# Patient Record
Sex: MALE | Race: WHITE | NOT HISPANIC OR LATINO | Employment: OTHER | URBAN - METROPOLITAN AREA
[De-identification: names, ages, dates, MRNs, and addresses within clinical notes are randomized per-mention and may not be internally consistent; named-entity substitution may affect disease eponyms.]

---

## 2017-01-01 ENCOUNTER — APPOINTMENT (OUTPATIENT)
Dept: GENERAL RADIOLOGY | Facility: HOSPITAL | Age: 82
End: 2017-01-01
Attending: INTERNAL MEDICINE

## 2017-01-01 ENCOUNTER — APPOINTMENT (OUTPATIENT)
Dept: NEUROLOGY | Facility: HOSPITAL | Age: 82
End: 2017-01-01
Attending: EMERGENCY MEDICINE

## 2017-01-01 ENCOUNTER — TRANSCRIBE ORDERS (OUTPATIENT)
Dept: ADMINISTRATIVE | Facility: HOSPITAL | Age: 82
End: 2017-01-01

## 2017-01-01 ENCOUNTER — APPOINTMENT (OUTPATIENT)
Dept: GENERAL RADIOLOGY | Facility: HOSPITAL | Age: 82
End: 2017-01-01

## 2017-01-01 ENCOUNTER — LAB REQUISITION (OUTPATIENT)
Dept: LAB | Facility: HOSPITAL | Age: 82
End: 2017-01-01

## 2017-01-01 ENCOUNTER — RESULTS ENCOUNTER (OUTPATIENT)
Dept: TELEMETRY | Facility: HOSPITAL | Age: 82
End: 2017-01-01

## 2017-01-01 ENCOUNTER — APPOINTMENT (OUTPATIENT)
Dept: CT IMAGING | Facility: HOSPITAL | Age: 82
End: 2017-01-01

## 2017-01-01 ENCOUNTER — OFFICE VISIT (OUTPATIENT)
Dept: CARDIOLOGY | Facility: CLINIC | Age: 82
End: 2017-01-01

## 2017-01-01 ENCOUNTER — HOSPITAL ENCOUNTER (INPATIENT)
Facility: HOSPITAL | Age: 82
LOS: 9 days | End: 2018-01-07
Attending: EMERGENCY MEDICINE | Admitting: INTERNAL MEDICINE

## 2017-01-01 ENCOUNTER — HOSPITAL ENCOUNTER (INPATIENT)
Facility: HOSPITAL | Age: 82
LOS: 21 days | Discharge: HOME HEALTH | End: 2017-08-07
Attending: EMERGENCY MEDICINE | Admitting: FAMILY MEDICINE

## 2017-01-01 ENCOUNTER — APPOINTMENT (OUTPATIENT)
Dept: CT IMAGING | Facility: HOSPITAL | Age: 82
End: 2017-01-01
Attending: UROLOGY

## 2017-01-01 ENCOUNTER — APPOINTMENT (OUTPATIENT)
Dept: GENERAL RADIOLOGY | Facility: HOSPITAL | Age: 82
End: 2017-01-01
Attending: HOSPITALIST

## 2017-01-01 ENCOUNTER — APPOINTMENT (OUTPATIENT)
Dept: CARDIOLOGY | Facility: HOSPITAL | Age: 82
End: 2017-01-01

## 2017-01-01 ENCOUNTER — HOSPITAL ENCOUNTER (OUTPATIENT)
Facility: HOSPITAL | Age: 82
Setting detail: OBSERVATION
Discharge: HOME | End: 2017-11-23
Attending: EMERGENCY MEDICINE | Admitting: INTERNAL MEDICINE

## 2017-01-01 ENCOUNTER — LAB (OUTPATIENT)
Dept: LAB | Facility: HOSPITAL | Age: 82
End: 2017-01-01

## 2017-01-01 ENCOUNTER — HOSPITAL ENCOUNTER (OUTPATIENT)
Dept: CT IMAGING | Facility: HOSPITAL | Age: 82
Discharge: HOME | End: 2017-07-06
Attending: UROLOGY | Admitting: UROLOGY

## 2017-01-01 ENCOUNTER — HOSPITAL ENCOUNTER (INPATIENT)
Facility: HOSPITAL | Age: 82
LOS: 6 days | Discharge: HOME HEALTH | End: 2017-02-28
Attending: EMERGENCY MEDICINE | Admitting: FAMILY MEDICINE

## 2017-01-01 ENCOUNTER — HOSPITAL ENCOUNTER (OUTPATIENT)
Dept: INTERVENTIONAL RADIOLOGY/VASCULAR | Facility: HOSPITAL | Age: 82
Discharge: HOME | End: 2017-06-19
Attending: INTERNAL MEDICINE | Admitting: INTERNAL MEDICINE

## 2017-01-01 ENCOUNTER — HOSPITAL ENCOUNTER (INPATIENT)
Facility: HOSPITAL | Age: 82
LOS: 14 days | Discharge: NURSING, REHAB OR LONG-TERM CARE | End: 2017-10-03
Attending: EMERGENCY MEDICINE | Admitting: INTERNAL MEDICINE

## 2017-01-01 ENCOUNTER — HOSPITAL ENCOUNTER (OUTPATIENT)
Dept: GENERAL RADIOLOGY | Facility: HOSPITAL | Age: 82
Discharge: HOME | End: 2017-03-03
Attending: INTERNAL MEDICINE | Admitting: INTERNAL MEDICINE

## 2017-01-01 VITALS
HEIGHT: 72 IN | TEMPERATURE: 98 F | RESPIRATION RATE: 18 BRPM | OXYGEN SATURATION: 96 % | WEIGHT: 257.4 LBS | BODY MASS INDEX: 34.86 KG/M2 | SYSTOLIC BLOOD PRESSURE: 133 MMHG | HEART RATE: 87 BPM | DIASTOLIC BLOOD PRESSURE: 66 MMHG

## 2017-01-01 VITALS
BODY MASS INDEX: 35.84 KG/M2 | HEART RATE: 97 BPM | SYSTOLIC BLOOD PRESSURE: 161 MMHG | HEIGHT: 72 IN | DIASTOLIC BLOOD PRESSURE: 125 MMHG | WEIGHT: 264.6 LBS

## 2017-01-01 VITALS
WEIGHT: 237.7 LBS | HEIGHT: 74 IN | DIASTOLIC BLOOD PRESSURE: 79 MMHG | BODY MASS INDEX: 30.51 KG/M2 | RESPIRATION RATE: 16 BRPM | HEART RATE: 80 BPM | SYSTOLIC BLOOD PRESSURE: 122 MMHG | TEMPERATURE: 98.5 F | OXYGEN SATURATION: 100 %

## 2017-01-01 VITALS
OXYGEN SATURATION: 94 % | BODY MASS INDEX: 34.96 KG/M2 | RESPIRATION RATE: 18 BRPM | WEIGHT: 236 LBS | TEMPERATURE: 98.2 F | SYSTOLIC BLOOD PRESSURE: 122 MMHG | HEIGHT: 69 IN | HEART RATE: 89 BPM | DIASTOLIC BLOOD PRESSURE: 76 MMHG

## 2017-01-01 VITALS
HEART RATE: 82 BPM | TEMPERATURE: 98.4 F | SYSTOLIC BLOOD PRESSURE: 106 MMHG | WEIGHT: 228.4 LBS | BODY MASS INDEX: 30.94 KG/M2 | OXYGEN SATURATION: 97 % | DIASTOLIC BLOOD PRESSURE: 57 MMHG | RESPIRATION RATE: 18 BRPM | HEIGHT: 72 IN

## 2017-01-01 VITALS
DIASTOLIC BLOOD PRESSURE: 70 MMHG | TEMPERATURE: 97.8 F | RESPIRATION RATE: 17 BRPM | OXYGEN SATURATION: 92 % | HEART RATE: 91 BPM | WEIGHT: 236.5 LBS | SYSTOLIC BLOOD PRESSURE: 109 MMHG | HEIGHT: 72 IN | BODY MASS INDEX: 32.03 KG/M2

## 2017-01-01 DIAGNOSIS — B96.89 ACUTE BACTERIAL EPIGLOTTITIS: Primary | ICD-10-CM

## 2017-01-01 DIAGNOSIS — R09.02 HYPOXIA: ICD-10-CM

## 2017-01-01 DIAGNOSIS — N39.0 URINARY TRACT INFECTION, SITE NOT SPECIFIED: Primary | ICD-10-CM

## 2017-01-01 DIAGNOSIS — Z74.09 IMPAIRED FUNCTIONAL MOBILITY, BALANCE, GAIT, AND ENDURANCE: ICD-10-CM

## 2017-01-01 DIAGNOSIS — B96.89 OTHER SPECIFIED BACTERIAL AGENTS AS THE CAUSE OF DISEASES CLASSIFIED ELSEWHERE: ICD-10-CM

## 2017-01-01 DIAGNOSIS — E78.5 DYSLIPIDEMIA: Chronic | ICD-10-CM

## 2017-01-01 DIAGNOSIS — R25.1 EPISODE OF SHAKING: ICD-10-CM

## 2017-01-01 DIAGNOSIS — E11.9 TYPE 2 DIABETES MELLITUS WITHOUT COMPLICATION, WITH LONG-TERM CURRENT USE OF INSULIN (HCC): Chronic | ICD-10-CM

## 2017-01-01 DIAGNOSIS — I11.0 DIASTOLIC HEART FAILURE SECONDARY TO HYPERTENSION (HCC): Chronic | ICD-10-CM

## 2017-01-01 DIAGNOSIS — J45.51: Primary | ICD-10-CM

## 2017-01-01 DIAGNOSIS — N18.2 CKD (CHRONIC KIDNEY DISEASE), STAGE 2 (MILD): Chronic | ICD-10-CM

## 2017-01-01 DIAGNOSIS — Z78.9 IMPAIRED MOBILITY AND ADLS: ICD-10-CM

## 2017-01-01 DIAGNOSIS — N40.1 ENLARGED PROSTATE WITH LOWER URINARY TRACT SYMPTOMS (LUTS): ICD-10-CM

## 2017-01-01 DIAGNOSIS — I10 ESSENTIAL HYPERTENSION: Chronic | ICD-10-CM

## 2017-01-01 DIAGNOSIS — R60.0 BILATERAL EDEMA OF LOWER EXTREMITY: Chronic | ICD-10-CM

## 2017-01-01 DIAGNOSIS — R79.1 SUPRATHERAPEUTIC INR: ICD-10-CM

## 2017-01-01 DIAGNOSIS — I50.30 DIASTOLIC HEART FAILURE SECONDARY TO HYPERTENSION (HCC): Chronic | ICD-10-CM
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
## 2017-01-01 DIAGNOSIS — R60.9 PERIPHERAL EDEMA: ICD-10-CM

## 2017-01-01 DIAGNOSIS — Z74.09 IMPAIRED MOBILITY AND ADLS: ICD-10-CM

## 2017-01-01 DIAGNOSIS — R31.0 FRANK HEMATURIA: ICD-10-CM

## 2017-01-01 DIAGNOSIS — R06.02 SHORTNESS OF BREATH: ICD-10-CM

## 2017-01-01 DIAGNOSIS — N39.0 SEPSIS DUE TO URINARY TRACT INFECTION (HCC): Primary | ICD-10-CM

## 2017-01-01 DIAGNOSIS — Z86.79 HISTORY OF CORONARY ARTERY DISEASE: ICD-10-CM

## 2017-01-01 DIAGNOSIS — A41.9 SEPSIS DUE TO URINARY TRACT INFECTION (HCC): Primary | ICD-10-CM

## 2017-01-01 DIAGNOSIS — E11.9 TYPE 2 DIABETES MELLITUS WITHOUT COMPLICATION, UNSPECIFIED LONG TERM INSULIN USE STATUS: Chronic | ICD-10-CM

## 2017-01-01 DIAGNOSIS — I48.20 CHRONIC ATRIAL FIBRILLATION (HCC): Chronic | ICD-10-CM

## 2017-01-01 DIAGNOSIS — R53.83 FATIGUE, UNSPECIFIED TYPE: ICD-10-CM

## 2017-01-01 DIAGNOSIS — J05.10 ACUTE BACTERIAL EPIGLOTTITIS: Primary | ICD-10-CM

## 2017-01-01 DIAGNOSIS — R41.0 DISORIENTATION: ICD-10-CM

## 2017-01-01 DIAGNOSIS — M70.21 OLECRANON BURSITIS OF RIGHT ELBOW: ICD-10-CM

## 2017-01-01 DIAGNOSIS — J40 BRONCHITIS: ICD-10-CM

## 2017-01-01 DIAGNOSIS — E87.70 HYPERVOLEMIA, UNSPECIFIED HYPERVOLEMIA TYPE: Primary | ICD-10-CM

## 2017-01-01 DIAGNOSIS — R41.89 SPELL OF ALTERED COGNITION: Primary | ICD-10-CM

## 2017-01-01 DIAGNOSIS — R31.0 GROSS HEMATURIA: ICD-10-CM

## 2017-01-01 DIAGNOSIS — Z79.4 TYPE 2 DIABETES MELLITUS WITHOUT COMPLICATION, WITH LONG-TERM CURRENT USE OF INSULIN (HCC): Chronic | ICD-10-CM

## 2017-01-01 DIAGNOSIS — N17.9 ACUTE RENAL FAILURE, UNSPECIFIED ACUTE RENAL FAILURE TYPE (HCC): ICD-10-CM

## 2017-01-01 DIAGNOSIS — R31.0 FRANK HEMATURIA: Primary | ICD-10-CM

## 2017-01-01 DIAGNOSIS — R13.10 DYSPHAGIA, UNSPECIFIED TYPE: ICD-10-CM

## 2017-01-01 DIAGNOSIS — N39.0 URINARY TRACT INFECTION: ICD-10-CM

## 2017-01-01 DIAGNOSIS — A41.9 SEPSIS, DUE TO UNSPECIFIED ORGANISM: ICD-10-CM

## 2017-01-01 DIAGNOSIS — N39.0 URINARY TRACT INFECTION, SITE NOT SPECIFIED: ICD-10-CM

## 2017-01-01 DIAGNOSIS — Z16.24 RESISTANCE TO MULTIPLE ANTIBIOTICS: ICD-10-CM

## 2017-01-01 DIAGNOSIS — M25.552 LEFT HIP PAIN: Primary | ICD-10-CM

## 2017-01-01 DIAGNOSIS — I50.23 ACUTE ON CHRONIC SYSTOLIC CONGESTIVE HEART FAILURE (HCC): ICD-10-CM

## 2017-01-01 DIAGNOSIS — R33.8 OTHER RETENTION OF URINE: ICD-10-CM

## 2017-01-01 DIAGNOSIS — I25.810 CORONARY ARTERY DISEASE INVOLVING CORONARY BYPASS GRAFT OF NATIVE HEART WITHOUT ANGINA PECTORIS: Primary | ICD-10-CM

## 2017-01-01 DIAGNOSIS — I50.33 ACUTE ON CHRONIC DIASTOLIC CONGESTIVE HEART FAILURE (HCC): Primary | ICD-10-CM

## 2017-01-01 LAB
ALBUMIN SERPL-MCNC: 3.4 G/DL (ref 3.2–4.8)
ALBUMIN SERPL-MCNC: 3.5 G/DL (ref 3.2–4.8)
ALBUMIN SERPL-MCNC: 3.6 G/DL (ref 3.2–4.8)
ALBUMIN SERPL-MCNC: 3.7 G/DL (ref 3.2–4.8)
ALBUMIN SERPL-MCNC: 3.7 G/DL (ref 3.2–4.8)
ALBUMIN SERPL-MCNC: 3.8 G/DL (ref 3.2–4.8)
ALBUMIN SERPL-MCNC: 3.9 G/DL (ref 3.2–4.8)
ALBUMIN SERPL-MCNC: 4 G/DL (ref 3.2–4.8)
ALBUMIN SERPL-MCNC: 4.1 G/DL (ref 3.2–4.8)
ALBUMIN SERPL-MCNC: 4.3 G/DL (ref 3.2–4.8)
ALBUMIN/GLOB SERPL: 1.1 G/DL (ref 1.5–2.5)
ALBUMIN/GLOB SERPL: 1.2 G/DL (ref 1.5–2.5)
ALBUMIN/GLOB SERPL: 1.3 G/DL (ref 1.5–2.5)
ALBUMIN/GLOB SERPL: 1.4 G/DL (ref 1.5–2.5)
ALBUMIN/GLOB SERPL: 1.4 G/DL (ref 1.5–2.5)
ALBUMIN/GLOB SERPL: 1.5 G/DL (ref 1.5–2.5)
ALP SERPL-CCNC: 67 U/L (ref 25–100)
ALP SERPL-CCNC: 68 U/L (ref 25–100)
ALP SERPL-CCNC: 71 U/L (ref 25–100)
ALP SERPL-CCNC: 72 U/L (ref 25–100)
ALP SERPL-CCNC: 73 U/L (ref 25–100)
ALP SERPL-CCNC: 73 U/L (ref 25–100)
ALP SERPL-CCNC: 75 U/L (ref 25–100)
ALP SERPL-CCNC: 80 U/L (ref 25–100)
ALP SERPL-CCNC: 83 U/L (ref 25–100)
ALP SERPL-CCNC: 86 U/L (ref 25–100)
ALP SERPL-CCNC: 90 U/L (ref 25–100)
ALT SERPL W P-5'-P-CCNC: 11 U/L (ref 7–40)
ALT SERPL W P-5'-P-CCNC: 12 U/L (ref 7–40)
ALT SERPL W P-5'-P-CCNC: 12 U/L (ref 7–40)
ALT SERPL W P-5'-P-CCNC: 13 U/L (ref 7–40)
ALT SERPL W P-5'-P-CCNC: 15 U/L (ref 7–40)
ALT SERPL W P-5'-P-CCNC: 16 U/L (ref 7–40)
ALT SERPL W P-5'-P-CCNC: 20 U/L (ref 7–40)
ALT SERPL W P-5'-P-CCNC: 32 U/L (ref 7–40)
ALT SERPL W P-5'-P-CCNC: 32 U/L (ref 7–40)
AMPHET+METHAMPHET UR QL: NEGATIVE
AMPHETAMINES UR QL: NEGATIVE
ANION GAP SERPL CALCULATED.3IONS-SCNC: 1 MMOL/L (ref 3–11)
ANION GAP SERPL CALCULATED.3IONS-SCNC: 11 MMOL/L (ref 3–11)
ANION GAP SERPL CALCULATED.3IONS-SCNC: 11 MMOL/L (ref 3–11)
ANION GAP SERPL CALCULATED.3IONS-SCNC: 13 MMOL/L (ref 3–11)
ANION GAP SERPL CALCULATED.3IONS-SCNC: 2 MMOL/L (ref 3–11)
ANION GAP SERPL CALCULATED.3IONS-SCNC: 2 MMOL/L (ref 3–11)
ANION GAP SERPL CALCULATED.3IONS-SCNC: 3 MMOL/L (ref 3–11)
ANION GAP SERPL CALCULATED.3IONS-SCNC: 4 MMOL/L (ref 3–11)
ANION GAP SERPL CALCULATED.3IONS-SCNC: 5 MMOL/L (ref 3–11)
ANION GAP SERPL CALCULATED.3IONS-SCNC: 5 MMOL/L (ref 3–11)
ANION GAP SERPL CALCULATED.3IONS-SCNC: 6 MMOL/L (ref 3–11)
ANION GAP SERPL CALCULATED.3IONS-SCNC: 7 MMOL/L (ref 3–11)
ANION GAP SERPL CALCULATED.3IONS-SCNC: 8 MMOL/L (ref 3–11)
ANION GAP SERPL CALCULATED.3IONS-SCNC: 9 MMOL/L (ref 3–11)
ARTERIAL PATENCY WRIST A: ABNORMAL
ARTERIAL PATENCY WRIST A: POSITIVE
ARTICHOKE IGE QN: 58 MG/DL (ref 0–130)
AST SERPL-CCNC: 18 U/L (ref 0–33)
AST SERPL-CCNC: 19 U/L (ref 0–33)
AST SERPL-CCNC: 20 U/L (ref 0–33)
AST SERPL-CCNC: 20 U/L (ref 0–33)
AST SERPL-CCNC: 22 U/L (ref 0–33)
AST SERPL-CCNC: 22 U/L (ref 0–33)
AST SERPL-CCNC: 24 U/L (ref 0–33)
AST SERPL-CCNC: 25 U/L (ref 0–33)
AST SERPL-CCNC: 26 U/L (ref 0–33)
ATMOSPHERIC PRESS: ABNORMAL MMHG
BACTERIA FLD CULT: ABNORMAL
BACTERIA FLD CULT: ABNORMAL
BACTERIA FLD CULT: NORMAL
BACTERIA SPEC AEROBE CULT: ABNORMAL
BACTERIA SPEC AEROBE CULT: NORMAL
BACTERIA SPEC RESP CULT: ABNORMAL
BACTERIA SPEC RESP CULT: ABNORMAL
BACTERIA UR QL AUTO: ABNORMAL /HPF
BARBITURATES UR QL SCN: NEGATIVE
BASE EXCESS BLDA CALC-SCNC: -1.4 MMOL/L (ref 0–2)
BASE EXCESS BLDA CALC-SCNC: -1.8 MMOL/L (ref 0–2)
BASE EXCESS BLDA CALC-SCNC: -1.9 MMOL/L (ref 0–2)
BASE EXCESS BLDA CALC-SCNC: 9 MMOL/L (ref 0–2)
BASOPHILS # BLD AUTO: 0.01 10*3/MM3 (ref 0–0.2)
BASOPHILS # BLD AUTO: 0.02 10*3/MM3 (ref 0–0.2)
BASOPHILS # BLD AUTO: 0.03 10*3/MM3 (ref 0–0.2)
BASOPHILS # BLD AUTO: 0.04 10*3/MM3 (ref 0–0.2)
BASOPHILS # BLD MANUAL: 0 10*3/MM3 (ref 0–0.2)
BASOPHILS NFR BLD AUTO: 0 % (ref 0–1)
BASOPHILS NFR BLD AUTO: 0.1 % (ref 0–1)
BASOPHILS NFR BLD AUTO: 0.2 % (ref 0–1)
BASOPHILS NFR BLD AUTO: 0.3 % (ref 0–1)
BASOPHILS NFR BLD AUTO: 0.5 % (ref 0–1)
BASOPHILS NFR BLD AUTO: 0.7 % (ref 0–1)
BASOPHILS NFR BLD AUTO: 0.7 % (ref 0–1)
BASOPHILS NFR BLD AUTO: 0.8 % (ref 0–1)
BDY SITE: ABNORMAL
BENZODIAZ UR QL SCN: NEGATIVE
BH CV ECHO MEAS - AI DEC SLOPE: 326 CM/SEC^2
BH CV ECHO MEAS - AI MAX PG: 57 MMHG
BH CV ECHO MEAS - AI MAX VEL: 377.6 CM/SEC
BH CV ECHO MEAS - AI P1/2T: 339.2 MSEC
BH CV ECHO MEAS - AO MAX PG (FULL): 4.1 MMHG
BH CV ECHO MEAS - AO MAX PG: 6.7 MMHG
BH CV ECHO MEAS - AO MEAN PG (FULL): 2.3 MMHG
BH CV ECHO MEAS - AO MEAN PG: 3.5 MMHG
BH CV ECHO MEAS - AO ROOT AREA (BSA CORRECTED): 1.3
BH CV ECHO MEAS - AO ROOT AREA (BSA CORRECTED): 1.5
BH CV ECHO MEAS - AO ROOT AREA: 10.1 CM^2
BH CV ECHO MEAS - AO ROOT AREA: 8 CM^2
BH CV ECHO MEAS - AO ROOT DIAM: 3.2 CM
BH CV ECHO MEAS - AO ROOT DIAM: 3.6 CM
BH CV ECHO MEAS - AO V2 MAX: 129.1 CM/SEC
BH CV ECHO MEAS - AO V2 MEAN: 86.2 CM/SEC
BH CV ECHO MEAS - AO V2 VTI: 20.5 CM
BH CV ECHO MEAS - AVA(I,A): 2.5 CM^2
BH CV ECHO MEAS - AVA(I,D): 2.5 CM^2
BH CV ECHO MEAS - AVA(V,A): 2.3 CM^2
BH CV ECHO MEAS - AVA(V,D): 2.3 CM^2
BH CV ECHO MEAS - BSA(HAYCOCK): 2.4 M^2
BH CV ECHO MEAS - BSA(HAYCOCK): 2.5 M^2
BH CV ECHO MEAS - BSA: 2.3 M^2
BH CV ECHO MEAS - BSA: 2.4 M^2
BH CV ECHO MEAS - BZI_BMI: 30.2 KILOGRAMS/M^2
BH CV ECHO MEAS - BZI_BMI: 35.7 KILOGRAMS/M^2
BH CV ECHO MEAS - BZI_METRIC_HEIGHT: 182.9 CM
BH CV ECHO MEAS - BZI_METRIC_HEIGHT: 188 CM
BH CV ECHO MEAS - BZI_METRIC_WEIGHT: 106.6 KG
BH CV ECHO MEAS - BZI_METRIC_WEIGHT: 119.3 KG
BH CV ECHO MEAS - CONTRAST EF 4CH: 34.3 ML/M^2
BH CV ECHO MEAS - EDV(CUBED): 103.8 ML
BH CV ECHO MEAS - EDV(CUBED): 96.5 ML
BH CV ECHO MEAS - EDV(MOD-SP4): 99 ML
BH CV ECHO MEAS - EDV(TEICH): 102.4 ML
BH CV ECHO MEAS - EDV(TEICH): 96.7 ML
BH CV ECHO MEAS - EF(CUBED): 30.4 %
BH CV ECHO MEAS - EF(CUBED): 38.4 %
BH CV ECHO MEAS - EF(MOD-SP4): 34.3 %
BH CV ECHO MEAS - EF(TEICH): 24.8 %
BH CV ECHO MEAS - EF(TEICH): 31.6 %
BH CV ECHO MEAS - ESV(CUBED): 64 ML
BH CV ECHO MEAS - ESV(CUBED): 67.1 ML
BH CV ECHO MEAS - ESV(MOD-SP4): 65 ML
BH CV ECHO MEAS - ESV(TEICH): 70 ML
BH CV ECHO MEAS - ESV(TEICH): 72.7 ML
BH CV ECHO MEAS - FS: 11.4 %
BH CV ECHO MEAS - FS: 14.9 %
BH CV ECHO MEAS - IVS/LVPW: 0.99
BH CV ECHO MEAS - IVS/LVPW: 1.2
BH CV ECHO MEAS - IVSD: 1.3 CM
BH CV ECHO MEAS - IVSD: 1.5 CM
BH CV ECHO MEAS - LA DIMENSION: 4.4 CM
BH CV ECHO MEAS - LA DIMENSION: 4.8 CM
BH CV ECHO MEAS - LA/AO: 1.2
BH CV ECHO MEAS - LA/AO: 1.5
BH CV ECHO MEAS - LAT PEAK E' VEL: 5.4 CM/SEC
BH CV ECHO MEAS - LV DIASTOLIC VOL/BSA (35-75): 42.5 ML/M^2
BH CV ECHO MEAS - LV MASS(C)D: 231.3 GRAMS
BH CV ECHO MEAS - LV MASS(C)D: 238.1 GRAMS
BH CV ECHO MEAS - LV MASS(C)DI: 102.3 GRAMS/M^2
BH CV ECHO MEAS - LV MASS(C)DI: 96.6 GRAMS/M^2
BH CV ECHO MEAS - LV MAX PG: 2.5 MMHG
BH CV ECHO MEAS - LV MEAN PG: 1.2 MMHG
BH CV ECHO MEAS - LV SYSTOLIC VOL/BSA (12-30): 27.9 ML/M^2
BH CV ECHO MEAS - LV V1 MAX: 79.4 CM/SEC
BH CV ECHO MEAS - LV V1 MEAN: 50.4 CM/SEC
BH CV ECHO MEAS - LV V1 VTI: 13.6 CM
BH CV ECHO MEAS - LVIDD: 4.6 CM
BH CV ECHO MEAS - LVIDD: 4.7 CM
BH CV ECHO MEAS - LVIDS: 4 CM
BH CV ECHO MEAS - LVIDS: 4.1 CM
BH CV ECHO MEAS - LVLD AP4: 8.2 CM
BH CV ECHO MEAS - LVLS AP4: 7.6 CM
BH CV ECHO MEAS - LVOT AREA (M): 3.5 CM^2
BH CV ECHO MEAS - LVOT AREA (M): 3.8 CM^2
BH CV ECHO MEAS - LVOT AREA: 3.5 CM^2
BH CV ECHO MEAS - LVOT AREA: 3.8 CM^2
BH CV ECHO MEAS - LVOT DIAM: 2.1 CM
BH CV ECHO MEAS - LVOT DIAM: 2.2 CM
BH CV ECHO MEAS - LVPWD: 1.2 CM
BH CV ECHO MEAS - LVPWD: 1.3 CM
BH CV ECHO MEAS - MED PEAK E' VEL: 8.95 CM/SEC
BH CV ECHO MEAS - MV DEC SLOPE: 381 CM/SEC^2
BH CV ECHO MEAS - MV DEC TIME: 0.14 SEC
BH CV ECHO MEAS - MV DEC TIME: 0.18 SEC
BH CV ECHO MEAS - MV E MAX VEL: 110.4 CM/SEC
BH CV ECHO MEAS - MV E MAX VEL: 124 CM/SEC
BH CV ECHO MEAS - MV P1/2T MAX VEL: 125 CM/SEC
BH CV ECHO MEAS - MV P1/2T: 96.1 MSEC
BH CV ECHO MEAS - MVA P1/2T LCG: 1.8 CM^2
BH CV ECHO MEAS - MVA(P1/2T): 2.3 CM^2
BH CV ECHO MEAS - PA ACC SLOPE: 313 CM/SEC^2
BH CV ECHO MEAS - PA ACC SLOPE: 570.3 CM/SEC^2
BH CV ECHO MEAS - PA ACC TIME: 0.09 SEC
BH CV ECHO MEAS - PA ACC TIME: 0.14 SEC
BH CV ECHO MEAS - PA PR(ACCEL): 17.4 MMHG
BH CV ECHO MEAS - PA PR(ACCEL): 38.6 MMHG
BH CV ECHO MEAS - RAP SYSTOLE: 10 MMHG
BH CV ECHO MEAS - RVDD: 3.4 CM
BH CV ECHO MEAS - RVSP: 33 MMHG
BH CV ECHO MEAS - SI(AO): 88.6 ML/M^2
BH CV ECHO MEAS - SI(CUBED): 12.6 ML/M^2
BH CV ECHO MEAS - SI(CUBED): 16.6 ML/M^2
BH CV ECHO MEAS - SI(LVOT): 22 ML/M^2
BH CV ECHO MEAS - SI(MOD-SP4): 14.6 ML/M^2
BH CV ECHO MEAS - SI(TEICH): 10.3 ML/M^2
BH CV ECHO MEAS - SI(TEICH): 13.5 ML/M^2
BH CV ECHO MEAS - SV(AO): 206.1 ML
BH CV ECHO MEAS - SV(CUBED): 29.4 ML
BH CV ECHO MEAS - SV(CUBED): 39.8 ML
BH CV ECHO MEAS - SV(LVOT): 51.3 ML
BH CV ECHO MEAS - SV(MOD-SP4): 34 ML
BH CV ECHO MEAS - SV(TEICH): 24 ML
BH CV ECHO MEAS - SV(TEICH): 32.4 ML
BH CV ECHO MEAS - TR MAX VEL: 227.5 CM/SEC
BH CV ECHO MEAS - TR MAX VEL: 240 CM/SEC
BH CV VAS BP LEFT ARM: NORMAL MMHG
BH CV XLRA - RV BASE: 4.6 CM
BH CV XLRA - RV BASE: 4.8 CM
BH CV XLRA - RV LENGTH: 7.1 CM
BH CV XLRA - RV LENGTH: 7.3 CM
BH CV XLRA - RV MID: 3.8 CM
BH CV XLRA - RV MID: 3.9 CM
BH CV XLRA - TDI S': 4.51 CM/SEC
BILIRUB SERPL-MCNC: 0.4 MG/DL (ref 0.3–1.2)
BILIRUB SERPL-MCNC: 0.8 MG/DL (ref 0.3–1.2)
BILIRUB SERPL-MCNC: 0.9 MG/DL (ref 0.3–1.2)
BILIRUB SERPL-MCNC: 1.3 MG/DL (ref 0.3–1.2)
BILIRUB SERPL-MCNC: 1.4 MG/DL (ref 0.3–1.2)
BILIRUB SERPL-MCNC: 1.6 MG/DL (ref 0.3–1.2)
BILIRUB SERPL-MCNC: 1.7 MG/DL (ref 0.3–1.2)
BILIRUB SERPL-MCNC: 2 MG/DL (ref 0.3–1.2)
BILIRUB UR QL STRIP: ABNORMAL
BILIRUB UR QL STRIP: NEGATIVE
BNP SERPL-MCNC: 150 PG/ML (ref 0–100)
BNP SERPL-MCNC: 188 PG/ML (ref 0–100)
BNP SERPL-MCNC: 223 PG/ML (ref 0–100)
BNP SERPL-MCNC: 345 PG/ML (ref 0–100)
BNP SERPL-MCNC: 394 PG/ML (ref 0–100)
BNP SERPL-MCNC: 399 PG/ML (ref 0–100)
BNP SERPL-MCNC: 431 PG/ML (ref 0–100)
BNP SERPL-MCNC: 589 PG/ML (ref 0–100)
BNP SERPL-MCNC: 619 PG/ML (ref 0–100)
BUN BLD-MCNC: 14 MG/DL (ref 9–23)
BUN BLD-MCNC: 15 MG/DL (ref 9–23)
BUN BLD-MCNC: 16 MG/DL (ref 9–23)
BUN BLD-MCNC: 16 MG/DL (ref 9–23)
BUN BLD-MCNC: 17 MG/DL (ref 9–23)
BUN BLD-MCNC: 19 MG/DL (ref 9–23)
BUN BLD-MCNC: 20 MG/DL (ref 9–23)
BUN BLD-MCNC: 21 MG/DL (ref 9–23)
BUN BLD-MCNC: 22 MG/DL (ref 9–23)
BUN BLD-MCNC: 23 MG/DL (ref 9–23)
BUN BLD-MCNC: 24 MG/DL (ref 9–23)
BUN BLD-MCNC: 24 MG/DL (ref 9–23)
BUN BLD-MCNC: 25 MG/DL (ref 9–23)
BUN BLD-MCNC: 25 MG/DL (ref 9–23)
BUN BLD-MCNC: 26 MG/DL (ref 9–23)
BUN BLD-MCNC: 30 MG/DL (ref 9–23)
BUN BLD-MCNC: 32 MG/DL (ref 9–23)
BUN BLD-MCNC: 35 MG/DL (ref 9–23)
BUN BLD-MCNC: 35 MG/DL (ref 9–23)
BUN BLD-MCNC: 39 MG/DL (ref 9–23)
BUN BLD-MCNC: 41 MG/DL (ref 9–23)
BUN BLD-MCNC: 41 MG/DL (ref 9–23)
BUN/CREAT SERPL: 12.7 (ref 7–25)
BUN/CREAT SERPL: 13.3 (ref 7–25)
BUN/CREAT SERPL: 13.3 (ref 7–25)
BUN/CREAT SERPL: 13.6 (ref 7–25)
BUN/CREAT SERPL: 13.6 (ref 7–25)
BUN/CREAT SERPL: 14.6 (ref 7–25)
BUN/CREAT SERPL: 15 (ref 7–25)
BUN/CREAT SERPL: 15 (ref 7–25)
BUN/CREAT SERPL: 16.7 (ref 7–25)
BUN/CREAT SERPL: 16.7 (ref 7–25)
BUN/CREAT SERPL: 17 (ref 7–25)
BUN/CREAT SERPL: 17.3 (ref 7–25)
BUN/CREAT SERPL: 18.2 (ref 7–25)
BUN/CREAT SERPL: 19 (ref 7–25)
BUN/CREAT SERPL: 20 (ref 7–25)
BUN/CREAT SERPL: 20.9 (ref 7–25)
BUN/CREAT SERPL: 21 (ref 7–25)
BUN/CREAT SERPL: 22.2 (ref 7–25)
BUN/CREAT SERPL: 22.7 (ref 7–25)
BUN/CREAT SERPL: 23 (ref 7–25)
BUN/CREAT SERPL: 24 (ref 7–25)
BUN/CREAT SERPL: 24 (ref 7–25)
BUN/CREAT SERPL: 24.4 (ref 7–25)
BUN/CREAT SERPL: 25 (ref 7–25)
BUN/CREAT SERPL: 25 (ref 7–25)
BUN/CREAT SERPL: 25.6 (ref 7–25)
BUN/CREAT SERPL: 26 (ref 7–25)
BUN/CREAT SERPL: 29.1 (ref 7–25)
BUN/CREAT SERPL: 35 (ref 7–25)
BUN/CREAT SERPL: 37.3 (ref 7–25)
BUN/CREAT SERPL: 38.9 (ref 7–25)
BUN/CREAT SERPL: 41 (ref 7–25)
BUPRENORPHINE SERPL-MCNC: NEGATIVE NG/ML
CALCIUM SPEC-SCNC: 8.6 MG/DL (ref 8.7–10.4)
CALCIUM SPEC-SCNC: 8.7 MG/DL (ref 8.7–10.4)
CALCIUM SPEC-SCNC: 8.8 MG/DL (ref 8.7–10.4)
CALCIUM SPEC-SCNC: 8.9 MG/DL (ref 8.7–10.4)
CALCIUM SPEC-SCNC: 9 MG/DL (ref 8.7–10.4)
CALCIUM SPEC-SCNC: 9.1 MG/DL (ref 8.7–10.4)
CALCIUM SPEC-SCNC: 9.2 MG/DL (ref 8.7–10.4)
CALCIUM SPEC-SCNC: 9.3 MG/DL (ref 8.7–10.4)
CALCIUM SPEC-SCNC: 9.5 MG/DL (ref 8.7–10.4)
CALCIUM SPEC-SCNC: 9.7 MG/DL (ref 8.7–10.4)
CALCIUM SPEC-SCNC: 9.8 MG/DL (ref 8.7–10.4)
CALCIUM SPEC-SCNC: 9.8 MG/DL (ref 8.7–10.4)
CALCIUM SPEC-SCNC: 9.9 MG/DL (ref 8.7–10.4)
CANNABINOIDS SERPL QL: NEGATIVE
CHLORIDE SERPL-SCNC: 100 MMOL/L (ref 99–109)
CHLORIDE SERPL-SCNC: 102 MMOL/L (ref 99–109)
CHLORIDE SERPL-SCNC: 103 MMOL/L (ref 99–109)
CHLORIDE SERPL-SCNC: 104 MMOL/L (ref 99–109)
CHLORIDE SERPL-SCNC: 105 MMOL/L (ref 99–109)
CHLORIDE SERPL-SCNC: 106 MMOL/L (ref 99–109)
CHLORIDE SERPL-SCNC: 107 MMOL/L (ref 99–109)
CHLORIDE SERPL-SCNC: 97 MMOL/L (ref 99–109)
CHLORIDE SERPL-SCNC: 97 MMOL/L (ref 99–109)
CHLORIDE SERPL-SCNC: 98 MMOL/L (ref 99–109)
CHLORIDE SERPL-SCNC: 99 MMOL/L (ref 99–109)
CHLORIDE SERPL-SCNC: 99 MMOL/L (ref 99–109)
CHOLEST SERPL-MCNC: 102 MG/DL (ref 0–200)
CLARITY UR: ABNORMAL
CLARITY UR: CLEAR
CO2 BLDA-SCNC: 23.4 MMOL/L (ref 22–33)
CO2 BLDA-SCNC: 23.8 MMOL/L (ref 22–33)
CO2 BLDA-SCNC: 25.3 MMOL/L (ref 22–33)
CO2 BLDA-SCNC: 34.4 MMOL/L (ref 22–33)
CO2 SERPL-SCNC: 23 MMOL/L (ref 20–31)
CO2 SERPL-SCNC: 24 MMOL/L (ref 20–31)
CO2 SERPL-SCNC: 24 MMOL/L (ref 20–31)
CO2 SERPL-SCNC: 25 MMOL/L (ref 20–31)
CO2 SERPL-SCNC: 26 MMOL/L (ref 20–31)
CO2 SERPL-SCNC: 26 MMOL/L (ref 20–31)
CO2 SERPL-SCNC: 27 MMOL/L (ref 20–31)
CO2 SERPL-SCNC: 27 MMOL/L (ref 20–31)
CO2 SERPL-SCNC: 28 MMOL/L (ref 20–31)
CO2 SERPL-SCNC: 29 MMOL/L (ref 20–31)
CO2 SERPL-SCNC: 30 MMOL/L (ref 20–31)
CO2 SERPL-SCNC: 31 MMOL/L (ref 20–31)
CO2 SERPL-SCNC: 32 MMOL/L (ref 20–31)
CO2 SERPL-SCNC: 33 MMOL/L (ref 20–31)
CO2 SERPL-SCNC: 36 MMOL/L (ref 20–31)
CO2 SERPL-SCNC: 36 MMOL/L (ref 20–31)
COCAINE UR QL: NEGATIVE
COHGB MFR BLD: 1.7 % (ref 0–2)
COHGB MFR BLD: 1.9 % (ref 0–2)
COHGB MFR BLD: 2.3 % (ref 0–2)
COHGB MFR BLD: 2.7 % (ref 0–2)
COLOR UR: ABNORMAL
COLOR UR: YELLOW
CREAT BLD-MCNC: 0.9 MG/DL (ref 0.6–1.3)
CREAT BLD-MCNC: 1 MG/DL (ref 0.6–1.3)
CREAT BLD-MCNC: 1.1 MG/DL (ref 0.6–1.3)
CREAT BLD-MCNC: 1.2 MG/DL (ref 0.6–1.3)
CREAT BLD-MCNC: 1.3 MG/DL (ref 0.6–1.3)
CREAT BLD-MCNC: 1.6 MG/DL (ref 0.6–1.3)
CRP SERPL-MCNC: 0.23 MG/DL (ref 0–1)
CRP SERPL-MCNC: 0.36 MG/DL (ref 0–1)
CRP SERPL-MCNC: 0.43 MG/DL (ref 0–1)
CRP SERPL-MCNC: 0.47 MG/DL (ref 0–1)
CYTOLOGIST CVX/VAG CYTO: NORMAL
D DIMER PPP FEU-MCNC: 0.64 MG/L (FEU) (ref 0–0.5)
D-LACTATE SERPL-SCNC: 1.1 MMOL/L (ref 0.5–2)
D-LACTATE SERPL-SCNC: 1.9 MMOL/L (ref 0.5–2)
D-LACTATE SERPL-SCNC: 1.9 MMOL/L (ref 0.5–2)
D-LACTATE SERPL-SCNC: 3.7 MMOL/L (ref 0.5–2)
DEPRECATED RDW RBC AUTO: 46.8 FL (ref 37–54)
DEPRECATED RDW RBC AUTO: 47.8 FL (ref 37–54)
DEPRECATED RDW RBC AUTO: 48 FL (ref 37–54)
DEPRECATED RDW RBC AUTO: 48 FL (ref 37–54)
DEPRECATED RDW RBC AUTO: 48.2 FL (ref 37–54)
DEPRECATED RDW RBC AUTO: 48.3 FL (ref 37–54)
DEPRECATED RDW RBC AUTO: 48.9 FL (ref 37–54)
DEPRECATED RDW RBC AUTO: 49.7 FL (ref 37–54)
DEPRECATED RDW RBC AUTO: 50.4 FL (ref 37–54)
DEPRECATED RDW RBC AUTO: 50.9 FL (ref 37–54)
DEPRECATED RDW RBC AUTO: 51 FL (ref 37–54)
DEPRECATED RDW RBC AUTO: 51 FL (ref 37–54)
DEPRECATED RDW RBC AUTO: 51.1 FL (ref 37–54)
DEPRECATED RDW RBC AUTO: 51.6 FL (ref 37–54)
DEPRECATED RDW RBC AUTO: 52 FL (ref 37–54)
DEPRECATED RDW RBC AUTO: 52.1 FL (ref 37–54)
DEPRECATED RDW RBC AUTO: 53.8 FL (ref 37–54)
DEPRECATED RDW RBC AUTO: 53.8 FL (ref 37–54)
DEPRECATED RDW RBC AUTO: 54.1 FL (ref 37–54)
DEPRECATED RDW RBC AUTO: 54.6 FL (ref 37–54)
DEPRECATED RDW RBC AUTO: 54.8 FL (ref 37–54)
DEPRECATED RDW RBC AUTO: 54.9 FL (ref 37–54)
DEPRECATED RDW RBC AUTO: 55.2 FL (ref 37–54)
DEPRECATED RDW RBC AUTO: 55.4 FL (ref 37–54)
DEPRECATED RDW RBC AUTO: 55.9 FL (ref 37–54)
DEPRECATED RDW RBC AUTO: 56 FL (ref 37–54)
DEPRECATED RDW RBC AUTO: 56.2 FL (ref 37–54)
DEPRECATED RDW RBC AUTO: 56.7 FL (ref 37–54)
DEPRECATED RDW RBC AUTO: 60.8 FL (ref 37–54)
DEPRECATED RDW RBC AUTO: 63.5 FL (ref 37–54)
DEPRECATED RDW RBC AUTO: 63.9 FL (ref 37–54)
E/E' RATIO: 16.3
EOSINOPHIL # BLD AUTO: 0 10*3/MM3 (ref 0.1–0.3)
EOSINOPHIL # BLD AUTO: 0 10*3/MM3 (ref 0–0.3)
EOSINOPHIL # BLD AUTO: 0.01 10*3/MM3 (ref 0.1–0.3)
EOSINOPHIL # BLD AUTO: 0.01 10*3/MM3 (ref 0–0.3)
EOSINOPHIL # BLD AUTO: 0.02 10*3/MM3 (ref 0–0.3)
EOSINOPHIL # BLD AUTO: 0.04 10*3/MM3 (ref 0–0.3)
EOSINOPHIL # BLD AUTO: 0.05 10*3/MM3 (ref 0–0.3)
EOSINOPHIL # BLD AUTO: 0.06 10*3/MM3 (ref 0–0.3)
EOSINOPHIL # BLD AUTO: 0.06 10*3/MM3 (ref 0–0.3)
EOSINOPHIL # BLD AUTO: 0.07 10*3/MM3 (ref 0–0.3)
EOSINOPHIL # BLD AUTO: 0.08 10*3/MM3 (ref 0.1–0.3)
EOSINOPHIL # BLD AUTO: 0.08 10*3/MM3 (ref 0–0.3)
EOSINOPHIL # BLD AUTO: 0.08 10*3/MM3 (ref 0–0.3)
EOSINOPHIL # BLD AUTO: 0.12 10*3/MM3 (ref 0–0.3)
EOSINOPHIL # BLD AUTO: 0.16 10*3/MM3 (ref 0–0.3)
EOSINOPHIL # BLD MANUAL: 0 10*3/MM3 (ref 0.1–0.3)
EOSINOPHIL NFR BLD AUTO: 0 % (ref 0–3)
EOSINOPHIL NFR BLD AUTO: 0.1 % (ref 0–3)
EOSINOPHIL NFR BLD AUTO: 0.1 % (ref 0–3)
EOSINOPHIL NFR BLD AUTO: 0.2 % (ref 0–3)
EOSINOPHIL NFR BLD AUTO: 1.1 % (ref 0–3)
EOSINOPHIL NFR BLD AUTO: 1.2 % (ref 0–3)
EOSINOPHIL NFR BLD AUTO: 1.4 % (ref 0–3)
EOSINOPHIL NFR BLD AUTO: 1.5 % (ref 0–3)
EOSINOPHIL NFR BLD AUTO: 1.6 % (ref 0–3)
EOSINOPHIL NFR BLD AUTO: 1.6 % (ref 0–3)
EOSINOPHIL NFR BLD AUTO: 1.7 % (ref 0–3)
EOSINOPHIL NFR BLD AUTO: 1.9 % (ref 0–3)
EOSINOPHIL NFR BLD AUTO: 2.2 % (ref 0–3)
EOSINOPHIL NFR BLD AUTO: 2.8 % (ref 0–3)
EOSINOPHIL NFR BLD AUTO: 3 % (ref 0–3)
EOSINOPHIL NFR BLD AUTO: 3.7 % (ref 0–3)
EOSINOPHIL NFR BLD MANUAL: 0 % (ref 0–3)
ERYTHROCYTE [DISTWIDTH] IN BLOOD BY AUTOMATED COUNT: 14.6 % (ref 11.3–14.5)
ERYTHROCYTE [DISTWIDTH] IN BLOOD BY AUTOMATED COUNT: 14.7 % (ref 11.3–14.5)
ERYTHROCYTE [DISTWIDTH] IN BLOOD BY AUTOMATED COUNT: 14.8 % (ref 11.3–14.5)
ERYTHROCYTE [DISTWIDTH] IN BLOOD BY AUTOMATED COUNT: 14.9 % (ref 11.3–14.5)
ERYTHROCYTE [DISTWIDTH] IN BLOOD BY AUTOMATED COUNT: 15 % (ref 11.3–14.5)
ERYTHROCYTE [DISTWIDTH] IN BLOOD BY AUTOMATED COUNT: 15 % (ref 11.3–14.5)
ERYTHROCYTE [DISTWIDTH] IN BLOOD BY AUTOMATED COUNT: 15.1 % (ref 11.3–14.5)
ERYTHROCYTE [DISTWIDTH] IN BLOOD BY AUTOMATED COUNT: 15.2 % (ref 11.3–14.5)
ERYTHROCYTE [DISTWIDTH] IN BLOOD BY AUTOMATED COUNT: 15.2 % (ref 11.3–14.5)
ERYTHROCYTE [DISTWIDTH] IN BLOOD BY AUTOMATED COUNT: 15.3 % (ref 11.3–14.5)
ERYTHROCYTE [DISTWIDTH] IN BLOOD BY AUTOMATED COUNT: 15.4 % (ref 11.3–14.5)
ERYTHROCYTE [DISTWIDTH] IN BLOOD BY AUTOMATED COUNT: 16.6 % (ref 11.3–14.5)
ERYTHROCYTE [DISTWIDTH] IN BLOOD BY AUTOMATED COUNT: 16.7 % (ref 11.3–14.5)
ERYTHROCYTE [DISTWIDTH] IN BLOOD BY AUTOMATED COUNT: 16.7 % (ref 11.3–14.5)
ERYTHROCYTE [DISTWIDTH] IN BLOOD BY AUTOMATED COUNT: 16.9 % (ref 11.3–14.5)
ERYTHROCYTE [DISTWIDTH] IN BLOOD BY AUTOMATED COUNT: 17.1 % (ref 11.3–14.5)
ERYTHROCYTE [DISTWIDTH] IN BLOOD BY AUTOMATED COUNT: 17.3 % (ref 11.3–14.5)
ERYTHROCYTE [DISTWIDTH] IN BLOOD BY AUTOMATED COUNT: 17.5 % (ref 11.3–14.5)
ERYTHROCYTE [DISTWIDTH] IN BLOOD BY AUTOMATED COUNT: 17.7 % (ref 11.3–14.5)
ERYTHROCYTE [DISTWIDTH] IN BLOOD BY AUTOMATED COUNT: 19.1 % (ref 11.3–14.5)
ERYTHROCYTE [DISTWIDTH] IN BLOOD BY AUTOMATED COUNT: 19.4 % (ref 11.3–14.5)
ERYTHROCYTE [DISTWIDTH] IN BLOOD BY AUTOMATED COUNT: 19.5 % (ref 11.3–14.5)
ERYTHROCYTE [SEDIMENTATION RATE] IN BLOOD: 19 MM/HR (ref 0–20)
ERYTHROCYTE [SEDIMENTATION RATE] IN BLOOD: 20 MM/HR (ref 0–20)
ERYTHROCYTE [SEDIMENTATION RATE] IN BLOOD: 28 MM/HR (ref 0–20)
ERYTHROCYTE [SEDIMENTATION RATE] IN BLOOD: 33 MM/HR (ref 0–20)
FLUAV AG NPH QL: NEGATIVE
FLUBV AG NPH QL IA: NEGATIVE
GAS FLOW AIRWAY: 2 LPM
GFR SERPL CREATININE-BSD FRML MDRD: 41 ML/MIN/1.73
GFR SERPL CREATININE-BSD FRML MDRD: 53 ML/MIN/1.73
GFR SERPL CREATININE-BSD FRML MDRD: 58 ML/MIN/1.73
GFR SERPL CREATININE-BSD FRML MDRD: 64 ML/MIN/1.73
GFR SERPL CREATININE-BSD FRML MDRD: 71 ML/MIN/1.73
GFR SERPL CREATININE-BSD FRML MDRD: 72 ML/MIN/1.73
GFR SERPL CREATININE-BSD FRML MDRD: 81 ML/MIN/1.73
GLOBULIN UR ELPH-MCNC: 2.8 GM/DL
GLOBULIN UR ELPH-MCNC: 2.9 GM/DL
GLOBULIN UR ELPH-MCNC: 3 GM/DL
GLOBULIN UR ELPH-MCNC: 3.2 GM/DL
GLOBULIN UR ELPH-MCNC: 3.4 GM/DL
GLOBULIN UR ELPH-MCNC: 3.5 GM/DL
GLUCOSE BLD-MCNC: 101 MG/DL (ref 70–100)
GLUCOSE BLD-MCNC: 103 MG/DL (ref 70–100)
GLUCOSE BLD-MCNC: 104 MG/DL (ref 70–100)
GLUCOSE BLD-MCNC: 104 MG/DL (ref 70–100)
GLUCOSE BLD-MCNC: 107 MG/DL (ref 70–100)
GLUCOSE BLD-MCNC: 108 MG/DL (ref 70–100)
GLUCOSE BLD-MCNC: 116 MG/DL (ref 70–100)
GLUCOSE BLD-MCNC: 118 MG/DL (ref 70–100)
GLUCOSE BLD-MCNC: 119 MG/DL (ref 70–100)
GLUCOSE BLD-MCNC: 119 MG/DL (ref 70–100)
GLUCOSE BLD-MCNC: 121 MG/DL (ref 70–100)
GLUCOSE BLD-MCNC: 125 MG/DL (ref 70–100)
GLUCOSE BLD-MCNC: 128 MG/DL (ref 70–100)
GLUCOSE BLD-MCNC: 136 MG/DL (ref 70–100)
GLUCOSE BLD-MCNC: 143 MG/DL (ref 70–100)
GLUCOSE BLD-MCNC: 144 MG/DL (ref 70–100)
GLUCOSE BLD-MCNC: 151 MG/DL (ref 70–100)
GLUCOSE BLD-MCNC: 154 MG/DL (ref 70–100)
GLUCOSE BLD-MCNC: 154 MG/DL (ref 70–100)
GLUCOSE BLD-MCNC: 166 MG/DL (ref 70–100)
GLUCOSE BLD-MCNC: 176 MG/DL (ref 70–100)
GLUCOSE BLD-MCNC: 177 MG/DL (ref 70–100)
GLUCOSE BLD-MCNC: 177 MG/DL (ref 70–100)
GLUCOSE BLD-MCNC: 195 MG/DL (ref 70–100)
GLUCOSE BLD-MCNC: 201 MG/DL (ref 70–100)
GLUCOSE BLD-MCNC: 202 MG/DL (ref 70–100)
GLUCOSE BLD-MCNC: 222 MG/DL (ref 70–100)
GLUCOSE BLD-MCNC: 234 MG/DL (ref 70–100)
GLUCOSE BLD-MCNC: 71 MG/DL (ref 70–100)
GLUCOSE BLD-MCNC: 71 MG/DL (ref 70–100)
GLUCOSE BLD-MCNC: 79 MG/DL (ref 70–100)
GLUCOSE BLD-MCNC: 80 MG/DL (ref 70–100)
GLUCOSE BLD-MCNC: 81 MG/DL (ref 70–100)
GLUCOSE BLD-MCNC: 84 MG/DL (ref 70–100)
GLUCOSE BLD-MCNC: 87 MG/DL (ref 70–100)
GLUCOSE BLD-MCNC: 88 MG/DL (ref 70–100)
GLUCOSE BLD-MCNC: 94 MG/DL (ref 70–100)
GLUCOSE BLDC GLUCOMTR-MCNC: 100 MG/DL (ref 70–130)
GLUCOSE BLDC GLUCOMTR-MCNC: 102 MG/DL (ref 70–130)
GLUCOSE BLDC GLUCOMTR-MCNC: 102 MG/DL (ref 70–130)
GLUCOSE BLDC GLUCOMTR-MCNC: 103 MG/DL (ref 70–130)
GLUCOSE BLDC GLUCOMTR-MCNC: 104 MG/DL (ref 70–130)
GLUCOSE BLDC GLUCOMTR-MCNC: 105 MG/DL (ref 70–130)
GLUCOSE BLDC GLUCOMTR-MCNC: 107 MG/DL (ref 70–130)
GLUCOSE BLDC GLUCOMTR-MCNC: 109 MG/DL (ref 70–130)
GLUCOSE BLDC GLUCOMTR-MCNC: 109 MG/DL (ref 70–130)
GLUCOSE BLDC GLUCOMTR-MCNC: 111 MG/DL (ref 70–130)
GLUCOSE BLDC GLUCOMTR-MCNC: 113 MG/DL (ref 70–130)
GLUCOSE BLDC GLUCOMTR-MCNC: 114 MG/DL (ref 70–130)
GLUCOSE BLDC GLUCOMTR-MCNC: 114 MG/DL (ref 70–130)
GLUCOSE BLDC GLUCOMTR-MCNC: 116 MG/DL (ref 70–130)
GLUCOSE BLDC GLUCOMTR-MCNC: 117 MG/DL (ref 70–130)
GLUCOSE BLDC GLUCOMTR-MCNC: 117 MG/DL (ref 70–130)
GLUCOSE BLDC GLUCOMTR-MCNC: 119 MG/DL (ref 70–130)
GLUCOSE BLDC GLUCOMTR-MCNC: 121 MG/DL (ref 70–130)
GLUCOSE BLDC GLUCOMTR-MCNC: 122 MG/DL (ref 70–130)
GLUCOSE BLDC GLUCOMTR-MCNC: 124 MG/DL (ref 70–130)
GLUCOSE BLDC GLUCOMTR-MCNC: 124 MG/DL (ref 70–130)
GLUCOSE BLDC GLUCOMTR-MCNC: 125 MG/DL (ref 70–130)
GLUCOSE BLDC GLUCOMTR-MCNC: 125 MG/DL (ref 70–130)
GLUCOSE BLDC GLUCOMTR-MCNC: 127 MG/DL (ref 70–130)
GLUCOSE BLDC GLUCOMTR-MCNC: 127 MG/DL (ref 70–130)
GLUCOSE BLDC GLUCOMTR-MCNC: 128 MG/DL (ref 70–130)
GLUCOSE BLDC GLUCOMTR-MCNC: 129 MG/DL (ref 70–130)
GLUCOSE BLDC GLUCOMTR-MCNC: 130 MG/DL (ref 70–130)
GLUCOSE BLDC GLUCOMTR-MCNC: 132 MG/DL (ref 70–130)
GLUCOSE BLDC GLUCOMTR-MCNC: 132 MG/DL (ref 70–130)
GLUCOSE BLDC GLUCOMTR-MCNC: 133 MG/DL (ref 70–130)
GLUCOSE BLDC GLUCOMTR-MCNC: 134 MG/DL (ref 70–130)
GLUCOSE BLDC GLUCOMTR-MCNC: 134 MG/DL (ref 70–130)
GLUCOSE BLDC GLUCOMTR-MCNC: 136 MG/DL (ref 70–130)
GLUCOSE BLDC GLUCOMTR-MCNC: 136 MG/DL (ref 70–130)
GLUCOSE BLDC GLUCOMTR-MCNC: 138 MG/DL (ref 70–130)
GLUCOSE BLDC GLUCOMTR-MCNC: 140 MG/DL (ref 70–130)
GLUCOSE BLDC GLUCOMTR-MCNC: 140 MG/DL (ref 70–130)
GLUCOSE BLDC GLUCOMTR-MCNC: 144 MG/DL (ref 70–130)
GLUCOSE BLDC GLUCOMTR-MCNC: 145 MG/DL (ref 70–130)
GLUCOSE BLDC GLUCOMTR-MCNC: 146 MG/DL (ref 70–130)
GLUCOSE BLDC GLUCOMTR-MCNC: 147 MG/DL (ref 70–130)
GLUCOSE BLDC GLUCOMTR-MCNC: 148 MG/DL (ref 70–130)
GLUCOSE BLDC GLUCOMTR-MCNC: 149 MG/DL (ref 70–130)
GLUCOSE BLDC GLUCOMTR-MCNC: 154 MG/DL (ref 70–130)
GLUCOSE BLDC GLUCOMTR-MCNC: 156 MG/DL (ref 70–130)
GLUCOSE BLDC GLUCOMTR-MCNC: 157 MG/DL (ref 70–130)
GLUCOSE BLDC GLUCOMTR-MCNC: 158 MG/DL (ref 70–130)
GLUCOSE BLDC GLUCOMTR-MCNC: 159 MG/DL (ref 70–130)
GLUCOSE BLDC GLUCOMTR-MCNC: 160 MG/DL (ref 70–130)
GLUCOSE BLDC GLUCOMTR-MCNC: 162 MG/DL (ref 70–130)
GLUCOSE BLDC GLUCOMTR-MCNC: 163 MG/DL (ref 70–130)
GLUCOSE BLDC GLUCOMTR-MCNC: 163 MG/DL (ref 70–130)
GLUCOSE BLDC GLUCOMTR-MCNC: 166 MG/DL (ref 70–130)
GLUCOSE BLDC GLUCOMTR-MCNC: 166 MG/DL (ref 70–130)
GLUCOSE BLDC GLUCOMTR-MCNC: 167 MG/DL (ref 70–130)
GLUCOSE BLDC GLUCOMTR-MCNC: 167 MG/DL (ref 70–130)
GLUCOSE BLDC GLUCOMTR-MCNC: 168 MG/DL (ref 70–130)
GLUCOSE BLDC GLUCOMTR-MCNC: 169 MG/DL (ref 70–130)
GLUCOSE BLDC GLUCOMTR-MCNC: 169 MG/DL (ref 70–130)
GLUCOSE BLDC GLUCOMTR-MCNC: 170 MG/DL (ref 70–130)
GLUCOSE BLDC GLUCOMTR-MCNC: 170 MG/DL (ref 70–130)
GLUCOSE BLDC GLUCOMTR-MCNC: 171 MG/DL (ref 70–130)
GLUCOSE BLDC GLUCOMTR-MCNC: 172 MG/DL (ref 70–130)
GLUCOSE BLDC GLUCOMTR-MCNC: 173 MG/DL (ref 70–130)
GLUCOSE BLDC GLUCOMTR-MCNC: 173 MG/DL (ref 70–130)
GLUCOSE BLDC GLUCOMTR-MCNC: 174 MG/DL (ref 70–130)
GLUCOSE BLDC GLUCOMTR-MCNC: 175 MG/DL (ref 70–130)
GLUCOSE BLDC GLUCOMTR-MCNC: 176 MG/DL (ref 70–130)
GLUCOSE BLDC GLUCOMTR-MCNC: 178 MG/DL (ref 70–130)
GLUCOSE BLDC GLUCOMTR-MCNC: 178 MG/DL (ref 70–130)
GLUCOSE BLDC GLUCOMTR-MCNC: 179 MG/DL (ref 70–130)
GLUCOSE BLDC GLUCOMTR-MCNC: 181 MG/DL (ref 70–130)
GLUCOSE BLDC GLUCOMTR-MCNC: 186 MG/DL (ref 70–130)
GLUCOSE BLDC GLUCOMTR-MCNC: 187 MG/DL (ref 70–130)
GLUCOSE BLDC GLUCOMTR-MCNC: 191 MG/DL (ref 70–130)
GLUCOSE BLDC GLUCOMTR-MCNC: 192 MG/DL (ref 70–130)
GLUCOSE BLDC GLUCOMTR-MCNC: 193 MG/DL (ref 70–130)
GLUCOSE BLDC GLUCOMTR-MCNC: 193 MG/DL (ref 70–130)
GLUCOSE BLDC GLUCOMTR-MCNC: 196 MG/DL (ref 70–130)
GLUCOSE BLDC GLUCOMTR-MCNC: 199 MG/DL (ref 70–130)
GLUCOSE BLDC GLUCOMTR-MCNC: 201 MG/DL (ref 70–130)
GLUCOSE BLDC GLUCOMTR-MCNC: 203 MG/DL (ref 70–130)
GLUCOSE BLDC GLUCOMTR-MCNC: 203 MG/DL (ref 70–130)
GLUCOSE BLDC GLUCOMTR-MCNC: 204 MG/DL (ref 70–130)
GLUCOSE BLDC GLUCOMTR-MCNC: 204 MG/DL (ref 70–130)
GLUCOSE BLDC GLUCOMTR-MCNC: 206 MG/DL (ref 70–130)
GLUCOSE BLDC GLUCOMTR-MCNC: 206 MG/DL (ref 70–130)
GLUCOSE BLDC GLUCOMTR-MCNC: 207 MG/DL (ref 70–130)
GLUCOSE BLDC GLUCOMTR-MCNC: 208 MG/DL (ref 70–130)
GLUCOSE BLDC GLUCOMTR-MCNC: 209 MG/DL (ref 70–130)
GLUCOSE BLDC GLUCOMTR-MCNC: 210 MG/DL (ref 70–130)
GLUCOSE BLDC GLUCOMTR-MCNC: 212 MG/DL (ref 70–130)
GLUCOSE BLDC GLUCOMTR-MCNC: 213 MG/DL (ref 70–130)
GLUCOSE BLDC GLUCOMTR-MCNC: 214 MG/DL (ref 70–130)
GLUCOSE BLDC GLUCOMTR-MCNC: 214 MG/DL (ref 70–130)
GLUCOSE BLDC GLUCOMTR-MCNC: 216 MG/DL (ref 70–130)
GLUCOSE BLDC GLUCOMTR-MCNC: 217 MG/DL (ref 70–130)
GLUCOSE BLDC GLUCOMTR-MCNC: 217 MG/DL (ref 70–130)
GLUCOSE BLDC GLUCOMTR-MCNC: 218 MG/DL (ref 70–130)
GLUCOSE BLDC GLUCOMTR-MCNC: 219 MG/DL (ref 70–130)
GLUCOSE BLDC GLUCOMTR-MCNC: 219 MG/DL (ref 70–130)
GLUCOSE BLDC GLUCOMTR-MCNC: 220 MG/DL (ref 70–130)
GLUCOSE BLDC GLUCOMTR-MCNC: 220 MG/DL (ref 70–130)
GLUCOSE BLDC GLUCOMTR-MCNC: 221 MG/DL (ref 70–130)
GLUCOSE BLDC GLUCOMTR-MCNC: 222 MG/DL (ref 70–130)
GLUCOSE BLDC GLUCOMTR-MCNC: 232 MG/DL (ref 70–130)
GLUCOSE BLDC GLUCOMTR-MCNC: 232 MG/DL (ref 70–130)
GLUCOSE BLDC GLUCOMTR-MCNC: 234 MG/DL (ref 70–130)
GLUCOSE BLDC GLUCOMTR-MCNC: 234 MG/DL (ref 70–130)
GLUCOSE BLDC GLUCOMTR-MCNC: 235 MG/DL (ref 70–130)
GLUCOSE BLDC GLUCOMTR-MCNC: 237 MG/DL (ref 70–130)
GLUCOSE BLDC GLUCOMTR-MCNC: 239 MG/DL (ref 70–130)
GLUCOSE BLDC GLUCOMTR-MCNC: 248 MG/DL (ref 70–130)
GLUCOSE BLDC GLUCOMTR-MCNC: 249 MG/DL (ref 70–130)
GLUCOSE BLDC GLUCOMTR-MCNC: 260 MG/DL (ref 70–130)
GLUCOSE BLDC GLUCOMTR-MCNC: 261 MG/DL (ref 70–130)
GLUCOSE BLDC GLUCOMTR-MCNC: 265 MG/DL (ref 70–130)
GLUCOSE BLDC GLUCOMTR-MCNC: 272 MG/DL (ref 70–130)
GLUCOSE BLDC GLUCOMTR-MCNC: 275 MG/DL (ref 70–130)
GLUCOSE BLDC GLUCOMTR-MCNC: 276 MG/DL (ref 70–130)
GLUCOSE BLDC GLUCOMTR-MCNC: 279 MG/DL (ref 70–130)
GLUCOSE BLDC GLUCOMTR-MCNC: 283 MG/DL (ref 70–130)
GLUCOSE BLDC GLUCOMTR-MCNC: 286 MG/DL (ref 70–130)
GLUCOSE BLDC GLUCOMTR-MCNC: 287 MG/DL (ref 70–130)
GLUCOSE BLDC GLUCOMTR-MCNC: 287 MG/DL (ref 70–130)
GLUCOSE BLDC GLUCOMTR-MCNC: 297 MG/DL (ref 70–130)
GLUCOSE BLDC GLUCOMTR-MCNC: 297 MG/DL (ref 70–130)
GLUCOSE BLDC GLUCOMTR-MCNC: 300 MG/DL (ref 70–130)
GLUCOSE BLDC GLUCOMTR-MCNC: 309 MG/DL (ref 70–130)
GLUCOSE BLDC GLUCOMTR-MCNC: 311 MG/DL (ref 70–130)
GLUCOSE BLDC GLUCOMTR-MCNC: 311 MG/DL (ref 70–130)
GLUCOSE BLDC GLUCOMTR-MCNC: 312 MG/DL (ref 70–130)
GLUCOSE BLDC GLUCOMTR-MCNC: 314 MG/DL (ref 70–130)
GLUCOSE BLDC GLUCOMTR-MCNC: 323 MG/DL (ref 70–130)
GLUCOSE BLDC GLUCOMTR-MCNC: 327 MG/DL (ref 70–130)
GLUCOSE BLDC GLUCOMTR-MCNC: 388 MG/DL (ref 70–130)
GLUCOSE BLDC GLUCOMTR-MCNC: 392 MG/DL (ref 70–130)
GLUCOSE BLDC GLUCOMTR-MCNC: 407 MG/DL (ref 70–130)
GLUCOSE BLDC GLUCOMTR-MCNC: 44 MG/DL (ref 70–130)
GLUCOSE BLDC GLUCOMTR-MCNC: 442 MG/DL (ref 70–130)
GLUCOSE BLDC GLUCOMTR-MCNC: 54 MG/DL (ref 70–130)
GLUCOSE BLDC GLUCOMTR-MCNC: 64 MG/DL (ref 70–130)
GLUCOSE BLDC GLUCOMTR-MCNC: 71 MG/DL (ref 70–130)
GLUCOSE BLDC GLUCOMTR-MCNC: 74 MG/DL (ref 70–130)
GLUCOSE BLDC GLUCOMTR-MCNC: 76 MG/DL (ref 70–130)
GLUCOSE BLDC GLUCOMTR-MCNC: 76 MG/DL (ref 70–130)
GLUCOSE BLDC GLUCOMTR-MCNC: 78 MG/DL (ref 70–130)
GLUCOSE BLDC GLUCOMTR-MCNC: 79 MG/DL (ref 70–130)
GLUCOSE BLDC GLUCOMTR-MCNC: 81 MG/DL (ref 70–130)
GLUCOSE BLDC GLUCOMTR-MCNC: 84 MG/DL (ref 70–130)
GLUCOSE BLDC GLUCOMTR-MCNC: 85 MG/DL (ref 70–130)
GLUCOSE BLDC GLUCOMTR-MCNC: 86 MG/DL (ref 70–130)
GLUCOSE BLDC GLUCOMTR-MCNC: 88 MG/DL (ref 70–130)
GLUCOSE BLDC GLUCOMTR-MCNC: 88 MG/DL (ref 70–130)
GLUCOSE BLDC GLUCOMTR-MCNC: 90 MG/DL (ref 70–130)
GLUCOSE BLDC GLUCOMTR-MCNC: 92 MG/DL (ref 70–130)
GLUCOSE BLDC GLUCOMTR-MCNC: 96 MG/DL (ref 70–130)
GLUCOSE BLDC GLUCOMTR-MCNC: 96 MG/DL (ref 70–130)
GLUCOSE BLDC GLUCOMTR-MCNC: 97 MG/DL (ref 70–130)
GLUCOSE BLDC GLUCOMTR-MCNC: 97 MG/DL (ref 70–130)
GLUCOSE BLDC GLUCOMTR-MCNC: 98 MG/DL (ref 70–130)
GLUCOSE BLDC GLUCOMTR-MCNC: 99 MG/DL (ref 70–130)
GLUCOSE BLDC GLUCOMTR-MCNC: 99 MG/DL (ref 70–130)
GLUCOSE UR STRIP-MCNC: NEGATIVE MG/DL
GRAM STN SPEC: ABNORMAL
HBA1C MFR BLD: 5.6 % (ref 4.8–5.6)
HBA1C MFR BLD: 5.6 % (ref 4.8–5.6)
HBA1C MFR BLD: 5.7 % (ref 4.8–5.6)
HCO3 BLDA-SCNC: 22.4 MMOL/L (ref 20–26)
HCO3 BLDA-SCNC: 22.7 MMOL/L (ref 20–26)
HCO3 BLDA-SCNC: 23.9 MMOL/L (ref 20–26)
HCO3 BLDA-SCNC: 33.1 MMOL/L (ref 20–26)
HCT VFR BLD AUTO: 32.3 % (ref 38.9–50.9)
HCT VFR BLD AUTO: 32.7 % (ref 38.9–50.9)
HCT VFR BLD AUTO: 33.2 % (ref 38.9–50.9)
HCT VFR BLD AUTO: 34 % (ref 38.9–50.9)
HCT VFR BLD AUTO: 34.1 % (ref 38.9–50.9)
HCT VFR BLD AUTO: 34.2 % (ref 38.9–50.9)
HCT VFR BLD AUTO: 34.5 % (ref 38.9–50.9)
HCT VFR BLD AUTO: 34.7 % (ref 38.9–50.9)
HCT VFR BLD AUTO: 34.9 % (ref 38.9–50.9)
HCT VFR BLD AUTO: 35.5 % (ref 38.9–50.9)
HCT VFR BLD AUTO: 35.7 % (ref 38.9–50.9)
HCT VFR BLD AUTO: 36 % (ref 38.9–50.9)
HCT VFR BLD AUTO: 36.2 % (ref 38.9–50.9)
HCT VFR BLD AUTO: 36.7 % (ref 38.9–50.9)
HCT VFR BLD AUTO: 37.1 % (ref 38.9–50.9)
HCT VFR BLD AUTO: 37.8 % (ref 38.9–50.9)
HCT VFR BLD AUTO: 38.8 % (ref 38.9–50.9)
HCT VFR BLD AUTO: 39.3 % (ref 38.9–50.9)
HCT VFR BLD AUTO: 39.4 % (ref 38.9–50.9)
HCT VFR BLD AUTO: 39.5 % (ref 38.9–50.9)
HCT VFR BLD AUTO: 39.5 % (ref 38.9–50.9)
HCT VFR BLD AUTO: 40.3 % (ref 38.9–50.9)
HCT VFR BLD AUTO: 40.4 % (ref 38.9–50.9)
HCT VFR BLD AUTO: 40.4 % (ref 38.9–50.9)
HCT VFR BLD AUTO: 40.6 % (ref 38.9–50.9)
HCT VFR BLD AUTO: 40.7 % (ref 38.9–50.9)
HCT VFR BLD AUTO: 41.1 % (ref 38.9–50.9)
HCT VFR BLD AUTO: 42 % (ref 38.9–50.9)
HCT VFR BLD AUTO: 42.5 % (ref 38.9–50.9)
HCT VFR BLD AUTO: 43 % (ref 38.9–50.9)
HCT VFR BLD AUTO: 44 % (ref 38.9–50.9)
HCT VFR BLD CALC: 36.3 %
HCT VFR BLD CALC: 37.7 %
HCT VFR BLD CALC: 40.6 %
HCT VFR BLD CALC: 40.7 %
HDLC SERPL-MCNC: 30 MG/DL (ref 40–60)
HGB BLD-MCNC: 10 G/DL (ref 13.1–17.5)
HGB BLD-MCNC: 10.3 G/DL (ref 13.1–17.5)
HGB BLD-MCNC: 10.3 G/DL (ref 13.1–17.5)
HGB BLD-MCNC: 10.4 G/DL (ref 13.1–17.5)
HGB BLD-MCNC: 10.5 G/DL (ref 13.1–17.5)
HGB BLD-MCNC: 10.7 G/DL (ref 13.1–17.5)
HGB BLD-MCNC: 11 G/DL (ref 13.1–17.5)
HGB BLD-MCNC: 11.2 G/DL (ref 13.1–17.5)
HGB BLD-MCNC: 11.3 G/DL (ref 13.1–17.5)
HGB BLD-MCNC: 11.4 G/DL (ref 13.1–17.5)
HGB BLD-MCNC: 11.8 G/DL (ref 13.1–17.5)
HGB BLD-MCNC: 12 G/DL (ref 13.1–17.5)
HGB BLD-MCNC: 12.4 G/DL (ref 13.1–17.5)
HGB BLD-MCNC: 12.6 G/DL (ref 13.1–17.5)
HGB BLD-MCNC: 12.8 G/DL (ref 13.1–17.5)
HGB BLD-MCNC: 13 G/DL (ref 13.1–17.5)
HGB BLD-MCNC: 13 G/DL (ref 13.1–17.5)
HGB BLD-MCNC: 13.2 G/DL (ref 13.1–17.5)
HGB BLD-MCNC: 13.2 G/DL (ref 13.1–17.5)
HGB BLD-MCNC: 13.3 G/DL (ref 13.1–17.5)
HGB BLD-MCNC: 13.4 G/DL (ref 13.1–17.5)
HGB BLD-MCNC: 13.9 G/DL (ref 13.1–17.5)
HGB BLD-MCNC: 9.9 G/DL (ref 13.1–17.5)
HGB BLDA-MCNC: 11.8 G/DL (ref 13.5–17.5)
HGB BLDA-MCNC: 12.3 G/DL (ref 13.5–17.5)
HGB BLDA-MCNC: 13.3 G/DL (ref 13.5–17.5)
HGB BLDA-MCNC: 13.3 G/DL (ref 13.5–17.5)
HGB UR QL STRIP.AUTO: ABNORMAL
HGB UR QL STRIP.AUTO: NEGATIVE
HOLD SPECIMEN: NORMAL
HOROWITZ INDEX BLD+IHG-RTO: 21 %
HOROWITZ INDEX BLD+IHG-RTO: 28 %
HOROWITZ INDEX BLD+IHG-RTO: 28 %
HOROWITZ INDEX BLD+IHG-RTO: 36 %
HYALINE CASTS UR QL AUTO: ABNORMAL /LPF
HYPOCHROMIA BLD QL: ABNORMAL
IMM GRANULOCYTES # BLD: 0 10*3/MM3 (ref 0–0.03)
IMM GRANULOCYTES # BLD: 0.01 10*3/MM3 (ref 0–0.03)
IMM GRANULOCYTES # BLD: 0.03 10*3/MM3 (ref 0–0.03)
IMM GRANULOCYTES # BLD: 0.03 10*3/MM3 (ref 0–0.03)
IMM GRANULOCYTES # BLD: 0.04 10*3/MM3 (ref 0–0.03)
IMM GRANULOCYTES # BLD: 0.12 10*3/MM3 (ref 0–0.03)
IMM GRANULOCYTES # BLD: 0.15 10*3/MM3 (ref 0–0.03)
IMM GRANULOCYTES # BLD: 0.18 10*3/MM3 (ref 0–0.03)
IMM GRANULOCYTES NFR BLD: 0 % (ref 0–0.6)
IMM GRANULOCYTES NFR BLD: 0.2 % (ref 0–0.6)
IMM GRANULOCYTES NFR BLD: 0.3 % (ref 0–0.6)
IMM GRANULOCYTES NFR BLD: 0.4 % (ref 0–0.6)
IMM GRANULOCYTES NFR BLD: 0.4 % (ref 0–0.6)
IMM GRANULOCYTES NFR BLD: 0.5 % (ref 0–0.6)
IMM GRANULOCYTES NFR BLD: 0.6 % (ref 0–0.6)
IMM GRANULOCYTES NFR BLD: 0.7 % (ref 0–0.6)
IMM GRANULOCYTES NFR BLD: 0.9 % (ref 0–0.6)
IMM GRANULOCYTES NFR BLD: 1 % (ref 0–0.6)
IMM GRANULOCYTES NFR BLD: 1.4 % (ref 0–0.6)
IMM GRANULOCYTES NFR BLD: 1.9 % (ref 0–0.6)
INR PPP: 1.33
INR PPP: 1.35
INR PPP: 1.47
INR PPP: 1.49
INR PPP: 1.6
INR PPP: 1.62
INR PPP: 1.64
INR PPP: 1.64
INR PPP: 1.65
INR PPP: 1.66
INR PPP: 1.77
INR PPP: 1.85
INR PPP: 1.87
INR PPP: 1.91
INR PPP: 1.96
INR PPP: 1.96
INR PPP: 1.98
INR PPP: 1.98
INR PPP: 2
INR PPP: 2.08
INR PPP: 2.1
INR PPP: 2.14
INR PPP: 2.15
INR PPP: 2.16
INR PPP: 2.19
INR PPP: 2.24
INR PPP: 2.25
INR PPP: 2.36
INR PPP: 2.38
INR PPP: 2.45
INR PPP: 2.47
INR PPP: 2.58
INR PPP: 2.68
INR PPP: 2.69
INR PPP: 2.7
INR PPP: 2.7
INR PPP: 2.72
INR PPP: 2.76
INR PPP: 2.78
INR PPP: 2.9
INR PPP: 2.96
INR PPP: 3.26
INR PPP: 3.61
INR PPP: 3.8
INR PPP: 3.93
INR PPP: 4.08
INR PPP: 4.38
INR PPP: 5.15
INR PPP: 6.01
KETONES UR QL STRIP: ABNORMAL
KETONES UR QL STRIP: NEGATIVE
L PNEUMO1 AG UR QL IA: NEGATIVE
LEFT ATRIUM VOLUME INDEX: 17.2 ML/M2
LEFT ATRIUM VOLUME: 41 CM3
LEUKOCYTE ESTERASE UR QL STRIP.AUTO: ABNORMAL
LEUKOCYTE ESTERASE UR QL STRIP.AUTO: NEGATIVE
LEUKOCYTE ESTERASE UR QL STRIP.AUTO: NEGATIVE
LV EF 2D ECHO EST: 35 %
LV EF 2D ECHO EST: 45 %
LYMPHOCYTES # BLD AUTO: 0.16 10*3/MM3 (ref 0.6–4.8)
LYMPHOCYTES # BLD AUTO: 0.17 10*3/MM3 (ref 0.6–4.8)
LYMPHOCYTES # BLD AUTO: 0.25 10*3/MM3 (ref 0.6–4.8)
LYMPHOCYTES # BLD AUTO: 0.26 10*3/MM3 (ref 0.6–4.8)
LYMPHOCYTES # BLD AUTO: 0.29 10*3/MM3 (ref 0.6–4.8)
LYMPHOCYTES # BLD AUTO: 0.3 10*3/MM3 (ref 0.6–4.8)
LYMPHOCYTES # BLD AUTO: 0.31 10*3/MM3 (ref 0.6–4.8)
LYMPHOCYTES # BLD AUTO: 0.34 10*3/MM3 (ref 0.6–4.8)
LYMPHOCYTES # BLD AUTO: 0.36 10*3/MM3 (ref 0.6–4.8)
LYMPHOCYTES # BLD AUTO: 0.39 10*3/MM3 (ref 0.6–4.8)
LYMPHOCYTES # BLD AUTO: 0.39 10*3/MM3 (ref 0.6–4.8)
LYMPHOCYTES # BLD AUTO: 0.41 10*3/MM3 (ref 0.6–4.8)
LYMPHOCYTES # BLD AUTO: 0.42 10*3/MM3 (ref 0.6–4.8)
LYMPHOCYTES # BLD AUTO: 0.45 10*3/MM3 (ref 0.6–4.8)
LYMPHOCYTES # BLD AUTO: 0.54 10*3/MM3 (ref 0.6–4.8)
LYMPHOCYTES # BLD AUTO: 0.58 10*3/MM3 (ref 0.6–4.8)
LYMPHOCYTES # BLD AUTO: 0.62 10*3/MM3 (ref 0.6–4.8)
LYMPHOCYTES # BLD AUTO: 0.63 10*3/MM3 (ref 0.6–4.8)
LYMPHOCYTES # BLD AUTO: 0.7 10*3/MM3 (ref 0.6–4.8)
LYMPHOCYTES # BLD MANUAL: 0.42 10*3/MM3 (ref 0.6–4.8)
LYMPHOCYTES NFR BLD AUTO: 1.5 % (ref 24–44)
LYMPHOCYTES NFR BLD AUTO: 1.8 % (ref 24–44)
LYMPHOCYTES NFR BLD AUTO: 14.3 % (ref 24–44)
LYMPHOCYTES NFR BLD AUTO: 15.3 % (ref 24–44)
LYMPHOCYTES NFR BLD AUTO: 16.7 % (ref 24–44)
LYMPHOCYTES NFR BLD AUTO: 17.5 % (ref 24–44)
LYMPHOCYTES NFR BLD AUTO: 2.1 % (ref 24–44)
LYMPHOCYTES NFR BLD AUTO: 2.2 % (ref 24–44)
LYMPHOCYTES NFR BLD AUTO: 24.5 % (ref 24–44)
LYMPHOCYTES NFR BLD AUTO: 4 % (ref 24–44)
LYMPHOCYTES NFR BLD AUTO: 5.6 % (ref 24–44)
LYMPHOCYTES NFR BLD AUTO: 6 % (ref 24–44)
LYMPHOCYTES NFR BLD AUTO: 7.1 % (ref 24–44)
LYMPHOCYTES NFR BLD AUTO: 7.5 % (ref 24–44)
LYMPHOCYTES NFR BLD AUTO: 8.1 % (ref 24–44)
LYMPHOCYTES NFR BLD AUTO: 8.2 % (ref 24–44)
LYMPHOCYTES NFR BLD AUTO: 8.4 % (ref 24–44)
LYMPHOCYTES NFR BLD AUTO: 9.1 % (ref 24–44)
LYMPHOCYTES NFR BLD AUTO: 9.5 % (ref 24–44)
LYMPHOCYTES NFR BLD MANUAL: 3 % (ref 24–44)
LYMPHOCYTES NFR BLD MANUAL: 6 % (ref 0–12)
Lab: NORMAL
MAGNESIUM SERPL-MCNC: 1.8 MG/DL (ref 1.3–2.7)
MAGNESIUM SERPL-MCNC: 2.3 MG/DL (ref 1.3–2.7)
MAGNESIUM SERPL-MCNC: 2.3 MG/DL (ref 1.3–2.7)
MAGNESIUM SERPL-MCNC: 2.4 MG/DL (ref 1.3–2.7)
MCH RBC QN AUTO: 26.1 PG (ref 27–31)
MCH RBC QN AUTO: 26.3 PG (ref 27–31)
MCH RBC QN AUTO: 26.4 PG (ref 27–31)
MCH RBC QN AUTO: 26.6 PG (ref 27–31)
MCH RBC QN AUTO: 26.7 PG (ref 27–31)
MCH RBC QN AUTO: 26.8 PG (ref 27–31)
MCH RBC QN AUTO: 26.9 PG (ref 27–31)
MCH RBC QN AUTO: 27 PG (ref 27–31)
MCH RBC QN AUTO: 27 PG (ref 27–31)
MCH RBC QN AUTO: 27.1 PG (ref 27–31)
MCH RBC QN AUTO: 27.1 PG (ref 27–31)
MCH RBC QN AUTO: 27.2 PG (ref 27–31)
MCH RBC QN AUTO: 27.2 PG (ref 27–31)
MCH RBC QN AUTO: 27.3 PG (ref 27–31)
MCH RBC QN AUTO: 27.3 PG (ref 27–31)
MCH RBC QN AUTO: 27.4 PG (ref 27–31)
MCH RBC QN AUTO: 27.5 PG (ref 27–31)
MCH RBC QN AUTO: 27.5 PG (ref 27–31)
MCH RBC QN AUTO: 27.8 PG (ref 27–31)
MCH RBC QN AUTO: 28 PG (ref 27–31)
MCH RBC QN AUTO: 28.1 PG (ref 27–31)
MCH RBC QN AUTO: 28.1 PG (ref 27–31)
MCH RBC QN AUTO: 28.2 PG (ref 27–31)
MCH RBC QN AUTO: 28.9 PG (ref 27–31)
MCH RBC QN AUTO: 29.1 PG (ref 27–31)
MCH RBC QN AUTO: 29.3 PG (ref 27–31)
MCH RBC QN AUTO: 30.5 PG (ref 27–31)
MCH RBC QN AUTO: 30.7 PG (ref 27–31)
MCH RBC QN AUTO: 30.9 PG (ref 27–31)
MCH RBC QN AUTO: 31 PG (ref 27–31)
MCH RBC QN AUTO: 32.1 PG (ref 27–31)
MCHC RBC AUTO-ENTMCNC: 29.6 G/DL (ref 32–36)
MCHC RBC AUTO-ENTMCNC: 29.9 G/DL (ref 32–36)
MCHC RBC AUTO-ENTMCNC: 30.1 G/DL (ref 32–36)
MCHC RBC AUTO-ENTMCNC: 30.2 G/DL (ref 32–36)
MCHC RBC AUTO-ENTMCNC: 30.3 G/DL (ref 32–36)
MCHC RBC AUTO-ENTMCNC: 30.3 G/DL (ref 32–36)
MCHC RBC AUTO-ENTMCNC: 30.5 G/DL (ref 32–36)
MCHC RBC AUTO-ENTMCNC: 30.6 G/DL (ref 32–36)
MCHC RBC AUTO-ENTMCNC: 30.7 G/DL (ref 32–36)
MCHC RBC AUTO-ENTMCNC: 30.7 G/DL (ref 32–36)
MCHC RBC AUTO-ENTMCNC: 30.8 G/DL (ref 32–36)
MCHC RBC AUTO-ENTMCNC: 30.9 G/DL (ref 32–36)
MCHC RBC AUTO-ENTMCNC: 31 G/DL (ref 32–36)
MCHC RBC AUTO-ENTMCNC: 31 G/DL (ref 32–36)
MCHC RBC AUTO-ENTMCNC: 31.4 G/DL (ref 32–36)
MCHC RBC AUTO-ENTMCNC: 31.5 G/DL (ref 32–36)
MCHC RBC AUTO-ENTMCNC: 31.6 G/DL (ref 32–36)
MCHC RBC AUTO-ENTMCNC: 31.6 G/DL (ref 32–36)
MCHC RBC AUTO-ENTMCNC: 31.7 G/DL (ref 32–36)
MCHC RBC AUTO-ENTMCNC: 31.7 G/DL (ref 32–36)
MCHC RBC AUTO-ENTMCNC: 31.8 G/DL (ref 32–36)
MCHC RBC AUTO-ENTMCNC: 32.1 G/DL (ref 32–36)
MCHC RBC AUTO-ENTMCNC: 32.2 G/DL (ref 32–36)
MCHC RBC AUTO-ENTMCNC: 32.4 G/DL (ref 32–36)
MCHC RBC AUTO-ENTMCNC: 32.5 G/DL (ref 32–36)
MCHC RBC AUTO-ENTMCNC: 32.8 G/DL (ref 32–36)
MCHC RBC AUTO-ENTMCNC: 32.9 G/DL (ref 32–36)
MCHC RBC AUTO-ENTMCNC: 33.5 G/DL (ref 32–36)
MCV RBC AUTO: 84.9 FL (ref 80–99)
MCV RBC AUTO: 85.2 FL (ref 80–99)
MCV RBC AUTO: 86.6 FL (ref 80–99)
MCV RBC AUTO: 86.9 FL (ref 80–99)
MCV RBC AUTO: 87.3 FL (ref 80–99)
MCV RBC AUTO: 87.4 FL (ref 80–99)
MCV RBC AUTO: 87.8 FL (ref 80–99)
MCV RBC AUTO: 88.1 FL (ref 80–99)
MCV RBC AUTO: 88.4 FL (ref 80–99)
MCV RBC AUTO: 88.4 FL (ref 80–99)
MCV RBC AUTO: 88.5 FL (ref 80–99)
MCV RBC AUTO: 88.7 FL (ref 80–99)
MCV RBC AUTO: 88.9 FL (ref 80–99)
MCV RBC AUTO: 89.2 FL (ref 80–99)
MCV RBC AUTO: 89.2 FL (ref 80–99)
MCV RBC AUTO: 89.3 FL (ref 80–99)
MCV RBC AUTO: 89.3 FL (ref 80–99)
MCV RBC AUTO: 89.6 FL (ref 80–99)
MCV RBC AUTO: 89.6 FL (ref 80–99)
MCV RBC AUTO: 89.8 FL (ref 80–99)
MCV RBC AUTO: 90.2 FL (ref 80–99)
MCV RBC AUTO: 90.2 FL (ref 80–99)
MCV RBC AUTO: 91.3 FL (ref 80–99)
MCV RBC AUTO: 91.4 FL (ref 80–99)
MCV RBC AUTO: 91.9 FL (ref 80–99)
MCV RBC AUTO: 92.2 FL (ref 80–99)
MCV RBC AUTO: 94 FL (ref 80–99)
MCV RBC AUTO: 94.3 FL (ref 80–99)
MCV RBC AUTO: 94.4 FL (ref 80–99)
MCV RBC AUTO: 94.5 FL (ref 80–99)
MCV RBC AUTO: 95.9 FL (ref 80–99)
METHADONE UR QL SCN: NEGATIVE
METHGB BLD QL: 0.6 % (ref 0–1.5)
METHGB BLD QL: 0.6 % (ref 0–1.5)
METHGB BLD QL: 0.7 % (ref 0–1.5)
METHGB BLD QL: 0.8 % (ref 0–1.5)
MODALITY: ABNORMAL
MONOCYTES # BLD AUTO: 0.12 10*3/MM3 (ref 0–1)
MONOCYTES # BLD AUTO: 0.14 10*3/MM3 (ref 0–1)
MONOCYTES # BLD AUTO: 0.16 10*3/MM3 (ref 0–1)
MONOCYTES # BLD AUTO: 0.21 10*3/MM3 (ref 0–1)
MONOCYTES # BLD AUTO: 0.24 10*3/MM3 (ref 0–1)
MONOCYTES # BLD AUTO: 0.27 10*3/MM3 (ref 0–1)
MONOCYTES # BLD AUTO: 0.3 10*3/MM3 (ref 0–1)
MONOCYTES # BLD AUTO: 0.3 10*3/MM3 (ref 0–1)
MONOCYTES # BLD AUTO: 0.31 10*3/MM3 (ref 0–1)
MONOCYTES # BLD AUTO: 0.32 10*3/MM3 (ref 0–1)
MONOCYTES # BLD AUTO: 0.35 10*3/MM3 (ref 0–1)
MONOCYTES # BLD AUTO: 0.38 10*3/MM3 (ref 0–1)
MONOCYTES # BLD AUTO: 0.42 10*3/MM3 (ref 0–1)
MONOCYTES # BLD AUTO: 0.49 10*3/MM3 (ref 0–1)
MONOCYTES # BLD AUTO: 0.67 10*3/MM3 (ref 0–1)
MONOCYTES # BLD AUTO: 0.67 10*3/MM3 (ref 0–1)
MONOCYTES # BLD AUTO: 0.68 10*3/MM3 (ref 0–1)
MONOCYTES # BLD AUTO: 0.71 10*3/MM3 (ref 0–1)
MONOCYTES # BLD AUTO: 0.73 10*3/MM3 (ref 0–1)
MONOCYTES # BLD AUTO: 0.84 10*3/MM3 (ref 0–1)
MONOCYTES NFR BLD AUTO: 10 % (ref 0–12)
MONOCYTES NFR BLD AUTO: 10.5 % (ref 0–12)
MONOCYTES NFR BLD AUTO: 3.1 % (ref 0–12)
MONOCYTES NFR BLD AUTO: 3.4 % (ref 0–12)
MONOCYTES NFR BLD AUTO: 3.8 % (ref 0–12)
MONOCYTES NFR BLD AUTO: 4.2 % (ref 0–12)
MONOCYTES NFR BLD AUTO: 4.9 % (ref 0–12)
MONOCYTES NFR BLD AUTO: 5.6 % (ref 0–12)
MONOCYTES NFR BLD AUTO: 5.9 % (ref 0–12)
MONOCYTES NFR BLD AUTO: 6.1 % (ref 0–12)
MONOCYTES NFR BLD AUTO: 6.3 % (ref 0–12)
MONOCYTES NFR BLD AUTO: 6.3 % (ref 0–12)
MONOCYTES NFR BLD AUTO: 6.8 % (ref 0–12)
MONOCYTES NFR BLD AUTO: 7 % (ref 0–12)
MONOCYTES NFR BLD AUTO: 8.3 % (ref 0–12)
MONOCYTES NFR BLD AUTO: 8.4 % (ref 0–12)
MONOCYTES NFR BLD AUTO: 8.6 % (ref 0–12)
MONOCYTES NFR BLD AUTO: 8.8 % (ref 0–12)
MONOCYTES NFR BLD AUTO: 9.4 % (ref 0–12)
NEUTROPHILS # BLD AUTO: 1.76 10*3/MM3 (ref 1.5–8.3)
NEUTROPHILS # BLD AUTO: 10.38 10*3/MM3 (ref 1.5–8.3)
NEUTROPHILS # BLD AUTO: 12.62 10*3/MM3 (ref 1.5–8.3)
NEUTROPHILS # BLD AUTO: 17.97 10*3/MM3 (ref 1.5–8.3)
NEUTROPHILS # BLD AUTO: 2.59 10*3/MM3 (ref 1.5–8.3)
NEUTROPHILS # BLD AUTO: 2.63 10*3/MM3 (ref 1.5–8.3)
NEUTROPHILS # BLD AUTO: 2.66 10*3/MM3 (ref 1.5–8.3)
NEUTROPHILS # BLD AUTO: 2.95 10*3/MM3 (ref 1.5–8.3)
NEUTROPHILS # BLD AUTO: 3.36 10*3/MM3 (ref 1.5–8.3)
NEUTROPHILS # BLD AUTO: 3.37 10*3/MM3 (ref 1.5–8.3)
NEUTROPHILS # BLD AUTO: 3.41 10*3/MM3 (ref 1.5–8.3)
NEUTROPHILS # BLD AUTO: 3.46 10*3/MM3 (ref 1.5–8.3)
NEUTROPHILS # BLD AUTO: 3.47 10*3/MM3 (ref 1.5–8.3)
NEUTROPHILS # BLD AUTO: 3.56 10*3/MM3 (ref 1.5–8.3)
NEUTROPHILS # BLD AUTO: 3.73 10*3/MM3 (ref 1.5–8.3)
NEUTROPHILS # BLD AUTO: 4.21 10*3/MM3 (ref 1.5–8.3)
NEUTROPHILS # BLD AUTO: 7.04 10*3/MM3 (ref 1.5–8.3)
NEUTROPHILS # BLD AUTO: 7.32 10*3/MM3 (ref 1.5–8.3)
NEUTROPHILS # BLD AUTO: 8.64 10*3/MM3 (ref 1.5–8.3)
NEUTROPHILS # BLD AUTO: 9.57 10*3/MM3 (ref 1.5–8.3)
NEUTROPHILS NFR BLD AUTO: 61.5 % (ref 41–71)
NEUTROPHILS NFR BLD AUTO: 71.7 % (ref 41–71)
NEUTROPHILS NFR BLD AUTO: 72 % (ref 41–71)
NEUTROPHILS NFR BLD AUTO: 72.6 % (ref 41–71)
NEUTROPHILS NFR BLD AUTO: 74.2 % (ref 41–71)
NEUTROPHILS NFR BLD AUTO: 80.4 % (ref 41–71)
NEUTROPHILS NFR BLD AUTO: 81.1 % (ref 41–71)
NEUTROPHILS NFR BLD AUTO: 82.4 % (ref 41–71)
NEUTROPHILS NFR BLD AUTO: 83.5 % (ref 41–71)
NEUTROPHILS NFR BLD AUTO: 84.5 % (ref 41–71)
NEUTROPHILS NFR BLD AUTO: 86.6 % (ref 41–71)
NEUTROPHILS NFR BLD AUTO: 86.8 % (ref 41–71)
NEUTROPHILS NFR BLD AUTO: 87 % (ref 41–71)
NEUTROPHILS NFR BLD AUTO: 87.1 % (ref 41–71)
NEUTROPHILS NFR BLD AUTO: 87.6 % (ref 41–71)
NEUTROPHILS NFR BLD AUTO: 89.1 % (ref 41–71)
NEUTROPHILS NFR BLD AUTO: 90.6 % (ref 41–71)
NEUTROPHILS NFR BLD AUTO: 91.3 % (ref 41–71)
NEUTROPHILS NFR BLD AUTO: 93.4 % (ref 41–71)
NEUTROPHILS NFR BLD MANUAL: 86 % (ref 41–71)
NEUTS BAND NFR BLD MANUAL: 4 % (ref 0–5)
NITRITE UR QL STRIP: NEGATIVE
NITRITE UR QL STRIP: POSITIVE
NRBC SPEC MANUAL: 1 /100 WBC (ref 0–0)
OPIATES UR QL: POSITIVE
ORGANISM ID: NORMAL
OXYCODONE UR QL SCN: NEGATIVE
OXYHGB MFR BLDV: 92.4 % (ref 94–99)
OXYHGB MFR BLDV: 94.7 % (ref 94–99)
OXYHGB MFR BLDV: 94.8 % (ref 94–99)
OXYHGB MFR BLDV: 95 % (ref 94–99)
PATH INTERP BLD-IMP: NORMAL
PCO2 BLDA: 34.1 MM HG (ref 35–48)
PCO2 BLDA: 34.7 MM HG (ref 35–48)
PCO2 BLDA: 42.5 MM HG (ref 35–48)
PCO2 BLDA: 43.9 MM HG (ref 35–48)
PCP UR QL SCN: NEGATIVE
PH BLDA: 7.34 PH UNITS (ref 7.35–7.45)
PH BLDA: 7.42 PH UNITS (ref 7.35–7.45)
PH BLDA: 7.42 PH UNITS (ref 7.35–7.45)
PH BLDA: 7.5 PH UNITS (ref 7.35–7.45)
PH UR STRIP.AUTO: 5.5 [PH] (ref 5–8)
PH UR STRIP.AUTO: 6.5 [PH] (ref 5–8)
PH UR STRIP.AUTO: 7.5 [PH] (ref 5–8)
PH UR STRIP.AUTO: <=5 [PH] (ref 5–8)
PHOSPHATE SERPL-MCNC: 2.2 MG/DL (ref 2.4–5.1)
PHOSPHATE SERPL-MCNC: 2.8 MG/DL (ref 2.4–5.1)
PHOSPHATE SERPL-MCNC: 3.3 MG/DL (ref 2.4–5.1)
PHOSPHATE SERPL-MCNC: 3.8 MG/DL (ref 2.4–5.1)
PHOSPHATE SERPL-MCNC: 3.8 MG/DL (ref 2.4–5.1)
PHOSPHATE SERPL-MCNC: 4 MG/DL (ref 2.4–5.1)
PLAT MORPH BLD: NORMAL
PLATELET # BLD AUTO: 100 10*3/MM3 (ref 150–450)
PLATELET # BLD AUTO: 102 10*3/MM3 (ref 150–450)
PLATELET # BLD AUTO: 102 10*3/MM3 (ref 150–450)
PLATELET # BLD AUTO: 105 10*3/MM3 (ref 150–450)
PLATELET # BLD AUTO: 108 10*3/MM3 (ref 150–450)
PLATELET # BLD AUTO: 110 10*3/MM3 (ref 150–450)
PLATELET # BLD AUTO: 119 10*3/MM3 (ref 150–450)
PLATELET # BLD AUTO: 120 10*3/MM3 (ref 150–450)
PLATELET # BLD AUTO: 120 10*3/MM3 (ref 150–450)
PLATELET # BLD AUTO: 128 10*3/MM3 (ref 150–450)
PLATELET # BLD AUTO: 131 10*3/MM3 (ref 150–450)
PLATELET # BLD AUTO: 80 10*3/MM3 (ref 150–450)
PLATELET # BLD AUTO: 80 10*3/MM3 (ref 150–450)
PLATELET # BLD AUTO: 81 10*3/MM3 (ref 150–450)
PLATELET # BLD AUTO: 84 10*3/MM3 (ref 150–450)
PLATELET # BLD AUTO: 85 10*3/MM3 (ref 150–450)
PLATELET # BLD AUTO: 86 10*3/MM3 (ref 150–450)
PLATELET # BLD AUTO: 88 10*3/MM3 (ref 150–450)
PLATELET # BLD AUTO: 89 10*3/MM3 (ref 150–450)
PLATELET # BLD AUTO: 90 10*3/MM3 (ref 150–450)
PLATELET # BLD AUTO: 93 10*3/MM3 (ref 150–450)
PLATELET # BLD AUTO: 94 10*3/MM3 (ref 150–450)
PLATELET # BLD AUTO: 95 10*3/MM3 (ref 150–450)
PLATELET # BLD AUTO: 96 10*3/MM3 (ref 150–450)
PLATELET # BLD AUTO: 96 10*3/MM3 (ref 150–450)
PLATELET # BLD AUTO: 98 10*3/MM3 (ref 150–450)
PLATELET # BLD AUTO: 98 10*3/MM3 (ref 150–450)
PLATELET # BLD AUTO: 99 10*3/MM3 (ref 150–450)
PLATELET # BLD AUTO: 99 10*3/MM3 (ref 150–450)
PMV BLD AUTO: 10 FL (ref 6–12)
PMV BLD AUTO: 10.1 FL (ref 6–12)
PMV BLD AUTO: 10.3 FL (ref 6–12)
PMV BLD AUTO: 10.3 FL (ref 6–12)
PMV BLD AUTO: 10.4 FL (ref 6–12)
PMV BLD AUTO: 10.5 FL (ref 6–12)
PMV BLD AUTO: 10.5 FL (ref 6–12)
PMV BLD AUTO: 10.6 FL (ref 6–12)
PMV BLD AUTO: 10.7 FL (ref 6–12)
PMV BLD AUTO: 10.8 FL (ref 6–12)
PMV BLD AUTO: 10.9 FL (ref 6–12)
PMV BLD AUTO: 11 FL (ref 6–12)
PMV BLD AUTO: 11.1 FL (ref 6–12)
PMV BLD AUTO: 11.5 FL (ref 6–12)
PMV BLD AUTO: 11.7 FL (ref 6–12)
PMV BLD AUTO: 9.5 FL (ref 6–12)
PMV BLD AUTO: 9.6 FL (ref 6–12)
PMV BLD AUTO: 9.9 FL (ref 6–12)
PO2 BLDA: 102 MM HG (ref 83–108)
PO2 BLDA: 67.2 MM HG (ref 83–108)
PO2 BLDA: 82 MM HG (ref 83–108)
PO2 BLDA: 82 MM HG (ref 83–108)
POLYCHROMASIA BLD QL SMEAR: ABNORMAL
POTASSIUM BLD-SCNC: 3.5 MMOL/L (ref 3.5–5.5)
POTASSIUM BLD-SCNC: 3.6 MMOL/L (ref 3.5–5.5)
POTASSIUM BLD-SCNC: 3.7 MMOL/L (ref 3.5–5.5)
POTASSIUM BLD-SCNC: 3.7 MMOL/L (ref 3.5–5.5)
POTASSIUM BLD-SCNC: 3.8 MMOL/L (ref 3.5–5.5)
POTASSIUM BLD-SCNC: 3.8 MMOL/L (ref 3.5–5.5)
POTASSIUM BLD-SCNC: 3.9 MMOL/L (ref 3.5–5.5)
POTASSIUM BLD-SCNC: 4 MMOL/L (ref 3.5–5.5)
POTASSIUM BLD-SCNC: 4 MMOL/L (ref 3.5–5.5)
POTASSIUM BLD-SCNC: 4.1 MMOL/L (ref 3.5–5.5)
POTASSIUM BLD-SCNC: 4.2 MMOL/L (ref 3.5–5.5)
POTASSIUM BLD-SCNC: 4.3 MMOL/L (ref 3.5–5.5)
POTASSIUM BLD-SCNC: 4.4 MMOL/L (ref 3.5–5.5)
POTASSIUM BLD-SCNC: 4.6 MMOL/L (ref 3.5–5.5)
POTASSIUM BLD-SCNC: 4.7 MMOL/L (ref 3.5–5.5)
PROCALCITONIN SERPL-MCNC: 0.08 NG/ML
PROPOXYPH UR QL: NEGATIVE
PROT SERPL-MCNC: 6.4 G/DL (ref 5.7–8.2)
PROT SERPL-MCNC: 6.6 G/DL (ref 5.7–8.2)
PROT SERPL-MCNC: 6.7 G/DL (ref 5.7–8.2)
PROT SERPL-MCNC: 6.7 G/DL (ref 5.7–8.2)
PROT SERPL-MCNC: 6.9 G/DL (ref 5.7–8.2)
PROT SERPL-MCNC: 7.1 G/DL (ref 5.7–8.2)
PROT SERPL-MCNC: 7.2 G/DL (ref 5.7–8.2)
PROT SERPL-MCNC: 7.4 G/DL (ref 5.7–8.2)
PROT SERPL-MCNC: 7.5 G/DL (ref 5.7–8.2)
PROT UR QL STRIP: ABNORMAL
PROT UR QL STRIP: NEGATIVE
PROTHROMBIN TIME: 14.6 SECONDS (ref 9.6–11.5)
PROTHROMBIN TIME: 14.9 SECONDS (ref 9.6–11.5)
PROTHROMBIN TIME: 16.2 SECONDS (ref 9.6–11.5)
PROTHROMBIN TIME: 16.4 SECONDS (ref 9.6–11.5)
PROTHROMBIN TIME: 17.7 SECONDS (ref 9.6–11.5)
PROTHROMBIN TIME: 17.9 SECONDS (ref 9.6–11.5)
PROTHROMBIN TIME: 18.2 SECONDS (ref 9.6–11.5)
PROTHROMBIN TIME: 18.2 SECONDS (ref 9.6–11.5)
PROTHROMBIN TIME: 18.3 SECONDS (ref 9.6–11.5)
PROTHROMBIN TIME: 18.4 SECONDS (ref 9.6–11.5)
PROTHROMBIN TIME: 19.6 SECONDS (ref 9.6–11.5)
PROTHROMBIN TIME: 20.6 SECONDS (ref 9.6–11.5)
PROTHROMBIN TIME: 20.8 SECONDS (ref 9.6–11.5)
PROTHROMBIN TIME: 21.2 SECONDS (ref 9.6–11.5)
PROTHROMBIN TIME: 21.8 SECONDS (ref 9.6–11.5)
PROTHROMBIN TIME: 21.8 SECONDS (ref 9.6–11.5)
PROTHROMBIN TIME: 22 SECONDS (ref 9.6–11.5)
PROTHROMBIN TIME: 22.1 SECONDS (ref 9.6–11.5)
PROTHROMBIN TIME: 22.3 SECONDS (ref 9.6–11.5)
PROTHROMBIN TIME: 23.2 SECONDS (ref 9.6–11.5)
PROTHROMBIN TIME: 23.4 SECONDS (ref 9.6–11.5)
PROTHROMBIN TIME: 23.9 SECONDS (ref 9.6–11.5)
PROTHROMBIN TIME: 24 SECONDS (ref 9.6–11.5)
PROTHROMBIN TIME: 24.1 SECONDS (ref 9.6–11.5)
PROTHROMBIN TIME: 24.5 SECONDS (ref 9.6–11.5)
PROTHROMBIN TIME: 25 SECONDS (ref 9.6–11.5)
PROTHROMBIN TIME: 25.2 SECONDS (ref 9.6–11.5)
PROTHROMBIN TIME: 26.4 SECONDS (ref 9.6–11.5)
PROTHROMBIN TIME: 26.6 SECONDS (ref 9.6–11.5)
PROTHROMBIN TIME: 27.4 SECONDS (ref 9.6–11.5)
PROTHROMBIN TIME: 27.7 SECONDS (ref 9.6–11.5)
PROTHROMBIN TIME: 29 SECONDS (ref 9.6–11.5)
PROTHROMBIN TIME: 30.1 SECONDS (ref 9.6–11.5)
PROTHROMBIN TIME: 30.2 SECONDS (ref 9.6–11.5)
PROTHROMBIN TIME: 30.3 SECONDS (ref 9.6–11.5)
PROTHROMBIN TIME: 30.4 SECONDS (ref 9.6–11.5)
PROTHROMBIN TIME: 30.6 SECONDS (ref 9.6–11.5)
PROTHROMBIN TIME: 31 SECONDS (ref 9.6–11.5)
PROTHROMBIN TIME: 31.3 SECONDS (ref 9.6–11.5)
PROTHROMBIN TIME: 32.7 SECONDS (ref 9.6–11.5)
PROTHROMBIN TIME: 33.4 SECONDS (ref 9.6–11.5)
PROTHROMBIN TIME: 36.8 SECONDS (ref 9.6–11.5)
PROTHROMBIN TIME: 41 SECONDS (ref 9.6–11.5)
PROTHROMBIN TIME: 43.2 SECONDS (ref 9.6–11.5)
PROTHROMBIN TIME: 44.7 SECONDS (ref 9.6–11.5)
PROTHROMBIN TIME: 46.4 SECONDS (ref 9.6–11.5)
PROTHROMBIN TIME: 50 SECONDS (ref 9.6–11.5)
PROTHROMBIN TIME: 59.1 SECONDS (ref 9.6–11.5)
PROTHROMBIN TIME: 69.2 SECONDS (ref 9.6–11.5)
RBC # BLD AUTO: 3.65 10*6/MM3 (ref 4.2–5.76)
RBC # BLD AUTO: 3.68 10*6/MM3 (ref 4.2–5.76)
RBC # BLD AUTO: 3.77 10*6/MM3 (ref 4.2–5.76)
RBC # BLD AUTO: 3.82 10*6/MM3 (ref 4.2–5.76)
RBC # BLD AUTO: 3.84 10*6/MM3 (ref 4.2–5.76)
RBC # BLD AUTO: 3.87 10*6/MM3 (ref 4.2–5.76)
RBC # BLD AUTO: 3.88 10*6/MM3 (ref 4.2–5.76)
RBC # BLD AUTO: 3.89 10*6/MM3 (ref 4.2–5.76)
RBC # BLD AUTO: 3.96 10*6/MM3 (ref 4.2–5.76)
RBC # BLD AUTO: 4 10*6/MM3 (ref 4.2–5.76)
RBC # BLD AUTO: 4.03 10*6/MM3 (ref 4.2–5.76)
RBC # BLD AUTO: 4.11 10*6/MM3 (ref 4.2–5.76)
RBC # BLD AUTO: 4.12 10*6/MM3 (ref 4.2–5.76)
RBC # BLD AUTO: 4.19 10*6/MM3 (ref 4.2–5.76)
RBC # BLD AUTO: 4.25 10*6/MM3 (ref 4.2–5.76)
RBC # BLD AUTO: 4.33 10*6/MM3 (ref 4.2–5.76)
RBC # BLD AUTO: 4.37 10*6/MM3 (ref 4.2–5.76)
RBC # BLD AUTO: 4.38 10*6/MM3 (ref 4.2–5.76)
RBC # BLD AUTO: 4.42 10*6/MM3 (ref 4.2–5.76)
RBC # BLD AUTO: 4.43 10*6/MM3 (ref 4.2–5.76)
RBC # BLD AUTO: 4.5 10*6/MM3 (ref 4.2–5.76)
RBC # BLD AUTO: 4.56 10*6/MM3 (ref 4.2–5.76)
RBC # BLD AUTO: 4.68 10*6/MM3 (ref 4.2–5.76)
RBC # BLD AUTO: 4.71 10*6/MM3 (ref 4.2–5.76)
RBC # BLD AUTO: 4.77 10*6/MM3 (ref 4.2–5.76)
RBC # BLD AUTO: 4.87 10*6/MM3 (ref 4.2–5.76)
RBC # UR: ABNORMAL /HPF
RBC MORPH BLD: NORMAL
RBC MORPH BLD: NORMAL
REF LAB TEST METHOD: ABNORMAL
S PNEUM AG SPEC QL LA: NEGATIVE
SODIUM BLD-SCNC: 134 MMOL/L (ref 132–146)
SODIUM BLD-SCNC: 135 MMOL/L (ref 132–146)
SODIUM BLD-SCNC: 136 MMOL/L (ref 132–146)
SODIUM BLD-SCNC: 137 MMOL/L (ref 132–146)
SODIUM BLD-SCNC: 138 MMOL/L (ref 132–146)
SODIUM BLD-SCNC: 139 MMOL/L (ref 132–146)
SODIUM BLD-SCNC: 140 MMOL/L (ref 132–146)
SODIUM BLD-SCNC: 141 MMOL/L (ref 132–146)
SODIUM BLD-SCNC: 142 MMOL/L (ref 132–146)
SODIUM BLD-SCNC: 142 MMOL/L (ref 132–146)
SODIUM BLD-SCNC: 143 MMOL/L (ref 132–146)
SODIUM BLD-SCNC: 144 MMOL/L (ref 132–146)
SP GR UR STRIP: 1.01 (ref 1–1.03)
SP GR UR STRIP: 1.02 (ref 1–1.03)
SP GR UR STRIP: 1.02 (ref 1–1.03)
SPECIMEN SOURCE: NORMAL
SQUAMOUS #/AREA URNS HPF: ABNORMAL /HPF
T4 FREE SERPL-MCNC: 0.87 NG/DL (ref 0.89–1.76)
TRICYCLICS UR QL SCN: NEGATIVE
TRIGL SERPL-MCNC: 89 MG/DL (ref 0–150)
TROPONIN I SERPL-MCNC: 0.01 NG/ML (ref 0–0.07)
TROPONIN I SERPL-MCNC: 0.02 NG/ML (ref 0–0.07)
TROPONIN I SERPL-MCNC: 0.03 NG/ML (ref 0–0.07)
TROPONIN I SERPL-MCNC: 0.03 NG/ML (ref 0–0.07)
TROPONIN I SERPL-MCNC: 0.04 NG/ML
TROPONIN I SERPL-MCNC: 0.05 NG/ML (ref 0–0.07)
TROPONIN I SERPL-MCNC: 0.06 NG/ML
TROPONIN I SERPL-MCNC: 0.06 NG/ML
TROPONIN I SERPL-MCNC: 0.07 NG/ML
TSH SERPL DL<=0.05 MIU/L-ACNC: 0.88 MIU/ML (ref 0.35–5.35)
TSH SERPL DL<=0.05 MIU/L-ACNC: 2.15 MIU/ML (ref 0.35–5.35)
UROBILINOGEN UR QL STRIP: ABNORMAL
VANCOMYCIN TROUGH SERPL-MCNC: 15.2 MCG/ML (ref 10–20)
VARIANT LYMPHS NFR BLD MANUAL: 1 % (ref 0–5)
WBC MORPH BLD: NORMAL
WBC NRBC COR # BLD: 10.57 10*3/MM3 (ref 3.5–10.8)
WBC NRBC COR # BLD: 11.36 10*3/MM3 (ref 3.5–10.8)
WBC NRBC COR # BLD: 14.02 10*3/MM3 (ref 3.5–10.8)
WBC NRBC COR # BLD: 19.24 10*3/MM3 (ref 3.5–10.8)
WBC NRBC COR # BLD: 2.86 10*3/MM3 (ref 3.5–10.8)
WBC NRBC COR # BLD: 3.08 10*3/MM3 (ref 3.5–10.8)
WBC NRBC COR # BLD: 3.54 10*3/MM3 (ref 3.5–10.8)
WBC NRBC COR # BLD: 3.6 10*3/MM3 (ref 3.5–10.8)
WBC NRBC COR # BLD: 3.63 10*3/MM3 (ref 3.5–10.8)
WBC NRBC COR # BLD: 3.64 10*3/MM3 (ref 3.5–10.8)
WBC NRBC COR # BLD: 3.68 10*3/MM3 (ref 3.5–10.8)
WBC NRBC COR # BLD: 3.71 10*3/MM3 (ref 3.5–10.8)
WBC NRBC COR # BLD: 3.85 10*3/MM3 (ref 3.5–10.8)
WBC NRBC COR # BLD: 3.86 10*3/MM3 (ref 3.5–10.8)
WBC NRBC COR # BLD: 3.89 10*3/MM3 (ref 3.5–10.8)
WBC NRBC COR # BLD: 4.06 10*3/MM3 (ref 3.5–10.8)
WBC NRBC COR # BLD: 4.11 10*3/MM3 (ref 3.5–10.8)
WBC NRBC COR # BLD: 4.21 10*3/MM3 (ref 3.5–10.8)
WBC NRBC COR # BLD: 4.27 10*3/MM3 (ref 3.5–10.8)
WBC NRBC COR # BLD: 4.27 10*3/MM3 (ref 3.5–10.8)
WBC NRBC COR # BLD: 4.3 10*3/MM3 (ref 3.5–10.8)
WBC NRBC COR # BLD: 4.3 10*3/MM3 (ref 3.5–10.8)
WBC NRBC COR # BLD: 4.62 10*3/MM3 (ref 3.5–10.8)
WBC NRBC COR # BLD: 5.11 10*3/MM3 (ref 3.5–10.8)
WBC NRBC COR # BLD: 6.06 10*3/MM3 (ref 3.5–10.8)
WBC NRBC COR # BLD: 8.02 10*3/MM3 (ref 3.5–10.8)
WBC NRBC COR # BLD: 8.04 10*3/MM3 (ref 3.5–10.8)
WBC NRBC COR # BLD: 8.42 10*3/MM3 (ref 3.5–10.8)
WBC NRBC COR # BLD: 9.05 10*3/MM3 (ref 3.5–10.8)
WBC NRBC COR # BLD: 9.63 10*3/MM3 (ref 3.5–10.8)
WBC NRBC COR # BLD: 9.69 10*3/MM3 (ref 3.5–10.8)
WBC UR QL AUTO: ABNORMAL /HPF
WHOLE BLOOD HOLD SPECIMEN: NORMAL

## 2017-01-01 PROCEDURE — 87205 SMEAR GRAM STAIN: CPT | Performed by: NURSE PRACTITIONER

## 2017-01-01 PROCEDURE — 94668 MNPJ CHEST WALL SBSQ: CPT

## 2017-01-01 PROCEDURE — 87077 CULTURE AEROBIC IDENTIFY: CPT | Performed by: EMERGENCY MEDICINE

## 2017-01-01 PROCEDURE — 94799 UNLISTED PULMONARY SVC/PX: CPT

## 2017-01-01 PROCEDURE — 85610 PROTHROMBIN TIME: CPT

## 2017-01-01 PROCEDURE — 94760 N-INVAS EAR/PLS OXIMETRY 1: CPT

## 2017-01-01 PROCEDURE — G8978 MOBILITY CURRENT STATUS: HCPCS

## 2017-01-01 PROCEDURE — 94660 CPAP INITIATION&MGMT: CPT

## 2017-01-01 PROCEDURE — 99285 EMERGENCY DEPT VISIT HI MDM: CPT

## 2017-01-01 PROCEDURE — 94640 AIRWAY INHALATION TREATMENT: CPT

## 2017-01-01 PROCEDURE — 82962 GLUCOSE BLOOD TEST: CPT

## 2017-01-01 PROCEDURE — 85025 COMPLETE CBC W/AUTO DIFF WBC: CPT | Performed by: NURSE PRACTITIONER

## 2017-01-01 PROCEDURE — 99232 SBSQ HOSP IP/OBS MODERATE 35: CPT | Performed by: INTERNAL MEDICINE

## 2017-01-01 PROCEDURE — 80069 RENAL FUNCTION PANEL: CPT | Performed by: INTERNAL MEDICINE

## 2017-01-01 PROCEDURE — 93005 ELECTROCARDIOGRAM TRACING: CPT | Performed by: EMERGENCY MEDICINE

## 2017-01-01 PROCEDURE — 97110 THERAPEUTIC EXERCISES: CPT

## 2017-01-01 PROCEDURE — 85060 BLOOD SMEAR INTERPRETATION: CPT | Performed by: HOSPITALIST

## 2017-01-01 PROCEDURE — 97162 PT EVAL MOD COMPLEX 30 MIN: CPT | Performed by: PHYSICAL THERAPIST

## 2017-01-01 PROCEDURE — 70450 CT HEAD/BRAIN W/O DYE: CPT

## 2017-01-01 PROCEDURE — 71010 HC CHEST PA OR AP: CPT

## 2017-01-01 PROCEDURE — 25810000003 SODIUM CHLORIDE 0.9 % WITH KCL 20 MEQ 20-0.9 MEQ/L-% SOLUTION: Performed by: NURSE PRACTITIONER

## 2017-01-01 PROCEDURE — 81001 URINALYSIS AUTO W/SCOPE: CPT | Performed by: NURSE PRACTITIONER

## 2017-01-01 PROCEDURE — 95819 EEG AWAKE AND ASLEEP: CPT

## 2017-01-01 PROCEDURE — 92610 EVALUATE SWALLOWING FUNCTION: CPT

## 2017-01-01 PROCEDURE — 99239 HOSP IP/OBS DSCHRG MGMT >30: CPT | Performed by: NURSE PRACTITIONER

## 2017-01-01 PROCEDURE — 97162 PT EVAL MOD COMPLEX 30 MIN: CPT

## 2017-01-01 PROCEDURE — 97116 GAIT TRAINING THERAPY: CPT

## 2017-01-01 PROCEDURE — 80053 COMPREHEN METABOLIC PANEL: CPT | Performed by: EMERGENCY MEDICINE

## 2017-01-01 PROCEDURE — 93306 TTE W/DOPPLER COMPLETE: CPT

## 2017-01-01 PROCEDURE — 94667 MNPJ CHEST WALL 1ST: CPT

## 2017-01-01 PROCEDURE — 99232 SBSQ HOSP IP/OBS MODERATE 35: CPT | Performed by: HOSPITALIST

## 2017-01-01 PROCEDURE — 93010 ELECTROCARDIOGRAM REPORT: CPT | Performed by: INTERNAL MEDICINE

## 2017-01-01 PROCEDURE — 85027 COMPLETE CBC AUTOMATED: CPT | Performed by: INTERNAL MEDICINE

## 2017-01-01 PROCEDURE — 94669 MECHANICAL CHEST WALL OSCILL: CPT

## 2017-01-01 PROCEDURE — 84484 ASSAY OF TROPONIN QUANT: CPT

## 2017-01-01 PROCEDURE — 97530 THERAPEUTIC ACTIVITIES: CPT

## 2017-01-01 PROCEDURE — 63710000001 INSULIN DETEMIR PER 5 UNITS: Performed by: NURSE PRACTITIONER

## 2017-01-01 PROCEDURE — C1751 CATH, INF, PER/CENT/MIDLINE: HCPCS

## 2017-01-01 PROCEDURE — 25010000002 FUROSEMIDE PER 20 MG: Performed by: NURSE PRACTITIONER

## 2017-01-01 PROCEDURE — 85610 PROTHROMBIN TIME: CPT | Performed by: NURSE PRACTITIONER

## 2017-01-01 PROCEDURE — 63710000001 INSULIN LISPRO (HUMAN) PER 5 UNITS: Performed by: PHYSICIAN ASSISTANT

## 2017-01-01 PROCEDURE — 25010000002 FUROSEMIDE PER 20 MG: Performed by: INTERNAL MEDICINE

## 2017-01-01 PROCEDURE — G0378 HOSPITAL OBSERVATION PER HR: HCPCS

## 2017-01-01 PROCEDURE — 63710000001 PREDNISONE PER 1 MG: Performed by: INTERNAL MEDICINE

## 2017-01-01 PROCEDURE — 87077 CULTURE AEROBIC IDENTIFY: CPT | Performed by: NURSE PRACTITIONER

## 2017-01-01 PROCEDURE — C8929 TTE W OR WO FOL WCON,DOPPLER: HCPCS

## 2017-01-01 PROCEDURE — 80069 RENAL FUNCTION PANEL: CPT | Performed by: HOSPITALIST

## 2017-01-01 PROCEDURE — 85027 COMPLETE CBC AUTOMATED: CPT

## 2017-01-01 PROCEDURE — 99232 SBSQ HOSP IP/OBS MODERATE 35: CPT | Performed by: PHYSICIAN ASSISTANT

## 2017-01-01 PROCEDURE — 83880 ASSAY OF NATRIURETIC PEPTIDE: CPT | Performed by: EMERGENCY MEDICINE

## 2017-01-01 PROCEDURE — 86140 C-REACTIVE PROTEIN: CPT

## 2017-01-01 PROCEDURE — 97166 OT EVAL MOD COMPLEX 45 MIN: CPT

## 2017-01-01 PROCEDURE — 85610 PROTHROMBIN TIME: CPT | Performed by: PHYSICIAN ASSISTANT

## 2017-01-01 PROCEDURE — 86140 C-REACTIVE PROTEIN: CPT | Performed by: INTERNAL MEDICINE

## 2017-01-01 PROCEDURE — 83735 ASSAY OF MAGNESIUM: CPT | Performed by: EMERGENCY MEDICINE

## 2017-01-01 PROCEDURE — 83036 HEMOGLOBIN GLYCOSYLATED A1C: CPT | Performed by: NURSE PRACTITIONER

## 2017-01-01 PROCEDURE — 99233 SBSQ HOSP IP/OBS HIGH 50: CPT | Performed by: HOSPITALIST

## 2017-01-01 PROCEDURE — 87086 URINE CULTURE/COLONY COUNT: CPT | Performed by: NURSE PRACTITIONER

## 2017-01-01 PROCEDURE — 73630 X-RAY EXAM OF FOOT: CPT

## 2017-01-01 PROCEDURE — 80048 BASIC METABOLIC PNL TOTAL CA: CPT | Performed by: INTERNAL MEDICINE

## 2017-01-01 PROCEDURE — 85652 RBC SED RATE AUTOMATED: CPT | Performed by: INTERNAL MEDICINE

## 2017-01-01 PROCEDURE — 99213 OFFICE O/P EST LOW 20 MIN: CPT | Performed by: INTERNAL MEDICINE

## 2017-01-01 PROCEDURE — 80048 BASIC METABOLIC PNL TOTAL CA: CPT | Performed by: HOSPITALIST

## 2017-01-01 PROCEDURE — 36600 WITHDRAWAL OF ARTERIAL BLOOD: CPT | Performed by: INTERNAL MEDICINE

## 2017-01-01 PROCEDURE — 80053 COMPREHEN METABOLIC PANEL: CPT | Performed by: NURSE PRACTITIONER

## 2017-01-01 PROCEDURE — 92612 ENDOSCOPY SWALLOW (FEES) VID: CPT

## 2017-01-01 PROCEDURE — 83880 ASSAY OF NATRIURETIC PEPTIDE: CPT | Performed by: NURSE PRACTITIONER

## 2017-01-01 PROCEDURE — 92611 MOTION FLUOROSCOPY/SWALLOW: CPT

## 2017-01-01 PROCEDURE — 93306 TTE W/DOPPLER COMPLETE: CPT | Performed by: INTERNAL MEDICINE

## 2017-01-01 PROCEDURE — 85025 COMPLETE CBC W/AUTO DIFF WBC: CPT | Performed by: EMERGENCY MEDICINE

## 2017-01-01 PROCEDURE — 25010000002 LORAZEPAM PER 2 MG: Performed by: NURSE PRACTITIONER

## 2017-01-01 PROCEDURE — 83880 ASSAY OF NATRIURETIC PEPTIDE: CPT | Performed by: HOSPITALIST

## 2017-01-01 PROCEDURE — 99231 SBSQ HOSP IP/OBS SF/LOW 25: CPT | Performed by: INTERNAL MEDICINE

## 2017-01-01 PROCEDURE — 85027 COMPLETE CBC AUTOMATED: CPT | Performed by: PHYSICIAN ASSISTANT

## 2017-01-01 PROCEDURE — 99223 1ST HOSP IP/OBS HIGH 75: CPT | Performed by: INTERNAL MEDICINE

## 2017-01-01 PROCEDURE — 80048 BASIC METABOLIC PNL TOTAL CA: CPT | Performed by: NURSE PRACTITIONER

## 2017-01-01 PROCEDURE — 25010000002 VANCOMYCIN PER 500 MG: Performed by: EMERGENCY MEDICINE

## 2017-01-01 PROCEDURE — 85025 COMPLETE CBC W/AUTO DIFF WBC: CPT | Performed by: HOSPITALIST

## 2017-01-01 PROCEDURE — 85610 PROTHROMBIN TIME: CPT | Performed by: INTERNAL MEDICINE

## 2017-01-01 PROCEDURE — 83880 ASSAY OF NATRIURETIC PEPTIDE: CPT | Performed by: INTERNAL MEDICINE

## 2017-01-01 PROCEDURE — 25010000002 SULFUR HEXAFLUORIDE MICROSPH 60.7-25 MG RECONSTITUTED SUSPENSION: Performed by: INTERNAL MEDICINE

## 2017-01-01 PROCEDURE — 93005 ELECTROCARDIOGRAM TRACING: CPT | Performed by: NURSE PRACTITIONER

## 2017-01-01 PROCEDURE — 25010000002 METHYLPREDNISOLONE PER 40 MG: Performed by: PHYSICIAN ASSISTANT

## 2017-01-01 PROCEDURE — 29580 STRAPPING UNNA BOOT: CPT

## 2017-01-01 PROCEDURE — 81001 URINALYSIS AUTO W/SCOPE: CPT | Performed by: INTERNAL MEDICINE

## 2017-01-01 PROCEDURE — 25010000002 PIPERACILLIN SOD-TAZOBACTAM PER 1 G

## 2017-01-01 PROCEDURE — 25010000003 CEFTRIAXONE PER 250 MG: Performed by: NURSE PRACTITIONER

## 2017-01-01 PROCEDURE — 87899 AGENT NOS ASSAY W/OPTIC: CPT | Performed by: HOSPITALIST

## 2017-01-01 PROCEDURE — 87205 SMEAR GRAM STAIN: CPT | Performed by: INTERNAL MEDICINE

## 2017-01-01 PROCEDURE — 82805 BLOOD GASES W/O2 SATURATION: CPT | Performed by: PHYSICIAN ASSISTANT

## 2017-01-01 PROCEDURE — 25010000002 VANCOMYCIN 10 G RECONSTITUTED SOLUTION

## 2017-01-01 PROCEDURE — 25010000002 LEVOFLOXACIN PER 250 MG: Performed by: INTERNAL MEDICINE

## 2017-01-01 PROCEDURE — 83605 ASSAY OF LACTIC ACID: CPT | Performed by: EMERGENCY MEDICINE

## 2017-01-01 PROCEDURE — 73501 X-RAY EXAM HIP UNI 1 VIEW: CPT

## 2017-01-01 PROCEDURE — 63710000001 INSULIN LISPRO (HUMAN) PER 5 UNITS: Performed by: NURSE PRACTITIONER

## 2017-01-01 PROCEDURE — 85027 COMPLETE CBC AUTOMATED: CPT | Performed by: NURSE PRACTITIONER

## 2017-01-01 PROCEDURE — 80048 BASIC METABOLIC PNL TOTAL CA: CPT | Performed by: PHYSICIAN ASSISTANT

## 2017-01-01 PROCEDURE — 85610 PROTHROMBIN TIME: CPT | Performed by: EMERGENCY MEDICINE

## 2017-01-01 PROCEDURE — 36600 WITHDRAWAL OF ARTERIAL BLOOD: CPT | Performed by: NURSE PRACTITIONER

## 2017-01-01 PROCEDURE — 80306 DRUG TEST PRSMV INSTRMNT: CPT | Performed by: NURSE PRACTITIONER

## 2017-01-01 PROCEDURE — 85027 COMPLETE CBC AUTOMATED: CPT | Performed by: HOSPITALIST

## 2017-01-01 PROCEDURE — 87804 INFLUENZA ASSAY W/OPTIC: CPT | Performed by: PHYSICIAN ASSISTANT

## 2017-01-01 PROCEDURE — 81001 URINALYSIS AUTO W/SCOPE: CPT | Performed by: EMERGENCY MEDICINE

## 2017-01-01 PROCEDURE — 74240 X-RAY XM UPR GI TRC 1CNTRST: CPT

## 2017-01-01 PROCEDURE — 85025 COMPLETE CBC W/AUTO DIFF WBC: CPT | Performed by: INTERNAL MEDICINE

## 2017-01-01 PROCEDURE — G8979 MOBILITY GOAL STATUS: HCPCS

## 2017-01-01 PROCEDURE — 84443 ASSAY THYROID STIM HORMONE: CPT | Performed by: INTERNAL MEDICINE

## 2017-01-01 PROCEDURE — 97164 PT RE-EVAL EST PLAN CARE: CPT

## 2017-01-01 PROCEDURE — 25010000002 FUROSEMIDE PER 20 MG: Performed by: HOSPITALIST

## 2017-01-01 PROCEDURE — 87186 SC STD MICRODIL/AGAR DIL: CPT | Performed by: NURSE PRACTITIONER

## 2017-01-01 PROCEDURE — 84484 ASSAY OF TROPONIN QUANT: CPT | Performed by: INTERNAL MEDICINE

## 2017-01-01 PROCEDURE — 99233 SBSQ HOSP IP/OBS HIGH 50: CPT | Performed by: INTERNAL MEDICINE

## 2017-01-01 PROCEDURE — G8996 SWALLOW CURRENT STATUS: HCPCS

## 2017-01-01 PROCEDURE — 87086 URINE CULTURE/COLONY COUNT: CPT | Performed by: EMERGENCY MEDICINE

## 2017-01-01 PROCEDURE — 71020 HC CHEST PA AND LATERAL: CPT

## 2017-01-01 PROCEDURE — G8978 MOBILITY CURRENT STATUS: HCPCS | Performed by: PHYSICAL THERAPIST

## 2017-01-01 PROCEDURE — 82805 BLOOD GASES W/O2 SATURATION: CPT | Performed by: INTERNAL MEDICINE

## 2017-01-01 PROCEDURE — G0108 DIAB MANAGE TRN  PER INDIV: HCPCS | Performed by: REGISTERED NURSE

## 2017-01-01 PROCEDURE — G8987 SELF CARE CURRENT STATUS: HCPCS

## 2017-01-01 PROCEDURE — 83605 ASSAY OF LACTIC ACID: CPT | Performed by: PHYSICIAN ASSISTANT

## 2017-01-01 PROCEDURE — 87040 BLOOD CULTURE FOR BACTERIA: CPT | Performed by: EMERGENCY MEDICINE

## 2017-01-01 PROCEDURE — 85379 FIBRIN DEGRADATION QUANT: CPT | Performed by: INTERNAL MEDICINE

## 2017-01-01 PROCEDURE — 83735 ASSAY OF MAGNESIUM: CPT | Performed by: NURSE PRACTITIONER

## 2017-01-01 PROCEDURE — 99220 PR INITIAL OBSERVATION CARE/DAY 70 MINUTES: CPT | Performed by: INTERNAL MEDICINE

## 2017-01-01 PROCEDURE — 63710000001 PREDNISONE PER 1 MG: Performed by: EMERGENCY MEDICINE

## 2017-01-01 PROCEDURE — 87147 CULTURE TYPE IMMUNOLOGIC: CPT | Performed by: EMERGENCY MEDICINE

## 2017-01-01 PROCEDURE — 25010000002 METHYLPREDNISOLONE PER 125 MG: Performed by: INTERNAL MEDICINE

## 2017-01-01 PROCEDURE — 25010000002 KETOROLAC TROMETHAMINE PER 15 MG: Performed by: INTERNAL MEDICINE

## 2017-01-01 PROCEDURE — 71250 CT THORAX DX C-: CPT

## 2017-01-01 PROCEDURE — 0M933ZX DRAINAGE OF RIGHT ELBOW BURSA AND LIGAMENT, PERCUTANEOUS APPROACH, DIAGNOSTIC: ICD-10-PCS | Performed by: ORTHOPAEDIC SURGERY

## 2017-01-01 PROCEDURE — 87186 SC STD MICRODIL/AGAR DIL: CPT | Performed by: EMERGENCY MEDICINE

## 2017-01-01 PROCEDURE — 84439 ASSAY OF FREE THYROXINE: CPT | Performed by: INTERNAL MEDICINE

## 2017-01-01 PROCEDURE — 85652 RBC SED RATE AUTOMATED: CPT | Performed by: EMERGENCY MEDICINE

## 2017-01-01 PROCEDURE — 93005 ELECTROCARDIOGRAM TRACING: CPT | Performed by: INTERNAL MEDICINE

## 2017-01-01 PROCEDURE — 87015 SPECIMEN INFECT AGNT CONCNTJ: CPT | Performed by: INTERNAL MEDICINE

## 2017-01-01 PROCEDURE — 36600 WITHDRAWAL OF ARTERIAL BLOOD: CPT | Performed by: PHYSICIAN ASSISTANT

## 2017-01-01 PROCEDURE — G8979 MOBILITY GOAL STATUS: HCPCS | Performed by: PHYSICAL THERAPIST

## 2017-01-01 PROCEDURE — 85610 PROTHROMBIN TIME: CPT | Performed by: HOSPITALIST

## 2017-01-01 PROCEDURE — 87449 NOS EACH ORGANISM AG IA: CPT | Performed by: HOSPITALIST

## 2017-01-01 PROCEDURE — 87070 CULTURE OTHR SPECIMN AEROBIC: CPT | Performed by: INTERNAL MEDICINE

## 2017-01-01 PROCEDURE — 80061 LIPID PANEL: CPT | Performed by: PHYSICIAN ASSISTANT

## 2017-01-01 PROCEDURE — C1894 INTRO/SHEATH, NON-LASER: HCPCS

## 2017-01-01 PROCEDURE — 97530 THERAPEUTIC ACTIVITIES: CPT | Performed by: OCCUPATIONAL THERAPIST

## 2017-01-01 PROCEDURE — G8988 SELF CARE GOAL STATUS: HCPCS

## 2017-01-01 PROCEDURE — 87086 URINE CULTURE/COLONY COUNT: CPT | Performed by: INTERNAL MEDICINE

## 2017-01-01 PROCEDURE — 25010000003 CEFTRIAXONE PER 250 MG: Performed by: EMERGENCY MEDICINE

## 2017-01-01 PROCEDURE — 80053 COMPREHEN METABOLIC PANEL: CPT | Performed by: INTERNAL MEDICINE

## 2017-01-01 PROCEDURE — 87186 SC STD MICRODIL/AGAR DIL: CPT | Performed by: INTERNAL MEDICINE

## 2017-01-01 PROCEDURE — 82805 BLOOD GASES W/O2 SATURATION: CPT | Performed by: EMERGENCY MEDICINE

## 2017-01-01 PROCEDURE — 99254 IP/OBS CNSLTJ NEW/EST MOD 60: CPT | Performed by: INTERNAL MEDICINE

## 2017-01-01 PROCEDURE — 36600 WITHDRAWAL OF ARTERIAL BLOOD: CPT | Performed by: EMERGENCY MEDICINE

## 2017-01-01 PROCEDURE — 25010000002 MORPHINE SULFATE (PF) 2 MG/ML SOLUTION: Performed by: EMERGENCY MEDICINE

## 2017-01-01 PROCEDURE — 85007 BL SMEAR W/DIFF WBC COUNT: CPT | Performed by: HOSPITALIST

## 2017-01-01 PROCEDURE — 99232 SBSQ HOSP IP/OBS MODERATE 35: CPT | Performed by: NURSE PRACTITIONER

## 2017-01-01 PROCEDURE — 25010000002 ONDANSETRON PER 1 MG: Performed by: PHYSICIAN ASSISTANT

## 2017-01-01 PROCEDURE — 99226 PR SBSQ OBSERVATION CARE/DAY 35 MINUTES: CPT | Performed by: INTERNAL MEDICINE

## 2017-01-01 PROCEDURE — 99253 IP/OBS CNSLTJ NEW/EST LOW 45: CPT | Performed by: INTERNAL MEDICINE

## 2017-01-01 PROCEDURE — 25010000002 VANCOMYCIN HCL IN NACL 2-0.9 GM/500ML-% SOLUTION

## 2017-01-01 PROCEDURE — 25010000002 FUROSEMIDE PER 20 MG: Performed by: EMERGENCY MEDICINE

## 2017-01-01 PROCEDURE — 99220 PR INITIAL OBSERVATION CARE/DAY 70 MINUTES: CPT | Performed by: FAMILY MEDICINE

## 2017-01-01 PROCEDURE — 87077 CULTURE AEROBIC IDENTIFY: CPT | Performed by: INTERNAL MEDICINE

## 2017-01-01 PROCEDURE — 96360 HYDRATION IV INFUSION INIT: CPT

## 2017-01-01 PROCEDURE — 96361 HYDRATE IV INFUSION ADD-ON: CPT

## 2017-01-01 PROCEDURE — 99239 HOSP IP/OBS DSCHRG MGMT >30: CPT | Performed by: PHYSICIAN ASSISTANT

## 2017-01-01 PROCEDURE — 99231 SBSQ HOSP IP/OBS SF/LOW 25: CPT | Performed by: PHYSICIAN ASSISTANT

## 2017-01-01 PROCEDURE — 36415 COLL VENOUS BLD VENIPUNCTURE: CPT

## 2017-01-01 PROCEDURE — 84443 ASSAY THYROID STIM HORMONE: CPT | Performed by: EMERGENCY MEDICINE

## 2017-01-01 PROCEDURE — 99223 1ST HOSP IP/OBS HIGH 75: CPT | Performed by: FAMILY MEDICINE

## 2017-01-01 PROCEDURE — G8997 SWALLOW GOAL STATUS: HCPCS

## 2017-01-01 PROCEDURE — 99217 PR OBSERVATION CARE DISCHARGE MANAGEMENT: CPT | Performed by: NURSE PRACTITIONER

## 2017-01-01 PROCEDURE — 85007 BL SMEAR W/DIFF WBC COUNT: CPT | Performed by: NURSE PRACTITIONER

## 2017-01-01 PROCEDURE — 25010000002 FUROSEMIDE PER 20 MG: Performed by: PHYSICIAN ASSISTANT

## 2017-01-01 PROCEDURE — 86140 C-REACTIVE PROTEIN: CPT | Performed by: EMERGENCY MEDICINE

## 2017-01-01 PROCEDURE — 85652 RBC SED RATE AUTOMATED: CPT

## 2017-01-01 PROCEDURE — 99226 PR SBSQ OBSERVATION CARE/DAY 35 MINUTES: CPT | Performed by: HOSPITALIST

## 2017-01-01 PROCEDURE — 93005 ELECTROCARDIOGRAM TRACING: CPT

## 2017-01-01 PROCEDURE — 87040 BLOOD CULTURE FOR BACTERIA: CPT | Performed by: NURSE PRACTITIONER

## 2017-01-01 PROCEDURE — 74230 X-RAY XM SWLNG FUNCJ C+: CPT

## 2017-01-01 PROCEDURE — 96375 TX/PRO/DX INJ NEW DRUG ADDON: CPT

## 2017-01-01 PROCEDURE — 82805 BLOOD GASES W/O2 SATURATION: CPT | Performed by: NURSE PRACTITIONER

## 2017-01-01 PROCEDURE — P9612 CATHETERIZE FOR URINE SPEC: HCPCS

## 2017-01-01 PROCEDURE — 85025 COMPLETE CBC W/AUTO DIFF WBC: CPT

## 2017-01-01 PROCEDURE — 25010000002 VANCOMYCIN PER 500 MG

## 2017-01-01 PROCEDURE — 29581 APPL MULTLAYER CMPRN SYS LEG: CPT

## 2017-01-01 PROCEDURE — 80053 COMPREHEN METABOLIC PANEL: CPT

## 2017-01-01 PROCEDURE — 84484 ASSAY OF TROPONIN QUANT: CPT | Performed by: PHYSICIAN ASSISTANT

## 2017-01-01 PROCEDURE — 74020 HC XR ABDOMEN FLAT & UPRIGHT: CPT

## 2017-01-01 PROCEDURE — 74176 CT ABD & PELVIS W/O CONTRAST: CPT

## 2017-01-01 PROCEDURE — 97116 GAIT TRAINING THERAPY: CPT | Performed by: PHYSICAL THERAPIST

## 2017-01-01 PROCEDURE — 25010000002 KETOROLAC TROMETHAMINE PER 15 MG: Performed by: NURSE PRACTITIONER

## 2017-01-01 PROCEDURE — 80202 ASSAY OF VANCOMYCIN: CPT

## 2017-01-01 PROCEDURE — 87070 CULTURE OTHR SPECIMN AEROBIC: CPT | Performed by: NURSE PRACTITIONER

## 2017-01-01 PROCEDURE — 84145 PROCALCITONIN (PCT): CPT | Performed by: EMERGENCY MEDICINE

## 2017-01-01 PROCEDURE — 99233 SBSQ HOSP IP/OBS HIGH 50: CPT | Performed by: NURSE PRACTITIONER

## 2017-01-01 RX ORDER — ONDANSETRON HYDROCHLORIDE 8 MG/1
8 TABLET, FILM COATED ORAL EVERY 8 HOURS PRN
Status: ON HOLD | COMMUNITY
End: 2017-01-01

## 2017-01-01 RX ORDER — ACETAMINOPHEN 325 MG/1
650 TABLET ORAL EVERY 4 HOURS PRN
Start: 2017-01-01

## 2017-01-01 RX ORDER — WARFARIN SODIUM 3 MG/1
6 TABLET ORAL
Status: DISCONTINUED | OUTPATIENT
Start: 2017-01-01 | End: 2017-01-01 | Stop reason: HOSPADM

## 2017-01-01 RX ORDER — POLYETHYLENE GLYCOL 3350 17 G/17G
17 POWDER, FOR SOLUTION ORAL DAILY
Start: 2017-01-01 | End: 2017-01-01 | Stop reason: DRUGHIGH

## 2017-01-01 RX ORDER — DEXTROSE MONOHYDRATE 25 G/50ML
25 INJECTION, SOLUTION INTRAVENOUS
Status: DISCONTINUED | OUTPATIENT
Start: 2017-01-01 | End: 2018-01-01

## 2017-01-01 RX ORDER — VANCOMYCIN HYDROCHLORIDE 1 G/200ML
1000 INJECTION, SOLUTION INTRAVENOUS ONCE
Status: COMPLETED | OUTPATIENT
Start: 2017-01-01 | End: 2017-01-01

## 2017-01-01 RX ORDER — FERROUS SULFATE TAB EC 324 MG (65 MG FE EQUIVALENT) 324 (65 FE) MG
324 TABLET DELAYED RESPONSE ORAL
COMMUNITY

## 2017-01-01 RX ORDER — BENZONATATE 100 MG/1
100 CAPSULE ORAL 3 TIMES DAILY PRN
Status: DISCONTINUED | OUTPATIENT
Start: 2017-01-01 | End: 2017-01-01 | Stop reason: HOSPADM

## 2017-01-01 RX ORDER — FUROSEMIDE 40 MG/1
40 TABLET ORAL DAILY
Status: DISCONTINUED | OUTPATIENT
Start: 2017-01-01 | End: 2017-01-01

## 2017-01-01 RX ORDER — CYCLOBENZAPRINE HCL 10 MG
10 TABLET ORAL 3 TIMES DAILY PRN
Status: DISCONTINUED | OUTPATIENT
Start: 2017-01-01 | End: 2017-01-01 | Stop reason: HOSPADM

## 2017-01-01 RX ORDER — SACCHAROMYCES BOULARDII 250 MG
250 CAPSULE ORAL 2 TIMES DAILY
Status: DISCONTINUED | OUTPATIENT
Start: 2017-01-01 | End: 2017-01-01 | Stop reason: HOSPADM

## 2017-01-01 RX ORDER — ASPIRIN 81 MG/1
81 TABLET ORAL DAILY
Qty: 30 TABLET | Refills: 11
Start: 2017-01-01

## 2017-01-01 RX ORDER — SODIUM CHLORIDE 9 MG/ML
100 INJECTION, SOLUTION INTRAVENOUS CONTINUOUS
Status: DISCONTINUED | OUTPATIENT
Start: 2017-01-01 | End: 2017-01-01

## 2017-01-01 RX ORDER — ASPIRIN 81 MG/1
81 TABLET ORAL DAILY
Status: DISCONTINUED | OUTPATIENT
Start: 2017-01-01 | End: 2017-01-01 | Stop reason: HOSPADM

## 2017-01-01 RX ORDER — NYSTATIN 100000 [USP'U]/G
POWDER TOPICAL EVERY 12 HOURS SCHEDULED
Status: DISCONTINUED | OUTPATIENT
Start: 2017-01-01 | End: 2018-01-01 | Stop reason: HOSPADM

## 2017-01-01 RX ORDER — ACETAMINOPHEN AND CODEINE PHOSPHATE 300; 30 MG/1; MG/1
1 TABLET ORAL ONCE
Status: COMPLETED | OUTPATIENT
Start: 2017-01-01 | End: 2017-01-01

## 2017-01-01 RX ORDER — FUROSEMIDE 40 MG/1
20-40 TABLET ORAL 2 TIMES DAILY
COMMUNITY

## 2017-01-01 RX ORDER — SODIUM CHLORIDE 0.9 % (FLUSH) 0.9 %
1-10 SYRINGE (ML) INJECTION AS NEEDED
Status: DISCONTINUED | OUTPATIENT
Start: 2017-01-01 | End: 2017-01-01 | Stop reason: HOSPADM

## 2017-01-01 RX ORDER — CASTOR OIL AND BALSAM, PERU 788; 87 MG/G; MG/G
OINTMENT TOPICAL EVERY 12 HOURS SCHEDULED
Status: DISCONTINUED | OUTPATIENT
Start: 2017-01-01 | End: 2017-01-01 | Stop reason: HOSPADM

## 2017-01-01 RX ORDER — IPRATROPIUM BROMIDE AND ALBUTEROL SULFATE 2.5; .5 MG/3ML; MG/3ML
3 SOLUTION RESPIRATORY (INHALATION) EVERY 4 HOURS PRN
Status: DISCONTINUED | OUTPATIENT
Start: 2017-01-01 | End: 2017-01-01 | Stop reason: HOSPADM

## 2017-01-01 RX ORDER — TAMSULOSIN HYDROCHLORIDE 0.4 MG/1
0.8 CAPSULE ORAL NIGHTLY
Status: DISCONTINUED | OUTPATIENT
Start: 2017-01-01 | End: 2017-01-01 | Stop reason: HOSPADM

## 2017-01-01 RX ORDER — FLUVASTATIN SODIUM 80 MG/1
80 TABLET, FILM COATED, EXTENDED RELEASE ORAL DAILY
Status: DISCONTINUED | OUTPATIENT
Start: 2017-01-01 | End: 2017-01-01 | Stop reason: HOSPADM

## 2017-01-01 RX ORDER — POTASSIUM CHLORIDE 750 MG/1
10 CAPSULE, EXTENDED RELEASE ORAL 2 TIMES DAILY WITH MEALS
Status: DISCONTINUED | OUTPATIENT
Start: 2017-01-01 | End: 2017-01-01 | Stop reason: HOSPADM

## 2017-01-01 RX ORDER — SODIUM CHLORIDE 0.9 % (FLUSH) 0.9 %
10 SYRINGE (ML) INJECTION AS NEEDED
Status: DISCONTINUED | OUTPATIENT
Start: 2017-01-01 | End: 2017-01-01 | Stop reason: HOSPADM

## 2017-01-01 RX ORDER — FLUVASTATIN SODIUM 80 MG/1
80 TABLET, FILM COATED, EXTENDED RELEASE ORAL NIGHTLY
Status: ON HOLD | COMMUNITY
End: 2017-01-01 | Stop reason: ALTCHOICE

## 2017-01-01 RX ORDER — PSEUDOEPHEDRINE HCL 30 MG
100 TABLET ORAL 2 TIMES DAILY
Start: 2017-01-01 | End: 2017-01-01 | Stop reason: HOSPADM

## 2017-01-01 RX ORDER — WARFARIN SODIUM 4 MG/1
4 TABLET ORAL
Status: COMPLETED | OUTPATIENT
Start: 2017-01-01 | End: 2017-01-01

## 2017-01-01 RX ORDER — ALBUTEROL SULFATE 2.5 MG/3ML
2.5 SOLUTION RESPIRATORY (INHALATION) EVERY 6 HOURS PRN
Status: ON HOLD | COMMUNITY
End: 2017-01-01

## 2017-01-01 RX ORDER — NYSTATIN 100000 U/G
CREAM TOPICAL EVERY 12 HOURS SCHEDULED
Refills: 0 | Status: ON HOLD
Start: 2017-01-01 | End: 2017-01-01

## 2017-01-01 RX ORDER — WARFARIN SODIUM 5 MG/1
5 TABLET ORAL
Status: COMPLETED | OUTPATIENT
Start: 2017-01-01 | End: 2017-01-01

## 2017-01-01 RX ORDER — WARFARIN SODIUM 5 MG/1
5 TABLET ORAL
Status: DISCONTINUED | OUTPATIENT
Start: 2017-01-01 | End: 2017-01-01

## 2017-01-01 RX ORDER — POLYETHYLENE GLYCOL 3350 17 G/17G
17 POWDER, FOR SOLUTION ORAL DAILY
Status: DISCONTINUED | OUTPATIENT
Start: 2017-01-01 | End: 2017-01-01

## 2017-01-01 RX ORDER — ASPIRIN 81 MG/1
81 TABLET ORAL DAILY
Status: DISCONTINUED | OUTPATIENT
Start: 2017-01-01 | End: 2018-01-01

## 2017-01-01 RX ORDER — LIDOCAINE 50 MG/G
1 PATCH TOPICAL
Qty: 15 PATCH | Refills: 0 | Status: SHIPPED | OUTPATIENT
Start: 2017-01-01

## 2017-01-01 RX ORDER — ACETAMINOPHEN AND CODEINE PHOSPHATE 300; 30 MG/1; MG/1
1 TABLET ORAL EVERY 6 HOURS PRN
Status: DISPENSED | OUTPATIENT
Start: 2017-01-01 | End: 2017-01-01

## 2017-01-01 RX ORDER — TAMSULOSIN HYDROCHLORIDE 0.4 MG/1
0.4 CAPSULE ORAL NIGHTLY
Status: DISCONTINUED | OUTPATIENT
Start: 2017-01-01 | End: 2017-01-01

## 2017-01-01 RX ORDER — FAMOTIDINE 20 MG/1
40 TABLET, FILM COATED ORAL DAILY
Status: DISCONTINUED | OUTPATIENT
Start: 2017-01-01 | End: 2018-01-01

## 2017-01-01 RX ORDER — ACETAMINOPHEN AND CODEINE PHOSPHATE 300; 30 MG/1; MG/1
1 TABLET ORAL EVERY 6 HOURS PRN
Status: DISCONTINUED | OUTPATIENT
Start: 2017-01-01 | End: 2017-01-01

## 2017-01-01 RX ORDER — ONDANSETRON 4 MG/1
4 TABLET, FILM COATED ORAL EVERY 6 HOURS PRN
Status: DISCONTINUED | OUTPATIENT
Start: 2017-01-01 | End: 2017-01-01 | Stop reason: HOSPADM

## 2017-01-01 RX ORDER — AMOXICILLIN AND CLAVULANATE POTASSIUM 875; 125 MG/1; MG/1
1 TABLET, FILM COATED ORAL EVERY 12 HOURS SCHEDULED
Start: 2017-01-01 | End: 2017-01-01

## 2017-01-01 RX ORDER — LISINOPRIL 5 MG/1
2.5 TABLET ORAL
Status: DISCONTINUED | OUTPATIENT
Start: 2017-01-01 | End: 2017-01-01 | Stop reason: HOSPADM

## 2017-01-01 RX ORDER — SODIUM PHOSPHATE, DIBASIC AND SODIUM PHOSPHATE, MONOBASIC 7; 19 G/133ML; G/133ML
1 ENEMA RECTAL ONCE
Status: COMPLETED | OUTPATIENT
Start: 2017-01-01 | End: 2017-01-01

## 2017-01-01 RX ORDER — TAMSULOSIN HYDROCHLORIDE 0.4 MG/1
0.8 CAPSULE ORAL NIGHTLY
Qty: 30 CAPSULE | Refills: 0 | Status: ON HOLD | OUTPATIENT
Start: 2017-01-01 | End: 2017-01-01

## 2017-01-01 RX ORDER — WARFARIN SODIUM 2 MG/1
2 TABLET ORAL
Status: DISCONTINUED | OUTPATIENT
Start: 2017-01-01 | End: 2017-01-01 | Stop reason: HOSPADM

## 2017-01-01 RX ORDER — FUROSEMIDE 40 MG/1
40 TABLET ORAL 2 TIMES DAILY
Status: ON HOLD
Start: 2017-01-01 | End: 2017-01-01

## 2017-01-01 RX ORDER — BISACODYL 5 MG/1
10 TABLET, DELAYED RELEASE ORAL DAILY PRN
Status: DISCONTINUED | OUTPATIENT
Start: 2017-01-01 | End: 2017-01-01 | Stop reason: HOSPADM

## 2017-01-01 RX ORDER — PANTOPRAZOLE SODIUM 40 MG/1
40 TABLET, DELAYED RELEASE ORAL 2 TIMES DAILY
Status: DISCONTINUED | OUTPATIENT
Start: 2017-01-01 | End: 2017-01-01 | Stop reason: HOSPADM

## 2017-01-01 RX ORDER — BUDESONIDE 0.5 MG/2ML
0.5 INHALANT ORAL
Start: 2017-01-01 | End: 2017-01-01

## 2017-01-01 RX ORDER — FUROSEMIDE 10 MG/ML
40 INJECTION INTRAMUSCULAR; INTRAVENOUS ONCE
Status: COMPLETED | OUTPATIENT
Start: 2017-01-01 | End: 2017-01-01

## 2017-01-01 RX ORDER — DILTIAZEM HYDROCHLORIDE 120 MG/1
120 CAPSULE, COATED, EXTENDED RELEASE ORAL DAILY
Status: ON HOLD
Start: 2017-01-01 | End: 2017-01-01

## 2017-01-01 RX ORDER — CARVEDILOL 6.25 MG/1
6.25 TABLET ORAL EVERY 12 HOURS SCHEDULED
Status: DISCONTINUED | OUTPATIENT
Start: 2017-01-01 | End: 2017-01-01

## 2017-01-01 RX ORDER — METOPROLOL SUCCINATE 25 MG/1
12.5 TABLET, EXTENDED RELEASE ORAL
Status: DISCONTINUED | OUTPATIENT
Start: 2017-01-01 | End: 2017-01-01

## 2017-01-01 RX ORDER — NITROGLYCERIN 0.4 MG/1
0.4 TABLET SUBLINGUAL
Status: DISCONTINUED | OUTPATIENT
Start: 2017-01-01 | End: 2018-01-01

## 2017-01-01 RX ORDER — FUROSEMIDE 40 MG/1
40 TABLET ORAL DAILY
COMMUNITY
End: 2017-01-01 | Stop reason: HOSPADM

## 2017-01-01 RX ORDER — SERTRALINE HYDROCHLORIDE 100 MG/1
100 TABLET, FILM COATED ORAL DAILY
Status: DISCONTINUED | OUTPATIENT
Start: 2017-01-01 | End: 2018-01-01

## 2017-01-01 RX ORDER — WARFARIN SODIUM 7.5 MG/1
7.5 TABLET ORAL
Status: ON HOLD | COMMUNITY
End: 2017-01-01

## 2017-01-01 RX ORDER — WARFARIN SODIUM 5 MG/1
5 TABLET ORAL
COMMUNITY
End: 2017-01-01 | Stop reason: HOSPADM

## 2017-01-01 RX ORDER — SODIUM CHLORIDE 9 MG/ML
125 INJECTION, SOLUTION INTRAVENOUS CONTINUOUS
Status: DISCONTINUED | OUTPATIENT
Start: 2017-01-01 | End: 2017-01-01

## 2017-01-01 RX ORDER — VANCOMYCIN HYDROCHLORIDE
1250 EVERY 24 HOURS
Status: DISCONTINUED | OUTPATIENT
Start: 2017-01-01 | End: 2017-01-01

## 2017-01-01 RX ORDER — CEFTRIAXONE SODIUM 1 G/50ML
1 INJECTION, SOLUTION INTRAVENOUS
Status: DISCONTINUED | OUTPATIENT
Start: 2017-01-01 | End: 2017-01-01 | Stop reason: HOSPADM

## 2017-01-01 RX ORDER — MINERAL OIL 100 G/100G
1 OIL RECTAL ONCE
Status: COMPLETED | OUTPATIENT
Start: 2017-01-01 | End: 2017-01-01

## 2017-01-01 RX ORDER — METHYLPREDNISOLONE SODIUM SUCCINATE 40 MG/ML
60 INJECTION, POWDER, LYOPHILIZED, FOR SOLUTION INTRAMUSCULAR; INTRAVENOUS EVERY 8 HOURS
Status: DISCONTINUED | OUTPATIENT
Start: 2017-01-01 | End: 2017-01-01

## 2017-01-01 RX ORDER — WARFARIN SODIUM 5 MG/1
TABLET ORAL
Qty: 30 TABLET | Refills: 0 | Status: ON HOLD | OUTPATIENT
Start: 2017-01-01 | End: 2017-01-01

## 2017-01-01 RX ORDER — SODIUM CHLORIDE 9 MG/ML
125 INJECTION, SOLUTION INTRAVENOUS CONTINUOUS
Status: DISCONTINUED | OUTPATIENT
Start: 2017-01-01 | End: 2017-01-01 | Stop reason: SDUPTHER

## 2017-01-01 RX ORDER — FUROSEMIDE 20 MG/1
20 TABLET ORAL DAILY
Start: 2017-01-01 | End: 2017-01-01 | Stop reason: HOSPADM

## 2017-01-01 RX ORDER — POTASSIUM CHLORIDE 750 MG/1
10 TABLET, FILM COATED, EXTENDED RELEASE ORAL 2 TIMES DAILY
Qty: 60 TABLET | Refills: 0 | Status: ON HOLD | OUTPATIENT
Start: 2017-01-01 | End: 2017-01-01

## 2017-01-01 RX ORDER — BISACODYL 5 MG/1
5 TABLET, DELAYED RELEASE ORAL DAILY PRN
Status: DISCONTINUED | OUTPATIENT
Start: 2017-01-01 | End: 2018-01-01

## 2017-01-01 RX ORDER — DEXTROSE MONOHYDRATE 25 G/50ML
25 INJECTION, SOLUTION INTRAVENOUS
Status: DISCONTINUED | OUTPATIENT
Start: 2017-01-01 | End: 2017-01-01 | Stop reason: HOSPADM

## 2017-01-01 RX ORDER — VANCOMYCIN/0.9 % SOD CHLORIDE 1.5G/250ML
1500 PLASTIC BAG, INJECTION (ML) INTRAVENOUS EVERY 24 HOURS
Status: COMPLETED | OUTPATIENT
Start: 2017-01-01 | End: 2017-01-01

## 2017-01-01 RX ORDER — MAGNESIUM CARB/ALUMINUM HYDROX 105-160MG
150 TABLET,CHEWABLE ORAL ONCE
Status: CANCELLED | OUTPATIENT
Start: 2017-01-01 | End: 2017-01-01

## 2017-01-01 RX ORDER — ONDANSETRON 2 MG/ML
4 INJECTION INTRAMUSCULAR; INTRAVENOUS EVERY 6 HOURS PRN
Status: DISCONTINUED | OUTPATIENT
Start: 2017-01-01 | End: 2017-01-01 | Stop reason: HOSPADM

## 2017-01-01 RX ORDER — BISACODYL 5 MG/1
10 TABLET, DELAYED RELEASE ORAL DAILY PRN
Status: ON HOLD
Start: 2017-01-01 | End: 2017-01-01

## 2017-01-01 RX ORDER — PREDNISONE 20 MG/1
60 TABLET ORAL ONCE
Status: COMPLETED | OUTPATIENT
Start: 2017-01-01 | End: 2017-01-01

## 2017-01-01 RX ORDER — HYDROCODONE BITARTRATE AND ACETAMINOPHEN 5; 325 MG/1; MG/1
1 TABLET ORAL ONCE
Status: COMPLETED | OUTPATIENT
Start: 2017-01-01 | End: 2017-01-01

## 2017-01-01 RX ORDER — DOXYCYCLINE HYCLATE 100 MG/1
100 CAPSULE ORAL EVERY 12 HOURS SCHEDULED
Status: DISCONTINUED | OUTPATIENT
Start: 2017-01-01 | End: 2017-01-01

## 2017-01-01 RX ORDER — WARFARIN SODIUM 3 MG/1
TABLET ORAL
Start: 2017-01-01 | End: 2017-01-01 | Stop reason: DRUGHIGH

## 2017-01-01 RX ORDER — WARFARIN SODIUM 3 MG/1
6 TABLET ORAL
Status: DISCONTINUED | OUTPATIENT
Start: 2017-01-01 | End: 2017-01-01

## 2017-01-01 RX ORDER — ACETAMINOPHEN 325 MG/1
650 TABLET ORAL EVERY 4 HOURS PRN
Status: DISCONTINUED | OUTPATIENT
Start: 2017-01-01 | End: 2018-01-01 | Stop reason: HOSPADM

## 2017-01-01 RX ORDER — LISINOPRIL 2.5 MG/1
2.5 TABLET ORAL
Start: 2017-01-01 | End: 2017-01-01 | Stop reason: HOSPADM

## 2017-01-01 RX ORDER — SACCHAROMYCES BOULARDII 250 MG
250 CAPSULE ORAL 2 TIMES DAILY
Qty: 28 CAPSULE | Refills: 0
Start: 2017-01-01 | End: 2017-01-01

## 2017-01-01 RX ORDER — WARFARIN SODIUM 1 MG/1
1 TABLET ORAL
Status: DISCONTINUED | OUTPATIENT
Start: 2017-01-01 | End: 2017-01-01

## 2017-01-01 RX ORDER — GUAIFENESIN 600 MG/1
600 TABLET, EXTENDED RELEASE ORAL EVERY 12 HOURS SCHEDULED
Status: DISCONTINUED | OUTPATIENT
Start: 2017-01-01 | End: 2017-01-01 | Stop reason: HOSPADM

## 2017-01-01 RX ORDER — METOLAZONE 2.5 MG/1
2.5 TABLET ORAL DAILY
COMMUNITY
End: 2017-01-01 | Stop reason: HOSPADM

## 2017-01-01 RX ORDER — AMOXICILLIN AND CLAVULANATE POTASSIUM 875; 125 MG/1; MG/1
1 TABLET, FILM COATED ORAL EVERY 12 HOURS SCHEDULED
Status: COMPLETED | OUTPATIENT
Start: 2017-01-01 | End: 2017-01-01

## 2017-01-01 RX ORDER — FUROSEMIDE 10 MG/ML
60 INJECTION INTRAMUSCULAR; INTRAVENOUS ONCE
Status: COMPLETED | OUTPATIENT
Start: 2017-01-01 | End: 2017-01-01

## 2017-01-01 RX ORDER — NYSTATIN 100000 U/G
CREAM TOPICAL EVERY 12 HOURS SCHEDULED
Status: ON HOLD
Start: 2017-01-01 | End: 2017-01-01

## 2017-01-01 RX ORDER — TAMSULOSIN HYDROCHLORIDE 0.4 MG/1
0.4 CAPSULE ORAL DAILY
Status: DISCONTINUED | OUTPATIENT
Start: 2017-01-01 | End: 2018-01-01

## 2017-01-01 RX ORDER — SENNA AND DOCUSATE SODIUM 50; 8.6 MG/1; MG/1
2 TABLET, FILM COATED ORAL 2 TIMES DAILY PRN
Status: DISCONTINUED | OUTPATIENT
Start: 2017-01-01 | End: 2017-01-01 | Stop reason: HOSPADM

## 2017-01-01 RX ORDER — DULOXETIN HYDROCHLORIDE 30 MG/1
30 CAPSULE, DELAYED RELEASE ORAL EVERY 12 HOURS SCHEDULED
Status: DISCONTINUED | OUTPATIENT
Start: 2017-01-01 | End: 2017-01-01 | Stop reason: HOSPADM

## 2017-01-01 RX ORDER — METOPROLOL SUCCINATE 25 MG/1
12.5 TABLET, EXTENDED RELEASE ORAL
Status: DISCONTINUED | OUTPATIENT
Start: 2017-01-01 | End: 2017-01-01 | Stop reason: HOSPADM

## 2017-01-01 RX ORDER — NICOTINE POLACRILEX 4 MG
15 LOZENGE BUCCAL
Status: DISCONTINUED | OUTPATIENT
Start: 2017-01-01 | End: 2017-01-01 | Stop reason: HOSPADM

## 2017-01-01 RX ORDER — ATORVASTATIN CALCIUM 40 MG/1
40 TABLET, FILM COATED ORAL NIGHTLY
Status: DISCONTINUED | OUTPATIENT
Start: 2017-01-01 | End: 2017-01-01 | Stop reason: HOSPADM

## 2017-01-01 RX ORDER — SERTRALINE HYDROCHLORIDE 100 MG/1
100 TABLET, FILM COATED ORAL DAILY
Status: DISCONTINUED | OUTPATIENT
Start: 2017-01-01 | End: 2017-01-01 | Stop reason: HOSPADM

## 2017-01-01 RX ORDER — NITROGLYCERIN 0.4 MG/1
0.4 TABLET SUBLINGUAL
Refills: 12
Start: 2017-01-01 | End: 2017-01-01 | Stop reason: DRUGHIGH

## 2017-01-01 RX ORDER — BUDESONIDE 0.5 MG/2ML
0.5 INHALANT ORAL
Status: DISCONTINUED | OUTPATIENT
Start: 2017-01-01 | End: 2017-01-01 | Stop reason: HOSPADM

## 2017-01-01 RX ORDER — NITROGLYCERIN 400 UG/1
1 SPRAY ORAL
Status: ON HOLD | COMMUNITY
End: 2017-01-01

## 2017-01-01 RX ORDER — DOXYCYCLINE HYCLATE 100 MG/1
100 CAPSULE ORAL 2 TIMES DAILY
COMMUNITY
End: 2017-01-01 | Stop reason: HOSPADM

## 2017-01-01 RX ORDER — GUAIFENESIN 600 MG/1
600 TABLET, EXTENDED RELEASE ORAL 2 TIMES DAILY
Status: DISCONTINUED | OUTPATIENT
Start: 2017-01-01 | End: 2017-01-01 | Stop reason: HOSPADM

## 2017-01-01 RX ORDER — WARFARIN SODIUM 2 MG/1
2 TABLET ORAL
Status: DISCONTINUED | OUTPATIENT
Start: 2017-01-01 | End: 2017-01-01

## 2017-01-01 RX ORDER — IPRATROPIUM BROMIDE AND ALBUTEROL SULFATE 2.5; .5 MG/3ML; MG/3ML
3 SOLUTION RESPIRATORY (INHALATION) EVERY 4 HOURS PRN
Status: DISCONTINUED | OUTPATIENT
Start: 2017-01-01 | End: 2018-01-01

## 2017-01-01 RX ORDER — CEFTRIAXONE SODIUM 1 G/50ML
1 INJECTION, SOLUTION INTRAVENOUS ONCE
Status: COMPLETED | OUTPATIENT
Start: 2017-01-01 | End: 2017-01-01

## 2017-01-01 RX ORDER — METOPROLOL SUCCINATE 25 MG/1
12.5 TABLET, EXTENDED RELEASE ORAL
Start: 2017-01-01 | End: 2017-01-01 | Stop reason: DRUGHIGH

## 2017-01-01 RX ORDER — FUROSEMIDE 10 MG/ML
20 INJECTION INTRAMUSCULAR; INTRAVENOUS ONCE
Status: COMPLETED | OUTPATIENT
Start: 2017-01-01 | End: 2017-01-01

## 2017-01-01 RX ORDER — NYSTATIN 100000 U/G
CREAM TOPICAL EVERY 12 HOURS SCHEDULED
Status: DISCONTINUED | OUTPATIENT
Start: 2017-01-01 | End: 2017-01-01 | Stop reason: HOSPADM

## 2017-01-01 RX ORDER — DULOXETIN HYDROCHLORIDE 30 MG/1
30 CAPSULE, DELAYED RELEASE ORAL 2 TIMES DAILY
Status: DISCONTINUED | OUTPATIENT
Start: 2017-01-01 | End: 2017-01-01 | Stop reason: HOSPADM

## 2017-01-01 RX ORDER — PANTOPRAZOLE SODIUM 40 MG/1
40 TABLET, DELAYED RELEASE ORAL DAILY
Status: ON HOLD | COMMUNITY
End: 2017-01-01

## 2017-01-01 RX ORDER — GUAIFENESIN/DEXTROMETHORPHAN 100-10MG/5
5 SYRUP ORAL EVERY 4 HOURS PRN
Status: DISCONTINUED | OUTPATIENT
Start: 2017-01-01 | End: 2017-01-01 | Stop reason: HOSPADM

## 2017-01-01 RX ORDER — BISACODYL 5 MG/1
5 TABLET, DELAYED RELEASE ORAL DAILY PRN
Status: DISCONTINUED | OUTPATIENT
Start: 2017-01-01 | End: 2017-01-01 | Stop reason: HOSPADM

## 2017-01-01 RX ORDER — CALCIUM CARBONATE 200(500)MG
2 TABLET,CHEWABLE ORAL 2 TIMES DAILY PRN
Status: DISCONTINUED | OUTPATIENT
Start: 2017-01-01 | End: 2017-01-01 | Stop reason: HOSPADM

## 2017-01-01 RX ORDER — SACCHAROMYCES BOULARDII 250 MG
250 CAPSULE ORAL 2 TIMES DAILY
Start: 2017-01-01 | End: 2017-01-01

## 2017-01-01 RX ORDER — BENZONATATE 100 MG/1
200 CAPSULE ORAL 3 TIMES DAILY PRN
Status: DISCONTINUED | OUTPATIENT
Start: 2017-01-01 | End: 2017-01-01 | Stop reason: HOSPADM

## 2017-01-01 RX ORDER — CALCIUM CARBONATE 200(500)MG
2 TABLET,CHEWABLE ORAL 2 TIMES DAILY PRN
Start: 2017-01-01

## 2017-01-01 RX ORDER — ONDANSETRON 4 MG/1
8 TABLET, FILM COATED ORAL EVERY 8 HOURS PRN
Status: DISCONTINUED | OUTPATIENT
Start: 2017-01-01 | End: 2017-01-01 | Stop reason: HOSPADM

## 2017-01-01 RX ORDER — MAGNESIUM CARB/ALUMINUM HYDROX 105-160MG
296 TABLET,CHEWABLE ORAL ONCE AS NEEDED
Status: DISCONTINUED | OUTPATIENT
Start: 2017-01-01 | End: 2017-01-01 | Stop reason: HOSPADM

## 2017-01-01 RX ORDER — FUROSEMIDE 10 MG/ML
60 INJECTION INTRAMUSCULAR; INTRAVENOUS DAILY
Status: DISCONTINUED | OUTPATIENT
Start: 2017-01-01 | End: 2017-01-01

## 2017-01-01 RX ORDER — TAMSULOSIN HYDROCHLORIDE 0.4 MG/1
0.4 CAPSULE ORAL DAILY
Status: DISCONTINUED | OUTPATIENT
Start: 2017-01-01 | End: 2017-01-01 | Stop reason: HOSPADM

## 2017-01-01 RX ORDER — WARFARIN SODIUM 7.5 MG/1
7.5 TABLET ORAL
Status: COMPLETED | OUTPATIENT
Start: 2017-01-01 | End: 2017-01-01

## 2017-01-01 RX ORDER — TAMSULOSIN HYDROCHLORIDE 0.4 MG/1
2 CAPSULE ORAL NIGHTLY
Status: ON HOLD | COMMUNITY
End: 2017-01-01

## 2017-01-01 RX ORDER — ACETAMINOPHEN 325 MG/1
650 TABLET ORAL EVERY 4 HOURS PRN
Status: DISCONTINUED | OUTPATIENT
Start: 2017-01-01 | End: 2017-01-01 | Stop reason: HOSPADM

## 2017-01-01 RX ORDER — WARFARIN SODIUM 4 MG/1
4 TABLET ORAL
Status: DISCONTINUED | OUTPATIENT
Start: 2017-01-01 | End: 2017-01-01

## 2017-01-01 RX ORDER — FUROSEMIDE 10 MG/ML
20 INJECTION INTRAMUSCULAR; INTRAVENOUS ONCE
Status: DISCONTINUED | OUTPATIENT
Start: 2017-01-01 | End: 2017-01-01

## 2017-01-01 RX ORDER — ATORVASTATIN CALCIUM 20 MG/1
20 TABLET, FILM COATED ORAL NIGHTLY
Status: DISCONTINUED | OUTPATIENT
Start: 2017-01-01 | End: 2017-01-01 | Stop reason: HOSPADM

## 2017-01-01 RX ORDER — WARFARIN SODIUM 1 MG/1
1 TABLET ORAL
Status: DISCONTINUED | OUTPATIENT
Start: 2017-01-01 | End: 2017-01-01 | Stop reason: HOSPADM

## 2017-01-01 RX ORDER — FUROSEMIDE 20 MG/1
20 TABLET ORAL DAILY
Status: DISCONTINUED | OUTPATIENT
Start: 2017-01-01 | End: 2017-01-01

## 2017-01-01 RX ORDER — WARFARIN SODIUM 5 MG/1
5 TABLET ORAL
Status: DISCONTINUED | OUTPATIENT
Start: 2017-01-01 | End: 2017-01-01 | Stop reason: HOSPADM

## 2017-01-01 RX ORDER — DOCUSATE SODIUM 100 MG/1
100 CAPSULE, LIQUID FILLED ORAL 2 TIMES DAILY PRN
Status: DISCONTINUED | OUTPATIENT
Start: 2017-01-01 | End: 2017-01-01 | Stop reason: HOSPADM

## 2017-01-01 RX ORDER — GUAIFENESIN 600 MG/1
600 TABLET, EXTENDED RELEASE ORAL EVERY 12 HOURS SCHEDULED
Start: 2017-01-01 | End: 2017-01-01

## 2017-01-01 RX ORDER — ACETAMINOPHEN 325 MG/1
650 TABLET ORAL EVERY 6 HOURS PRN
Status: DISCONTINUED | OUTPATIENT
Start: 2017-01-01 | End: 2017-01-01

## 2017-01-01 RX ORDER — KETOROLAC TROMETHAMINE 15 MG/ML
15 INJECTION, SOLUTION INTRAMUSCULAR; INTRAVENOUS ONCE
Status: COMPLETED | OUTPATIENT
Start: 2017-01-01 | End: 2017-01-01

## 2017-01-01 RX ORDER — SERTRALINE HYDROCHLORIDE 100 MG/1
200 TABLET, FILM COATED ORAL DAILY
Status: DISCONTINUED | OUTPATIENT
Start: 2017-01-01 | End: 2017-01-01 | Stop reason: HOSPADM

## 2017-01-01 RX ORDER — DILTIAZEM HYDROCHLORIDE 120 MG/1
120 CAPSULE, COATED, EXTENDED RELEASE ORAL DAILY
Status: DISCONTINUED | OUTPATIENT
Start: 2017-01-01 | End: 2017-01-01 | Stop reason: HOSPADM

## 2017-01-01 RX ORDER — ACETYLCYSTEINE 100 MG/ML
4 SOLUTION ORAL; RESPIRATORY (INHALATION)
Status: DISCONTINUED | OUTPATIENT
Start: 2017-01-01 | End: 2017-01-01 | Stop reason: HOSPADM

## 2017-01-01 RX ORDER — VANCOMYCIN 2 GRAM/500 ML IN 0.9 % SODIUM CHLORIDE INTRAVENOUS
2000 ONCE
Status: COMPLETED | OUTPATIENT
Start: 2017-01-01 | End: 2017-01-01

## 2017-01-01 RX ORDER — WARFARIN SODIUM 4 MG/1
TABLET ORAL
Status: ON HOLD
Start: 2017-01-01 | End: 2017-01-01

## 2017-01-01 RX ORDER — DOCUSATE SODIUM 100 MG/1
100 CAPSULE, LIQUID FILLED ORAL 2 TIMES DAILY
Status: DISCONTINUED | OUTPATIENT
Start: 2017-01-01 | End: 2017-01-01 | Stop reason: HOSPADM

## 2017-01-01 RX ORDER — NYSTATIN 100000 [USP'U]/G
POWDER TOPICAL EVERY 12 HOURS SCHEDULED
Status: DISCONTINUED | OUTPATIENT
Start: 2017-01-01 | End: 2017-01-01 | Stop reason: HOSPADM

## 2017-01-01 RX ORDER — LISINOPRIL 5 MG/1
5 TABLET ORAL
Status: DISCONTINUED | OUTPATIENT
Start: 2017-01-01 | End: 2017-01-01

## 2017-01-01 RX ORDER — MORPHINE SULFATE 2 MG/ML
1 INJECTION, SOLUTION INTRAMUSCULAR; INTRAVENOUS ONCE
Status: COMPLETED | OUTPATIENT
Start: 2017-01-01 | End: 2017-01-01

## 2017-01-01 RX ORDER — WARFARIN SODIUM 3 MG/1
3 TABLET ORAL
Status: ON HOLD | COMMUNITY
End: 2017-01-01

## 2017-01-01 RX ORDER — BISACODYL 5 MG/1
5 TABLET, DELAYED RELEASE ORAL DAILY PRN
Start: 2017-01-01

## 2017-01-01 RX ORDER — NITROGLYCERIN 400 UG/1
1 SPRAY ORAL
COMMUNITY

## 2017-01-01 RX ORDER — CASTOR OIL AND BALSAM, PERU 788; 87 MG/G; MG/G
1 OINTMENT TOPICAL EVERY 12 HOURS SCHEDULED
Status: DISCONTINUED | OUTPATIENT
Start: 2017-01-01 | End: 2018-01-01 | Stop reason: HOSPADM

## 2017-01-01 RX ORDER — ACETAMINOPHEN 500 MG
1000 TABLET ORAL EVERY 6 HOURS PRN
Status: DISCONTINUED | OUTPATIENT
Start: 2017-01-01 | End: 2017-01-01 | Stop reason: HOSPADM

## 2017-01-01 RX ORDER — CEFUROXIME AXETIL 250 MG/1
250 TABLET ORAL 2 TIMES DAILY
COMMUNITY
End: 2017-01-01

## 2017-01-01 RX ORDER — POLYETHYLENE GLYCOL 3350 17 G/17G
17 POWDER, FOR SOLUTION ORAL AS NEEDED
COMMUNITY

## 2017-01-01 RX ORDER — WARFARIN SODIUM 2.5 MG/1
2.5 TABLET ORAL
Status: COMPLETED | OUTPATIENT
Start: 2017-01-01 | End: 2017-01-01

## 2017-01-01 RX ORDER — LORAZEPAM 2 MG/ML
1 INJECTION INTRAMUSCULAR ONCE
Status: COMPLETED | OUTPATIENT
Start: 2017-01-01 | End: 2017-01-01

## 2017-01-01 RX ORDER — IPRATROPIUM BROMIDE AND ALBUTEROL SULFATE 2.5; .5 MG/3ML; MG/3ML
3 SOLUTION RESPIRATORY (INHALATION) ONCE
Status: COMPLETED | OUTPATIENT
Start: 2017-01-01 | End: 2017-01-01

## 2017-01-01 RX ORDER — BENZONATATE 100 MG/1
100 CAPSULE ORAL ONCE
Status: COMPLETED | OUTPATIENT
Start: 2017-01-01 | End: 2017-01-01

## 2017-01-01 RX ORDER — NITROGLYCERIN 400 UG/1
1 SPRAY ORAL
Status: DISCONTINUED | OUTPATIENT
Start: 2017-01-01 | End: 2017-01-01

## 2017-01-01 RX ORDER — FERROUS SULFATE 325(65) MG
325 TABLET ORAL
Status: DISCONTINUED | OUTPATIENT
Start: 2017-01-01 | End: 2018-01-01

## 2017-01-01 RX ORDER — WARFARIN SODIUM 3 MG/1
3 TABLET ORAL
Status: DISCONTINUED | OUTPATIENT
Start: 2017-01-01 | End: 2017-01-01

## 2017-01-01 RX ORDER — LEVOFLOXACIN 5 MG/ML
750 INJECTION, SOLUTION INTRAVENOUS EVERY 24 HOURS
Status: DISCONTINUED | OUTPATIENT
Start: 2017-01-01 | End: 2017-01-01

## 2017-01-01 RX ORDER — IPRATROPIUM BROMIDE AND ALBUTEROL SULFATE 2.5; .5 MG/3ML; MG/3ML
3 SOLUTION RESPIRATORY (INHALATION) EVERY 6 HOURS PRN
Status: DISCONTINUED | OUTPATIENT
Start: 2017-01-01 | End: 2017-01-01 | Stop reason: HOSPADM

## 2017-01-01 RX ORDER — SODIUM CHLORIDE 0.9 % (FLUSH) 0.9 %
1-10 SYRINGE (ML) INJECTION AS NEEDED
Status: DISCONTINUED | OUTPATIENT
Start: 2017-01-01 | End: 2018-01-01 | Stop reason: HOSPADM

## 2017-01-01 RX ORDER — LIDOCAINE 50 MG/G
1 PATCH TOPICAL
Status: DISCONTINUED | OUTPATIENT
Start: 2017-01-01 | End: 2017-01-01 | Stop reason: HOSPADM

## 2017-01-01 RX ORDER — SODIUM CHLORIDE AND POTASSIUM CHLORIDE 150; 900 MG/100ML; MG/100ML
100 INJECTION, SOLUTION INTRAVENOUS CONTINUOUS
Status: ACTIVE | OUTPATIENT
Start: 2017-01-01 | End: 2017-01-01

## 2017-01-01 RX ORDER — ASCORBIC ACID 500 MG
250 TABLET ORAL 3 TIMES DAILY
Status: DISCONTINUED | OUTPATIENT
Start: 2017-01-01 | End: 2018-01-01

## 2017-01-01 RX ORDER — WARFARIN SODIUM 4 MG/1
4 TABLET ORAL
Status: DISCONTINUED | OUTPATIENT
Start: 2017-01-01 | End: 2017-01-01 | Stop reason: HOSPADM

## 2017-01-01 RX ORDER — IPRATROPIUM BROMIDE AND ALBUTEROL SULFATE 2.5; .5 MG/3ML; MG/3ML
3 SOLUTION RESPIRATORY (INHALATION) EVERY 6 HOURS PRN
Qty: 360 ML
Start: 2017-01-01 | End: 2017-01-01

## 2017-01-01 RX ORDER — NICOTINE POLACRILEX 4 MG
15 LOZENGE BUCCAL
Status: DISCONTINUED | OUTPATIENT
Start: 2017-01-01 | End: 2018-01-01

## 2017-01-01 RX ORDER — ACETAMINOPHEN AND CODEINE PHOSPHATE 300; 30 MG/1; MG/1
1 TABLET ORAL EVERY 6 HOURS PRN
Status: ON HOLD | COMMUNITY
End: 2017-01-01

## 2017-01-01 RX ORDER — GUAIFENESIN 600 MG/1
600 TABLET, EXTENDED RELEASE ORAL 2 TIMES DAILY
Qty: 10 TABLET | Refills: 0 | Status: ON HOLD | OUTPATIENT
Start: 2017-01-01 | End: 2017-01-01

## 2017-01-01 RX ORDER — CYCLOBENZAPRINE HCL 10 MG
5 TABLET ORAL 3 TIMES DAILY PRN
COMMUNITY

## 2017-01-01 RX ORDER — LIDOCAINE 50 MG/G
1 PATCH TOPICAL
Status: DISCONTINUED | OUTPATIENT
Start: 2017-01-01 | End: 2018-01-01 | Stop reason: HOSPADM

## 2017-01-01 RX ORDER — WARFARIN SODIUM 6 MG/1
6 TABLET ORAL
Status: ON HOLD | COMMUNITY
End: 2017-01-01 | Stop reason: ALTCHOICE

## 2017-01-01 RX ORDER — ALBUTEROL SULFATE 2.5 MG/3ML
2.5 SOLUTION RESPIRATORY (INHALATION) ONCE
Status: COMPLETED | OUTPATIENT
Start: 2017-01-01 | End: 2017-01-01

## 2017-01-01 RX ORDER — SODIUM CHLORIDE 0.9 % (FLUSH) 0.9 %
10 SYRINGE (ML) INJECTION AS NEEDED
Status: DISCONTINUED | OUTPATIENT
Start: 2017-01-01 | End: 2018-01-01 | Stop reason: HOSPADM

## 2017-01-01 RX ORDER — WARFARIN SODIUM 2 MG/1
TABLET ORAL
Start: 2017-01-01 | End: 2017-01-01 | Stop reason: HOSPADM

## 2017-01-01 RX ORDER — FUROSEMIDE 10 MG/ML
40 INJECTION INTRAMUSCULAR; INTRAVENOUS DAILY
Status: DISCONTINUED | OUTPATIENT
Start: 2017-01-01 | End: 2017-01-01

## 2017-01-01 RX ORDER — IPRATROPIUM BROMIDE AND ALBUTEROL SULFATE 2.5; .5 MG/3ML; MG/3ML
3 SOLUTION RESPIRATORY (INHALATION)
Status: DISCONTINUED | OUTPATIENT
Start: 2017-01-01 | End: 2017-01-01 | Stop reason: HOSPADM

## 2017-01-01 RX ORDER — FUROSEMIDE 40 MG/1
40 TABLET ORAL 2 TIMES DAILY
Status: DISCONTINUED | OUTPATIENT
Start: 2017-01-01 | End: 2017-01-01 | Stop reason: HOSPADM

## 2017-01-01 RX ORDER — KETOROLAC TROMETHAMINE 15 MG/ML
15 INJECTION, SOLUTION INTRAMUSCULAR; INTRAVENOUS EVERY 6 HOURS PRN
Status: DISPENSED | OUTPATIENT
Start: 2017-01-01 | End: 2017-01-01

## 2017-01-01 RX ORDER — CEFUROXIME AXETIL 500 MG/1
500 TABLET ORAL 2 TIMES DAILY
Qty: 14 TABLET | Refills: 0
Start: 2017-01-01 | End: 2017-01-01

## 2017-01-01 RX ORDER — CASTOR OIL AND BALSAM, PERU 788; 87 MG/G; MG/G
1 OINTMENT TOPICAL EVERY 12 HOURS SCHEDULED
Start: 2017-01-01

## 2017-01-01 RX ORDER — WARFARIN SODIUM 4 MG/1
4 TABLET ORAL 2 TIMES WEEKLY
COMMUNITY
End: 2017-01-01 | Stop reason: DRUGHIGH

## 2017-01-01 RX ORDER — FUROSEMIDE 10 MG/ML
40 INJECTION INTRAMUSCULAR; INTRAVENOUS DAILY
Status: DISCONTINUED | OUTPATIENT
Start: 2017-01-01 | End: 2018-01-01

## 2017-01-01 RX ORDER — ACETAMINOPHEN AND CODEINE PHOSPHATE 300; 30 MG/1; MG/1
1 TABLET ORAL EVERY 4 HOURS PRN
Status: DISCONTINUED | OUTPATIENT
Start: 2017-01-01 | End: 2017-01-01

## 2017-01-01 RX ORDER — ACETAMINOPHEN 325 MG/1
650 TABLET ORAL EVERY 4 HOURS PRN
Status: DISCONTINUED | OUTPATIENT
Start: 2017-01-01 | End: 2018-01-01

## 2017-01-01 RX ORDER — BENZONATATE 100 MG/1
100 CAPSULE ORAL 3 TIMES DAILY PRN
Qty: 10 CAPSULE | Refills: 0 | Status: ON HOLD | OUTPATIENT
Start: 2017-01-01 | End: 2017-01-01

## 2017-01-01 RX ORDER — FUROSEMIDE 20 MG/1
20 TABLET ORAL DAILY
Status: DISCONTINUED | OUTPATIENT
Start: 2017-01-01 | End: 2017-01-01 | Stop reason: HOSPADM

## 2017-01-01 RX ORDER — PANTOPRAZOLE SODIUM 40 MG/1
40 TABLET, DELAYED RELEASE ORAL DAILY
Status: DISCONTINUED | OUTPATIENT
Start: 2017-01-01 | End: 2017-01-01 | Stop reason: HOSPADM

## 2017-01-01 RX ORDER — AMOXICILLIN AND CLAVULANATE POTASSIUM 875; 125 MG/1; MG/1
1 TABLET, FILM COATED ORAL EVERY 12 HOURS SCHEDULED
Status: DISCONTINUED | OUTPATIENT
Start: 2017-01-01 | End: 2017-01-01 | Stop reason: HOSPADM

## 2017-01-01 RX ORDER — ACETAMINOPHEN AND CODEINE PHOSPHATE 300; 30 MG/1; MG/1
1 TABLET ORAL EVERY 6 HOURS PRN
Status: DISCONTINUED | OUTPATIENT
Start: 2017-01-01 | End: 2018-01-01

## 2017-01-01 RX ORDER — ACETAMINOPHEN AND CODEINE PHOSPHATE 300; 30 MG/1; MG/1
1 TABLET ORAL EVERY 4 HOURS PRN
Status: DISCONTINUED | OUTPATIENT
Start: 2017-01-01 | End: 2017-01-01 | Stop reason: HOSPADM

## 2017-01-01 RX ORDER — CALCIUM CARBONATE 200(500)MG
2 TABLET,CHEWABLE ORAL 2 TIMES DAILY PRN
Status: DISCONTINUED | OUTPATIENT
Start: 2017-01-01 | End: 2018-01-01

## 2017-01-01 RX ORDER — VANCOMYCIN/0.9 % SOD CHLORIDE 1.5G/250ML
12 PLASTIC BAG, INJECTION (ML) INTRAVENOUS EVERY 24 HOURS
Status: DISCONTINUED | OUTPATIENT
Start: 2017-01-01 | End: 2017-01-01

## 2017-01-01 RX ORDER — BISACODYL 5 MG/1
10 TABLET, DELAYED RELEASE ORAL ONCE
Status: COMPLETED | OUTPATIENT
Start: 2017-01-01 | End: 2017-01-01

## 2017-01-01 RX ORDER — LEVOFLOXACIN 750 MG/1
750 TABLET ORAL EVERY 24 HOURS
Status: DISCONTINUED | OUTPATIENT
Start: 2017-01-01 | End: 2017-01-01

## 2017-01-01 RX ORDER — SERTRALINE HYDROCHLORIDE 100 MG/1
100 TABLET, FILM COATED ORAL DAILY
COMMUNITY

## 2017-01-01 RX ORDER — POTASSIUM CHLORIDE 750 MG/1
20 CAPSULE, EXTENDED RELEASE ORAL 2 TIMES DAILY WITH MEALS
Status: ON HOLD
Start: 2017-01-01 | End: 2017-01-01

## 2017-01-01 RX ORDER — POLYETHYLENE GLYCOL 3350 17 G/17G
17 POWDER, FOR SOLUTION ORAL DAILY
Status: DISCONTINUED | OUTPATIENT
Start: 2017-01-01 | End: 2017-01-01 | Stop reason: HOSPADM

## 2017-01-01 RX ORDER — DULOXETIN HYDROCHLORIDE 30 MG/1
30 CAPSULE, DELAYED RELEASE ORAL 2 TIMES DAILY
Status: ON HOLD | COMMUNITY
End: 2017-01-01

## 2017-01-01 RX ORDER — NITROGLYCERIN 0.4 MG/1
0.4 TABLET SUBLINGUAL
Status: DISCONTINUED | OUTPATIENT
Start: 2017-01-01 | End: 2017-01-01 | Stop reason: HOSPADM

## 2017-01-01 RX ORDER — POTASSIUM CHLORIDE 20 MEQ/1
20 TABLET, EXTENDED RELEASE ORAL DAILY
COMMUNITY
End: 2017-01-01 | Stop reason: HOSPADM

## 2017-01-01 RX ORDER — NYSTATIN 100000 [USP'U]/G
POWDER TOPICAL EVERY 12 HOURS SCHEDULED
Refills: 0 | Status: ON HOLD
Start: 2017-01-01 | End: 2017-01-01

## 2017-01-01 RX ORDER — ATORVASTATIN CALCIUM 20 MG/1
20 TABLET, FILM COATED ORAL NIGHTLY
Status: DISCONTINUED | OUTPATIENT
Start: 2017-01-01 | End: 2018-01-01

## 2017-01-01 RX ORDER — HYDROCODONE BITARTRATE AND ACETAMINOPHEN 5; 325 MG/1; MG/1
1 TABLET ORAL EVERY 6 HOURS PRN
Status: DISCONTINUED | OUTPATIENT
Start: 2017-01-01 | End: 2017-01-01

## 2017-01-01 RX ORDER — ATORVASTATIN CALCIUM 10 MG/1
10 TABLET, FILM COATED ORAL NIGHTLY
Status: DISCONTINUED | OUTPATIENT
Start: 2017-01-01 | End: 2017-01-01

## 2017-01-01 RX ORDER — WARFARIN SODIUM 1 MG/1
1 TABLET ORAL TAKE AS DIRECTED
Status: ON HOLD | COMMUNITY
End: 2017-01-01 | Stop reason: ALTCHOICE

## 2017-01-01 RX ORDER — ACETAMINOPHEN AND CODEINE PHOSPHATE 300; 30 MG/1; MG/1
1 TABLET ORAL EVERY 6 HOURS PRN
COMMUNITY
End: 2017-01-01 | Stop reason: HOSPADM

## 2017-01-01 RX ORDER — DILTIAZEM HYDROCHLORIDE 120 MG/1
120 CAPSULE, COATED, EXTENDED RELEASE ORAL DAILY
Status: DISCONTINUED | OUTPATIENT
Start: 2017-01-01 | End: 2017-01-01

## 2017-01-01 RX ORDER — INSULIN GLARGINE 100 [IU]/ML
11 INJECTION, SOLUTION SUBCUTANEOUS DAILY
Status: ON HOLD | COMMUNITY
End: 2017-01-01

## 2017-01-01 RX ORDER — DILTIAZEM HYDROCHLORIDE 120 MG/1
120 CAPSULE, COATED, EXTENDED RELEASE ORAL DAILY
COMMUNITY
End: 2017-01-01 | Stop reason: HOSPADM

## 2017-01-01 RX ORDER — CYCLOBENZAPRINE HCL 10 MG
5 TABLET ORAL 3 TIMES DAILY PRN
Status: DISCONTINUED | OUTPATIENT
Start: 2017-01-01 | End: 2018-01-01

## 2017-01-01 RX ORDER — SACCHAROMYCES BOULARDII 250 MG
250 CAPSULE ORAL 2 TIMES DAILY
Qty: 28 CAPSULE | Refills: 0 | Status: SHIPPED | OUTPATIENT
Start: 2017-01-01 | End: 2017-01-01

## 2017-01-01 RX ORDER — ATORVASTATIN CALCIUM 20 MG/1
20 TABLET, FILM COATED ORAL NIGHTLY
COMMUNITY

## 2017-01-01 RX ORDER — BUDESONIDE 0.5 MG/2ML
0.5 INHALANT ORAL 2 TIMES DAILY
COMMUNITY

## 2017-01-01 RX ORDER — WARFARIN SODIUM 5 MG/1
5 TABLET ORAL
COMMUNITY

## 2017-01-01 RX ORDER — FUROSEMIDE 40 MG/1
40 TABLET ORAL DAILY
Status: DISCONTINUED | OUTPATIENT
Start: 2017-01-01 | End: 2017-01-01 | Stop reason: HOSPADM

## 2017-01-01 RX ORDER — WARFARIN SODIUM 2.5 MG/1
2.5 TABLET ORAL
Status: DISCONTINUED | OUTPATIENT
Start: 2017-01-01 | End: 2017-01-01 | Stop reason: SDUPTHER

## 2017-01-01 RX ORDER — FUROSEMIDE 40 MG/1
40 TABLET ORAL DAILY
Qty: 30 TABLET | Refills: 0 | Status: ON HOLD | OUTPATIENT
Start: 2017-01-01 | End: 2017-01-01

## 2017-01-01 RX ORDER — NYSTATIN 100000 [USP'U]/G
POWDER TOPICAL EVERY 12 HOURS SCHEDULED
Status: ON HOLD
Start: 2017-01-01 | End: 2017-01-01

## 2017-01-01 RX ORDER — FLUVASTATIN SODIUM 80 MG/1
80 TABLET, FILM COATED, EXTENDED RELEASE ORAL NIGHTLY
Qty: 30 TABLET | Refills: 11 | Status: ON HOLD | OUTPATIENT
Start: 2017-01-01 | End: 2017-01-01

## 2017-01-01 RX ORDER — MULTIVIT WITH MINERALS/LUTEIN
250 TABLET ORAL 3 TIMES DAILY
COMMUNITY

## 2017-01-01 RX ORDER — ACETAMINOPHEN 500 MG
500 TABLET ORAL EVERY 6 HOURS PRN
Status: DISCONTINUED | OUTPATIENT
Start: 2017-01-01 | End: 2017-01-01

## 2017-01-01 RX ORDER — WARFARIN SODIUM 1 MG/1
1 TABLET ORAL
Status: COMPLETED | OUTPATIENT
Start: 2017-01-01 | End: 2017-01-01

## 2017-01-01 RX ORDER — PREDNISONE 20 MG/1
40 TABLET ORAL
Status: DISCONTINUED | OUTPATIENT
Start: 2017-01-01 | End: 2017-01-01

## 2017-01-01 RX ORDER — FUROSEMIDE 10 MG/ML
40 INJECTION INTRAMUSCULAR; INTRAVENOUS ONCE
Status: DISCONTINUED | OUTPATIENT
Start: 2017-01-01 | End: 2017-01-01

## 2017-01-01 RX ORDER — WARFARIN SODIUM 4 MG/1
4 TABLET ORAL
Status: DISCONTINUED | OUTPATIENT
Start: 2017-01-01 | End: 2017-01-01 | Stop reason: SDUPTHER

## 2017-01-01 RX ORDER — NYSTATIN 100000 [USP'U]/G
POWDER TOPICAL EVERY 12 HOURS SCHEDULED
Start: 2017-01-01

## 2017-01-01 RX ORDER — METHYLPREDNISOLONE SODIUM SUCCINATE 125 MG/2ML
60 INJECTION, POWDER, LYOPHILIZED, FOR SOLUTION INTRAMUSCULAR; INTRAVENOUS ONCE
Status: COMPLETED | OUTPATIENT
Start: 2017-01-01 | End: 2017-01-01

## 2017-01-01 RX ORDER — IPRATROPIUM BROMIDE AND ALBUTEROL SULFATE 2.5; .5 MG/3ML; MG/3ML
3 SOLUTION RESPIRATORY (INHALATION)
Status: DISCONTINUED | OUTPATIENT
Start: 2017-01-01 | End: 2017-01-01

## 2017-01-01 RX ORDER — ONDANSETRON 4 MG/1
4 TABLET, FILM COATED ORAL EVERY 6 HOURS PRN
Status: ON HOLD
Start: 2017-01-01 | End: 2017-01-01

## 2017-01-01 RX ORDER — WARFARIN SODIUM 4 MG/1
TABLET ORAL
Qty: 30 TABLET | Refills: 0 | Status: ON HOLD | OUTPATIENT
Start: 2017-01-01 | End: 2017-01-01

## 2017-01-01 RX ORDER — POTASSIUM CHLORIDE 750 MG/1
10 TABLET, FILM COATED, EXTENDED RELEASE ORAL DAILY
COMMUNITY
End: 2017-01-01 | Stop reason: HOSPADM

## 2017-01-01 RX ADMIN — INSULIN DETEMIR 8 UNITS: 100 INJECTION, SOLUTION SUBCUTANEOUS at 09:31

## 2017-01-01 RX ADMIN — FUROSEMIDE 40 MG: 40 TABLET ORAL at 08:57

## 2017-01-01 RX ADMIN — GUAIFENESIN 600 MG: 600 TABLET, EXTENDED RELEASE ORAL at 21:04

## 2017-01-01 RX ADMIN — LIDOCAINE 1 PATCH: 50 PATCH CUTANEOUS at 09:40

## 2017-01-01 RX ADMIN — IPRATROPIUM BROMIDE AND ALBUTEROL SULFATE 3 ML: .5; 3 SOLUTION RESPIRATORY (INHALATION) at 16:13

## 2017-01-01 RX ADMIN — MINERAL OIL 1 ENEMA: 100 ENEMA RECTAL at 13:00

## 2017-01-01 RX ADMIN — ASPIRIN 81 MG: 81 TABLET, COATED ORAL at 08:33

## 2017-01-01 RX ADMIN — DILTIAZEM HYDROCHLORIDE 120 MG: 120 CAPSULE, EXTENDED RELEASE ORAL at 09:49

## 2017-01-01 RX ADMIN — GUAIFENESIN 600 MG: 600 TABLET, EXTENDED RELEASE ORAL at 08:15

## 2017-01-01 RX ADMIN — BUDESONIDE 0.5 MG: 0.5 INHALANT RESPIRATORY (INHALATION) at 08:02

## 2017-01-01 RX ADMIN — NYSTATIN: 100000 POWDER TOPICAL at 08:33

## 2017-01-01 RX ADMIN — ACETAMINOPHEN AND CODEINE PHOSPHATE 1 TABLET: 30; 300 TABLET ORAL at 08:36

## 2017-01-01 RX ADMIN — BUDESONIDE 0.5 MG: 0.5 INHALANT RESPIRATORY (INHALATION) at 08:52

## 2017-01-01 RX ADMIN — WARFARIN SODIUM 4 MG: 4 TABLET ORAL at 17:01

## 2017-01-01 RX ADMIN — DOCUSATE SODIUM 100 MG: 100 CAPSULE, LIQUID FILLED ORAL at 17:53

## 2017-01-01 RX ADMIN — INSULIN LISPRO 4 UNITS: 100 INJECTION, SOLUTION INTRAVENOUS; SUBCUTANEOUS at 21:09

## 2017-01-01 RX ADMIN — NYSTATIN: 100000 POWDER TOPICAL at 08:16

## 2017-01-01 RX ADMIN — POTASSIUM CHLORIDE 10 MEQ: 750 CAPSULE, EXTENDED RELEASE ORAL at 08:22

## 2017-01-01 RX ADMIN — HYDROCODONE POLISTIREX AND CHLORPHENIRAMINE POLISTIREX 5 ML: 10; 8 SUSPENSION, EXTENDED RELEASE ORAL at 00:38

## 2017-01-01 RX ADMIN — DOCUSATE SODIUM 100 MG: 100 CAPSULE, LIQUID FILLED ORAL at 17:06

## 2017-01-01 RX ADMIN — IPRATROPIUM BROMIDE AND ALBUTEROL SULFATE 3 ML: .5; 3 SOLUTION RESPIRATORY (INHALATION) at 07:58

## 2017-01-01 RX ADMIN — TAMSULOSIN HYDROCHLORIDE 0.8 MG: 0.4 CAPSULE ORAL at 20:39

## 2017-01-01 RX ADMIN — BUDESONIDE 0.5 MG: 0.5 INHALANT RESPIRATORY (INHALATION) at 07:52

## 2017-01-01 RX ADMIN — PANTOPRAZOLE SODIUM 40 MG: 40 TABLET, DELAYED RELEASE ORAL at 17:53

## 2017-01-01 RX ADMIN — DILTIAZEM HYDROCHLORIDE 120 MG: 120 CAPSULE, EXTENDED RELEASE ORAL at 08:20

## 2017-01-01 RX ADMIN — DOCUSATE SODIUM 100 MG: 100 CAPSULE, LIQUID FILLED ORAL at 09:51

## 2017-01-01 RX ADMIN — GUAIFENESIN 600 MG: 600 TABLET, EXTENDED RELEASE ORAL at 20:53

## 2017-01-01 RX ADMIN — POTASSIUM CHLORIDE 10 MEQ: 750 CAPSULE, EXTENDED RELEASE ORAL at 17:27

## 2017-01-01 RX ADMIN — GUAIFENESIN 600 MG: 600 TABLET, EXTENDED RELEASE ORAL at 08:50

## 2017-01-01 RX ADMIN — PANTOPRAZOLE SODIUM 40 MG: 40 TABLET, DELAYED RELEASE ORAL at 17:51

## 2017-01-01 RX ADMIN — GUAIFENESIN 600 MG: 600 TABLET, EXTENDED RELEASE ORAL at 08:13

## 2017-01-01 RX ADMIN — METHYLPREDNISOLONE SODIUM SUCCINATE 60 MG: 125 INJECTION, POWDER, FOR SOLUTION INTRAMUSCULAR; INTRAVENOUS at 10:55

## 2017-01-01 RX ADMIN — DOXYCYCLINE HYCLATE 100 MG: 100 CAPSULE ORAL at 08:54

## 2017-01-01 RX ADMIN — IPRATROPIUM BROMIDE AND ALBUTEROL SULFATE 3 ML: .5; 3 SOLUTION RESPIRATORY (INHALATION) at 13:40

## 2017-01-01 RX ADMIN — CYCLOBENZAPRINE HYDROCHLORIDE 10 MG: 10 TABLET, FILM COATED ORAL at 08:18

## 2017-01-01 RX ADMIN — FUROSEMIDE 40 MG: 40 TABLET ORAL at 08:53

## 2017-01-01 RX ADMIN — DILTIAZEM HYDROCHLORIDE 120 MG: 120 CAPSULE, EXTENDED RELEASE ORAL at 09:18

## 2017-01-01 RX ADMIN — NYSTATIN: 100000 CREAM TOPICAL at 08:29

## 2017-01-01 RX ADMIN — POTASSIUM CHLORIDE AND SODIUM CHLORIDE 100 ML/HR: 900; 150 INJECTION, SOLUTION INTRAVENOUS at 08:45

## 2017-01-01 RX ADMIN — GUAIFENESIN 600 MG: 600 TABLET, EXTENDED RELEASE ORAL at 20:23

## 2017-01-01 RX ADMIN — INSULIN LISPRO 2 UNITS: 100 INJECTION, SOLUTION INTRAVENOUS; SUBCUTANEOUS at 18:04

## 2017-01-01 RX ADMIN — INSULIN LISPRO 2 UNITS: 100 INJECTION, SOLUTION INTRAVENOUS; SUBCUTANEOUS at 17:29

## 2017-01-01 RX ADMIN — NYSTATIN 1 APPLICATION: 100000 CREAM TOPICAL at 21:57

## 2017-01-01 RX ADMIN — WARFARIN SODIUM 2 MG: 2 TABLET ORAL at 17:06

## 2017-01-01 RX ADMIN — POTASSIUM CHLORIDE 10 MEQ: 750 CAPSULE, EXTENDED RELEASE ORAL at 17:15

## 2017-01-01 RX ADMIN — CARVEDILOL 6.25 MG: 6.25 TABLET, FILM COATED ORAL at 21:57

## 2017-01-01 RX ADMIN — DILTIAZEM HYDROCHLORIDE 30 MG: 30 TABLET, FILM COATED ORAL at 00:31

## 2017-01-01 RX ADMIN — LEVOFLOXACIN 750 MG: 750 INJECTION, SOLUTION INTRAVENOUS at 21:57

## 2017-01-01 RX ADMIN — PANTOPRAZOLE SODIUM 40 MG: 40 TABLET, DELAYED RELEASE ORAL at 17:07

## 2017-01-01 RX ADMIN — TAMSULOSIN HYDROCHLORIDE 0.8 MG: 0.4 CAPSULE ORAL at 20:53

## 2017-01-01 RX ADMIN — PANTOPRAZOLE SODIUM 40 MG: 40 TABLET, DELAYED RELEASE ORAL at 17:06

## 2017-01-01 RX ADMIN — ASPIRIN 81 MG: 81 TABLET, COATED ORAL at 08:21

## 2017-01-01 RX ADMIN — GUAIFENESIN AND DEXTROMETHORPHAN 5 ML: 100; 10 SYRUP ORAL at 12:36

## 2017-01-01 RX ADMIN — NYSTATIN: 100000 CREAM TOPICAL at 20:41

## 2017-01-01 RX ADMIN — GUAIFENESIN 600 MG: 600 TABLET, EXTENDED RELEASE ORAL at 21:56

## 2017-01-01 RX ADMIN — IPRATROPIUM BROMIDE AND ALBUTEROL SULFATE 3 ML: .5; 3 SOLUTION RESPIRATORY (INHALATION) at 19:20

## 2017-01-01 RX ADMIN — SODIUM PHOSPHATE, DIBASIC AND SODIUM PHOSPHATE, MONOBASIC 1 ENEMA: 7; 19 ENEMA RECTAL at 16:39

## 2017-01-01 RX ADMIN — METHYLPREDNISOLONE SODIUM SUCCINATE 60 MG: 40 INJECTION, POWDER, FOR SOLUTION INTRAMUSCULAR; INTRAVENOUS at 05:56

## 2017-01-01 RX ADMIN — FUROSEMIDE 60 MG: 10 INJECTION, SOLUTION INTRAMUSCULAR; INTRAVENOUS at 23:30

## 2017-01-01 RX ADMIN — PREDNISONE 40 MG: 20 TABLET ORAL at 08:27

## 2017-01-01 RX ADMIN — CYCLOBENZAPRINE HYDROCHLORIDE 10 MG: 10 TABLET, FILM COATED ORAL at 16:35

## 2017-01-01 RX ADMIN — INSULIN LISPRO 3 UNITS: 100 INJECTION, SOLUTION INTRAVENOUS; SUBCUTANEOUS at 12:07

## 2017-01-01 RX ADMIN — CYCLOBENZAPRINE HYDROCHLORIDE 10 MG: 10 TABLET, FILM COATED ORAL at 22:00

## 2017-01-01 RX ADMIN — ASPIRIN 81 MG: 81 TABLET, COATED ORAL at 08:32

## 2017-01-01 RX ADMIN — GUAIFENESIN 600 MG: 600 TABLET, EXTENDED RELEASE ORAL at 17:51

## 2017-01-01 RX ADMIN — INSULIN LISPRO 3 UNITS: 100 INJECTION, SOLUTION INTRAVENOUS; SUBCUTANEOUS at 18:04

## 2017-01-01 RX ADMIN — NYSTATIN 500000 UNITS: 100000 SUSPENSION ORAL at 21:08

## 2017-01-01 RX ADMIN — NYSTATIN 500000 UNITS: 100000 SUSPENSION ORAL at 10:28

## 2017-01-01 RX ADMIN — IPRATROPIUM BROMIDE AND ALBUTEROL SULFATE 3 ML: .5; 3 SOLUTION RESPIRATORY (INHALATION) at 23:58

## 2017-01-01 RX ADMIN — DOCUSATE SODIUM 100 MG: 100 CAPSULE, LIQUID FILLED ORAL at 09:53

## 2017-01-01 RX ADMIN — NYSTATIN: 100000 POWDER TOPICAL at 08:02

## 2017-01-01 RX ADMIN — LEVOFLOXACIN 750 MG: 750 INJECTION, SOLUTION INTRAVENOUS at 21:37

## 2017-01-01 RX ADMIN — Medication 250 MG: at 08:18

## 2017-01-01 RX ADMIN — MORPHINE SULFATE 1 MG: 25 INJECTION, SOLUTION, CONCENTRATE INTRAVENOUS at 18:52

## 2017-01-01 RX ADMIN — DULOXETINE HYDROCHLORIDE 30 MG: 30 CAPSULE, DELAYED RELEASE ORAL at 12:37

## 2017-01-01 RX ADMIN — ACETAMINOPHEN 650 MG: 325 TABLET, FILM COATED ORAL at 21:13

## 2017-01-01 RX ADMIN — NYSTATIN: 100000 CREAM TOPICAL at 08:41

## 2017-01-01 RX ADMIN — Medication 325 MG: at 09:36

## 2017-01-01 RX ADMIN — POTASSIUM CHLORIDE 10 MEQ: 750 CAPSULE, EXTENDED RELEASE ORAL at 18:00

## 2017-01-01 RX ADMIN — Medication 250 MG: at 17:02

## 2017-01-01 RX ADMIN — IPRATROPIUM BROMIDE AND ALBUTEROL SULFATE 3 ML: .5; 3 SOLUTION RESPIRATORY (INHALATION) at 13:26

## 2017-01-01 RX ADMIN — DULOXETINE HYDROCHLORIDE 30 MG: 30 CAPSULE, DELAYED RELEASE ORAL at 21:13

## 2017-01-01 RX ADMIN — NYSTATIN: 100000 CREAM TOPICAL at 08:58

## 2017-01-01 RX ADMIN — PANTOPRAZOLE SODIUM 40 MG: 40 TABLET, DELAYED RELEASE ORAL at 08:54

## 2017-01-01 RX ADMIN — ASPIRIN 81 MG: 81 TABLET, COATED ORAL at 08:30

## 2017-01-01 RX ADMIN — IPRATROPIUM BROMIDE AND ALBUTEROL SULFATE 3 ML: .5; 3 SOLUTION RESPIRATORY (INHALATION) at 12:47

## 2017-01-01 RX ADMIN — POTASSIUM CHLORIDE 10 MEQ: 750 CAPSULE, EXTENDED RELEASE ORAL at 08:15

## 2017-01-01 RX ADMIN — NYSTATIN 500000 UNITS: 100000 SUSPENSION ORAL at 12:37

## 2017-01-01 RX ADMIN — WARFARIN SODIUM 2 MG: 2 TABLET ORAL at 18:25

## 2017-01-01 RX ADMIN — METOPROLOL SUCCINATE 12.5 MG: 25 TABLET, EXTENDED RELEASE ORAL at 21:19

## 2017-01-01 RX ADMIN — CYCLOBENZAPRINE HYDROCHLORIDE 10 MG: 10 TABLET, FILM COATED ORAL at 22:13

## 2017-01-01 RX ADMIN — ACETAMINOPHEN AND CODEINE PHOSPHATE 1 TABLET: 30; 300 TABLET ORAL at 08:22

## 2017-01-01 RX ADMIN — ASPIRIN 81 MG: 81 TABLET, COATED ORAL at 08:43

## 2017-01-01 RX ADMIN — DULOXETINE HYDROCHLORIDE 30 MG: 30 CAPSULE, DELAYED RELEASE ORAL at 09:17

## 2017-01-01 RX ADMIN — CASTOR OIL AND BALSAM, PERU 1 APPLICATION: 788; 87 OINTMENT TOPICAL at 21:41

## 2017-01-01 RX ADMIN — ASPIRIN 81 MG: 81 TABLET, COATED ORAL at 08:50

## 2017-01-01 RX ADMIN — INSULIN LISPRO 3 UNITS: 100 INJECTION, SOLUTION INTRAVENOUS; SUBCUTANEOUS at 17:47

## 2017-01-01 RX ADMIN — ACETAMINOPHEN AND CODEINE PHOSPHATE 1 TABLET: 30; 300 TABLET ORAL at 16:56

## 2017-01-01 RX ADMIN — NYSTATIN: 100000 CREAM TOPICAL at 20:15

## 2017-01-01 RX ADMIN — Medication 250 MG: at 08:15

## 2017-01-01 RX ADMIN — BISACODYL 10 MG: 5 TABLET, COATED ORAL at 22:24

## 2017-01-01 RX ADMIN — Medication 250 MG: at 17:53

## 2017-01-01 RX ADMIN — ALBUTEROL SULFATE 2.5 MG: 2.5 SOLUTION RESPIRATORY (INHALATION) at 02:04

## 2017-01-01 RX ADMIN — Medication 250 MG: at 17:22

## 2017-01-01 RX ADMIN — NYSTATIN: 100000 CREAM TOPICAL at 08:15

## 2017-01-01 RX ADMIN — TAZOBACTAM SODIUM AND PIPERACILLIN SODIUM 4.5 G: 500; 4 INJECTION, SOLUTION INTRAVENOUS at 00:27

## 2017-01-01 RX ADMIN — INSULIN LISPRO 3 UNITS: 100 INJECTION, SOLUTION INTRAVENOUS; SUBCUTANEOUS at 09:29

## 2017-01-01 RX ADMIN — ACETAMINOPHEN AND CODEINE PHOSPHATE 1 TABLET: 30; 300 TABLET ORAL at 23:56

## 2017-01-01 RX ADMIN — FUROSEMIDE 40 MG: 40 TABLET ORAL at 08:29

## 2017-01-01 RX ADMIN — NYSTATIN 500000 UNITS: 100000 SUSPENSION ORAL at 20:04

## 2017-01-01 RX ADMIN — GUAIFENESIN 600 MG: 600 TABLET, EXTENDED RELEASE ORAL at 08:23

## 2017-01-01 RX ADMIN — HYDROCODONE POLISTIREX AND CHLORPHENIRAMINE POLISTIREX 5 ML: 10; 8 SUSPENSION, EXTENDED RELEASE ORAL at 17:39

## 2017-01-01 RX ADMIN — CASTOR OIL AND BALSAM, PERU 1 APPLICATION: 788; 87 OINTMENT TOPICAL at 09:37

## 2017-01-01 RX ADMIN — INSULIN LISPRO 3 UNITS: 100 INJECTION, SOLUTION INTRAVENOUS; SUBCUTANEOUS at 12:37

## 2017-01-01 RX ADMIN — BENZONATATE 100 MG: 100 CAPSULE, LIQUID FILLED ORAL at 10:49

## 2017-01-01 RX ADMIN — NYSTATIN: 100000 CREAM TOPICAL at 08:11

## 2017-01-01 RX ADMIN — ACETAMINOPHEN AND CODEINE PHOSPHATE 1 TABLET: 30; 300 TABLET ORAL at 23:39

## 2017-01-01 RX ADMIN — GUAIFENESIN 600 MG: 600 TABLET, EXTENDED RELEASE ORAL at 17:06

## 2017-01-01 RX ADMIN — GUAIFENESIN 600 MG: 600 TABLET, EXTENDED RELEASE ORAL at 20:17

## 2017-01-01 RX ADMIN — IPRATROPIUM BROMIDE AND ALBUTEROL SULFATE 3 ML: .5; 3 SOLUTION RESPIRATORY (INHALATION) at 19:14

## 2017-01-01 RX ADMIN — IPRATROPIUM BROMIDE AND ALBUTEROL SULFATE 3 ML: .5; 3 SOLUTION RESPIRATORY (INHALATION) at 19:33

## 2017-01-01 RX ADMIN — DILTIAZEM HYDROCHLORIDE 30 MG: 30 TABLET, FILM COATED ORAL at 06:24

## 2017-01-01 RX ADMIN — NYSTATIN 500000 UNITS: 100000 SUSPENSION ORAL at 12:03

## 2017-01-01 RX ADMIN — WARFARIN SODIUM 1 MG: 1 TABLET ORAL at 18:59

## 2017-01-01 RX ADMIN — TAMSULOSIN HYDROCHLORIDE 0.8 MG: 0.4 CAPSULE ORAL at 20:23

## 2017-01-01 RX ADMIN — ACETAMINOPHEN 1000 MG: 500 TABLET, FILM COATED ORAL at 17:58

## 2017-01-01 RX ADMIN — INSULIN LISPRO 3 UNITS: 100 INJECTION, SOLUTION INTRAVENOUS; SUBCUTANEOUS at 11:43

## 2017-01-01 RX ADMIN — NYSTATIN 500000 UNITS: 100000 SUSPENSION ORAL at 21:57

## 2017-01-01 RX ADMIN — ASPIRIN 81 MG: 81 TABLET, COATED ORAL at 09:15

## 2017-01-01 RX ADMIN — DOXYCYCLINE HYCLATE 100 MG: 100 CAPSULE ORAL at 21:13

## 2017-01-01 RX ADMIN — ACETAMINOPHEN AND CODEINE PHOSPHATE 1 TABLET: 30; 300 TABLET ORAL at 22:31

## 2017-01-01 RX ADMIN — NYSTATIN 500000 UNITS: 100000 SUSPENSION ORAL at 10:01

## 2017-01-01 RX ADMIN — ATORVASTATIN CALCIUM 20 MG: 20 TABLET, FILM COATED ORAL at 20:43

## 2017-01-01 RX ADMIN — ACETAMINOPHEN AND CODEINE PHOSPHATE 1 TABLET: 30; 300 TABLET ORAL at 16:58

## 2017-01-01 RX ADMIN — ACETAMINOPHEN AND CODEINE PHOSPHATE 1 TABLET: 30; 300 TABLET ORAL at 21:01

## 2017-01-01 RX ADMIN — IPRATROPIUM BROMIDE AND ALBUTEROL SULFATE 3 ML: .5; 3 SOLUTION RESPIRATORY (INHALATION) at 08:22

## 2017-01-01 RX ADMIN — INSULIN LISPRO 2 UNITS: 100 INJECTION, SOLUTION INTRAVENOUS; SUBCUTANEOUS at 17:44

## 2017-01-01 RX ADMIN — IPRATROPIUM BROMIDE AND ALBUTEROL SULFATE 3 ML: .5; 3 SOLUTION RESPIRATORY (INHALATION) at 09:49

## 2017-01-01 RX ADMIN — METOPROLOL TARTRATE 25 MG: 25 TABLET ORAL at 20:15

## 2017-01-01 RX ADMIN — INSULIN DETEMIR 8 UNITS: 100 INJECTION, SOLUTION SUBCUTANEOUS at 09:57

## 2017-01-01 RX ADMIN — BUDESONIDE 0.5 MG: 0.5 INHALANT RESPIRATORY (INHALATION) at 07:17

## 2017-01-01 RX ADMIN — CYCLOBENZAPRINE HYDROCHLORIDE 10 MG: 10 TABLET, FILM COATED ORAL at 16:15

## 2017-01-01 RX ADMIN — DOXYCYCLINE HYCLATE 100 MG: 100 CAPSULE ORAL at 08:30

## 2017-01-01 RX ADMIN — INSULIN LISPRO 2 UNITS: 100 INJECTION, SOLUTION INTRAVENOUS; SUBCUTANEOUS at 17:26

## 2017-01-01 RX ADMIN — ACETAMINOPHEN AND CODEINE PHOSPHATE 1 TABLET: 30; 300 TABLET ORAL at 23:26

## 2017-01-01 RX ADMIN — Medication 250 MG: at 09:00

## 2017-01-01 RX ADMIN — NYSTATIN 500000 UNITS: 100000 SUSPENSION ORAL at 17:29

## 2017-01-01 RX ADMIN — NYSTATIN: 100000 POWDER TOPICAL at 21:18

## 2017-01-01 RX ADMIN — AMOXICILLIN AND CLAVULANATE POTASSIUM 1 TABLET: 875; 125 TABLET, FILM COATED ORAL at 21:25

## 2017-01-01 RX ADMIN — IPRATROPIUM BROMIDE AND ALBUTEROL SULFATE 3 ML: .5; 3 SOLUTION RESPIRATORY (INHALATION) at 20:33

## 2017-01-01 RX ADMIN — INSULIN LISPRO 3 UNITS: 100 INJECTION, SOLUTION INTRAVENOUS; SUBCUTANEOUS at 21:16

## 2017-01-01 RX ADMIN — PANTOPRAZOLE SODIUM 40 MG: 40 TABLET, DELAYED RELEASE ORAL at 08:10

## 2017-01-01 RX ADMIN — ACETAMINOPHEN AND CODEINE PHOSPHATE 1 TABLET: 30; 300 TABLET ORAL at 17:43

## 2017-01-01 RX ADMIN — NYSTATIN 500000 UNITS: 100000 SUSPENSION ORAL at 20:44

## 2017-01-01 RX ADMIN — IPRATROPIUM BROMIDE AND ALBUTEROL SULFATE 3 ML: .5; 3 SOLUTION RESPIRATORY (INHALATION) at 02:40

## 2017-01-01 RX ADMIN — ACETAMINOPHEN AND CODEINE PHOSPHATE 1 TABLET: 30; 300 TABLET ORAL at 16:18

## 2017-01-01 RX ADMIN — WARFARIN SODIUM 4 MG: 4 TABLET ORAL at 17:51

## 2017-01-01 RX ADMIN — WARFARIN SODIUM 4 MG: 4 TABLET ORAL at 17:02

## 2017-01-01 RX ADMIN — ATORVASTATIN CALCIUM 10 MG: 10 TABLET, FILM COATED ORAL at 20:17

## 2017-01-01 RX ADMIN — AMOXICILLIN AND CLAVULANATE POTASSIUM 1 TABLET: 875; 125 TABLET, FILM COATED ORAL at 09:18

## 2017-01-01 RX ADMIN — IPRATROPIUM BROMIDE AND ALBUTEROL SULFATE 3 ML: .5; 3 SOLUTION RESPIRATORY (INHALATION) at 12:42

## 2017-01-01 RX ADMIN — ATORVASTATIN CALCIUM 20 MG: 20 TABLET, FILM COATED ORAL at 20:15

## 2017-01-01 RX ADMIN — NYSTATIN 500000 UNITS: 100000 SUSPENSION ORAL at 11:58

## 2017-01-01 RX ADMIN — INSULIN DETEMIR 8 UNITS: 100 INJECTION, SOLUTION SUBCUTANEOUS at 08:55

## 2017-01-01 RX ADMIN — FUROSEMIDE 40 MG: 10 INJECTION, SOLUTION INTRAMUSCULAR; INTRAVENOUS at 08:25

## 2017-01-01 RX ADMIN — DILTIAZEM HYDROCHLORIDE 120 MG: 120 CAPSULE, EXTENDED RELEASE ORAL at 08:58

## 2017-01-01 RX ADMIN — WARFARIN SODIUM 3 MG: 3 TABLET ORAL at 17:03

## 2017-01-01 RX ADMIN — IPRATROPIUM BROMIDE AND ALBUTEROL SULFATE 3 ML: .5; 3 SOLUTION RESPIRATORY (INHALATION) at 15:23

## 2017-01-01 RX ADMIN — METHYLPREDNISOLONE SODIUM SUCCINATE 60 MG: 40 INJECTION, POWDER, FOR SOLUTION INTRAMUSCULAR; INTRAVENOUS at 12:24

## 2017-01-01 RX ADMIN — INSULIN LISPRO 3 UNITS: 100 INJECTION, SOLUTION INTRAVENOUS; SUBCUTANEOUS at 11:50

## 2017-01-01 RX ADMIN — IPRATROPIUM BROMIDE AND ALBUTEROL SULFATE 3 ML: .5; 3 SOLUTION RESPIRATORY (INHALATION) at 13:39

## 2017-01-01 RX ADMIN — POLYETHYLENE GLYCOL 3350 17 G: 17 POWDER, FOR SOLUTION ORAL at 08:27

## 2017-01-01 RX ADMIN — BARIUM SULFATE 20 ML: 400 PASTE ORAL at 11:13

## 2017-01-01 RX ADMIN — NYSTATIN 1 APPLICATION: 100000 CREAM TOPICAL at 20:52

## 2017-01-01 RX ADMIN — IPRATROPIUM BROMIDE AND ALBUTEROL SULFATE 3 ML: .5; 3 SOLUTION RESPIRATORY (INHALATION) at 12:41

## 2017-01-01 RX ADMIN — TAZOBACTAM SODIUM AND PIPERACILLIN SODIUM 4.5 G: 500; 4 INJECTION, SOLUTION INTRAVENOUS at 00:44

## 2017-01-01 RX ADMIN — ASPIRIN 81 MG: 81 TABLET, COATED ORAL at 08:35

## 2017-01-01 RX ADMIN — INSULIN LISPRO 3 UNITS: 100 INJECTION, SOLUTION INTRAVENOUS; SUBCUTANEOUS at 09:57

## 2017-01-01 RX ADMIN — ACETAMINOPHEN AND CODEINE PHOSPHATE 1 TABLET: 30; 300 TABLET ORAL at 02:51

## 2017-01-01 RX ADMIN — NYSTATIN: 100000 CREAM TOPICAL at 08:17

## 2017-01-01 RX ADMIN — ASPIRIN 81 MG: 81 TABLET, COATED ORAL at 08:16

## 2017-01-01 RX ADMIN — NYSTATIN: 100000 CREAM TOPICAL at 21:11

## 2017-01-01 RX ADMIN — ACETAMINOPHEN AND CODEINE PHOSPHATE 1 TABLET: 30; 300 TABLET ORAL at 06:07

## 2017-01-01 RX ADMIN — ACETAMINOPHEN AND CODEINE PHOSPHATE 1 TABLET: 30; 300 TABLET ORAL at 23:22

## 2017-01-01 RX ADMIN — ACETAMINOPHEN AND CODEINE PHOSPHATE 1 TABLET: 30; 300 TABLET ORAL at 23:15

## 2017-01-01 RX ADMIN — WARFARIN SODIUM 5 MG: 5 TABLET ORAL at 17:25

## 2017-01-01 RX ADMIN — WARFARIN SODIUM 4 MG: 4 TABLET ORAL at 17:26

## 2017-01-01 RX ADMIN — CYCLOBENZAPRINE HYDROCHLORIDE 10 MG: 10 TABLET, FILM COATED ORAL at 21:22

## 2017-01-01 RX ADMIN — ACETAMINOPHEN AND CODEINE PHOSPHATE 1 TABLET: 30; 300 TABLET ORAL at 23:49

## 2017-01-01 RX ADMIN — DULOXETINE HYDROCHLORIDE 30 MG: 30 CAPSULE, DELAYED RELEASE ORAL at 12:06

## 2017-01-01 RX ADMIN — NYSTATIN: 100000 CREAM TOPICAL at 09:00

## 2017-01-01 RX ADMIN — FUROSEMIDE 40 MG: 40 TABLET ORAL at 09:14

## 2017-01-01 RX ADMIN — ACETAMINOPHEN AND CODEINE PHOSPHATE 1 TABLET: 30; 300 TABLET ORAL at 15:20

## 2017-01-01 RX ADMIN — Medication 250 MG: at 08:56

## 2017-01-01 RX ADMIN — CYCLOBENZAPRINE HYDROCHLORIDE 10 MG: 10 TABLET, FILM COATED ORAL at 01:44

## 2017-01-01 RX ADMIN — INSULIN LISPRO 3 UNITS: 100 INJECTION, SOLUTION INTRAVENOUS; SUBCUTANEOUS at 18:00

## 2017-01-01 RX ADMIN — FUROSEMIDE 20 MG: 20 TABLET ORAL at 08:30

## 2017-01-01 RX ADMIN — FUROSEMIDE 40 MG: 10 INJECTION, SOLUTION INTRAMUSCULAR; INTRAVENOUS at 08:33

## 2017-01-01 RX ADMIN — INSULIN LISPRO 3 UNITS: 100 INJECTION, SOLUTION INTRAVENOUS; SUBCUTANEOUS at 08:55

## 2017-01-01 RX ADMIN — INSULIN LISPRO 3 UNITS: 100 INJECTION, SOLUTION INTRAVENOUS; SUBCUTANEOUS at 17:01

## 2017-01-01 RX ADMIN — Medication 250 MG: at 08:03

## 2017-01-01 RX ADMIN — INSULIN LISPRO 4 UNITS: 100 INJECTION, SOLUTION INTRAVENOUS; SUBCUTANEOUS at 17:05

## 2017-01-01 RX ADMIN — CEFTRIAXONE SODIUM 1 G: 1 INJECTION, SOLUTION INTRAVENOUS at 12:35

## 2017-01-01 RX ADMIN — LEVOFLOXACIN 750 MG: 750 INJECTION, SOLUTION INTRAVENOUS at 20:13

## 2017-01-01 RX ADMIN — METOPROLOL SUCCINATE 12.5 MG: 25 TABLET, EXTENDED RELEASE ORAL at 20:22

## 2017-01-01 RX ADMIN — NYSTATIN: 100000 POWDER TOPICAL at 20:23

## 2017-01-01 RX ADMIN — DILTIAZEM HYDROCHLORIDE 120 MG: 120 CAPSULE, EXTENDED RELEASE ORAL at 13:54

## 2017-01-01 RX ADMIN — FUROSEMIDE 40 MG: 10 INJECTION, SOLUTION INTRAMUSCULAR; INTRAVENOUS at 12:25

## 2017-01-01 RX ADMIN — POTASSIUM CHLORIDE 10 MEQ: 750 CAPSULE, EXTENDED RELEASE ORAL at 17:10

## 2017-01-01 RX ADMIN — ACETAMINOPHEN AND CODEINE PHOSPHATE 1 TABLET: 30; 300 TABLET ORAL at 05:33

## 2017-01-01 RX ADMIN — ACETAMINOPHEN 500 MG: 500 TABLET ORAL at 23:30

## 2017-01-01 RX ADMIN — ASPIRIN 81 MG: 81 TABLET, COATED ORAL at 08:57

## 2017-01-01 RX ADMIN — NYSTATIN: 100000 POWDER TOPICAL at 08:54

## 2017-01-01 RX ADMIN — TAZOBACTAM SODIUM AND PIPERACILLIN SODIUM 4.5 G: 500; 4 INJECTION, SOLUTION INTRAVENOUS at 09:30

## 2017-01-01 RX ADMIN — ACETAMINOPHEN AND CODEINE PHOSPHATE 1 TABLET: 30; 300 TABLET ORAL at 21:26

## 2017-01-01 RX ADMIN — NYSTATIN: 100000 CREAM TOPICAL at 20:42

## 2017-01-01 RX ADMIN — IPRATROPIUM BROMIDE AND ALBUTEROL SULFATE 3 ML: .5; 3 SOLUTION RESPIRATORY (INHALATION) at 16:40

## 2017-01-01 RX ADMIN — NYSTATIN 500000 UNITS: 100000 SUSPENSION ORAL at 17:38

## 2017-01-01 RX ADMIN — INSULIN LISPRO 2 UNITS: 100 INJECTION, SOLUTION INTRAVENOUS; SUBCUTANEOUS at 17:01

## 2017-01-01 RX ADMIN — ACETAMINOPHEN AND CODEINE PHOSPHATE 1 TABLET: 30; 300 TABLET ORAL at 12:34

## 2017-01-01 RX ADMIN — ATORVASTATIN CALCIUM 10 MG: 10 TABLET, FILM COATED ORAL at 20:53

## 2017-01-01 RX ADMIN — METHYLPREDNISOLONE SODIUM SUCCINATE 60 MG: 40 INJECTION, POWDER, FOR SOLUTION INTRAMUSCULAR; INTRAVENOUS at 20:52

## 2017-01-01 RX ADMIN — GUAIFENESIN 600 MG: 600 TABLET, EXTENDED RELEASE ORAL at 08:26

## 2017-01-01 RX ADMIN — BISACODYL 5 MG: 5 TABLET, COATED ORAL at 17:35

## 2017-01-01 RX ADMIN — ACETAMINOPHEN AND CODEINE PHOSPHATE 1 TABLET: 30; 300 TABLET ORAL at 14:08

## 2017-01-01 RX ADMIN — TAMSULOSIN HYDROCHLORIDE 0.8 MG: 0.4 CAPSULE ORAL at 20:17

## 2017-01-01 RX ADMIN — PANTOPRAZOLE SODIUM 40 MG: 40 TABLET, DELAYED RELEASE ORAL at 17:44

## 2017-01-01 RX ADMIN — WARFARIN SODIUM 2 MG: 2 TABLET ORAL at 17:19

## 2017-01-01 RX ADMIN — ACETAMINOPHEN AND CODEINE PHOSPHATE 1 TABLET: 30; 300 TABLET ORAL at 14:55

## 2017-01-01 RX ADMIN — ASPIRIN 81 MG: 81 TABLET, COATED ORAL at 08:29

## 2017-01-01 RX ADMIN — OXYCODONE HYDROCHLORIDE AND ACETAMINOPHEN 250 MG: 500 TABLET ORAL at 17:35

## 2017-01-01 RX ADMIN — DILTIAZEM HYDROCHLORIDE 30 MG: 30 TABLET, FILM COATED ORAL at 23:15

## 2017-01-01 RX ADMIN — PREDNISONE 40 MG: 20 TABLET ORAL at 09:52

## 2017-01-01 RX ADMIN — INSULIN LISPRO 3 UNITS: 100 INJECTION, SOLUTION INTRAVENOUS; SUBCUTANEOUS at 08:07

## 2017-01-01 RX ADMIN — TAMSULOSIN HYDROCHLORIDE 0.8 MG: 0.4 CAPSULE ORAL at 21:26

## 2017-01-01 RX ADMIN — POTASSIUM CHLORIDE AND SODIUM CHLORIDE 100 ML/HR: 900; 150 INJECTION, SOLUTION INTRAVENOUS at 13:07

## 2017-01-01 RX ADMIN — Medication 5 MG: at 00:38

## 2017-01-01 RX ADMIN — INSULIN LISPRO 3 UNITS: 100 INJECTION, SOLUTION INTRAVENOUS; SUBCUTANEOUS at 12:33

## 2017-01-01 RX ADMIN — ASPIRIN 81 MG: 81 TABLET, COATED ORAL at 13:53

## 2017-01-01 RX ADMIN — DULOXETINE HYDROCHLORIDE 30 MG: 30 CAPSULE, DELAYED RELEASE ORAL at 18:59

## 2017-01-01 RX ADMIN — BENZONATATE 100 MG: 100 CAPSULE, LIQUID FILLED ORAL at 23:53

## 2017-01-01 RX ADMIN — IPRATROPIUM BROMIDE AND ALBUTEROL SULFATE 3 ML: .5; 3 SOLUTION RESPIRATORY (INHALATION) at 16:22

## 2017-01-01 RX ADMIN — SERTRALINE HYDROCHLORIDE 100 MG: 100 TABLET ORAL at 08:18

## 2017-01-01 RX ADMIN — Medication 5 MG: at 01:23

## 2017-01-01 RX ADMIN — INSULIN LISPRO 3 UNITS: 100 INJECTION, SOLUTION INTRAVENOUS; SUBCUTANEOUS at 12:18

## 2017-01-01 RX ADMIN — Medication 250 MG: at 08:54

## 2017-01-01 RX ADMIN — SERTRALINE HYDROCHLORIDE 100 MG: 100 TABLET ORAL at 08:44

## 2017-01-01 RX ADMIN — ACETAMINOPHEN AND CODEINE PHOSPHATE 1 TABLET: 30; 300 TABLET ORAL at 10:17

## 2017-01-01 RX ADMIN — NYSTATIN: 100000 CREAM TOPICAL at 20:14

## 2017-01-01 RX ADMIN — GUAIFENESIN AND DEXTROMETHORPHAN 5 ML: 100; 10 SYRUP ORAL at 16:08

## 2017-01-01 RX ADMIN — BUDESONIDE 0.5 MG: 0.5 INHALANT RESPIRATORY (INHALATION) at 07:57

## 2017-01-01 RX ADMIN — INSULIN LISPRO 3 UNITS: 100 INJECTION, SOLUTION INTRAVENOUS; SUBCUTANEOUS at 16:56

## 2017-01-01 RX ADMIN — NYSTATIN: 100000 CREAM TOPICAL at 21:18

## 2017-01-01 RX ADMIN — NYSTATIN 500000 UNITS: 100000 SUSPENSION ORAL at 19:25

## 2017-01-01 RX ADMIN — Medication 250 MG: at 17:29

## 2017-01-01 RX ADMIN — TAMSULOSIN HYDROCHLORIDE 0.8 MG: 0.4 CAPSULE ORAL at 20:08

## 2017-01-01 RX ADMIN — INSULIN LISPRO 3 UNITS: 100 INJECTION, SOLUTION INTRAVENOUS; SUBCUTANEOUS at 12:52

## 2017-01-01 RX ADMIN — TAMSULOSIN HYDROCHLORIDE 0.4 MG: 0.4 CAPSULE ORAL at 17:35

## 2017-01-01 RX ADMIN — POTASSIUM CHLORIDE 10 MEQ: 750 CAPSULE, EXTENDED RELEASE ORAL at 08:33

## 2017-01-01 RX ADMIN — FUROSEMIDE 40 MG: 40 TABLET ORAL at 08:13

## 2017-01-01 RX ADMIN — PANTOPRAZOLE SODIUM 40 MG: 40 TABLET, DELAYED RELEASE ORAL at 05:16

## 2017-01-01 RX ADMIN — ACETAMINOPHEN 650 MG: 325 TABLET, FILM COATED ORAL at 23:30

## 2017-01-01 RX ADMIN — INSULIN LISPRO 2 UNITS: 100 INJECTION, SOLUTION INTRAVENOUS; SUBCUTANEOUS at 21:11

## 2017-01-01 RX ADMIN — INSULIN LISPRO 2 UNITS: 100 INJECTION, SOLUTION INTRAVENOUS; SUBCUTANEOUS at 12:42

## 2017-01-01 RX ADMIN — INSULIN LISPRO 3 UNITS: 100 INJECTION, SOLUTION INTRAVENOUS; SUBCUTANEOUS at 09:04

## 2017-01-01 RX ADMIN — GUAIFENESIN 600 MG: 600 TABLET, EXTENDED RELEASE ORAL at 17:25

## 2017-01-01 RX ADMIN — DULOXETINE HYDROCHLORIDE 30 MG: 30 CAPSULE, DELAYED RELEASE ORAL at 08:07

## 2017-01-01 RX ADMIN — WARFARIN SODIUM 4 MG: 4 TABLET ORAL at 17:25

## 2017-01-01 RX ADMIN — GUAIFENESIN AND DEXTROMETHORPHAN 5 ML: 100; 10 SYRUP ORAL at 02:52

## 2017-01-01 RX ADMIN — WARFARIN SODIUM 4 MG: 4 TABLET ORAL at 17:09

## 2017-01-01 RX ADMIN — IPRATROPIUM BROMIDE AND ALBUTEROL SULFATE 3 ML: .5; 3 SOLUTION RESPIRATORY (INHALATION) at 12:24

## 2017-01-01 RX ADMIN — INSULIN LISPRO 3 UNITS: 100 INJECTION, SOLUTION INTRAVENOUS; SUBCUTANEOUS at 21:02

## 2017-01-01 RX ADMIN — NYSTATIN: 100000 CREAM TOPICAL at 21:16

## 2017-01-01 RX ADMIN — DOXYCYCLINE HYCLATE 100 MG: 100 CAPSULE ORAL at 21:09

## 2017-01-01 RX ADMIN — NYSTATIN: 100000 POWDER TOPICAL at 08:18

## 2017-01-01 RX ADMIN — ACETAMINOPHEN AND CODEINE PHOSPHATE 1 TABLET: 30; 300 TABLET ORAL at 17:38

## 2017-01-01 RX ADMIN — TAMSULOSIN HYDROCHLORIDE 0.8 MG: 0.4 CAPSULE ORAL at 21:18

## 2017-01-01 RX ADMIN — ACETAMINOPHEN AND CODEINE PHOSPHATE 1 TABLET: 30; 300 TABLET ORAL at 18:58

## 2017-01-01 RX ADMIN — CASTOR OIL AND BALSAM, PERU: 788; 87 OINTMENT TOPICAL at 08:18

## 2017-01-01 RX ADMIN — POLYETHYLENE GLYCOL 3350 17 G: 17 POWDER, FOR SOLUTION ORAL at 08:18

## 2017-01-01 RX ADMIN — DILTIAZEM HYDROCHLORIDE 30 MG: 30 TABLET, FILM COATED ORAL at 00:09

## 2017-01-01 RX ADMIN — PANTOPRAZOLE SODIUM 40 MG: 40 TABLET, DELAYED RELEASE ORAL at 17:46

## 2017-01-01 RX ADMIN — ASPIRIN 81 MG: 81 TABLET, COATED ORAL at 08:03

## 2017-01-01 RX ADMIN — INSULIN DETEMIR 8 UNITS: 100 INJECTION, SOLUTION SUBCUTANEOUS at 08:51

## 2017-01-01 RX ADMIN — TAMSULOSIN HYDROCHLORIDE 0.4 MG: 0.4 CAPSULE ORAL at 11:50

## 2017-01-01 RX ADMIN — NYSTATIN: 100000 POWDER TOPICAL at 09:41

## 2017-01-01 RX ADMIN — WARFARIN SODIUM 2 MG: 2 TABLET ORAL at 15:19

## 2017-01-01 RX ADMIN — NYSTATIN 500000 UNITS: 100000 SUSPENSION ORAL at 17:44

## 2017-01-01 RX ADMIN — NYSTATIN: 100000 POWDER TOPICAL at 23:31

## 2017-01-01 RX ADMIN — SULFUR HEXAFLUORIDE 5 ML: KIT at 13:01

## 2017-01-01 RX ADMIN — PANTOPRAZOLE SODIUM 40 MG: 40 TABLET, DELAYED RELEASE ORAL at 17:14

## 2017-01-01 RX ADMIN — LIDOCAINE 1 PATCH: 50 PATCH CUTANEOUS at 08:27

## 2017-01-01 RX ADMIN — LIDOCAINE 1 PATCH: 50 PATCH CUTANEOUS at 09:37

## 2017-01-01 RX ADMIN — PANTOPRAZOLE SODIUM 40 MG: 40 TABLET, DELAYED RELEASE ORAL at 09:54

## 2017-01-01 RX ADMIN — ACETAMINOPHEN AND CODEINE PHOSPHATE 1 TABLET: 300; 30 TABLET ORAL at 13:44

## 2017-01-01 RX ADMIN — ASPIRIN 81 MG: 81 TABLET, COATED ORAL at 09:49

## 2017-01-01 RX ADMIN — BENZONATATE 100 MG: 100 CAPSULE, LIQUID FILLED ORAL at 13:37

## 2017-01-01 RX ADMIN — DILTIAZEM HYDROCHLORIDE 120 MG: 120 CAPSULE, EXTENDED RELEASE ORAL at 08:41

## 2017-01-01 RX ADMIN — DILTIAZEM HYDROCHLORIDE 120 MG: 120 CAPSULE, EXTENDED RELEASE ORAL at 10:28

## 2017-01-01 RX ADMIN — NYSTATIN 500000 UNITS: 100000 SUSPENSION ORAL at 17:51

## 2017-01-01 RX ADMIN — ASPIRIN 81 MG: 81 TABLET, COATED ORAL at 08:27

## 2017-01-01 RX ADMIN — WARFARIN SODIUM 6 MG: 3 TABLET ORAL at 17:06

## 2017-01-01 RX ADMIN — ATORVASTATIN CALCIUM 40 MG: 40 TABLET, FILM COATED ORAL at 22:24

## 2017-01-01 RX ADMIN — TAMSULOSIN HYDROCHLORIDE 0.4 MG: 0.4 CAPSULE ORAL at 20:38

## 2017-01-01 RX ADMIN — WARFARIN SODIUM 5 MG: 5 TABLET ORAL at 17:06

## 2017-01-01 RX ADMIN — DOCUSATE SODIUM 100 MG: 100 CAPSULE, LIQUID FILLED ORAL at 08:17

## 2017-01-01 RX ADMIN — GUAIFENESIN 600 MG: 600 TABLET, EXTENDED RELEASE ORAL at 20:01

## 2017-01-01 RX ADMIN — INSULIN LISPRO 2 UNITS: 100 INJECTION, SOLUTION INTRAVENOUS; SUBCUTANEOUS at 11:32

## 2017-01-01 RX ADMIN — IPRATROPIUM BROMIDE AND ALBUTEROL SULFATE 3 ML: .5; 3 SOLUTION RESPIRATORY (INHALATION) at 12:56

## 2017-01-01 RX ADMIN — GUAIFENESIN 600 MG: 600 TABLET, EXTENDED RELEASE ORAL at 17:46

## 2017-01-01 RX ADMIN — NYSTATIN 500000 UNITS: 100000 SUSPENSION ORAL at 12:02

## 2017-01-01 RX ADMIN — INSULIN LISPRO 2 UNITS: 100 INJECTION, SOLUTION INTRAVENOUS; SUBCUTANEOUS at 12:53

## 2017-01-01 RX ADMIN — ACETYLCYSTEINE 4 ML: 100 SOLUTION ORAL; RESPIRATORY (INHALATION) at 20:17

## 2017-01-01 RX ADMIN — IPRATROPIUM BROMIDE AND ALBUTEROL SULFATE 3 ML: .5; 3 SOLUTION RESPIRATORY (INHALATION) at 13:03

## 2017-01-01 RX ADMIN — GUAIFENESIN 600 MG: 600 TABLET, EXTENDED RELEASE ORAL at 08:22

## 2017-01-01 RX ADMIN — NYSTATIN 500000 UNITS: 100000 SUSPENSION ORAL at 12:34

## 2017-01-01 RX ADMIN — GUAIFENESIN AND DEXTROMETHORPHAN 5 ML: 100; 10 SYRUP ORAL at 22:55

## 2017-01-01 RX ADMIN — INSULIN LISPRO 4 UNITS: 100 INJECTION, SOLUTION INTRAVENOUS; SUBCUTANEOUS at 17:38

## 2017-01-01 RX ADMIN — INSULIN LISPRO 2 UNITS: 100 INJECTION, SOLUTION INTRAVENOUS; SUBCUTANEOUS at 17:55

## 2017-01-01 RX ADMIN — FUROSEMIDE 40 MG: 10 INJECTION, SOLUTION INTRAMUSCULAR; INTRAVENOUS at 11:48

## 2017-01-01 RX ADMIN — Medication 250 MG: at 17:09

## 2017-01-01 RX ADMIN — ASPIRIN 81 MG: 81 TABLET, COATED ORAL at 09:17

## 2017-01-01 RX ADMIN — CYCLOBENZAPRINE HYDROCHLORIDE 10 MG: 10 TABLET, FILM COATED ORAL at 17:06

## 2017-01-01 RX ADMIN — NYSTATIN 500000 UNITS: 100000 SUSPENSION ORAL at 08:20

## 2017-01-01 RX ADMIN — WARFARIN SODIUM 1 MG: 1 TABLET ORAL at 17:07

## 2017-01-01 RX ADMIN — SERTRALINE HYDROCHLORIDE 200 MG: 100 TABLET, FILM COATED ORAL at 09:43

## 2017-01-01 RX ADMIN — TAMSULOSIN HYDROCHLORIDE 0.8 MG: 0.4 CAPSULE ORAL at 20:40

## 2017-01-01 RX ADMIN — INSULIN LISPRO 3 UNITS: 100 INJECTION, SOLUTION INTRAVENOUS; SUBCUTANEOUS at 17:00

## 2017-01-01 RX ADMIN — ATORVASTATIN CALCIUM 10 MG: 10 TABLET, FILM COATED ORAL at 20:08

## 2017-01-01 RX ADMIN — NYSTATIN: 100000 CREAM TOPICAL at 08:16

## 2017-01-01 RX ADMIN — BISACODYL 10 MG: 5 TABLET, COATED ORAL at 11:47

## 2017-01-01 RX ADMIN — ACETAMINOPHEN AND CODEINE PHOSPHATE 1 TABLET: 30; 300 TABLET ORAL at 22:47

## 2017-01-01 RX ADMIN — TAMSULOSIN HYDROCHLORIDE 0.4 MG: 0.4 CAPSULE ORAL at 10:59

## 2017-01-01 RX ADMIN — IPRATROPIUM BROMIDE AND ALBUTEROL SULFATE 3 ML: .5; 3 SOLUTION RESPIRATORY (INHALATION) at 17:08

## 2017-01-01 RX ADMIN — INSULIN LISPRO 2 UNITS: 100 INJECTION, SOLUTION INTRAVENOUS; SUBCUTANEOUS at 11:30

## 2017-01-01 RX ADMIN — GUAIFENESIN AND DEXTROMETHORPHAN 5 ML: 100; 10 SYRUP ORAL at 11:29

## 2017-01-01 RX ADMIN — KETOROLAC TROMETHAMINE 15 MG: 15 INJECTION, SOLUTION INTRAMUSCULAR; INTRAVENOUS at 13:37

## 2017-01-01 RX ADMIN — NYSTATIN: 100000 CREAM TOPICAL at 21:10

## 2017-01-01 RX ADMIN — INSULIN LISPRO 4 UNITS: 100 INJECTION, SOLUTION INTRAVENOUS; SUBCUTANEOUS at 21:57

## 2017-01-01 RX ADMIN — DOCUSATE SODIUM 100 MG: 100 CAPSULE, LIQUID FILLED ORAL at 17:10

## 2017-01-01 RX ADMIN — BUDESONIDE 0.5 MG: 0.5 INHALANT RESPIRATORY (INHALATION) at 20:02

## 2017-01-01 RX ADMIN — PANTOPRAZOLE SODIUM 40 MG: 40 TABLET, DELAYED RELEASE ORAL at 17:40

## 2017-01-01 RX ADMIN — INSULIN LISPRO 3 UNITS: 100 INJECTION, SOLUTION INTRAVENOUS; SUBCUTANEOUS at 13:00

## 2017-01-01 RX ADMIN — PANTOPRAZOLE SODIUM 40 MG: 40 TABLET, DELAYED RELEASE ORAL at 17:09

## 2017-01-01 RX ADMIN — POLYETHYLENE GLYCOL 3350 17 G: 17 POWDER, FOR SOLUTION ORAL at 08:43

## 2017-01-01 RX ADMIN — TAZOBACTAM SODIUM AND PIPERACILLIN SODIUM 4.5 G: 500; 4 INJECTION, SOLUTION INTRAVENOUS at 08:18

## 2017-01-01 RX ADMIN — ACETAMINOPHEN AND CODEINE PHOSPHATE 1 TABLET: 30; 300 TABLET ORAL at 21:21

## 2017-01-01 RX ADMIN — FUROSEMIDE 40 MG: 10 INJECTION, SOLUTION INTRAMUSCULAR; INTRAVENOUS at 10:36

## 2017-01-01 RX ADMIN — DILTIAZEM HYDROCHLORIDE 30 MG: 30 TABLET, FILM COATED ORAL at 17:22

## 2017-01-01 RX ADMIN — INSULIN LISPRO 2 UNITS: 100 INJECTION, SOLUTION INTRAVENOUS; SUBCUTANEOUS at 11:45

## 2017-01-01 RX ADMIN — IPRATROPIUM BROMIDE AND ALBUTEROL SULFATE 3 ML: .5; 3 SOLUTION RESPIRATORY (INHALATION) at 13:07

## 2017-01-01 RX ADMIN — NYSTATIN: 100000 POWDER TOPICAL at 22:07

## 2017-01-01 RX ADMIN — POTASSIUM CHLORIDE 10 MEQ: 750 CAPSULE, EXTENDED RELEASE ORAL at 17:06

## 2017-01-01 RX ADMIN — ASPIRIN 81 MG: 81 TABLET, COATED ORAL at 08:41

## 2017-01-01 RX ADMIN — ACETAMINOPHEN AND CODEINE PHOSPHATE 1 TABLET: 30; 300 TABLET ORAL at 15:48

## 2017-01-01 RX ADMIN — DOXYCYCLINE HYCLATE 100 MG: 100 CAPSULE ORAL at 08:07

## 2017-01-01 RX ADMIN — FUROSEMIDE 40 MG: 40 TABLET ORAL at 09:18

## 2017-01-01 RX ADMIN — Medication 250 MG: at 08:32

## 2017-01-01 RX ADMIN — NYSTATIN 500000 UNITS: 100000 SUSPENSION ORAL at 20:30

## 2017-01-01 RX ADMIN — AMOXICILLIN AND CLAVULANATE POTASSIUM 1 TABLET: 875; 125 TABLET, FILM COATED ORAL at 21:17

## 2017-01-01 RX ADMIN — IPRATROPIUM BROMIDE AND ALBUTEROL SULFATE 3 ML: .5; 3 SOLUTION RESPIRATORY (INHALATION) at 12:36

## 2017-01-01 RX ADMIN — FAMOTIDINE 40 MG: 20 TABLET ORAL at 09:35

## 2017-01-01 RX ADMIN — ACETAMINOPHEN AND CODEINE PHOSPHATE 1 TABLET: 30; 300 TABLET ORAL at 10:49

## 2017-01-01 RX ADMIN — TAMSULOSIN HYDROCHLORIDE 0.8 MG: 0.4 CAPSULE ORAL at 23:30

## 2017-01-01 RX ADMIN — DILTIAZEM HYDROCHLORIDE 120 MG: 120 CAPSULE, EXTENDED RELEASE ORAL at 08:22

## 2017-01-01 RX ADMIN — INSULIN LISPRO 2 UNITS: 100 INJECTION, SOLUTION INTRAVENOUS; SUBCUTANEOUS at 12:00

## 2017-01-01 RX ADMIN — Medication: at 14:30

## 2017-01-01 RX ADMIN — IPRATROPIUM BROMIDE AND ALBUTEROL SULFATE 3 ML: .5; 3 SOLUTION RESPIRATORY (INHALATION) at 19:00

## 2017-01-01 RX ADMIN — ACETAMINOPHEN AND CODEINE PHOSPHATE 1 TABLET: 30; 300 TABLET ORAL at 06:20

## 2017-01-01 RX ADMIN — SERTRALINE HYDROCHLORIDE 200 MG: 100 TABLET, FILM COATED ORAL at 08:29

## 2017-01-01 RX ADMIN — GUAIFENESIN 600 MG: 600 TABLET, EXTENDED RELEASE ORAL at 08:03

## 2017-01-01 RX ADMIN — ACETAMINOPHEN AND CODEINE PHOSPHATE 1 TABLET: 30; 300 TABLET ORAL at 11:25

## 2017-01-01 RX ADMIN — NYSTATIN 500000 UNITS: 100000 SUSPENSION ORAL at 08:55

## 2017-01-01 RX ADMIN — LORAZEPAM 1 MG: 2 INJECTION INTRAMUSCULAR; INTRAVENOUS at 00:12

## 2017-01-01 RX ADMIN — INSULIN DETEMIR 8 UNITS: 100 INJECTION, SOLUTION SUBCUTANEOUS at 08:33

## 2017-01-01 RX ADMIN — FUROSEMIDE 20 MG: 20 TABLET ORAL at 09:44

## 2017-01-01 RX ADMIN — GUAIFENESIN 600 MG: 600 TABLET, EXTENDED RELEASE ORAL at 17:05

## 2017-01-01 RX ADMIN — PANTOPRAZOLE SODIUM 40 MG: 40 TABLET, DELAYED RELEASE ORAL at 08:35

## 2017-01-01 RX ADMIN — INSULIN DETEMIR 8 UNITS: 100 INJECTION, SOLUTION SUBCUTANEOUS at 08:32

## 2017-01-01 RX ADMIN — AMOXICILLIN AND CLAVULANATE POTASSIUM 1 TABLET: 875; 125 TABLET, FILM COATED ORAL at 20:41

## 2017-01-01 RX ADMIN — IPRATROPIUM BROMIDE AND ALBUTEROL SULFATE 3 ML: .5; 3 SOLUTION RESPIRATORY (INHALATION) at 21:28

## 2017-01-01 RX ADMIN — NYSTATIN 500000 UNITS: 100000 SUSPENSION ORAL at 17:03

## 2017-01-01 RX ADMIN — CYCLOBENZAPRINE HYDROCHLORIDE 10 MG: 10 TABLET, FILM COATED ORAL at 23:16

## 2017-01-01 RX ADMIN — BISACODYL 10 MG: 5 TABLET, COATED ORAL at 09:26

## 2017-01-01 RX ADMIN — BUDESONIDE 0.5 MG: 0.5 INHALANT RESPIRATORY (INHALATION) at 19:34

## 2017-01-01 RX ADMIN — NYSTATIN 500000 UNITS: 100000 SUSPENSION ORAL at 12:07

## 2017-01-01 RX ADMIN — Medication 325 MG: at 08:32

## 2017-01-01 RX ADMIN — ASPIRIN 81 MG: 81 TABLET, COATED ORAL at 08:54

## 2017-01-01 RX ADMIN — PANTOPRAZOLE SODIUM 40 MG: 40 TABLET, DELAYED RELEASE ORAL at 08:21

## 2017-01-01 RX ADMIN — INSULIN LISPRO 3 UNITS: 100 INJECTION, SOLUTION INTRAVENOUS; SUBCUTANEOUS at 21:19

## 2017-01-01 RX ADMIN — FUROSEMIDE 40 MG: 10 INJECTION, SOLUTION INTRAMUSCULAR; INTRAVENOUS at 15:48

## 2017-01-01 RX ADMIN — ACETAMINOPHEN AND CODEINE PHOSPHATE 1 TABLET: 30; 300 TABLET ORAL at 09:18

## 2017-01-01 RX ADMIN — INSULIN LISPRO 3 UNITS: 100 INJECTION, SOLUTION INTRAVENOUS; SUBCUTANEOUS at 17:05

## 2017-01-01 RX ADMIN — IPRATROPIUM BROMIDE AND ALBUTEROL SULFATE 3 ML: .5; 3 SOLUTION RESPIRATORY (INHALATION) at 08:07

## 2017-01-01 RX ADMIN — ACETAMINOPHEN AND CODEINE PHOSPHATE 1 TABLET: 30; 300 TABLET ORAL at 22:56

## 2017-01-01 RX ADMIN — PANTOPRAZOLE SODIUM 40 MG: 40 TABLET, DELAYED RELEASE ORAL at 08:02

## 2017-01-01 RX ADMIN — ACETAMINOPHEN AND CODEINE PHOSPHATE 1 TABLET: 30; 300 TABLET ORAL at 08:15

## 2017-01-01 RX ADMIN — IPRATROPIUM BROMIDE AND ALBUTEROL SULFATE 3 ML: .5; 3 SOLUTION RESPIRATORY (INHALATION) at 13:25

## 2017-01-01 RX ADMIN — IPRATROPIUM BROMIDE AND ALBUTEROL SULFATE 3 ML: .5; 3 SOLUTION RESPIRATORY (INHALATION) at 20:14

## 2017-01-01 RX ADMIN — AMOXICILLIN AND CLAVULANATE POTASSIUM 1 TABLET: 875; 125 TABLET, FILM COATED ORAL at 08:02

## 2017-01-01 RX ADMIN — TAMSULOSIN HYDROCHLORIDE 0.4 MG: 0.4 CAPSULE ORAL at 21:38

## 2017-01-01 RX ADMIN — INSULIN LISPRO 3 UNITS: 100 INJECTION, SOLUTION INTRAVENOUS; SUBCUTANEOUS at 12:49

## 2017-01-01 RX ADMIN — ACETYLCYSTEINE 4 ML: 100 SOLUTION ORAL; RESPIRATORY (INHALATION) at 07:52

## 2017-01-01 RX ADMIN — NYSTATIN 500000 UNITS: 100000 SUSPENSION ORAL at 12:00

## 2017-01-01 RX ADMIN — GUAIFENESIN 600 MG: 600 TABLET, EXTENDED RELEASE ORAL at 08:33

## 2017-01-01 RX ADMIN — BUDESONIDE 0.5 MG: 0.5 INHALANT RESPIRATORY (INHALATION) at 18:43

## 2017-01-01 RX ADMIN — IPRATROPIUM BROMIDE AND ALBUTEROL SULFATE 3 ML: .5; 3 SOLUTION RESPIRATORY (INHALATION) at 15:58

## 2017-01-01 RX ADMIN — SODIUM CHLORIDE 125 ML/HR: 9 INJECTION, SOLUTION INTRAVENOUS at 17:19

## 2017-01-01 RX ADMIN — WARFARIN SODIUM 7.5 MG: 7.5 TABLET ORAL at 18:12

## 2017-01-01 RX ADMIN — TAMSULOSIN HYDROCHLORIDE 0.8 MG: 0.4 CAPSULE ORAL at 20:13

## 2017-01-01 RX ADMIN — LISINOPRIL 2.5 MG: 5 TABLET ORAL at 08:18

## 2017-01-01 RX ADMIN — FUROSEMIDE 40 MG: 10 INJECTION, SOLUTION INTRAMUSCULAR; INTRAVENOUS at 13:50

## 2017-01-01 RX ADMIN — INSULIN LISPRO 4 UNITS: 100 INJECTION, SOLUTION INTRAVENOUS; SUBCUTANEOUS at 17:07

## 2017-01-01 RX ADMIN — DILTIAZEM HYDROCHLORIDE 30 MG: 30 TABLET, FILM COATED ORAL at 00:07

## 2017-01-01 RX ADMIN — INSULIN LISPRO 7 UNITS: 100 INJECTION, SOLUTION INTRAVENOUS; SUBCUTANEOUS at 20:41

## 2017-01-01 RX ADMIN — NYSTATIN 500000 UNITS: 100000 SUSPENSION ORAL at 08:14

## 2017-01-01 RX ADMIN — TAMSULOSIN HYDROCHLORIDE 0.4 MG: 0.4 CAPSULE ORAL at 21:57

## 2017-01-01 RX ADMIN — GUAIFENESIN 600 MG: 600 TABLET, EXTENDED RELEASE ORAL at 09:26

## 2017-01-01 RX ADMIN — ATORVASTATIN CALCIUM 20 MG: 20 TABLET, FILM COATED ORAL at 21:40

## 2017-01-01 RX ADMIN — INSULIN LISPRO 3 UNITS: 100 INJECTION, SOLUTION INTRAVENOUS; SUBCUTANEOUS at 12:01

## 2017-01-01 RX ADMIN — TAMSULOSIN HYDROCHLORIDE 0.8 MG: 0.4 CAPSULE ORAL at 21:00

## 2017-01-01 RX ADMIN — SERTRALINE HYDROCHLORIDE 200 MG: 100 TABLET, FILM COATED ORAL at 08:51

## 2017-01-01 RX ADMIN — NYSTATIN: 100000 POWDER TOPICAL at 21:41

## 2017-01-01 RX ADMIN — ACETAMINOPHEN AND CODEINE PHOSPHATE 1 TABLET: 30; 300 TABLET ORAL at 03:14

## 2017-01-01 RX ADMIN — INSULIN LISPRO 5 UNITS: 100 INJECTION, SOLUTION INTRAVENOUS; SUBCUTANEOUS at 20:53

## 2017-01-01 RX ADMIN — GUAIFENESIN 600 MG: 600 TABLET, EXTENDED RELEASE ORAL at 21:01

## 2017-01-01 RX ADMIN — ASPIRIN 81 MG: 81 TABLET, COATED ORAL at 10:54

## 2017-01-01 RX ADMIN — BENZONATATE 100 MG: 100 CAPSULE, LIQUID FILLED ORAL at 08:37

## 2017-01-01 RX ADMIN — CYCLOBENZAPRINE HYDROCHLORIDE 10 MG: 10 TABLET, FILM COATED ORAL at 13:04

## 2017-01-01 RX ADMIN — DULOXETINE HYDROCHLORIDE 30 MG: 30 CAPSULE, DELAYED RELEASE ORAL at 17:44

## 2017-01-01 RX ADMIN — AMOXICILLIN AND CLAVULANATE POTASSIUM 1 TABLET: 875; 125 TABLET, FILM COATED ORAL at 20:00

## 2017-01-01 RX ADMIN — GUAIFENESIN 600 MG: 600 TABLET, EXTENDED RELEASE ORAL at 08:57

## 2017-01-01 RX ADMIN — DILTIAZEM HYDROCHLORIDE 120 MG: 120 CAPSULE, EXTENDED RELEASE ORAL at 08:13

## 2017-01-01 RX ADMIN — AMOXICILLIN AND CLAVULANATE POTASSIUM 1 TABLET: 875; 125 TABLET, FILM COATED ORAL at 20:28

## 2017-01-01 RX ADMIN — BUDESONIDE 0.5 MG: 0.5 INHALANT RESPIRATORY (INHALATION) at 09:02

## 2017-01-01 RX ADMIN — METHYLPREDNISOLONE SODIUM SUCCINATE 60 MG: 40 INJECTION, POWDER, FOR SOLUTION INTRAMUSCULAR; INTRAVENOUS at 20:33

## 2017-01-01 RX ADMIN — NYSTATIN: 100000 CREAM TOPICAL at 22:26

## 2017-01-01 RX ADMIN — INSULIN LISPRO 2 UNITS: 100 INJECTION, SOLUTION INTRAVENOUS; SUBCUTANEOUS at 12:02

## 2017-01-01 RX ADMIN — DULOXETINE HYDROCHLORIDE 30 MG: 30 CAPSULE, DELAYED RELEASE ORAL at 20:31

## 2017-01-01 RX ADMIN — INSULIN DETEMIR 8 UNITS: 100 INJECTION, SOLUTION SUBCUTANEOUS at 08:23

## 2017-01-01 RX ADMIN — ASPIRIN 81 MG: 81 TABLET, COATED ORAL at 08:19

## 2017-01-01 RX ADMIN — TAMSULOSIN HYDROCHLORIDE 0.8 MG: 0.4 CAPSULE ORAL at 20:01

## 2017-01-01 RX ADMIN — POTASSIUM CHLORIDE 10 MEQ: 750 CAPSULE, EXTENDED RELEASE ORAL at 17:53

## 2017-01-01 RX ADMIN — TAMSULOSIN HYDROCHLORIDE 0.8 MG: 0.4 CAPSULE ORAL at 21:17

## 2017-01-01 RX ADMIN — IPRATROPIUM BROMIDE AND ALBUTEROL SULFATE 3 ML: .5; 3 SOLUTION RESPIRATORY (INHALATION) at 02:26

## 2017-01-01 RX ADMIN — LIDOCAINE 1 PATCH: 50 PATCH CUTANEOUS at 09:16

## 2017-01-01 RX ADMIN — NYSTATIN: 100000 CREAM TOPICAL at 21:09

## 2017-01-01 RX ADMIN — NYSTATIN: 100000 CREAM TOPICAL at 20:31

## 2017-01-01 RX ADMIN — NYSTATIN: 100000 CREAM TOPICAL at 09:26

## 2017-01-01 RX ADMIN — GUAIFENESIN 600 MG: 600 TABLET, EXTENDED RELEASE ORAL at 23:30

## 2017-01-01 RX ADMIN — Medication 250 MG: at 09:15

## 2017-01-01 RX ADMIN — ASPIRIN 81 MG: 81 TABLET, COATED ORAL at 08:01

## 2017-01-01 RX ADMIN — FUROSEMIDE 20 MG: 20 TABLET ORAL at 11:26

## 2017-01-01 RX ADMIN — GUAIFENESIN AND DEXTROMETHORPHAN 5 ML: 100; 10 SYRUP ORAL at 01:04

## 2017-01-01 RX ADMIN — WARFARIN SODIUM 2 MG: 2 TABLET ORAL at 17:28

## 2017-01-01 RX ADMIN — ACETAMINOPHEN AND CODEINE PHOSPHATE 1 TABLET: 30; 300 TABLET ORAL at 20:53

## 2017-01-01 RX ADMIN — DULOXETINE HYDROCHLORIDE 30 MG: 30 CAPSULE, DELAYED RELEASE ORAL at 17:07

## 2017-01-01 RX ADMIN — LISINOPRIL 2.5 MG: 5 TABLET ORAL at 08:14

## 2017-01-01 RX ADMIN — Medication 5 MG: at 03:41

## 2017-01-01 RX ADMIN — ASPIRIN 81 MG: 81 TABLET, COATED ORAL at 08:22

## 2017-01-01 RX ADMIN — DOXYCYCLINE 100 MG: 100 INJECTION, POWDER, LYOPHILIZED, FOR SOLUTION INTRAVENOUS at 02:53

## 2017-01-01 RX ADMIN — WARFARIN SODIUM 3 MG: 3 TABLET ORAL at 16:55

## 2017-01-01 RX ADMIN — NYSTATIN: 100000 CREAM TOPICAL at 20:37

## 2017-01-01 RX ADMIN — LIDOCAINE 1 PATCH: 50 PATCH CUTANEOUS at 11:50

## 2017-01-01 RX ADMIN — WARFARIN SODIUM 5 MG: 5 TABLET ORAL at 15:51

## 2017-01-01 RX ADMIN — METOPROLOL TARTRATE 25 MG: 25 TABLET ORAL at 21:40

## 2017-01-01 RX ADMIN — IPRATROPIUM BROMIDE AND ALBUTEROL SULFATE 3 ML: .5; 3 SOLUTION RESPIRATORY (INHALATION) at 19:04

## 2017-01-01 RX ADMIN — CYCLOBENZAPRINE HYDROCHLORIDE 10 MG: 10 TABLET, FILM COATED ORAL at 01:09

## 2017-01-01 RX ADMIN — MICONAZOLE NITRATE: 2 CREAM TOPICAL at 21:41

## 2017-01-01 RX ADMIN — NYSTATIN 500000 UNITS: 100000 SUSPENSION ORAL at 21:26

## 2017-01-01 RX ADMIN — WARFARIN SODIUM 2.5 MG: 2.5 TABLET ORAL at 17:44

## 2017-01-01 RX ADMIN — Medication 250 MG: at 17:33

## 2017-01-01 RX ADMIN — NYSTATIN 1 APPLICATION: 100000 CREAM TOPICAL at 20:34

## 2017-01-01 RX ADMIN — CEFTRIAXONE SODIUM 1 G: 1 INJECTION, SOLUTION INTRAVENOUS at 11:55

## 2017-01-01 RX ADMIN — DULOXETINE HYDROCHLORIDE 30 MG: 30 CAPSULE, DELAYED RELEASE ORAL at 08:51

## 2017-01-01 RX ADMIN — FUROSEMIDE 40 MG: 10 INJECTION, SOLUTION INTRAMUSCULAR; INTRAVENOUS at 11:11

## 2017-01-01 RX ADMIN — INSULIN LISPRO 4 UNITS: 100 INJECTION, SOLUTION INTRAVENOUS; SUBCUTANEOUS at 20:40

## 2017-01-01 RX ADMIN — IPRATROPIUM BROMIDE AND ALBUTEROL SULFATE 3 ML: .5; 3 SOLUTION RESPIRATORY (INHALATION) at 07:30

## 2017-01-01 RX ADMIN — ASPIRIN 81 MG: 81 TABLET, COATED ORAL at 09:04

## 2017-01-01 RX ADMIN — AMOXICILLIN AND CLAVULANATE POTASSIUM 1 TABLET: 875; 125 TABLET, FILM COATED ORAL at 20:40

## 2017-01-01 RX ADMIN — INSULIN DETEMIR 8 UNITS: 100 INJECTION, SOLUTION SUBCUTANEOUS at 08:29

## 2017-01-01 RX ADMIN — Medication 5 MG: at 23:32

## 2017-01-01 RX ADMIN — POTASSIUM CHLORIDE 10 MEQ: 750 CAPSULE, EXTENDED RELEASE ORAL at 17:21

## 2017-01-01 RX ADMIN — POTASSIUM CHLORIDE 10 MEQ: 750 CAPSULE, EXTENDED RELEASE ORAL at 17:33

## 2017-01-01 RX ADMIN — NYSTATIN 500000 UNITS: 100000 SUSPENSION ORAL at 20:14

## 2017-01-01 RX ADMIN — NYSTATIN 500000 UNITS: 100000 SUSPENSION ORAL at 21:21

## 2017-01-01 RX ADMIN — TAZOBACTAM SODIUM AND PIPERACILLIN SODIUM 4.5 G: 500; 4 INJECTION, SOLUTION INTRAVENOUS at 00:23

## 2017-01-01 RX ADMIN — ASPIRIN 81 MG: 81 TABLET, COATED ORAL at 08:26

## 2017-01-01 RX ADMIN — PANTOPRAZOLE SODIUM 40 MG: 40 TABLET, DELAYED RELEASE ORAL at 17:05

## 2017-01-01 RX ADMIN — INSULIN LISPRO 4 UNITS: 100 INJECTION, SOLUTION INTRAVENOUS; SUBCUTANEOUS at 20:36

## 2017-01-01 RX ADMIN — Medication 250 MG: at 17:51

## 2017-01-01 RX ADMIN — DOXYCYCLINE HYCLATE 100 MG: 100 CAPSULE ORAL at 18:26

## 2017-01-01 RX ADMIN — BARIUM SULFATE 100 ML: 0.81 POWDER, FOR SUSPENSION ORAL at 11:14

## 2017-01-01 RX ADMIN — NYSTATIN 500000 UNITS: 100000 SUSPENSION ORAL at 11:32

## 2017-01-01 RX ADMIN — DOCUSATE SODIUM 100 MG: 100 CAPSULE, LIQUID FILLED ORAL at 08:16

## 2017-01-01 RX ADMIN — LISINOPRIL 2.5 MG: 5 TABLET ORAL at 08:03

## 2017-01-01 RX ADMIN — IPRATROPIUM BROMIDE AND ALBUTEROL SULFATE 3 ML: .5; 3 SOLUTION RESPIRATORY (INHALATION) at 07:54

## 2017-01-01 RX ADMIN — DILTIAZEM HYDROCHLORIDE 120 MG: 120 CAPSULE, EXTENDED RELEASE ORAL at 08:29

## 2017-01-01 RX ADMIN — INSULIN LISPRO 3 UNITS: 100 INJECTION, SOLUTION INTRAVENOUS; SUBCUTANEOUS at 11:51

## 2017-01-01 RX ADMIN — Medication 250 MG: at 17:05

## 2017-01-01 RX ADMIN — IPRATROPIUM BROMIDE AND ALBUTEROL SULFATE 3 ML: .5; 3 SOLUTION RESPIRATORY (INHALATION) at 20:17

## 2017-01-01 RX ADMIN — NYSTATIN: 100000 CREAM TOPICAL at 08:24

## 2017-01-01 RX ADMIN — TAMSULOSIN HYDROCHLORIDE 0.4 MG: 0.4 CAPSULE ORAL at 20:51

## 2017-01-01 RX ADMIN — NYSTATIN: 100000 POWDER TOPICAL at 08:58

## 2017-01-01 RX ADMIN — PANTOPRAZOLE SODIUM 40 MG: 40 TABLET, DELAYED RELEASE ORAL at 08:07

## 2017-01-01 RX ADMIN — ACETAMINOPHEN AND CODEINE PHOSPHATE 1 TABLET: 30; 300 TABLET ORAL at 09:51

## 2017-01-01 RX ADMIN — WARFARIN SODIUM 3 MG: 3 TABLET ORAL at 17:40

## 2017-01-01 RX ADMIN — ATORVASTATIN CALCIUM 40 MG: 40 TABLET, FILM COATED ORAL at 20:23

## 2017-01-01 RX ADMIN — POTASSIUM CHLORIDE 10 MEQ: 750 CAPSULE, EXTENDED RELEASE ORAL at 09:00

## 2017-01-01 RX ADMIN — DOCUSATE SODIUM 100 MG: 100 CAPSULE, LIQUID FILLED ORAL at 17:20

## 2017-01-01 RX ADMIN — ACETAMINOPHEN AND CODEINE PHOSPHATE 1 TABLET: 30; 300 TABLET ORAL at 08:10

## 2017-01-01 RX ADMIN — FUROSEMIDE 40 MG: 40 TABLET ORAL at 08:54

## 2017-01-01 RX ADMIN — INSULIN LISPRO 2 UNITS: 100 INJECTION, SOLUTION INTRAVENOUS; SUBCUTANEOUS at 08:30

## 2017-01-01 RX ADMIN — ACETAMINOPHEN AND CODEINE PHOSPHATE 1 TABLET: 30; 300 TABLET ORAL at 21:18

## 2017-01-01 RX ADMIN — ACETAMINOPHEN AND CODEINE PHOSPHATE 1 TABLET: 30; 300 TABLET ORAL at 23:16

## 2017-01-01 RX ADMIN — ACETYLCYSTEINE 4 ML: 100 SOLUTION ORAL; RESPIRATORY (INHALATION) at 08:02

## 2017-01-01 RX ADMIN — TAMSULOSIN HYDROCHLORIDE 0.8 MG: 0.4 CAPSULE ORAL at 21:13

## 2017-01-01 RX ADMIN — NYSTATIN 500000 UNITS: 100000 SUSPENSION ORAL at 17:05

## 2017-01-01 RX ADMIN — IPRATROPIUM BROMIDE AND ALBUTEROL SULFATE 3 ML: .5; 3 SOLUTION RESPIRATORY (INHALATION) at 13:53

## 2017-01-01 RX ADMIN — FUROSEMIDE 20 MG: 10 INJECTION, SOLUTION INTRAMUSCULAR; INTRAVENOUS at 12:29

## 2017-01-01 RX ADMIN — ATORVASTATIN CALCIUM 20 MG: 20 TABLET, FILM COATED ORAL at 20:36

## 2017-01-01 RX ADMIN — FUROSEMIDE 40 MG: 40 TABLET ORAL at 08:10

## 2017-01-01 RX ADMIN — INSULIN LISPRO 3 UNITS: 100 INJECTION, SOLUTION INTRAVENOUS; SUBCUTANEOUS at 20:40

## 2017-01-01 RX ADMIN — IPRATROPIUM BROMIDE AND ALBUTEROL SULFATE 3 ML: .5; 3 SOLUTION RESPIRATORY (INHALATION) at 18:43

## 2017-01-01 RX ADMIN — NYSTATIN 500000 UNITS: 100000 SUSPENSION ORAL at 21:00

## 2017-01-01 RX ADMIN — DOXYCYCLINE HYCLATE 100 MG: 100 CAPSULE ORAL at 20:51

## 2017-01-01 RX ADMIN — AMOXICILLIN AND CLAVULANATE POTASSIUM 1 TABLET: 875; 125 TABLET, FILM COATED ORAL at 08:35

## 2017-01-01 RX ADMIN — ONDANSETRON 4 MG: 2 INJECTION INTRAMUSCULAR; INTRAVENOUS at 17:58

## 2017-01-01 RX ADMIN — ASPIRIN 81 MG: 81 TABLET, COATED ORAL at 10:27

## 2017-01-01 RX ADMIN — KETOROLAC TROMETHAMINE 15 MG: 15 INJECTION, SOLUTION INTRAMUSCULAR; INTRAVENOUS at 03:57

## 2017-01-01 RX ADMIN — PANTOPRAZOLE SODIUM 40 MG: 40 TABLET, DELAYED RELEASE ORAL at 08:57

## 2017-01-01 RX ADMIN — CYCLOBENZAPRINE HYDROCHLORIDE 10 MG: 10 TABLET, FILM COATED ORAL at 00:27

## 2017-01-01 RX ADMIN — ASPIRIN 81 MG: 81 TABLET, COATED ORAL at 08:23

## 2017-01-01 RX ADMIN — ATORVASTATIN CALCIUM 10 MG: 10 TABLET, FILM COATED ORAL at 21:10

## 2017-01-01 RX ADMIN — FUROSEMIDE 40 MG: 40 TABLET ORAL at 08:07

## 2017-01-01 RX ADMIN — INSULIN LISPRO 7 UNITS: 100 INJECTION, SOLUTION INTRAVENOUS; SUBCUTANEOUS at 20:13

## 2017-01-01 RX ADMIN — ACETYLCYSTEINE 4 ML: 100 SOLUTION ORAL; RESPIRATORY (INHALATION) at 18:44

## 2017-01-01 RX ADMIN — INSULIN LISPRO 3 UNITS: 100 INJECTION, SOLUTION INTRAVENOUS; SUBCUTANEOUS at 12:08

## 2017-01-01 RX ADMIN — POTASSIUM CHLORIDE 10 MEQ: 750 CAPSULE, EXTENDED RELEASE ORAL at 08:26

## 2017-01-01 RX ADMIN — NYSTATIN 500000 UNITS: 100000 SUSPENSION ORAL at 09:14

## 2017-01-01 RX ADMIN — INSULIN LISPRO 3 UNITS: 100 INJECTION, SOLUTION INTRAVENOUS; SUBCUTANEOUS at 12:38

## 2017-01-01 RX ADMIN — NYSTATIN: 100000 POWDER TOPICAL at 20:15

## 2017-01-01 RX ADMIN — METHYLPREDNISOLONE SODIUM SUCCINATE 60 MG: 40 INJECTION, POWDER, FOR SOLUTION INTRAMUSCULAR; INTRAVENOUS at 05:15

## 2017-01-01 RX ADMIN — DILTIAZEM HYDROCHLORIDE 30 MG: 30 TABLET, FILM COATED ORAL at 05:27

## 2017-01-01 RX ADMIN — WARFARIN SODIUM 6 MG: 3 TABLET ORAL at 17:18

## 2017-01-01 RX ADMIN — ACETYLCYSTEINE 4 ML: 100 SOLUTION ORAL; RESPIRATORY (INHALATION) at 18:56

## 2017-01-01 RX ADMIN — CYCLOBENZAPRINE HYDROCHLORIDE 10 MG: 10 TABLET, FILM COATED ORAL at 08:43

## 2017-01-01 RX ADMIN — NYSTATIN 500000 UNITS: 100000 SUSPENSION ORAL at 16:55

## 2017-01-01 RX ADMIN — PANTOPRAZOLE SODIUM 40 MG: 40 TABLET, DELAYED RELEASE ORAL at 16:55

## 2017-01-01 RX ADMIN — INSULIN LISPRO 3 UNITS: 100 INJECTION, SOLUTION INTRAVENOUS; SUBCUTANEOUS at 09:01

## 2017-01-01 RX ADMIN — IPRATROPIUM BROMIDE AND ALBUTEROL SULFATE 3 ML: .5; 3 SOLUTION RESPIRATORY (INHALATION) at 13:50

## 2017-01-01 RX ADMIN — WARFARIN SODIUM 2.5 MG: 2.5 TABLET ORAL at 17:22

## 2017-01-01 RX ADMIN — NYSTATIN: 100000 CREAM TOPICAL at 09:40

## 2017-01-01 RX ADMIN — IPRATROPIUM BROMIDE AND ALBUTEROL SULFATE 3 ML: .5; 3 SOLUTION RESPIRATORY (INHALATION) at 15:52

## 2017-01-01 RX ADMIN — Medication 2000 MG: at 17:14

## 2017-01-01 RX ADMIN — IPRATROPIUM BROMIDE AND ALBUTEROL SULFATE 3 ML: .5; 3 SOLUTION RESPIRATORY (INHALATION) at 18:53

## 2017-01-01 RX ADMIN — LEVOFLOXACIN 750 MG: 750 TABLET, FILM COATED ORAL at 20:40

## 2017-01-01 RX ADMIN — PANTOPRAZOLE SODIUM 40 MG: 40 TABLET, DELAYED RELEASE ORAL at 08:41

## 2017-01-01 RX ADMIN — INSULIN LISPRO 4 UNITS: 100 INJECTION, SOLUTION INTRAVENOUS; SUBCUTANEOUS at 21:14

## 2017-01-01 RX ADMIN — Medication 250 MG: at 18:59

## 2017-01-01 RX ADMIN — INSULIN LISPRO 2 UNITS: 100 INJECTION, SOLUTION INTRAVENOUS; SUBCUTANEOUS at 08:01

## 2017-01-01 RX ADMIN — ACETAMINOPHEN AND CODEINE PHOSPHATE 1 TABLET: 30; 300 TABLET ORAL at 19:17

## 2017-01-01 RX ADMIN — NYSTATIN: 100000 CREAM TOPICAL at 08:21

## 2017-01-01 RX ADMIN — NYSTATIN: 100000 CREAM TOPICAL at 20:17

## 2017-01-01 RX ADMIN — INSULIN LISPRO 3 UNITS: 100 INJECTION, SOLUTION INTRAVENOUS; SUBCUTANEOUS at 17:28

## 2017-01-01 RX ADMIN — DILTIAZEM HYDROCHLORIDE 120 MG: 120 CAPSULE, EXTENDED RELEASE ORAL at 08:57

## 2017-01-01 RX ADMIN — SODIUM CHLORIDE 500 ML: 9 INJECTION, SOLUTION INTRAVENOUS at 16:46

## 2017-01-01 RX ADMIN — INSULIN LISPRO 3 UNITS: 100 INJECTION, SOLUTION INTRAVENOUS; SUBCUTANEOUS at 21:22

## 2017-01-01 RX ADMIN — VANCOMYCIN HYDROCHLORIDE 1500 MG: 10 INJECTION, POWDER, LYOPHILIZED, FOR SOLUTION INTRAVENOUS at 14:20

## 2017-01-01 RX ADMIN — PANTOPRAZOLE SODIUM 40 MG: 40 TABLET, DELAYED RELEASE ORAL at 06:19

## 2017-01-01 RX ADMIN — Medication 250 MG: at 08:02

## 2017-01-01 RX ADMIN — INSULIN LISPRO 3 UNITS: 100 INJECTION, SOLUTION INTRAVENOUS; SUBCUTANEOUS at 20:42

## 2017-01-01 RX ADMIN — FLUVASTATIN SODIUM 80 MG: 80 TABLET, FILM COATED, EXTENDED RELEASE ORAL at 09:49

## 2017-01-01 RX ADMIN — INSULIN LISPRO 2 UNITS: 100 INJECTION, SOLUTION INTRAVENOUS; SUBCUTANEOUS at 08:54

## 2017-01-01 RX ADMIN — PANTOPRAZOLE SODIUM 40 MG: 40 TABLET, DELAYED RELEASE ORAL at 05:24

## 2017-01-01 RX ADMIN — NYSTATIN: 100000 CREAM TOPICAL at 20:03

## 2017-01-01 RX ADMIN — FUROSEMIDE 40 MG: 40 TABLET ORAL at 15:28

## 2017-01-01 RX ADMIN — Medication 250 MG: at 09:03

## 2017-01-01 RX ADMIN — NYSTATIN: 100000 CREAM TOPICAL at 08:30

## 2017-01-01 RX ADMIN — IPRATROPIUM BROMIDE AND ALBUTEROL SULFATE 3 ML: .5; 3 SOLUTION RESPIRATORY (INHALATION) at 07:52

## 2017-01-01 RX ADMIN — NYSTATIN: 100000 POWDER TOPICAL at 08:17

## 2017-01-01 RX ADMIN — TAMSULOSIN HYDROCHLORIDE 0.4 MG: 0.4 CAPSULE ORAL at 08:28

## 2017-01-01 RX ADMIN — DILTIAZEM HYDROCHLORIDE 120 MG: 120 CAPSULE, EXTENDED RELEASE ORAL at 08:07

## 2017-01-01 RX ADMIN — DILTIAZEM HYDROCHLORIDE 30 MG: 30 TABLET, FILM COATED ORAL at 17:07

## 2017-01-01 RX ADMIN — ACETAMINOPHEN 650 MG: 325 TABLET, FILM COATED ORAL at 22:38

## 2017-01-01 RX ADMIN — INSULIN LISPRO 3 UNITS: 100 INJECTION, SOLUTION INTRAVENOUS; SUBCUTANEOUS at 12:17

## 2017-01-01 RX ADMIN — SERTRALINE HYDROCHLORIDE 100 MG: 100 TABLET ORAL at 09:37

## 2017-01-01 RX ADMIN — ASPIRIN 81 MG: 81 TABLET, COATED ORAL at 08:55

## 2017-01-01 RX ADMIN — ATORVASTATIN CALCIUM 40 MG: 40 TABLET, FILM COATED ORAL at 20:01

## 2017-01-01 RX ADMIN — NYSTATIN: 100000 POWDER TOPICAL at 20:17

## 2017-01-01 RX ADMIN — NYSTATIN: 100000 CREAM TOPICAL at 21:05

## 2017-01-01 RX ADMIN — DILTIAZEM HYDROCHLORIDE 120 MG: 120 CAPSULE, EXTENDED RELEASE ORAL at 09:15

## 2017-01-01 RX ADMIN — ACETYLCYSTEINE 4 ML: 100 SOLUTION ORAL; RESPIRATORY (INHALATION) at 20:58

## 2017-01-01 RX ADMIN — GUAIFENESIN 600 MG: 600 TABLET, EXTENDED RELEASE ORAL at 08:59

## 2017-01-01 RX ADMIN — INSULIN LISPRO 2 UNITS: 100 INJECTION, SOLUTION INTRAVENOUS; SUBCUTANEOUS at 12:21

## 2017-01-01 RX ADMIN — IPRATROPIUM BROMIDE AND ALBUTEROL SULFATE 3 ML: .5; 3 SOLUTION RESPIRATORY (INHALATION) at 19:40

## 2017-01-01 RX ADMIN — SERTRALINE HYDROCHLORIDE 200 MG: 100 TABLET, FILM COATED ORAL at 17:30

## 2017-01-01 RX ADMIN — PANTOPRAZOLE SODIUM 40 MG: 40 TABLET, DELAYED RELEASE ORAL at 17:28

## 2017-01-01 RX ADMIN — CYCLOBENZAPRINE HYDROCHLORIDE 10 MG: 10 TABLET, FILM COATED ORAL at 09:42

## 2017-01-01 RX ADMIN — METHYLPREDNISOLONE SODIUM SUCCINATE 60 MG: 40 INJECTION, POWDER, FOR SOLUTION INTRAMUSCULAR; INTRAVENOUS at 12:34

## 2017-01-01 RX ADMIN — PANTOPRAZOLE SODIUM 40 MG: 40 TABLET, DELAYED RELEASE ORAL at 17:29

## 2017-01-01 RX ADMIN — LIDOCAINE 1 PATCH: 50 PATCH CUTANEOUS at 23:58

## 2017-01-01 RX ADMIN — NYSTATIN 500000 UNITS: 100000 SUSPENSION ORAL at 20:39

## 2017-01-01 RX ADMIN — IPRATROPIUM BROMIDE AND ALBUTEROL SULFATE 3 ML: .5; 3 SOLUTION RESPIRATORY (INHALATION) at 20:52

## 2017-01-01 RX ADMIN — FUROSEMIDE 40 MG: 40 TABLET ORAL at 08:20

## 2017-01-01 RX ADMIN — Medication 250 MG: at 18:00

## 2017-01-01 RX ADMIN — Medication 250 MG: at 12:07

## 2017-01-01 RX ADMIN — BUDESONIDE 0.5 MG: 0.5 INHALANT RESPIRATORY (INHALATION) at 18:56

## 2017-01-01 RX ADMIN — FUROSEMIDE 40 MG: 10 INJECTION, SOLUTION INTRAMUSCULAR; INTRAVENOUS at 15:01

## 2017-01-01 RX ADMIN — GUAIFENESIN AND DEXTROMETHORPHAN 5 ML: 100; 10 SYRUP ORAL at 21:38

## 2017-01-01 RX ADMIN — NYSTATIN: 100000 CREAM TOPICAL at 20:09

## 2017-01-01 RX ADMIN — NYSTATIN: 100000 POWDER TOPICAL at 09:00

## 2017-01-01 RX ADMIN — DILTIAZEM HYDROCHLORIDE 120 MG: 120 CAPSULE, EXTENDED RELEASE ORAL at 09:53

## 2017-01-01 RX ADMIN — ATORVASTATIN CALCIUM 10 MG: 10 TABLET, FILM COATED ORAL at 21:04

## 2017-01-01 RX ADMIN — CYCLOBENZAPRINE HYDROCHLORIDE 10 MG: 10 TABLET, FILM COATED ORAL at 21:04

## 2017-01-01 RX ADMIN — FUROSEMIDE 20 MG: 20 TABLET ORAL at 08:22

## 2017-01-01 RX ADMIN — PANTOPRAZOLE SODIUM 40 MG: 40 TABLET, DELAYED RELEASE ORAL at 08:18

## 2017-01-01 RX ADMIN — NYSTATIN: 100000 POWDER TOPICAL at 11:24

## 2017-01-01 RX ADMIN — PANTOPRAZOLE SODIUM 40 MG: 40 TABLET, DELAYED RELEASE ORAL at 08:43

## 2017-01-01 RX ADMIN — TAMSULOSIN HYDROCHLORIDE 0.8 MG: 0.4 CAPSULE ORAL at 21:11

## 2017-01-01 RX ADMIN — BISACODYL 10 MG: 5 TABLET, COATED ORAL at 15:23

## 2017-01-01 RX ADMIN — IPRATROPIUM BROMIDE AND ALBUTEROL SULFATE 3 ML: .5; 3 SOLUTION RESPIRATORY (INHALATION) at 13:38

## 2017-01-01 RX ADMIN — ACETAMINOPHEN AND CODEINE PHOSPHATE 1 TABLET: 30; 300 TABLET ORAL at 05:24

## 2017-01-01 RX ADMIN — NYSTATIN: 100000 CREAM TOPICAL at 08:04

## 2017-01-01 RX ADMIN — TAMSULOSIN HYDROCHLORIDE 0.8 MG: 0.4 CAPSULE ORAL at 21:04

## 2017-01-01 RX ADMIN — LIDOCAINE 1 PATCH: 50 PATCH CUTANEOUS at 08:40

## 2017-01-01 RX ADMIN — INSULIN LISPRO 3 UNITS: 100 INJECTION, SOLUTION INTRAVENOUS; SUBCUTANEOUS at 11:12

## 2017-01-01 RX ADMIN — INSULIN LISPRO 3 UNITS: 100 INJECTION, SOLUTION INTRAVENOUS; SUBCUTANEOUS at 12:27

## 2017-01-01 RX ADMIN — DILTIAZEM HYDROCHLORIDE 30 MG: 30 TABLET, FILM COATED ORAL at 11:53

## 2017-01-01 RX ADMIN — TAMSULOSIN HYDROCHLORIDE 0.8 MG: 0.4 CAPSULE ORAL at 21:10

## 2017-01-01 RX ADMIN — TAMSULOSIN HYDROCHLORIDE 0.8 MG: 0.4 CAPSULE ORAL at 20:30

## 2017-01-01 RX ADMIN — FUROSEMIDE 40 MG: 10 INJECTION, SOLUTION INTRAMUSCULAR; INTRAVENOUS at 09:49

## 2017-01-01 RX ADMIN — ACETAMINOPHEN AND CODEINE PHOSPHATE 1 TABLET: 30; 300 TABLET ORAL at 14:20

## 2017-01-01 RX ADMIN — WARFARIN SODIUM 5 MG: 5 TABLET ORAL at 17:29

## 2017-01-01 RX ADMIN — DILTIAZEM HYDROCHLORIDE 120 MG: 120 CAPSULE, EXTENDED RELEASE ORAL at 08:25

## 2017-01-01 RX ADMIN — CEFTRIAXONE SODIUM 1 G: 1 INJECTION, SOLUTION INTRAVENOUS at 18:36

## 2017-01-01 RX ADMIN — INSULIN DETEMIR 8 UNITS: 100 INJECTION, SOLUTION SUBCUTANEOUS at 09:05

## 2017-01-01 RX ADMIN — ATORVASTATIN CALCIUM 10 MG: 10 TABLET, FILM COATED ORAL at 21:57

## 2017-01-01 RX ADMIN — PANTOPRAZOLE SODIUM 40 MG: 40 TABLET, DELAYED RELEASE ORAL at 18:03

## 2017-01-01 RX ADMIN — IPRATROPIUM BROMIDE AND ALBUTEROL SULFATE 3 ML: .5; 3 SOLUTION RESPIRATORY (INHALATION) at 07:00

## 2017-01-01 RX ADMIN — NYSTATIN 500000 UNITS: 100000 SUSPENSION ORAL at 12:42

## 2017-01-01 RX ADMIN — POTASSIUM CHLORIDE 10 MEQ: 750 CAPSULE, EXTENDED RELEASE ORAL at 19:00

## 2017-01-01 RX ADMIN — PANTOPRAZOLE SODIUM 40 MG: 40 TABLET, DELAYED RELEASE ORAL at 08:16

## 2017-01-01 RX ADMIN — LISINOPRIL 2.5 MG: 5 TABLET ORAL at 20:23

## 2017-01-01 RX ADMIN — Medication 5 MG: at 22:44

## 2017-01-01 RX ADMIN — NYSTATIN 500000 UNITS: 100000 SUSPENSION ORAL at 21:17

## 2017-01-01 RX ADMIN — INSULIN LISPRO 3 UNITS: 100 INJECTION, SOLUTION INTRAVENOUS; SUBCUTANEOUS at 17:10

## 2017-01-01 RX ADMIN — BUDESONIDE 0.5 MG: 0.5 INHALANT RESPIRATORY (INHALATION) at 21:18

## 2017-01-01 RX ADMIN — NYSTATIN 500000 UNITS: 100000 SUSPENSION ORAL at 17:19

## 2017-01-01 RX ADMIN — PANTOPRAZOLE SODIUM 40 MG: 40 TABLET, DELAYED RELEASE ORAL at 09:18

## 2017-01-01 RX ADMIN — PANTOPRAZOLE SODIUM 40 MG: 40 TABLET, DELAYED RELEASE ORAL at 06:08

## 2017-01-01 RX ADMIN — Medication 250 MG: at 17:27

## 2017-01-01 RX ADMIN — DILTIAZEM HYDROCHLORIDE 30 MG: 30 TABLET, FILM COATED ORAL at 05:21

## 2017-01-01 RX ADMIN — NYSTATIN 500000 UNITS: 100000 SUSPENSION ORAL at 20:42

## 2017-01-01 RX ADMIN — WARFARIN SODIUM 4 MG: 4 TABLET ORAL at 17:06

## 2017-01-01 RX ADMIN — NYSTATIN 500000 UNITS: 100000 SUSPENSION ORAL at 20:52

## 2017-01-01 RX ADMIN — IPRATROPIUM BROMIDE AND ALBUTEROL SULFATE 3 ML: .5; 3 SOLUTION RESPIRATORY (INHALATION) at 01:07

## 2017-01-01 RX ADMIN — FUROSEMIDE 40 MG: 40 TABLET ORAL at 08:32

## 2017-01-01 RX ADMIN — NYSTATIN 500000 UNITS: 100000 SUSPENSION ORAL at 17:26

## 2017-01-01 RX ADMIN — TAZOBACTAM SODIUM AND PIPERACILLIN SODIUM 4.5 G: 500; 4 INJECTION, SOLUTION INTRAVENOUS at 08:59

## 2017-01-01 RX ADMIN — DOXYCYCLINE HYCLATE 100 MG: 100 CAPSULE ORAL at 08:02

## 2017-01-01 RX ADMIN — IPRATROPIUM BROMIDE AND ALBUTEROL SULFATE 3 ML: .5; 3 SOLUTION RESPIRATORY (INHALATION) at 20:07

## 2017-01-01 RX ADMIN — AMOXICILLIN AND CLAVULANATE POTASSIUM 1 TABLET: 875; 125 TABLET, FILM COATED ORAL at 17:53

## 2017-01-01 RX ADMIN — GUAIFENESIN 600 MG: 600 TABLET, EXTENDED RELEASE ORAL at 08:54

## 2017-01-01 RX ADMIN — ACETAMINOPHEN AND CODEINE PHOSPHATE 1 TABLET: 30; 300 TABLET ORAL at 20:51

## 2017-01-01 RX ADMIN — BENZONATATE 200 MG: 100 CAPSULE, LIQUID FILLED ORAL at 21:22

## 2017-01-01 RX ADMIN — POTASSIUM CHLORIDE 10 MEQ: 750 CAPSULE, EXTENDED RELEASE ORAL at 08:19

## 2017-01-01 RX ADMIN — IPRATROPIUM BROMIDE AND ALBUTEROL SULFATE 3 ML: .5; 3 SOLUTION RESPIRATORY (INHALATION) at 07:23

## 2017-01-01 RX ADMIN — DILTIAZEM HYDROCHLORIDE 30 MG: 30 TABLET, FILM COATED ORAL at 13:04

## 2017-01-01 RX ADMIN — Medication 250 MG: at 17:26

## 2017-01-01 RX ADMIN — ASPIRIN 81 MG: 81 TABLET, COATED ORAL at 08:25

## 2017-01-01 RX ADMIN — PANTOPRAZOLE SODIUM 40 MG: 40 TABLET, DELAYED RELEASE ORAL at 13:53

## 2017-01-01 RX ADMIN — CYCLOBENZAPRINE HYDROCHLORIDE 10 MG: 10 TABLET, FILM COATED ORAL at 21:57

## 2017-01-01 RX ADMIN — GUAIFENESIN 600 MG: 600 TABLET, EXTENDED RELEASE ORAL at 08:18

## 2017-01-01 RX ADMIN — METOPROLOL SUCCINATE 12.5 MG: 25 TABLET, EXTENDED RELEASE ORAL at 10:46

## 2017-01-01 RX ADMIN — FUROSEMIDE 20 MG: 20 TABLET ORAL at 13:53

## 2017-01-01 RX ADMIN — PREDNISONE 60 MG: 20 TABLET ORAL at 21:55

## 2017-01-01 RX ADMIN — DOCUSATE SODIUM 100 MG: 100 CAPSULE, LIQUID FILLED ORAL at 08:03

## 2017-01-01 RX ADMIN — ACETYLCYSTEINE 4 ML: 100 SOLUTION ORAL; RESPIRATORY (INHALATION) at 19:26

## 2017-01-01 RX ADMIN — INSULIN LISPRO 5 UNITS: 100 INJECTION, SOLUTION INTRAVENOUS; SUBCUTANEOUS at 23:48

## 2017-01-01 RX ADMIN — NYSTATIN: 100000 CREAM TOPICAL at 20:04

## 2017-01-01 RX ADMIN — DILTIAZEM HYDROCHLORIDE 120 MG: 120 CAPSULE, EXTENDED RELEASE ORAL at 08:03

## 2017-01-01 RX ADMIN — DULOXETINE HYDROCHLORIDE 30 MG: 30 CAPSULE, DELAYED RELEASE ORAL at 21:18

## 2017-01-01 RX ADMIN — NYSTATIN 500000 UNITS: 100000 SUSPENSION ORAL at 17:25

## 2017-01-01 RX ADMIN — DOXYCYCLINE HYCLATE 100 MG: 100 CAPSULE ORAL at 21:16

## 2017-01-01 RX ADMIN — ACETAMINOPHEN AND CODEINE PHOSPHATE 1 TABLET: 30; 300 TABLET ORAL at 22:53

## 2017-01-01 RX ADMIN — Medication: at 09:48

## 2017-01-01 RX ADMIN — FUROSEMIDE 40 MG: 40 TABLET ORAL at 08:41

## 2017-01-01 RX ADMIN — ACETYLCYSTEINE 4 ML: 100 SOLUTION ORAL; RESPIRATORY (INHALATION) at 07:17

## 2017-01-01 RX ADMIN — LISINOPRIL 5 MG: 5 TABLET ORAL at 17:21

## 2017-01-01 RX ADMIN — AMOXICILLIN AND CLAVULANATE POTASSIUM 1 TABLET: 875; 125 TABLET, FILM COATED ORAL at 20:03

## 2017-01-01 RX ADMIN — MICONAZOLE NITRATE: 2 CREAM TOPICAL at 20:44

## 2017-01-01 RX ADMIN — CEFTRIAXONE SODIUM 1 G: 1 INJECTION, SOLUTION INTRAVENOUS at 12:13

## 2017-01-01 RX ADMIN — METOPROLOL SUCCINATE 12.5 MG: 25 TABLET, EXTENDED RELEASE ORAL at 20:01

## 2017-01-01 RX ADMIN — ASPIRIN 81 MG: 81 TABLET, COATED ORAL at 08:17

## 2017-01-01 RX ADMIN — ACETAMINOPHEN AND CODEINE PHOSPHATE 1 TABLET: 30; 300 TABLET ORAL at 05:30

## 2017-01-01 RX ADMIN — SODIUM CHLORIDE 1000 ML: 9 INJECTION, SOLUTION INTRAVENOUS at 14:41

## 2017-01-01 RX ADMIN — BUDESONIDE 0.5 MG: 0.5 INHALANT RESPIRATORY (INHALATION) at 20:07

## 2017-01-01 RX ADMIN — GUAIFENESIN AND DEXTROMETHORPHAN 5 ML: 100; 10 SYRUP ORAL at 17:48

## 2017-01-01 RX ADMIN — INSULIN LISPRO 2 UNITS: 100 INJECTION, SOLUTION INTRAVENOUS; SUBCUTANEOUS at 18:59

## 2017-01-01 RX ADMIN — VANCOMYCIN HYDROCHLORIDE 1000 MG: 1 INJECTION, SOLUTION INTRAVENOUS at 19:11

## 2017-01-01 RX ADMIN — OXYCODONE HYDROCHLORIDE AND ACETAMINOPHEN: 500 TABLET ORAL at 08:32

## 2017-01-01 RX ADMIN — IPRATROPIUM BROMIDE AND ALBUTEROL SULFATE 3 ML: .5; 3 SOLUTION RESPIRATORY (INHALATION) at 16:24

## 2017-01-01 RX ADMIN — NYSTATIN 500000 UNITS: 100000 SUSPENSION ORAL at 08:56

## 2017-01-01 RX ADMIN — TAMSULOSIN HYDROCHLORIDE 0.8 MG: 0.4 CAPSULE ORAL at 20:03

## 2017-01-01 RX ADMIN — INSULIN LISPRO 2 UNITS: 100 INJECTION, SOLUTION INTRAVENOUS; SUBCUTANEOUS at 12:36

## 2017-01-01 RX ADMIN — IPRATROPIUM BROMIDE AND ALBUTEROL SULFATE 3 ML: .5; 3 SOLUTION RESPIRATORY (INHALATION) at 20:27

## 2017-01-01 RX ADMIN — DOCUSATE SODIUM 100 MG: 100 CAPSULE, LIQUID FILLED ORAL at 17:02

## 2017-01-01 RX ADMIN — NYSTATIN: 100000 CREAM TOPICAL at 08:59

## 2017-01-01 RX ADMIN — INSULIN LISPRO 3 UNITS: 100 INJECTION, SOLUTION INTRAVENOUS; SUBCUTANEOUS at 17:18

## 2017-01-01 RX ADMIN — TAMSULOSIN HYDROCHLORIDE 0.8 MG: 0.4 CAPSULE ORAL at 20:41

## 2017-01-01 RX ADMIN — Medication 250 MG: at 17:46

## 2017-01-01 RX ADMIN — NYSTATIN 500000 UNITS: 100000 SUSPENSION ORAL at 17:28

## 2017-01-01 RX ADMIN — AMOXICILLIN AND CLAVULANATE POTASSIUM 1 TABLET: 875; 125 TABLET, FILM COATED ORAL at 08:41

## 2017-01-01 RX ADMIN — BENZONATATE 100 MG: 100 CAPSULE, LIQUID FILLED ORAL at 02:15

## 2017-01-01 RX ADMIN — IPRATROPIUM BROMIDE AND ALBUTEROL SULFATE 3 ML: .5; 3 SOLUTION RESPIRATORY (INHALATION) at 17:23

## 2017-01-01 RX ADMIN — PANTOPRAZOLE SODIUM 40 MG: 40 TABLET, DELAYED RELEASE ORAL at 08:03

## 2017-01-01 RX ADMIN — WARFARIN SODIUM 2 MG: 2 TABLET ORAL at 17:38

## 2017-01-01 RX ADMIN — PANTOPRAZOLE SODIUM 40 MG: 40 TABLET, DELAYED RELEASE ORAL at 18:59

## 2017-01-01 RX ADMIN — NYSTATIN: 100000 POWDER TOPICAL at 08:23

## 2017-01-01 RX ADMIN — DILTIAZEM HYDROCHLORIDE 30 MG: 30 TABLET, FILM COATED ORAL at 12:28

## 2017-01-01 RX ADMIN — INSULIN LISPRO 4 UNITS: 100 INJECTION, SOLUTION INTRAVENOUS; SUBCUTANEOUS at 17:31

## 2017-01-01 RX ADMIN — DULOXETINE HYDROCHLORIDE 30 MG: 30 CAPSULE, DELAYED RELEASE ORAL at 21:08

## 2017-01-01 RX ADMIN — DILTIAZEM HYDROCHLORIDE 30 MG: 30 TABLET, FILM COATED ORAL at 20:11

## 2017-01-01 RX ADMIN — NYSTATIN: 100000 CREAM TOPICAL at 08:08

## 2017-01-01 RX ADMIN — NYSTATIN 500000 UNITS: 100000 SUSPENSION ORAL at 17:01

## 2017-01-01 RX ADMIN — INSULIN LISPRO 5 UNITS: 100 INJECTION, SOLUTION INTRAVENOUS; SUBCUTANEOUS at 17:40

## 2017-01-01 RX ADMIN — TAMSULOSIN HYDROCHLORIDE 0.4 MG: 0.4 CAPSULE ORAL at 08:32

## 2017-01-01 RX ADMIN — PANTOPRAZOLE SODIUM 40 MG: 40 TABLET, DELAYED RELEASE ORAL at 17:25

## 2017-01-01 RX ADMIN — INSULIN LISPRO 5 UNITS: 100 INJECTION, SOLUTION INTRAVENOUS; SUBCUTANEOUS at 17:43

## 2017-01-01 RX ADMIN — MICONAZOLE NITRATE: 2 CREAM TOPICAL at 08:34

## 2017-01-01 RX ADMIN — ASPIRIN 81 MG: 81 TABLET, COATED ORAL at 08:20

## 2017-01-01 RX ADMIN — GUAIFENESIN 600 MG: 600 TABLET, EXTENDED RELEASE ORAL at 08:07

## 2017-01-01 RX ADMIN — BENZONATATE 200 MG: 100 CAPSULE, LIQUID FILLED ORAL at 09:44

## 2017-01-01 RX ADMIN — Medication 250 MG: at 17:25

## 2017-01-01 RX ADMIN — POLYETHYLENE GLYCOL 3350 17 G: 17 POWDER, FOR SOLUTION ORAL at 17:06

## 2017-01-01 RX ADMIN — TAZOBACTAM SODIUM AND PIPERACILLIN SODIUM 4.5 G: 500; 4 INJECTION, SOLUTION INTRAVENOUS at 20:09

## 2017-01-01 RX ADMIN — BUDESONIDE 0.5 MG: 0.5 INHALANT RESPIRATORY (INHALATION) at 19:20

## 2017-01-01 RX ADMIN — INSULIN LISPRO 2 UNITS: 100 INJECTION, SOLUTION INTRAVENOUS; SUBCUTANEOUS at 12:09

## 2017-01-01 RX ADMIN — PANTOPRAZOLE SODIUM 40 MG: 40 TABLET, DELAYED RELEASE ORAL at 05:44

## 2017-01-01 RX ADMIN — INSULIN DETEMIR 8 UNITS: 100 INJECTION, SOLUTION SUBCUTANEOUS at 08:16

## 2017-01-01 RX ADMIN — INSULIN LISPRO 3 UNITS: 100 INJECTION, SOLUTION INTRAVENOUS; SUBCUTANEOUS at 12:00

## 2017-01-01 RX ADMIN — TAMSULOSIN HYDROCHLORIDE 0.8 MG: 0.4 CAPSULE ORAL at 21:08

## 2017-01-01 RX ADMIN — PANTOPRAZOLE SODIUM 40 MG: 40 TABLET, DELAYED RELEASE ORAL at 06:28

## 2017-01-01 RX ADMIN — Medication 5 MG: at 22:55

## 2017-01-01 RX ADMIN — GUAIFENESIN 600 MG: 600 TABLET, EXTENDED RELEASE ORAL at 21:11

## 2017-01-01 RX ADMIN — NYSTATIN: 100000 CREAM TOPICAL at 08:47

## 2017-01-01 RX ADMIN — INSULIN LISPRO 2 UNITS: 100 INJECTION, SOLUTION INTRAVENOUS; SUBCUTANEOUS at 12:07

## 2017-01-01 RX ADMIN — TAMSULOSIN HYDROCHLORIDE 0.4 MG: 0.4 CAPSULE ORAL at 20:46

## 2017-01-01 RX ADMIN — VANCOMYCIN HYDROCHLORIDE 1000 MG: 1 INJECTION, SOLUTION INTRAVENOUS at 23:55

## 2017-01-01 RX ADMIN — METHYLPREDNISOLONE SODIUM SUCCINATE 60 MG: 40 INJECTION, POWDER, FOR SOLUTION INTRAMUSCULAR; INTRAVENOUS at 05:24

## 2017-01-01 RX ADMIN — SODIUM CHLORIDE 100 ML/HR: 9 INJECTION, SOLUTION INTRAVENOUS at 22:38

## 2017-01-01 RX ADMIN — NYSTATIN: 100000 CREAM TOPICAL at 21:26

## 2017-01-01 RX ADMIN — GUAIFENESIN 600 MG: 600 TABLET, EXTENDED RELEASE ORAL at 22:25

## 2017-01-01 RX ADMIN — IPRATROPIUM BROMIDE AND ALBUTEROL SULFATE 3 ML: .5; 3 SOLUTION RESPIRATORY (INHALATION) at 23:09

## 2017-01-01 RX ADMIN — ACETAMINOPHEN AND CODEINE PHOSPHATE 1 TABLET: 30; 300 TABLET ORAL at 05:27

## 2017-01-01 RX ADMIN — METOPROLOL TARTRATE 25 MG: 25 TABLET ORAL at 08:32

## 2017-01-01 RX ADMIN — IPRATROPIUM BROMIDE AND ALBUTEROL SULFATE 3 ML: .5; 3 SOLUTION RESPIRATORY (INHALATION) at 08:59

## 2017-01-01 RX ADMIN — GUAIFENESIN 600 MG: 600 TABLET, EXTENDED RELEASE ORAL at 09:04

## 2017-01-01 RX ADMIN — CEFTRIAXONE SODIUM 1 G: 1 INJECTION, SOLUTION INTRAVENOUS at 11:45

## 2017-01-01 RX ADMIN — NYSTATIN: 100000 CREAM TOPICAL at 08:55

## 2017-01-01 RX ADMIN — DOXYCYCLINE 100 MG: 100 INJECTION, POWDER, LYOPHILIZED, FOR SOLUTION INTRAVENOUS at 16:07

## 2017-01-01 RX ADMIN — DILTIAZEM HYDROCHLORIDE 120 MG: 120 CAPSULE, EXTENDED RELEASE ORAL at 08:35

## 2017-01-01 RX ADMIN — LIDOCAINE 1 PATCH: 50 PATCH CUTANEOUS at 09:09

## 2017-01-01 RX ADMIN — INSULIN LISPRO 2 UNITS: 100 INJECTION, SOLUTION INTRAVENOUS; SUBCUTANEOUS at 12:28

## 2017-01-01 RX ADMIN — ASPIRIN 81 MG: 81 TABLET, COATED ORAL at 09:37

## 2017-01-01 RX ADMIN — POLYETHYLENE GLYCOL 3350 17 G: 17 POWDER, FOR SOLUTION ORAL at 09:26

## 2017-01-01 RX ADMIN — TAZOBACTAM SODIUM AND PIPERACILLIN SODIUM 4.5 G: 500; 4 INJECTION, SOLUTION INTRAVENOUS at 17:10

## 2017-01-01 RX ADMIN — FAMOTIDINE 40 MG: 20 TABLET ORAL at 08:32

## 2017-01-01 RX ADMIN — NYSTATIN: 100000 POWDER TOPICAL at 01:21

## 2017-01-01 RX ADMIN — IPRATROPIUM BROMIDE AND ALBUTEROL SULFATE 3 ML: .5; 3 SOLUTION RESPIRATORY (INHALATION) at 18:30

## 2017-01-01 RX ADMIN — DOCUSATE SODIUM 100 MG: 100 CAPSULE, LIQUID FILLED ORAL at 12:37

## 2017-01-01 RX ADMIN — INSULIN LISPRO 2 UNITS: 100 INJECTION, SOLUTION INTRAVENOUS; SUBCUTANEOUS at 11:50

## 2017-01-01 RX ADMIN — NYSTATIN: 100000 POWDER TOPICAL at 20:44

## 2017-01-01 RX ADMIN — NYSTATIN 500000 UNITS: 100000 SUSPENSION ORAL at 08:29

## 2017-01-01 RX ADMIN — ATORVASTATIN CALCIUM 20 MG: 20 TABLET, FILM COATED ORAL at 20:13

## 2017-01-01 RX ADMIN — INSULIN LISPRO 5 UNITS: 100 INJECTION, SOLUTION INTRAVENOUS; SUBCUTANEOUS at 21:15

## 2017-01-01 RX ADMIN — BUDESONIDE 0.5 MG: 0.5 INHALANT RESPIRATORY (INHALATION) at 19:26

## 2017-01-01 RX ADMIN — ACETAMINOPHEN 1000 MG: 500 TABLET, FILM COATED ORAL at 10:59

## 2017-01-01 RX ADMIN — SERTRALINE HYDROCHLORIDE 100 MG: 100 TABLET ORAL at 02:53

## 2017-01-01 RX ADMIN — ACETAMINOPHEN AND CODEINE PHOSPHATE 1 TABLET: 30; 300 TABLET ORAL at 01:33

## 2017-01-01 RX ADMIN — NYSTATIN: 100000 POWDER TOPICAL at 21:10

## 2017-01-01 RX ADMIN — NYSTATIN 500000 UNITS: 100000 SUSPENSION ORAL at 18:00

## 2017-01-01 RX ADMIN — IPRATROPIUM BROMIDE AND ALBUTEROL SULFATE 3 ML: .5; 3 SOLUTION RESPIRATORY (INHALATION) at 08:33

## 2017-01-01 RX ADMIN — FUROSEMIDE 60 MG: 10 INJECTION, SOLUTION INTRAMUSCULAR; INTRAVENOUS at 17:01

## 2017-01-01 RX ADMIN — DOCUSATE SODIUM 100 MG: 100 CAPSULE, LIQUID FILLED ORAL at 09:00

## 2017-01-01 RX ADMIN — ASPIRIN 81 MG: 81 TABLET, COATED ORAL at 08:18

## 2017-01-01 RX ADMIN — INSULIN DETEMIR 8 UNITS: 100 INJECTION, SOLUTION SUBCUTANEOUS at 09:01

## 2017-01-01 RX ADMIN — PANTOPRAZOLE SODIUM 40 MG: 40 TABLET, DELAYED RELEASE ORAL at 17:27

## 2017-01-01 RX ADMIN — BENZONATATE 100 MG: 100 CAPSULE ORAL at 22:57

## 2017-01-01 RX ADMIN — INSULIN LISPRO 3 UNITS: 100 INJECTION, SOLUTION INTRAVENOUS; SUBCUTANEOUS at 08:19

## 2017-01-01 RX ADMIN — DOXYCYCLINE HYCLATE 100 MG: 100 CAPSULE ORAL at 20:32

## 2017-01-01 RX ADMIN — NYSTATIN: 100000 CREAM TOPICAL at 12:00

## 2017-01-01 RX ADMIN — NYSTATIN: 100000 CREAM TOPICAL at 21:19

## 2017-01-01 RX ADMIN — CYCLOBENZAPRINE HYDROCHLORIDE 10 MG: 10 TABLET, FILM COATED ORAL at 17:45

## 2017-01-01 RX ADMIN — BUDESONIDE 0.5 MG: 0.5 INHALANT RESPIRATORY (INHALATION) at 08:33

## 2017-01-01 RX ADMIN — INSULIN LISPRO 3 UNITS: 100 INJECTION, SOLUTION INTRAVENOUS; SUBCUTANEOUS at 13:07

## 2017-01-01 RX ADMIN — POTASSIUM CHLORIDE 10 MEQ: 750 CAPSULE, EXTENDED RELEASE ORAL at 08:18

## 2017-01-01 RX ADMIN — ASPIRIN 81 MG: 81 TABLET, COATED ORAL at 09:44

## 2017-01-01 RX ADMIN — CASTOR OIL AND BALSAM, PERU 1 APPLICATION: 788; 87 OINTMENT TOPICAL at 08:36

## 2017-01-01 RX ADMIN — PANTOPRAZOLE SODIUM 40 MG: 40 TABLET, DELAYED RELEASE ORAL at 08:29

## 2017-01-01 RX ADMIN — CASTOR OIL AND BALSAM, PERU: 788; 87 OINTMENT TOPICAL at 20:13

## 2017-01-01 RX ADMIN — NYSTATIN: 100000 POWDER TOPICAL at 09:26

## 2017-01-01 RX ADMIN — DOCUSATE SODIUM 100 MG: 100 CAPSULE, LIQUID FILLED ORAL at 17:27

## 2017-01-01 RX ADMIN — AMOXICILLIN AND CLAVULANATE POTASSIUM 1 TABLET: 875; 125 TABLET, FILM COATED ORAL at 08:10

## 2017-01-01 RX ADMIN — NYSTATIN 500000 UNITS: 100000 SUSPENSION ORAL at 08:54

## 2017-01-01 RX ADMIN — IPRATROPIUM BROMIDE AND ALBUTEROL SULFATE 3 ML: .5; 3 SOLUTION RESPIRATORY (INHALATION) at 08:28

## 2017-01-01 RX ADMIN — IPRATROPIUM BROMIDE AND ALBUTEROL SULFATE 3 ML: .5; 3 SOLUTION RESPIRATORY (INHALATION) at 12:33

## 2017-01-01 RX ADMIN — INSULIN LISPRO 2 UNITS: 100 INJECTION, SOLUTION INTRAVENOUS; SUBCUTANEOUS at 10:53

## 2017-01-01 RX ADMIN — IPRATROPIUM BROMIDE AND ALBUTEROL SULFATE 3 ML: .5; 3 SOLUTION RESPIRATORY (INHALATION) at 07:17

## 2017-01-01 RX ADMIN — NYSTATIN: 100000 POWDER TOPICAL at 09:11

## 2017-01-01 RX ADMIN — NYSTATIN: 100000 POWDER TOPICAL at 21:11

## 2017-01-01 RX ADMIN — ACETAMINOPHEN AND CODEINE PHOSPHATE 1 TABLET: 30; 300 TABLET ORAL at 02:15

## 2017-01-01 RX ADMIN — METHYLPREDNISOLONE SODIUM SUCCINATE 60 MG: 40 INJECTION, POWDER, FOR SOLUTION INTRAMUSCULAR; INTRAVENOUS at 12:25

## 2017-01-01 RX ADMIN — NYSTATIN 500000 UNITS: 100000 SUSPENSION ORAL at 12:01

## 2017-01-01 RX ADMIN — INSULIN DETEMIR 8 UNITS: 100 INJECTION, SOLUTION SUBCUTANEOUS at 08:30

## 2017-01-01 RX ADMIN — IPRATROPIUM BROMIDE AND ALBUTEROL SULFATE 3 ML: .5; 3 SOLUTION RESPIRATORY (INHALATION) at 12:06

## 2017-01-01 RX ADMIN — ACETAMINOPHEN 650 MG: 325 TABLET, FILM COATED ORAL at 06:28

## 2017-01-01 RX ADMIN — GUAIFENESIN AND DEXTROMETHORPHAN 5 ML: 100; 10 SYRUP ORAL at 12:09

## 2017-01-01 RX ADMIN — Medication 250 MG: at 08:26

## 2017-01-01 RX ADMIN — NITROGLYCERIN 1 INCH: 20 OINTMENT TOPICAL at 11:54

## 2017-01-01 RX ADMIN — NYSTATIN 500000 UNITS: 100000 SUSPENSION ORAL at 20:37

## 2017-01-01 RX ADMIN — CYCLOBENZAPRINE HYDROCHLORIDE 10 MG: 10 TABLET, FILM COATED ORAL at 14:07

## 2017-01-01 RX ADMIN — TAZOBACTAM SODIUM AND PIPERACILLIN SODIUM 4.5 G: 500; 4 INJECTION, SOLUTION INTRAVENOUS at 17:06

## 2017-01-01 RX ADMIN — INSULIN LISPRO 2 UNITS: 100 INJECTION, SOLUTION INTRAVENOUS; SUBCUTANEOUS at 08:29

## 2017-01-01 RX ADMIN — CARVEDILOL 6.25 MG: 6.25 TABLET, FILM COATED ORAL at 17:21

## 2017-01-01 RX ADMIN — INSULIN LISPRO 3 UNITS: 100 INJECTION, SOLUTION INTRAVENOUS; SUBCUTANEOUS at 17:53

## 2017-01-01 RX ADMIN — BENZONATATE 200 MG: 100 CAPSULE, LIQUID FILLED ORAL at 20:47

## 2017-01-01 RX ADMIN — POTASSIUM CHLORIDE 10 MEQ: 750 CAPSULE, EXTENDED RELEASE ORAL at 09:03

## 2017-01-01 RX ADMIN — PANTOPRAZOLE SODIUM 40 MG: 40 TABLET, DELAYED RELEASE ORAL at 17:19

## 2017-01-01 RX ADMIN — OXYCODONE HYDROCHLORIDE AND ACETAMINOPHEN 500 MG: 500 TABLET ORAL at 15:58

## 2017-01-01 RX ADMIN — BENZONATATE 100 MG: 100 CAPSULE ORAL at 21:05

## 2017-01-01 RX ADMIN — WARFARIN SODIUM 4 MG: 4 TABLET ORAL at 17:50

## 2017-01-01 RX ADMIN — PANTOPRAZOLE SODIUM 40 MG: 40 TABLET, DELAYED RELEASE ORAL at 09:14

## 2017-01-01 RX ADMIN — NYSTATIN 500000 UNITS: 100000 SUSPENSION ORAL at 13:08

## 2017-01-01 RX ADMIN — GUAIFENESIN 600 MG: 600 TABLET, EXTENDED RELEASE ORAL at 21:10

## 2017-01-01 RX ADMIN — TAMSULOSIN HYDROCHLORIDE 0.8 MG: 0.4 CAPSULE ORAL at 21:58

## 2017-01-01 RX ADMIN — DULOXETINE HYDROCHLORIDE 30 MG: 30 CAPSULE, DELAYED RELEASE ORAL at 17:30

## 2017-01-01 RX ADMIN — ACETAMINOPHEN 650 MG: 325 TABLET ORAL at 01:21

## 2017-01-01 RX ADMIN — INSULIN LISPRO 2 UNITS: 100 INJECTION, SOLUTION INTRAVENOUS; SUBCUTANEOUS at 17:59

## 2017-01-01 RX ADMIN — OXYCODONE HYDROCHLORIDE AND ACETAMINOPHEN 250 MG: 500 TABLET ORAL at 21:40

## 2017-01-01 RX ADMIN — INSULIN LISPRO 3 UNITS: 100 INJECTION, SOLUTION INTRAVENOUS; SUBCUTANEOUS at 20:22

## 2017-01-01 RX ADMIN — Medication 250 MG: at 08:23

## 2017-01-01 RX ADMIN — ASPIRIN 81 MG: 81 TABLET, COATED ORAL at 08:51

## 2017-01-01 RX ADMIN — PANTOPRAZOLE SODIUM 40 MG: 40 TABLET, DELAYED RELEASE ORAL at 17:30

## 2017-01-01 RX ADMIN — Medication 250 MG: at 08:14

## 2017-01-01 RX ADMIN — INSULIN LISPRO 2 UNITS: 100 INJECTION, SOLUTION INTRAVENOUS; SUBCUTANEOUS at 08:49

## 2017-01-01 RX ADMIN — NYSTATIN 500000 UNITS: 100000 SUSPENSION ORAL at 08:21

## 2017-01-01 RX ADMIN — Medication 2 TABLET: at 08:26

## 2017-01-01 RX ADMIN — CYCLOBENZAPRINE HYDROCHLORIDE 5 MG: 10 TABLET, FILM COATED ORAL at 20:15

## 2017-01-01 RX ADMIN — ACETAMINOPHEN AND CODEINE PHOSPHATE 1 TABLET: 30; 300 TABLET ORAL at 02:04

## 2017-01-01 RX ADMIN — TAMSULOSIN HYDROCHLORIDE 0.8 MG: 0.4 CAPSULE ORAL at 21:01

## 2017-01-01 RX ADMIN — IPRATROPIUM BROMIDE AND ALBUTEROL SULFATE 3 ML: .5; 3 SOLUTION RESPIRATORY (INHALATION) at 08:58

## 2017-01-01 RX ADMIN — INSULIN LISPRO 3 UNITS: 100 INJECTION, SOLUTION INTRAVENOUS; SUBCUTANEOUS at 19:00

## 2017-01-01 RX ADMIN — NYSTATIN: 100000 CREAM TOPICAL at 09:48

## 2017-01-01 RX ADMIN — NYSTATIN: 100000 CREAM TOPICAL at 21:59

## 2017-01-01 RX ADMIN — BUDESONIDE 0.5 MG: 0.5 INHALANT RESPIRATORY (INHALATION) at 21:28

## 2017-01-01 RX ADMIN — INSULIN LISPRO 5 UNITS: 100 INJECTION, SOLUTION INTRAVENOUS; SUBCUTANEOUS at 16:55

## 2017-01-01 RX ADMIN — NYSTATIN: 100000 POWDER TOPICAL at 08:30

## 2017-01-01 RX ADMIN — Medication 250 MG: at 18:03

## 2017-01-01 RX ADMIN — TAMSULOSIN HYDROCHLORIDE 0.8 MG: 0.4 CAPSULE ORAL at 20:28

## 2017-01-01 RX ADMIN — TAZOBACTAM SODIUM AND PIPERACILLIN SODIUM 4.5 G: 500; 4 INJECTION, SOLUTION INTRAVENOUS at 17:26

## 2017-01-01 RX ADMIN — PANTOPRAZOLE SODIUM 40 MG: 40 TABLET, DELAYED RELEASE ORAL at 18:25

## 2017-01-01 RX ADMIN — GUAIFENESIN 600 MG: 600 TABLET, EXTENDED RELEASE ORAL at 08:02

## 2017-01-01 RX ADMIN — INSULIN LISPRO 4 UNITS: 100 INJECTION, SOLUTION INTRAVENOUS; SUBCUTANEOUS at 20:06

## 2017-01-01 RX ADMIN — NYSTATIN: 100000 POWDER TOPICAL at 22:25

## 2017-01-01 RX ADMIN — HYDROCODONE POLISTIREX AND CHLORPHENIRAMINE POLISTIREX 5 ML: 10; 8 SUSPENSION, EXTENDED RELEASE ORAL at 09:51

## 2017-01-01 RX ADMIN — FLUVASTATIN SODIUM 80 MG: 80 TABLET, FILM COATED, EXTENDED RELEASE ORAL at 08:27

## 2017-01-01 RX ADMIN — IPRATROPIUM BROMIDE AND ALBUTEROL SULFATE 3 ML: .5; 3 SOLUTION RESPIRATORY (INHALATION) at 10:52

## 2017-01-01 RX ADMIN — GUAIFENESIN 600 MG: 600 TABLET, EXTENDED RELEASE ORAL at 20:08

## 2017-01-01 RX ADMIN — VANCOMYCIN HYDROCHLORIDE 1500 MG: 10 INJECTION, POWDER, LYOPHILIZED, FOR SOLUTION INTRAVENOUS at 15:07

## 2017-01-01 RX ADMIN — OXYCODONE HYDROCHLORIDE AND ACETAMINOPHEN 250 MG: 500 TABLET ORAL at 20:14

## 2017-01-01 RX ADMIN — NYSTATIN 500000 UNITS: 100000 SUSPENSION ORAL at 17:46

## 2017-01-01 RX ADMIN — NYSTATIN 500000 UNITS: 100000 SUSPENSION ORAL at 21:15

## 2017-01-01 RX ADMIN — FUROSEMIDE 40 MG: 10 INJECTION, SOLUTION INTRAMUSCULAR; INTRAVENOUS at 16:10

## 2017-01-01 RX ADMIN — CYCLOBENZAPRINE HYDROCHLORIDE 10 MG: 10 TABLET, FILM COATED ORAL at 10:47

## 2017-01-01 RX ADMIN — ACETAMINOPHEN AND CODEINE PHOSPHATE 1 TABLET: 30; 300 TABLET ORAL at 01:05

## 2017-01-01 RX ADMIN — PANTOPRAZOLE SODIUM 40 MG: 40 TABLET, DELAYED RELEASE ORAL at 17:22

## 2017-01-01 RX ADMIN — IPRATROPIUM BROMIDE AND ALBUTEROL SULFATE 3 ML: .5; 3 SOLUTION RESPIRATORY (INHALATION) at 08:21

## 2017-01-01 RX ADMIN — INSULIN LISPRO 2 UNITS: 100 INJECTION, SOLUTION INTRAVENOUS; SUBCUTANEOUS at 12:37

## 2017-01-01 RX ADMIN — ACETAMINOPHEN 650 MG: 325 TABLET, FILM COATED ORAL at 03:27

## 2017-01-01 RX ADMIN — ACETAMINOPHEN AND CODEINE PHOSPHATE 1 TABLET: 30; 300 TABLET ORAL at 00:59

## 2017-01-01 RX ADMIN — NYSTATIN 500000 UNITS: 100000 SUSPENSION ORAL at 08:09

## 2017-01-01 RX ADMIN — LEVOFLOXACIN 750 MG: 750 INJECTION, SOLUTION INTRAVENOUS at 20:37

## 2017-01-01 RX ADMIN — DULOXETINE HYDROCHLORIDE 30 MG: 30 CAPSULE, DELAYED RELEASE ORAL at 09:44

## 2017-01-01 RX ADMIN — BUDESONIDE 0.5 MG: 0.5 INHALANT RESPIRATORY (INHALATION) at 20:58

## 2017-01-01 RX ADMIN — DILTIAZEM HYDROCHLORIDE 30 MG: 30 TABLET, FILM COATED ORAL at 12:13

## 2017-01-01 RX ADMIN — INSULIN LISPRO 3 UNITS: 100 INJECTION, SOLUTION INTRAVENOUS; SUBCUTANEOUS at 08:24

## 2017-01-01 RX ADMIN — PANTOPRAZOLE SODIUM 40 MG: 40 TABLET, DELAYED RELEASE ORAL at 09:17

## 2017-01-01 RX ADMIN — POTASSIUM CHLORIDE 10 MEQ: 750 CAPSULE, EXTENDED RELEASE ORAL at 08:50

## 2017-01-01 RX ADMIN — Medication 250 MG: at 08:22

## 2017-01-01 RX ADMIN — HYDROCODONE POLISTIREX AND CHLORPHENIRAMINE POLISTIREX 5 ML: 10; 8 SUSPENSION, EXTENDED RELEASE ORAL at 17:07

## 2017-01-01 RX ADMIN — NYSTATIN: 100000 CREAM TOPICAL at 23:31

## 2017-01-01 RX ADMIN — CASTOR OIL AND BALSAM, PERU: 788; 87 OINTMENT TOPICAL at 11:01

## 2017-01-01 RX ADMIN — NYSTATIN: 100000 CREAM TOPICAL at 08:02

## 2017-01-01 RX ADMIN — WARFARIN SODIUM 4 MG: 4 TABLET ORAL at 17:27

## 2017-01-01 RX ADMIN — SERTRALINE HYDROCHLORIDE 200 MG: 100 TABLET, FILM COATED ORAL at 09:17

## 2017-01-01 RX ADMIN — DILTIAZEM HYDROCHLORIDE 30 MG: 30 TABLET, FILM COATED ORAL at 11:50

## 2017-01-01 RX ADMIN — ATORVASTATIN CALCIUM 40 MG: 40 TABLET, FILM COATED ORAL at 21:10

## 2017-01-01 RX ADMIN — IPRATROPIUM BROMIDE AND ALBUTEROL SULFATE 3 ML: .5; 3 SOLUTION RESPIRATORY (INHALATION) at 16:21

## 2017-01-01 RX ADMIN — INSULIN LISPRO 7 UNITS: 100 INJECTION, SOLUTION INTRAVENOUS; SUBCUTANEOUS at 21:00

## 2017-01-01 RX ADMIN — NYSTATIN 500000 UNITS: 100000 SUSPENSION ORAL at 12:09

## 2017-01-01 RX ADMIN — GUAIFENESIN 600 MG: 600 TABLET, EXTENDED RELEASE ORAL at 21:19

## 2017-01-01 RX ADMIN — PANTOPRAZOLE SODIUM 40 MG: 40 TABLET, DELAYED RELEASE ORAL at 08:12

## 2017-01-01 RX ADMIN — DOXYCYCLINE HYCLATE 100 MG: 100 CAPSULE ORAL at 08:22

## 2017-01-01 RX ADMIN — CASTOR OIL AND BALSAM, PERU 1 APPLICATION: 788; 87 OINTMENT TOPICAL at 01:21

## 2017-01-01 RX ADMIN — ACETAMINOPHEN AND CODEINE PHOSPHATE 1 TABLET: 30; 300 TABLET ORAL at 08:50

## 2017-01-01 RX ADMIN — BUDESONIDE 0.5 MG: 0.5 INHALANT RESPIRATORY (INHALATION) at 20:27

## 2017-01-01 RX ADMIN — ACETAMINOPHEN AND CODEINE PHOSPHATE 1 TABLET: 30; 300 TABLET ORAL at 12:45

## 2017-01-01 RX ADMIN — IPRATROPIUM BROMIDE AND ALBUTEROL SULFATE 3 ML: .5; 3 SOLUTION RESPIRATORY (INHALATION) at 20:16

## 2017-01-01 RX ADMIN — METOPROLOL TARTRATE 25 MG: 25 TABLET ORAL at 20:43

## 2017-01-01 RX ADMIN — INSULIN LISPRO 2 UNITS: 100 INJECTION, SOLUTION INTRAVENOUS; SUBCUTANEOUS at 11:58

## 2017-01-01 RX ADMIN — POTASSIUM CHLORIDE AND SODIUM CHLORIDE 100 ML/HR: 900; 150 INJECTION, SOLUTION INTRAVENOUS at 05:18

## 2017-01-01 RX ADMIN — TAMSULOSIN HYDROCHLORIDE 0.8 MG: 0.4 CAPSULE ORAL at 21:56

## 2017-01-01 RX ADMIN — PANTOPRAZOLE SODIUM 40 MG: 40 TABLET, DELAYED RELEASE ORAL at 17:33

## 2017-01-01 RX ADMIN — PANTOPRAZOLE SODIUM 40 MG: 40 TABLET, DELAYED RELEASE ORAL at 19:00

## 2017-01-01 RX ADMIN — ACETAMINOPHEN AND CODEINE PHOSPHATE 1 TABLET: 30; 300 TABLET ORAL at 15:22

## 2017-01-01 RX ADMIN — NYSTATIN 500000 UNITS: 100000 SUSPENSION ORAL at 21:38

## 2017-01-01 RX ADMIN — INSULIN LISPRO 3 UNITS: 100 INJECTION, SOLUTION INTRAVENOUS; SUBCUTANEOUS at 17:08

## 2017-01-01 RX ADMIN — INSULIN LISPRO 3 UNITS: 100 INJECTION, SOLUTION INTRAVENOUS; SUBCUTANEOUS at 13:05

## 2017-01-01 RX ADMIN — DOXYCYCLINE HYCLATE 100 MG: 100 CAPSULE ORAL at 08:13

## 2017-01-01 RX ADMIN — Medication 250 MG: at 08:49

## 2017-01-01 RX ADMIN — GUAIFENESIN AND DEXTROMETHORPHAN 5 ML: 100; 10 SYRUP ORAL at 20:33

## 2017-01-01 RX ADMIN — ACETAMINOPHEN AND CODEINE PHOSPHATE 1 TABLET: 30; 300 TABLET ORAL at 09:34

## 2017-01-01 RX ADMIN — CYCLOBENZAPRINE HYDROCHLORIDE 10 MG: 10 TABLET, FILM COATED ORAL at 10:59

## 2017-01-01 RX ADMIN — CYCLOBENZAPRINE HYDROCHLORIDE 10 MG: 10 TABLET, FILM COATED ORAL at 08:13

## 2017-01-01 RX ADMIN — INSULIN LISPRO 3 UNITS: 100 INJECTION, SOLUTION INTRAVENOUS; SUBCUTANEOUS at 12:44

## 2017-01-01 RX ADMIN — DILTIAZEM HYDROCHLORIDE 30 MG: 30 TABLET, FILM COATED ORAL at 05:28

## 2017-01-01 RX ADMIN — ACETAMINOPHEN 650 MG: 325 TABLET, FILM COATED ORAL at 02:44

## 2017-01-01 RX ADMIN — WARFARIN SODIUM 4 MG: 4 TABLET ORAL at 17:53

## 2017-01-01 RX ADMIN — FAMOTIDINE 40 MG: 20 TABLET ORAL at 20:19

## 2017-01-01 RX ADMIN — NYSTATIN 500000 UNITS: 100000 SUSPENSION ORAL at 11:29

## 2017-01-01 RX ADMIN — FUROSEMIDE 40 MG: 40 TABLET ORAL at 08:15

## 2017-01-01 RX ADMIN — INSULIN LISPRO 3 UNITS: 100 INJECTION, SOLUTION INTRAVENOUS; SUBCUTANEOUS at 08:15

## 2017-01-01 RX ADMIN — Medication 5 MG: at 00:54

## 2017-01-01 RX ADMIN — NYSTATIN 500000 UNITS: 100000 SUSPENSION ORAL at 12:35

## 2017-01-01 RX ADMIN — NYSTATIN: 100000 CREAM TOPICAL at 20:39

## 2017-01-01 RX ADMIN — BENZONATATE 100 MG: 100 CAPSULE, LIQUID FILLED ORAL at 01:23

## 2017-01-01 RX ADMIN — ASPIRIN 81 MG: 81 TABLET, COATED ORAL at 09:18

## 2017-01-01 RX ADMIN — NYSTATIN: 100000 CREAM TOPICAL at 10:28

## 2017-01-01 RX ADMIN — INSULIN LISPRO 3 UNITS: 100 INJECTION, SOLUTION INTRAVENOUS; SUBCUTANEOUS at 12:26

## 2017-01-01 RX ADMIN — ACETAMINOPHEN AND CODEINE PHOSPHATE 1 TABLET: 30; 300 TABLET ORAL at 20:41

## 2017-01-01 RX ADMIN — INSULIN LISPRO 2 UNITS: 100 INJECTION, SOLUTION INTRAVENOUS; SUBCUTANEOUS at 21:59

## 2017-01-01 RX ADMIN — ACETAMINOPHEN 650 MG: 325 TABLET, FILM COATED ORAL at 08:15

## 2017-01-01 RX ADMIN — NYSTATIN: 100000 POWDER TOPICAL at 20:09

## 2017-01-01 RX ADMIN — INSULIN LISPRO 3 UNITS: 100 INJECTION, SOLUTION INTRAVENOUS; SUBCUTANEOUS at 11:52

## 2017-01-01 RX ADMIN — ACETAMINOPHEN AND CODEINE PHOSPHATE 1 TABLET: 30; 300 TABLET ORAL at 09:49

## 2017-01-01 RX ADMIN — IPRATROPIUM BROMIDE AND ALBUTEROL SULFATE 3 ML: .5; 3 SOLUTION RESPIRATORY (INHALATION) at 13:13

## 2017-01-01 RX ADMIN — DOXYCYCLINE HYCLATE 100 MG: 100 CAPSULE ORAL at 20:03

## 2017-01-01 RX ADMIN — INSULIN LISPRO 2 UNITS: 100 INJECTION, SOLUTION INTRAVENOUS; SUBCUTANEOUS at 12:13

## 2017-01-01 RX ADMIN — ACETAMINOPHEN AND CODEINE PHOSPHATE 1 TABLET: 30; 300 TABLET ORAL at 22:44

## 2017-01-01 RX ADMIN — DILTIAZEM HYDROCHLORIDE 30 MG: 30 TABLET, FILM COATED ORAL at 17:44

## 2017-01-01 RX ADMIN — PREDNISONE 40 MG: 20 TABLET ORAL at 09:49

## 2017-01-01 RX ADMIN — BUDESONIDE 0.5 MG: 0.5 INHALANT RESPIRATORY (INHALATION) at 08:46

## 2017-01-01 RX ADMIN — HYDROCODONE BITARTATE AND ACETAMINOPHEN 1 TABLET: 5; 325 TABLET ORAL at 05:21

## 2017-01-01 RX ADMIN — INSULIN LISPRO 3 UNITS: 100 INJECTION, SOLUTION INTRAVENOUS; SUBCUTANEOUS at 17:07

## 2017-01-01 RX ADMIN — NYSTATIN 500000 UNITS: 100000 SUSPENSION ORAL at 20:40

## 2017-01-01 RX ADMIN — INSULIN LISPRO 5 UNITS: 100 INJECTION, SOLUTION INTRAVENOUS; SUBCUTANEOUS at 20:37

## 2017-01-01 RX ADMIN — NYSTATIN: 100000 CREAM TOPICAL at 08:23

## 2017-01-01 RX ADMIN — AMOXICILLIN AND CLAVULANATE POTASSIUM 1 TABLET: 875; 125 TABLET, FILM COATED ORAL at 21:08

## 2017-01-01 RX ADMIN — FUROSEMIDE 40 MG: 40 TABLET ORAL at 08:49

## 2017-01-01 RX ADMIN — NYSTATIN 500000 UNITS: 100000 SUSPENSION ORAL at 08:03

## 2017-01-01 RX ADMIN — ACETAMINOPHEN 1000 MG: 500 TABLET, FILM COATED ORAL at 05:28

## 2017-01-01 RX ADMIN — NYSTATIN: 100000 CREAM TOPICAL at 11:25

## 2017-01-01 RX ADMIN — DILTIAZEM HYDROCHLORIDE 120 MG: 120 CAPSULE, EXTENDED RELEASE ORAL at 08:30

## 2017-01-01 RX ADMIN — IPRATROPIUM BROMIDE AND ALBUTEROL SULFATE 3 ML: .5; 3 SOLUTION RESPIRATORY (INHALATION) at 07:53

## 2017-01-01 RX ADMIN — FUROSEMIDE 40 MG: 40 TABLET ORAL at 10:46

## 2017-01-01 RX ADMIN — DOXYCYCLINE HYCLATE 100 MG: 100 CAPSULE ORAL at 13:53

## 2017-01-01 RX ADMIN — INSULIN LISPRO 3 UNITS: 100 INJECTION, SOLUTION INTRAVENOUS; SUBCUTANEOUS at 10:10

## 2017-01-01 RX ADMIN — DOCUSATE SODIUM 100 MG: 100 CAPSULE, LIQUID FILLED ORAL at 17:46

## 2017-01-01 RX ADMIN — Medication 250 MG: at 08:07

## 2017-01-01 RX ADMIN — HYDROCODONE POLISTIREX AND CHLORPHENIRAMINE POLISTIREX 5 ML: 10; 8 SUSPENSION, EXTENDED RELEASE ORAL at 22:26

## 2017-01-01 RX ADMIN — INSULIN LISPRO 3 UNITS: 100 INJECTION, SOLUTION INTRAVENOUS; SUBCUTANEOUS at 17:29

## 2017-01-01 RX ADMIN — PANTOPRAZOLE SODIUM 40 MG: 40 TABLET, DELAYED RELEASE ORAL at 06:13

## 2017-01-01 RX ADMIN — NYSTATIN: 100000 POWDER TOPICAL at 08:44

## 2017-01-01 RX ADMIN — ASPIRIN 81 MG: 81 TABLET, COATED ORAL at 20:19

## 2017-01-01 RX ADMIN — WARFARIN SODIUM 5 MG: 5 TABLET ORAL at 17:15

## 2017-01-01 RX ADMIN — Medication 250 MG: at 17:14

## 2017-01-01 RX ADMIN — OXYCODONE HYDROCHLORIDE AND ACETAMINOPHEN 250 MG: 500 TABLET ORAL at 20:44

## 2017-01-01 RX ADMIN — NYSTATIN 500000 UNITS: 100000 SUSPENSION ORAL at 21:58

## 2017-01-01 RX ADMIN — IPRATROPIUM BROMIDE AND ALBUTEROL SULFATE 3 ML: .5; 3 SOLUTION RESPIRATORY (INHALATION) at 17:30

## 2017-01-01 RX ADMIN — DOCUSATE SODIUM 100 MG: 100 CAPSULE, LIQUID FILLED ORAL at 09:25

## 2017-01-01 RX ADMIN — DILTIAZEM HYDROCHLORIDE 30 MG: 30 TABLET, FILM COATED ORAL at 18:58

## 2017-01-01 RX ADMIN — ACETAMINOPHEN AND CODEINE PHOSPHATE 1 TABLET: 30; 300 TABLET ORAL at 13:21

## 2017-01-01 RX ADMIN — TAZOBACTAM SODIUM AND PIPERACILLIN SODIUM 4.5 G: 500; 4 INJECTION, SOLUTION INTRAVENOUS at 23:50

## 2017-01-01 RX ADMIN — ASPIRIN 81 MG: 81 TABLET, COATED ORAL at 09:00

## 2017-01-01 RX ADMIN — NYSTATIN 500000 UNITS: 100000 SUSPENSION ORAL at 09:48

## 2017-01-01 RX ADMIN — NYSTATIN: 100000 CREAM TOPICAL at 08:03

## 2017-01-01 RX ADMIN — NYSTATIN 500000 UNITS: 100000 SUSPENSION ORAL at 11:43

## 2017-01-01 RX ADMIN — ACETAMINOPHEN AND CODEINE PHOSPHATE 1 TABLET: 30; 300 TABLET ORAL at 02:07

## 2017-01-01 RX ADMIN — POTASSIUM CHLORIDE 10 MEQ: 750 CAPSULE, EXTENDED RELEASE ORAL at 18:03

## 2017-01-01 RX ADMIN — TAMSULOSIN HYDROCHLORIDE 0.4 MG: 0.4 CAPSULE ORAL at 08:51

## 2017-01-01 RX ADMIN — IPRATROPIUM BROMIDE AND ALBUTEROL SULFATE 3 ML: .5; 3 SOLUTION RESPIRATORY (INHALATION) at 15:43

## 2017-01-01 RX ADMIN — IPRATROPIUM BROMIDE AND ALBUTEROL SULFATE 3 ML: .5; 3 SOLUTION RESPIRATORY (INHALATION) at 20:48

## 2017-01-01 RX ADMIN — INSULIN LISPRO 2 UNITS: 100 INJECTION, SOLUTION INTRAVENOUS; SUBCUTANEOUS at 17:20

## 2017-01-01 RX ADMIN — BENZONATATE 100 MG: 100 CAPSULE ORAL at 20:17

## 2017-01-01 RX ADMIN — IPRATROPIUM BROMIDE AND ALBUTEROL SULFATE 3 ML: .5; 3 SOLUTION RESPIRATORY (INHALATION) at 13:24

## 2017-01-01 RX ADMIN — FUROSEMIDE 40 MG: 40 TABLET ORAL at 08:02

## 2017-01-01 RX ADMIN — BUDESONIDE 0.5 MG: 0.5 INHALANT RESPIRATORY (INHALATION) at 20:17

## 2017-01-01 RX ADMIN — GUAIFENESIN AND DEXTROMETHORPHAN 5 ML: 100; 10 SYRUP ORAL at 23:22

## 2017-01-01 RX ADMIN — TAMSULOSIN HYDROCHLORIDE 0.8 MG: 0.4 CAPSULE ORAL at 20:36

## 2017-01-01 RX ADMIN — BUDESONIDE 0.5 MG: 0.5 INHALANT RESPIRATORY (INHALATION) at 20:19

## 2017-01-01 RX ADMIN — LIDOCAINE 1 PATCH: 50 PATCH CUTANEOUS at 08:49

## 2017-01-01 RX ADMIN — AMOXICILLIN AND CLAVULANATE POTASSIUM 1 TABLET: 875; 125 TABLET, FILM COATED ORAL at 08:18

## 2017-01-01 RX ADMIN — ACETAMINOPHEN AND CODEINE PHOSPHATE 1 TABLET: 30; 300 TABLET ORAL at 23:44

## 2017-01-01 RX ADMIN — NYSTATIN: 100000 POWDER TOPICAL at 08:10

## 2017-01-01 RX ADMIN — NYSTATIN 500000 UNITS: 100000 SUSPENSION ORAL at 12:18

## 2017-01-01 RX ADMIN — BENZONATATE 100 MG: 100 CAPSULE, LIQUID FILLED ORAL at 21:26

## 2017-01-01 RX ADMIN — NYSTATIN: 100000 CREAM TOPICAL at 10:02

## 2017-01-01 RX ADMIN — BENZONATATE 100 MG: 100 CAPSULE, LIQUID FILLED ORAL at 22:35

## 2017-01-01 RX ADMIN — PREDNISONE 40 MG: 20 TABLET ORAL at 10:28

## 2017-01-01 RX ADMIN — IPRATROPIUM BROMIDE AND ALBUTEROL SULFATE 3 ML: .5; 3 SOLUTION RESPIRATORY (INHALATION) at 20:34

## 2017-01-01 RX ADMIN — TAZOBACTAM SODIUM AND PIPERACILLIN SODIUM 4.5 G: 500; 4 INJECTION, SOLUTION INTRAVENOUS at 08:19

## 2017-01-01 RX ADMIN — AMOXICILLIN AND CLAVULANATE POTASSIUM 1 TABLET: 875; 125 TABLET, FILM COATED ORAL at 08:54

## 2017-01-01 RX ADMIN — NYSTATIN 500000 UNITS: 100000 SUSPENSION ORAL at 12:36

## 2017-01-01 RX ADMIN — CEFTRIAXONE SODIUM 1 G: 1 INJECTION, SOLUTION INTRAVENOUS at 12:28

## 2017-01-01 RX ADMIN — FUROSEMIDE 40 MG: 40 TABLET ORAL at 08:03

## 2017-01-01 RX ADMIN — NYSTATIN 500000 UNITS: 100000 SUSPENSION ORAL at 08:02

## 2017-01-01 RX ADMIN — INSULIN LISPRO 3 UNITS: 100 INJECTION, SOLUTION INTRAVENOUS; SUBCUTANEOUS at 17:27

## 2017-01-01 RX ADMIN — ASPIRIN 81 MG: 81 TABLET, COATED ORAL at 08:13

## 2017-01-01 RX ADMIN — WARFARIN SODIUM 5 MG: 5 TABLET ORAL at 17:05

## 2017-01-01 RX ADMIN — IPRATROPIUM BROMIDE AND ALBUTEROL SULFATE 3 ML: .5; 3 SOLUTION RESPIRATORY (INHALATION) at 13:09

## 2017-01-01 RX ADMIN — DILTIAZEM HYDROCHLORIDE 30 MG: 30 TABLET, FILM COATED ORAL at 23:39

## 2017-01-01 RX ADMIN — IPRATROPIUM BROMIDE AND ALBUTEROL SULFATE 3 ML: .5; 3 SOLUTION RESPIRATORY (INHALATION) at 20:56

## 2017-01-01 RX ADMIN — IPRATROPIUM BROMIDE AND ALBUTEROL SULFATE 3 ML: .5; 3 SOLUTION RESPIRATORY (INHALATION) at 20:58

## 2017-01-01 RX ADMIN — KETOROLAC TROMETHAMINE 15 MG: 15 INJECTION, SOLUTION INTRAMUSCULAR; INTRAVENOUS at 08:25

## 2017-01-01 RX ADMIN — IPRATROPIUM BROMIDE AND ALBUTEROL SULFATE 3 ML: .5; 3 SOLUTION RESPIRATORY (INHALATION) at 12:38

## 2017-01-01 RX ADMIN — ACETAMINOPHEN AND CODEINE PHOSPHATE 1 TABLET: 30; 300 TABLET ORAL at 00:50

## 2017-01-01 RX ADMIN — METOPROLOL TARTRATE 25 MG: 25 TABLET ORAL at 09:35

## 2017-01-01 RX ADMIN — POTASSIUM CHLORIDE 10 MEQ: 750 CAPSULE, EXTENDED RELEASE ORAL at 17:02

## 2017-01-01 RX ADMIN — ACETAMINOPHEN AND CODEINE PHOSPHATE 1 TABLET: 30; 300 TABLET ORAL at 17:40

## 2017-01-01 RX ADMIN — IPRATROPIUM BROMIDE AND ALBUTEROL SULFATE 3 ML: .5; 3 SOLUTION RESPIRATORY (INHALATION) at 09:24

## 2017-01-01 RX ADMIN — POLYETHYLENE GLYCOL 3350 17 G: 17 POWDER, FOR SOLUTION ORAL at 12:37

## 2017-01-01 RX ADMIN — SODIUM CHLORIDE 3030 ML: 9 INJECTION, SOLUTION INTRAVENOUS at 09:57

## 2017-01-01 RX ADMIN — FUROSEMIDE 20 MG: 20 TABLET ORAL at 08:27

## 2017-01-01 RX ADMIN — IPRATROPIUM BROMIDE AND ALBUTEROL SULFATE 3 ML: .5; 3 SOLUTION RESPIRATORY (INHALATION) at 18:54

## 2017-01-01 RX ADMIN — DOXYCYCLINE HYCLATE 100 MG: 100 CAPSULE ORAL at 08:57

## 2017-01-01 RX ADMIN — DILTIAZEM HYDROCHLORIDE 30 MG: 30 TABLET, FILM COATED ORAL at 05:35

## 2017-01-01 RX ADMIN — POTASSIUM CHLORIDE AND SODIUM CHLORIDE 100 ML/HR: 900; 150 INJECTION, SOLUTION INTRAVENOUS at 16:56

## 2017-01-01 RX ADMIN — ACETAMINOPHEN AND CODEINE PHOSPHATE 1 TABLET: 30; 300 TABLET ORAL at 11:30

## 2017-01-01 RX ADMIN — TAMSULOSIN HYDROCHLORIDE 0.4 MG: 0.4 CAPSULE ORAL at 09:08

## 2017-01-01 RX ADMIN — ACETAMINOPHEN 650 MG: 325 TABLET, FILM COATED ORAL at 08:11

## 2017-01-01 RX ADMIN — FUROSEMIDE 40 MG: 40 TABLET ORAL at 08:35

## 2017-01-01 RX ADMIN — NYSTATIN 500000 UNITS: 100000 SUSPENSION ORAL at 08:07

## 2017-01-01 RX ADMIN — BARIUM SULFATE 100 ML: 0.81 POWDER, FOR SUSPENSION ORAL at 11:16

## 2017-01-01 RX ADMIN — NYSTATIN 500000 UNITS: 100000 SUSPENSION ORAL at 17:06

## 2017-01-01 RX ADMIN — NYSTATIN: 100000 CREAM TOPICAL at 09:01

## 2017-01-01 RX ADMIN — DOCUSATE SODIUM 100 MG: 100 CAPSULE, LIQUID FILLED ORAL at 08:19

## 2017-01-01 RX ADMIN — SERTRALINE HYDROCHLORIDE 100 MG: 100 TABLET ORAL at 08:43

## 2017-01-01 RX ADMIN — DULOXETINE HYDROCHLORIDE 30 MG: 30 CAPSULE, DELAYED RELEASE ORAL at 08:29

## 2017-01-01 RX ADMIN — NYSTATIN: 100000 CREAM TOPICAL at 08:54

## 2017-01-01 RX ADMIN — INSULIN LISPRO 2 UNITS: 100 INJECTION, SOLUTION INTRAVENOUS; SUBCUTANEOUS at 11:55

## 2017-01-01 RX ADMIN — INSULIN LISPRO 3 UNITS: 100 INJECTION, SOLUTION INTRAVENOUS; SUBCUTANEOUS at 08:16

## 2017-01-01 RX ADMIN — TAMSULOSIN HYDROCHLORIDE 0.8 MG: 0.4 CAPSULE ORAL at 20:45

## 2017-01-01 RX ADMIN — INSULIN LISPRO 3 UNITS: 100 INJECTION, SOLUTION INTRAVENOUS; SUBCUTANEOUS at 08:28

## 2017-01-01 RX ADMIN — NYSTATIN: 100000 CREAM TOPICAL at 22:07

## 2017-01-01 RX ADMIN — AMOXICILLIN AND CLAVULANATE POTASSIUM 1 TABLET: 875; 125 TABLET, FILM COATED ORAL at 21:00

## 2017-01-01 RX ADMIN — INSULIN LISPRO 2 UNITS: 100 INJECTION, SOLUTION INTRAVENOUS; SUBCUTANEOUS at 08:02

## 2017-01-01 RX ADMIN — ONDANSETRON 4 MG: 4 TABLET, FILM COATED ORAL at 13:53

## 2017-01-01 RX ADMIN — NYSTATIN 500000 UNITS: 100000 SUSPENSION ORAL at 13:04

## 2017-01-01 RX ADMIN — PANTOPRAZOLE SODIUM 40 MG: 40 TABLET, DELAYED RELEASE ORAL at 18:00

## 2017-01-01 RX ADMIN — ACETAMINOPHEN 650 MG: 325 TABLET ORAL at 20:15

## 2017-01-01 RX ADMIN — Medication 5 MG: at 01:09

## 2017-01-01 RX ADMIN — DILTIAZEM HYDROCHLORIDE 120 MG: 120 CAPSULE, COATED, EXTENDED RELEASE ORAL at 08:15

## 2017-01-01 RX ADMIN — PANTOPRAZOLE SODIUM 40 MG: 40 TABLET, DELAYED RELEASE ORAL at 05:51

## 2017-01-01 RX ADMIN — TAMSULOSIN HYDROCHLORIDE 0.8 MG: 0.4 CAPSULE ORAL at 21:16

## 2017-01-01 RX ADMIN — INSULIN LISPRO 5 UNITS: 100 INJECTION, SOLUTION INTRAVENOUS; SUBCUTANEOUS at 21:37

## 2017-01-01 RX ADMIN — NYSTATIN: 100000 CREAM TOPICAL at 09:11

## 2017-01-01 RX ADMIN — NYSTATIN 1 APPLICATION: 100000 POWDER TOPICAL at 09:39

## 2017-01-01 RX ADMIN — INSULIN LISPRO 3 UNITS: 100 INJECTION, SOLUTION INTRAVENOUS; SUBCUTANEOUS at 21:26

## 2017-01-01 RX ADMIN — ACETAMINOPHEN AND CODEINE PHOSPHATE 1 TABLET: 30; 300 TABLET ORAL at 22:13

## 2017-01-01 RX ADMIN — FUROSEMIDE 40 MG: 10 INJECTION, SOLUTION INTRAMUSCULAR; INTRAVENOUS at 10:28

## 2017-01-01 RX ADMIN — ACETAMINOPHEN AND CODEINE PHOSPHATE 1 TABLET: 30; 300 TABLET ORAL at 09:32

## 2017-01-01 RX ADMIN — ACETAMINOPHEN AND CODEINE PHOSPHATE 1 TABLET: 30; 300 TABLET ORAL at 17:45

## 2017-01-01 RX ADMIN — TAMSULOSIN HYDROCHLORIDE 0.8 MG: 0.4 CAPSULE ORAL at 22:24

## 2017-01-01 RX ADMIN — INSULIN LISPRO 2 UNITS: 100 INJECTION, SOLUTION INTRAVENOUS; SUBCUTANEOUS at 17:06

## 2017-01-01 RX ADMIN — NYSTATIN 500000 UNITS: 100000 SUSPENSION ORAL at 08:25

## 2017-01-01 RX ADMIN — INSULIN LISPRO 4 UNITS: 100 INJECTION, SOLUTION INTRAVENOUS; SUBCUTANEOUS at 20:53

## 2017-01-01 RX ADMIN — ASPIRIN 81 MG: 81 TABLET, COATED ORAL at 08:15

## 2017-01-01 RX ADMIN — INSULIN LISPRO 3 UNITS: 100 INJECTION, SOLUTION INTRAVENOUS; SUBCUTANEOUS at 17:20

## 2017-01-01 RX ADMIN — NYSTATIN: 100000 POWDER TOPICAL at 20:13

## 2017-01-01 RX ADMIN — INSULIN LISPRO 2 UNITS: 100 INJECTION, SOLUTION INTRAVENOUS; SUBCUTANEOUS at 17:07

## 2017-01-01 RX ADMIN — TAMSULOSIN HYDROCHLORIDE 0.4 MG: 0.4 CAPSULE ORAL at 09:45

## 2017-01-01 RX ADMIN — NYSTATIN: 100000 POWDER TOPICAL at 20:03

## 2017-01-01 RX ADMIN — DULOXETINE HYDROCHLORIDE 30 MG: 30 CAPSULE, DELAYED RELEASE ORAL at 08:54

## 2017-01-01 RX ADMIN — IPRATROPIUM BROMIDE AND ALBUTEROL SULFATE 3 ML: .5; 3 SOLUTION RESPIRATORY (INHALATION) at 22:22

## 2017-01-01 RX ADMIN — FUROSEMIDE 40 MG: 40 TABLET ORAL at 08:22

## 2017-01-01 RX ADMIN — AMOXICILLIN AND CLAVULANATE POTASSIUM 1 TABLET: 875; 125 TABLET, FILM COATED ORAL at 08:04

## 2017-01-01 RX ADMIN — ACETAMINOPHEN AND CODEINE PHOSPHATE 1 TABLET: 30; 300 TABLET ORAL at 21:19

## 2017-01-01 RX ADMIN — CYCLOBENZAPRINE HYDROCHLORIDE 10 MG: 10 TABLET, FILM COATED ORAL at 21:53

## 2017-01-01 RX ADMIN — ACETAMINOPHEN AND CODEINE PHOSPHATE 1 TABLET: 30; 300 TABLET ORAL at 15:16

## 2017-01-01 RX ADMIN — Medication 250 MG: at 08:12

## 2017-01-01 RX ADMIN — NYSTATIN 500000 UNITS: 100000 SUSPENSION ORAL at 15:45

## 2017-01-01 RX ADMIN — NYSTATIN: 100000 CREAM TOPICAL at 21:38

## 2017-01-01 RX ADMIN — Medication 250 MG: at 17:06

## 2017-01-01 RX ADMIN — ASPIRIN 81 MG: 81 TABLET, COATED ORAL at 08:02

## 2017-01-01 RX ADMIN — ACETAMINOPHEN AND CODEINE PHOSPHATE 1 TABLET: 30; 300 TABLET ORAL at 13:11

## 2017-01-01 RX ADMIN — HYDROCODONE POLISTIREX AND CHLORPHENIRAMINE POLISTIREX 5 ML: 10; 8 SUSPENSION, EXTENDED RELEASE ORAL at 20:38

## 2017-01-01 RX ADMIN — LEVOFLOXACIN 750 MG: 750 INJECTION, SOLUTION INTRAVENOUS at 20:45

## 2017-01-01 RX ADMIN — ACETAMINOPHEN 1000 MG: 500 TABLET, FILM COATED ORAL at 21:24

## 2017-01-01 RX ADMIN — POTASSIUM CHLORIDE 10 MEQ: 750 CAPSULE, EXTENDED RELEASE ORAL at 08:54

## 2017-01-01 RX ADMIN — WARFARIN SODIUM 2 MG: 2 TABLET ORAL at 17:43

## 2017-01-01 RX ADMIN — POLYETHYLENE GLYCOL 3350 17 G: 17 POWDER, FOR SOLUTION ORAL at 08:59

## 2017-01-01 RX ADMIN — FUROSEMIDE 40 MG: 40 TABLET ORAL at 08:27

## 2017-01-01 RX ADMIN — ACETAMINOPHEN AND CODEINE PHOSPHATE 1 TABLET: 30; 300 TABLET ORAL at 20:31

## 2017-01-01 RX ADMIN — POLYETHYLENE GLYCOL 3350 17 G: 17 POWDER, FOR SOLUTION ORAL at 08:04

## 2017-01-01 RX ADMIN — ASPIRIN 81 MG: 81 TABLET, COATED ORAL at 09:53

## 2017-01-01 RX ADMIN — ACETAMINOPHEN 1000 MG: 500 TABLET, FILM COATED ORAL at 11:49

## 2017-01-01 RX ADMIN — FUROSEMIDE 40 MG: 40 TABLET ORAL at 08:59

## 2017-01-01 RX ADMIN — ATORVASTATIN CALCIUM 40 MG: 40 TABLET, FILM COATED ORAL at 21:18

## 2017-01-01 RX ADMIN — DILTIAZEM HYDROCHLORIDE 120 MG: 120 CAPSULE, EXTENDED RELEASE ORAL at 08:54

## 2017-01-01 RX ADMIN — CEFTRIAXONE SODIUM 1 G: 1 INJECTION, SOLUTION INTRAVENOUS at 13:01

## 2017-01-01 RX ADMIN — POLYETHYLENE GLYCOL 3350 17 G: 17 POWDER, FOR SOLUTION ORAL at 20:19

## 2017-01-01 RX ADMIN — PANTOPRAZOLE SODIUM 40 MG: 40 TABLET, DELAYED RELEASE ORAL at 08:51

## 2017-01-01 RX ADMIN — INSULIN LISPRO 2 UNITS: 100 INJECTION, SOLUTION INTRAVENOUS; SUBCUTANEOUS at 12:35

## 2017-01-01 RX ADMIN — POTASSIUM CHLORIDE 10 MEQ: 750 CAPSULE, EXTENDED RELEASE ORAL at 08:03

## 2017-01-01 RX ADMIN — ASPIRIN 81 MG: 81 TABLET, COATED ORAL at 08:07

## 2017-01-01 RX ADMIN — NYSTATIN 500000 UNITS: 100000 SUSPENSION ORAL at 12:22

## 2017-01-01 RX ADMIN — IPRATROPIUM BROMIDE AND ALBUTEROL SULFATE 3 ML: .5; 3 SOLUTION RESPIRATORY (INHALATION) at 20:49

## 2017-01-01 RX ADMIN — Medication 5 MG: at 23:06

## 2017-01-01 RX ADMIN — NYSTATIN: 100000 CREAM TOPICAL at 20:23

## 2017-01-01 RX ADMIN — POTASSIUM CHLORIDE 10 MEQ: 750 CAPSULE, EXTENDED RELEASE ORAL at 09:26

## 2017-01-01 RX ADMIN — PANTOPRAZOLE SODIUM 40 MG: 40 TABLET, DELAYED RELEASE ORAL at 08:13

## 2017-01-01 RX ADMIN — ACETAMINOPHEN AND CODEINE PHOSPHATE 1 TABLET: 30; 300 TABLET ORAL at 17:01

## 2017-01-01 RX ADMIN — NYSTATIN: 100000 CREAM TOPICAL at 21:17

## 2017-01-01 RX ADMIN — Medication 5 MG: at 02:58

## 2017-01-01 RX ADMIN — DILTIAZEM HYDROCHLORIDE 120 MG: 120 CAPSULE, EXTENDED RELEASE ORAL at 08:10

## 2017-01-01 RX ADMIN — NYSTATIN 500000 UNITS: 100000 SUSPENSION ORAL at 20:45

## 2017-01-01 RX ADMIN — INSULIN LISPRO 2 UNITS: 100 INJECTION, SOLUTION INTRAVENOUS; SUBCUTANEOUS at 09:36

## 2017-01-01 RX ADMIN — IPRATROPIUM BROMIDE AND ALBUTEROL SULFATE 3 ML: .5; 3 SOLUTION RESPIRATORY (INHALATION) at 07:11

## 2017-01-01 RX ADMIN — CYCLOBENZAPRINE HYDROCHLORIDE 10 MG: 10 TABLET, FILM COATED ORAL at 21:25

## 2017-01-01 RX ADMIN — BUDESONIDE 0.5 MG: 0.5 INHALANT RESPIRATORY (INHALATION) at 09:18

## 2017-01-01 RX ADMIN — CYCLOBENZAPRINE HYDROCHLORIDE 5 MG: 10 TABLET, FILM COATED ORAL at 01:20

## 2017-01-01 RX ADMIN — INSULIN LISPRO 2 UNITS: 100 INJECTION, SOLUTION INTRAVENOUS; SUBCUTANEOUS at 07:41

## 2017-01-01 RX ADMIN — IPRATROPIUM BROMIDE AND ALBUTEROL SULFATE 3 ML: .5; 3 SOLUTION RESPIRATORY (INHALATION) at 15:20

## 2017-01-01 RX ADMIN — LEVOFLOXACIN 750 MG: 750 TABLET, FILM COATED ORAL at 21:59

## 2017-01-01 RX ADMIN — PANTOPRAZOLE SODIUM 40 MG: 40 TABLET, DELAYED RELEASE ORAL at 17:02

## 2017-01-01 RX ADMIN — ASPIRIN 81 MG: 81 TABLET, COATED ORAL at 09:25

## 2017-01-01 RX ADMIN — PANTOPRAZOLE SODIUM 40 MG: 40 TABLET, DELAYED RELEASE ORAL at 08:27

## 2017-01-01 RX ADMIN — DOXYCYCLINE HYCLATE 100 MG: 100 CAPSULE ORAL at 20:31

## 2017-01-01 RX ADMIN — FUROSEMIDE 40 MG: 10 INJECTION, SOLUTION INTRAMUSCULAR; INTRAVENOUS at 09:53

## 2017-01-01 RX ADMIN — DILTIAZEM HYDROCHLORIDE 30 MG: 30 TABLET, FILM COATED ORAL at 12:36

## 2017-01-01 RX ADMIN — ACETAMINOPHEN AND CODEINE PHOSPHATE 1 TABLET: 30; 300 TABLET ORAL at 00:52

## 2017-01-01 RX ADMIN — FUROSEMIDE 40 MG: 10 INJECTION, SOLUTION INTRAMUSCULAR; INTRAVENOUS at 12:38

## 2017-01-01 RX ADMIN — PANTOPRAZOLE SODIUM 40 MG: 40 TABLET, DELAYED RELEASE ORAL at 05:35

## 2017-01-01 RX ADMIN — FUROSEMIDE 40 MG: 40 TABLET ORAL at 08:18

## 2017-01-01 RX ADMIN — IPRATROPIUM BROMIDE AND ALBUTEROL SULFATE 3 ML: .5; 3 SOLUTION RESPIRATORY (INHALATION) at 07:56

## 2017-01-01 RX ADMIN — OXYCODONE HYDROCHLORIDE AND ACETAMINOPHEN 250 MG: 500 TABLET ORAL at 09:35

## 2017-01-01 RX ADMIN — NYSTATIN: 100000 CREAM TOPICAL at 09:54

## 2017-01-01 RX ADMIN — IPRATROPIUM BROMIDE AND ALBUTEROL SULFATE 3 ML: .5; 3 SOLUTION RESPIRATORY (INHALATION) at 16:38

## 2017-01-01 RX ADMIN — ACETAMINOPHEN AND CODEINE PHOSPHATE 1 TABLET: 30; 300 TABLET ORAL at 20:28

## 2017-01-01 RX ADMIN — NYSTATIN: 100000 POWDER TOPICAL at 08:24

## 2017-01-01 RX ADMIN — IPRATROPIUM BROMIDE AND ALBUTEROL SULFATE 3 ML: .5; 3 SOLUTION RESPIRATORY (INHALATION) at 12:50

## 2017-01-01 RX ADMIN — Medication 5 MG: at 21:05

## 2017-01-01 RX ADMIN — DULOXETINE HYDROCHLORIDE 30 MG: 30 CAPSULE, DELAYED RELEASE ORAL at 09:14

## 2017-01-01 RX ADMIN — Medication 5 MG: at 00:23

## 2017-01-01 RX ADMIN — ATORVASTATIN CALCIUM 10 MG: 10 TABLET, FILM COATED ORAL at 21:01

## 2017-01-01 RX ADMIN — PHENYLEPHRINE HYDROCHLORIDE 2 SPRAY: 0.25 SPRAY NASAL at 21:38

## 2017-01-01 RX ADMIN — SODIUM CHLORIDE 500 ML: 9 INJECTION, SOLUTION INTRAVENOUS at 21:00

## 2017-01-01 RX ADMIN — NYSTATIN 500000 UNITS: 100000 SUSPENSION ORAL at 08:40

## 2017-01-01 RX ADMIN — ACETAMINOPHEN AND CODEINE PHOSPHATE 1 TABLET: 30; 300 TABLET ORAL at 11:29

## 2017-01-01 RX ADMIN — ACETAMINOPHEN AND CODEINE PHOSPHATE 1 TABLET: 30; 300 TABLET ORAL at 17:21

## 2017-01-01 RX ADMIN — PANTOPRAZOLE SODIUM 40 MG: 40 TABLET, DELAYED RELEASE ORAL at 17:26

## 2017-01-01 RX ADMIN — INSULIN LISPRO 2 UNITS: 100 INJECTION, SOLUTION INTRAVENOUS; SUBCUTANEOUS at 12:08

## 2017-01-01 RX ADMIN — PANTOPRAZOLE SODIUM 40 MG: 40 TABLET, DELAYED RELEASE ORAL at 09:03

## 2017-01-01 RX ADMIN — ACETAMINOPHEN AND CODEINE PHOSPHATE 1 TABLET: 30; 300 TABLET ORAL at 12:17

## 2017-01-01 RX ADMIN — FUROSEMIDE 40 MG: 10 INJECTION, SOLUTION INTRAMUSCULAR; INTRAVENOUS at 09:36

## 2017-01-01 RX ADMIN — BUDESONIDE 0.5 MG: 0.5 INHALANT RESPIRATORY (INHALATION) at 08:59

## 2017-01-01 RX ADMIN — IPRATROPIUM BROMIDE AND ALBUTEROL SULFATE 3 ML: .5; 3 SOLUTION RESPIRATORY (INHALATION) at 08:02

## 2017-01-01 RX ADMIN — ALBUTEROL SULFATE 2.5 MG: 2.5 SOLUTION RESPIRATORY (INHALATION) at 16:04

## 2017-01-01 RX ADMIN — CASTOR OIL AND BALSAM, PERU 1 APPLICATION: 788; 87 OINTMENT TOPICAL at 20:43

## 2017-01-01 RX ADMIN — ACETAMINOPHEN AND CODEINE PHOSPHATE 1 TABLET: 30; 300 TABLET ORAL at 15:49

## 2017-01-01 RX ADMIN — INSULIN LISPRO 2 UNITS: 100 INJECTION, SOLUTION INTRAVENOUS; SUBCUTANEOUS at 21:08

## 2017-01-01 RX ADMIN — NYSTATIN: 100000 POWDER TOPICAL at 21:05

## 2017-01-01 RX ADMIN — NYSTATIN: 100000 CREAM TOPICAL at 08:42

## 2017-01-12 NOTE — PROGRESS NOTES
[facility]  OFFICE FOLLOW-UP       Bjorn Pollock  :1934  MRN:[id number]  Home Phone:[phone]  [address] Vivas  [facility] [address]    Date of Encounter: 2017    PCP: Vincent Mccullough MD  [address]  [facility] [address]    IDENTIFICATION: A 82 y.o. male resident of [facility]    Chief Complaint   Patient presents with   • Follow-up     HTN/CAD       PROBLEM LIST:   1. Coronary artery disease:  a. CABG x3 in 1989.  b. Normal left ventricular systolic function.  c. New York Heart Association class I heart failure symptoms.  d.  of collateralized RCA with patent LAD graft in 2005.  e. MUGA, ejection fraction 63%, 2015.  f. Echocardiogram 2014, showed normal left ventricular ejection function of 55% to 60%.   2. Atrial fibrillation:  a. Chronic CHADS-VASc of 5.  b. Chronic anticoagulation.  3. Cerebrovascular accident:  a. Remote CVA and TIA. Questionable relationship to atrial fibrillation.  4. Obstructive sleep apnea on CPAP.   5. Obesity.   6. Dyslipidemia.   7. Diabetes mellitus type 2.  8. Anemia.  9. Degenerative joint disease.  10. BPH.  11. Chronic lower extremity edema due to venous stasis.    ALLERGIES:   Allergies   Allergen Reactions   • Ace Inhibitors    • Crestor [Rosuvastatin]    • Diphenhydramine    • Lipitor [Atorvastatin]    • Meropenem    • Other      Opioids   • Penicillins    • Phenergan [Promethazine Hcl]    • Promethazine    • Zocor [Simvastatin]        CURRENT MEDICATIONS:   Current Outpatient Prescriptions:   •  acetaminophen (TYLENOL) 500 MG tablet, Take 1 tablet by mouth every 6 (six) hours as needed for mild pain (1-3).  •  acetaminophen-codeine (TYLENOL #3) 300-30 MG per tablet, Take 1 tablet by mouth every 6 (six) hours as needed for moderate pain (4-6)  •  cefuroxime (CEFTIN) 250 MG tablet, Take 250 mg by mouth 2 (Two) Times a Day  •  cyclobenzaprine (FLEXERIL) 10 MG tablet,  Take 1 tablet by mouth 3 (three) times a day as needed for muscle spasms  •  diltiazem CD (DILT-CD) 120 MG 24 hr capsule, Take 120 mg by mouth daily - patient currently NOT taking for the past month, per PCP  •  DULoxetine (CYMBALTA) 30 MG capsule, Take 30 mg by mouth Daily  •  furosemide (LASIX) 20 MG tablet, Take 1 tablet by mouth 2 (two) times a day  •  gabapentin (NEURONTIN) 600 MG tablet, Take 600 mg by mouth 3 (three) times a day  •  insulin glargine (LANTUS) 100 UNIT/ML injection, Inject  under the skin Daily.   •  ondansetron (ZOFRAN) 8 MG tablet, Take 8 mg by mouth Every 8 (Eight) Hours As Needed for nausea or vomiting  •  sertraline (ZOLOFT) 100 MG tablet, Take 100 mg by mouth daily.  •  tamsulosin (FLOMAX) 0.4 MG capsule 24 hr capsule, Take 1 capsule by mouth every night  •  warfarin (COUMADIN) 5 MG tablet, Take 5 mg by mouth see administration instructions. Taken on Monday Wednesday Friday  •  warfarin (COUMADIN) 7.5 MG tablet, Take 7.5 mg by mouth see administration instructions. Taken Sunday Tuesday Thursday Saturday    HPI: Mr. Pollock is an 82 y/o WM with CAD, chronic atrial fibrillation, and dyslipidemia who presents today for follow-up. Since his last visit he states he has been doing well from cardiac standpoint. He participates in physical therapy twice per week. [facility]R which is followed by Dr. Mccullough, and have also been wrapping his legs due to peripheral edema. He states Dr. Mccullough has stopped his Cardizem 1 month ago, for reasons unknown to the patient. He has noticed his blood pressure spiking recently when  has been checking it, but he states as soon as it spikes, it will drop back down. He denies chest pain, shortness of breath, syncope, and his lower extremity edema is improved. He states he does not recall being told to start Lescol after his last appointment. He takes aspirin as he remembers it.     ROS: All systems have been reviewed and are negative  "with the exception of those mentioned in the HPI and problem list above.    Objective   Vitals:   Vitals:    01/12/17 1505   BP: (!) 161/125   BP Location: Left arm   Patient Position: Sitting   Pulse: 97   Weight: 264 lb 9.6 oz (120 kg)   Height: 72\" (182.9 cm)   Re-check BP: 119/69 RA, 119/66 LA     Physical Exam:  GENERAL: Alert, cooperative, in no acute distress, in wheelchair.   HEENT: Fundiscopic deferred, otherwise unremarkable.  NECK: No Jugular venous distention, adenopathy, or thyromegaly noted.   HEART: No discrete PMI is noted. Irregular rhythm, normal rate, and no murmurs, gallops, rubs, clicks, or other sounds are noted.  LUNGS: Clear to auscultation bilaterally. No wheezing, rales or ronchi.  ABDOMEN: Flat without evidence of organomegaly, masses, or tenderness.  NEUROLOGIC: No focal abnormalities involving strength or sensation are noted.   EXTREMETIES: Bilateral wraps to lower extremities. No clubbing, cyanosis, or edema noted.     Results:  Lipid Panel 3/10/2016: , HDL 35, LDL 136trig 199    Assessment and Plan:     1. CAD: Patient stable and asymptomatic. He is to take ASA 81 mg daily along with his other medications.   2. Dyslipidemia: We will prescribe Lescol XL 80mg nightly.   3. Chronic atrial fibrillation: on Warfarin for anticoagulation, given high CHADsVASC, followed by Dr. Mccullough.  4. HTN: controlled on re-check although initially elevated in office today. We will defer BP management to his PCP, Dr. Mccullough.     Scribed for Aly Vivas MD by Damari Webb PA-C. 1/12/2017  3:25 PM      I, Aly Vivas MD, [facility], [facility], personally performed the services described in this documentation as scribed by the above named individual in my presence, and it is both accurate and complete. At 5:50 PM on 01/12/2017    "

## 2017-01-12 NOTE — PROGRESS NOTES
OFFICE FOLLOW UP     Date of Encounter:2017     Name: Bjorn Pollock  : 1934  Address: [address] [place] [address]  Phone: [phone]    PCP: Vincent Mccullough MD  [address] [facility] [address]  [place] [id number]    Bjorn Pollock is a 82 y.o. male.      Chief Complaint: follow up for CAD, a-fib, CVA, and HLD.   PROBLEM LIST:  1. Coronary artery disease:  a. CABG x3 in 1989.  b. Normal left ventricular systolic function.  c. New York Heart Association  class I heart failure symptoms.  d.  of collateralized RCA with patent LAD graft in 2005.  e. MUGA, ejection fraction 63%, 2015.  f. Echocardiogram 2014, showed normal left ventricular ejection function of 55% to 60%.   2. Atrial fibrillation:  a. Chronic CHADS-VASc of 5.  b. Chronic anticoagulation.  3.  Cerebrovascular accident:  a. Remote CVA and TIA. Questionable relationship  to atrial fibrillation.  4. Obstructive sleep apnea on CPAP.   5.  Obesity.   6. Dyslipidemia.   7. Diabetes mellitus type 2.  8. Anemia.  9. Degenerative joint disease.  10. BPH.  11. Chronic lower extremity edema due to venous stasis.        Allergies   Allergen Reactions   • Ace Inhibitors    • Crestor [Rosuvastatin]    • Diphenhydramine    • Lipitor [Atorvastatin]    • Meropenem    • Other      Opioids   • Penicillins    • Phenergan [Promethazine Hcl]    • Promethazine    • Zocor [Simvastatin]          Current Outpatient Prescriptions:   •  acetaminophen (TYLENOL) 500 MG tablet, Take 1 tablet by mouth every 6 (six) hours as needed for mild pain (1-3)., Disp: , Rfl: 0  •  acetaminophen-codeine (TYLENOL #3) 300-30 MG per tablet, Take 1 tablet by mouth every 6 (six) hours as needed for moderate pain (4-6)., Disp: , Rfl: 0  •  cyclobenzaprine (FLEXERIL) 10 MG tablet, Take 1 tablet by mouth 3 (three) times a day as needed for muscle spasms., Disp: , Rfl: 0  •  diltiazem CD (DILT-CD) 120 MG 24 hr capsule, Take 120 mg by mouth  daily., Disp: , Rfl:   •  furosemide (LASIX) 20 MG tablet, Take 1 tablet by mouth 2 (two) times a day., Disp: , Rfl:   •  gabapentin (NEURONTIN) 600 MG tablet, Take 600 mg by mouth 3 (three) times a day., Disp: , Rfl:   •  insulin lispro (HumaLOG) 100 UNIT/ML injection, Inject 0-9 Units under the skin 3 (three) times a day with meals., Disp: , Rfl: 12  •  pantoprazole (PROTONIX) 40 MG EC tablet, Take 40 mg by mouth daily., Disp: , Rfl:   •  sertraline (ZOLOFT) 100 MG tablet, Take 100 mg by mouth daily., Disp: , Rfl:   •  tamsulosin (FLOMAX) 0.4 MG capsule 24 hr capsule, Take 1 capsule by mouth every night., Disp: , Rfl:   •  warfarin (COUMADIN) 5 MG tablet, Take 5 mg by mouth see administration instructions. Taken on Monday Wednesday Friday , Disp: , Rfl:   •  warfarin (COUMADIN) 7.5 MG tablet, Take 7.5 mg by mouth see administration instructions. Taken Sunday Tuesday Thursday Saturday , Disp: , Rfl:     History of Present Illness:         Mr. Pollock is an 82 year old male with a history of coronary artery disease, atrial fibrillation, cerebrovascular accident, and dyslipidemia, presenting today for a follow up. Since his last visit       The following portions of the patient's history were reviewed and updated as appropriate: allergies, current medications and problem list.    HPI: Pertinent positives as listed in the HPI.  All other systems reviewed and negative.      Objective:    There were no vitals filed for this visit.    Physical Exam   Constitutional: He is oriented to person, place, and time.   Neck: No JVD present. No thyromegaly present.   Cardiovascular: Intact distal pulses.    Exam reveals no gallop, murmur, or rub.   Pulmonary/Chest:   No wheezes, crackles, or rhonchi.   Musculoskeletal:   No peripheral edema, no clubbing, or cyanosis    Neurological: He is alert and oriented to person, place, and time.   No focal abnormalities in sensation or strength    Vitals reviewed.         Diagnostic Data:          Procedures      Assessment and Plan:        Scribed for Aly Vivas MD by Amy Ruiz. 1/12/2017  2:39 PM

## 2017-02-22 PROBLEM — L97.519 SKIN ULCERS OF FOOT, BILATERAL (HCC): Chronic | Status: RESOLVED | Noted: 2017-01-01 | Resolved: 2017-01-01

## 2017-02-22 PROBLEM — L97.529 SKIN ULCERS OF FOOT, BILATERAL (HCC): Chronic | Status: ACTIVE | Noted: 2017-01-01

## 2017-02-22 PROBLEM — L97.519 SKIN ULCERS OF FOOT, BILATERAL (HCC): Chronic | Status: ACTIVE | Noted: 2017-01-01

## 2017-02-22 PROBLEM — N39.0 SEPSIS DUE TO URINARY TRACT INFECTION (HCC): Status: ACTIVE | Noted: 2017-01-01

## 2017-02-22 PROBLEM — A41.9 SEPSIS (HCC): Status: ACTIVE | Noted: 2017-01-01

## 2017-02-22 PROBLEM — N39.0 URINARY TRACT INFECTION: Status: RESOLVED | Noted: 2017-01-01 | Resolved: 2017-01-01

## 2017-02-22 PROBLEM — A41.9 SEPSIS DUE TO URINARY TRACT INFECTION (HCC): Status: ACTIVE | Noted: 2017-01-01

## 2017-02-22 PROBLEM — A41.9 SEPSIS (HCC): Status: RESOLVED | Noted: 2017-01-01 | Resolved: 2017-01-01
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
## 2017-02-22 PROBLEM — L97.529 SKIN ULCERS OF FOOT, BILATERAL (HCC): Chronic | Status: RESOLVED | Noted: 2017-01-01 | Resolved: 2017-01-01

## 2017-02-22 PROBLEM — N39.0 URINARY TRACT INFECTION: Status: ACTIVE | Noted: 2017-01-01

## 2017-03-01 NOTE — THERAPY DISCHARGE NOTE
Acute Care - Occupational Therapy Discharge Summary  [facility]     Patient Name: Bjorn Pollock  : 1934  MRN: [id number]    Today's Date: 3/1/2017  Onset of Illness/Injury or Date of Surgery Date: 17    Date of Referral to OT: 17  Referring Physician: MD Shahid and MARLENE Gay      Admit Date: 2017        OT Recommendation and Plan    Visit Dx:    ICD-10-CM ICD-9-CM   1. Sepsis due to urinary tract infection A41.9 038.9    N39.0 995.91     599.0   2. Type 2 diabetes mellitus without complication, unspecified long term insulin use status E11.9 250.00   3. Essential hypertension I10 401.9   4. Chronic atrial fibrillation I48.2 427.31   5. CKD (chronic kidney disease), stage 2 (mild) N18.2 585.2   6. Diastolic heart failure secondary to hypertension I11.0 402.91    I50.30 428.30   7. History of coronary artery disease Z86.79 V12.59   8. Disorientation R41.0 780.99   9. Impaired mobility and ADLs Z74.09 799.89   10. Impaired functional mobility, balance, gait, and endurance Z74.09 V49.89   11. Sepsis, due to unspecified organism A41.9 038.9     995.91                     OT Goals       17 1401 17 1226 17 0934    Bed Mobility OT LTG    Bed Mobility OT LTG, Outcome goal ongoing  -JR goal ongoing  -JR goal ongoing   Mod A this date; limited by back pain.  -TA    Transfer Training OT LTG    Transfer Training OT LTG, Outcome goal ongoing  -JR goal ongoing  -JR goal ongoing   Min A this date  -TA    Strength OT LTG    Strength Goal OT LTG, Outcome goal ongoing  -JR goal ongoing  -JR goal ongoing   Progressing with ex's for strengthening  -TA    Safety Awareness OT LTG    Safety Awareness OT LTG, Outcome goal ongoing  -JR goal ongoing  -JR goal ongoing   Progressing  -TA    Activity Tolerance OT LTG    Activity Tolerance Goal OT LTG, Outcome goal ongoing  -JR goal ongoing  -JR goal ongoing   Progressing; limited at times by back pain.  -TA      17 1257 17 1500        Bed Mobility OT LTG    Bed Mobility OT LTG, Date Established  02/23/17  -KALIN     Bed Mobility OT LTG, Time to Achieve  1 wk  -KALIN     Bed Mobility OT LTG, Activity Type  all bed mobility  -KALIN     Bed Mobility OT LTG, [place] Level  supervision required  -KALIN     Bed Mobility OT LTG, Assist Device  bed rails  -KALIN     Bed Mobility OT LTG, Outcome goal ongoing   min level  -KALIN goal ongoing  -KALIN     Transfer Training OT LTG    Transfer Training OT LTG, Date Established  02/23/17  -KALIN     Transfer Training OT LTG, Time to Achieve  1 wk  -KALIN     Transfer Training OT LTG, Activity Type  sit to stand/stand to sit;toilet  -KALIN     Transfer Training OT LTG, [place] Level  contact guard assist  -KALIN     Transfer Training OT LTG, Assist Device  commode, bedside;walker, rolling  -KALIN     Transfer Training OT LTG, Outcome goal ongoing   min level  -KALIN goal ongoing  -KALIN     Strength OT LTG    Strength Goal OT LTG, Date Established  02/23/17  -KALIN     Strength Goal OT LTG, Time to Achieve  1 wk  -KALIN     Strength Goal OT LTG, Measure to Achieve  AROM 10-15 reps to support ADL and mobility indep  -KALIN     Strength Goal OT LTG, Outcome goal ongoing   pt. declined this date.  -KALIN goal ongoing  -KALIN     Safety Awareness OT LTG    Safety Awareness OT LTG, Date Established  02/23/17  -KALIN     Safety Awareness OT LTG, Time to Achieve  1 wk  -KALIN     Safety Awareness OT LTG, Activity Type  good safety awareness;with ADL's  -KALIN     Safety Awareness OT LTG, [place] Level  min verbal cues  -KALIN     Safety Awareness OT LTG, Outcome goal ongoing   pt. declined participation  -KALIN goal ongoing  -KALIN     Activity Tolerance OT LTG    Activity Tolerance Goal OT LTG, Date Established  02/23/17  -KALIN     Activity Tolerance Goal OT LTG, Time to Achieve  1 wk  -KALIN     Activity Tolerance Goal OT LTG, Activity Level  10 min activity  -KALIN     Activity Tolerance Goal OT LTG, Additional Goal  one rest period  -KALIN     Activity Tolerance Goal OT LTG, Outcome  goal ongoing   more SoA today needing many rest periods  -KALIN goal ongoing  -KALIN       User Key  (r) = Recorded By, (t) = Taken By, (c) = Cosigned By    Initials Name Provider Type    KALIN Barr, OT Occupational Therapist    JR Paris Cochran, OT Occupational Therapist    PJ Guo OT Occupational Therapist                Outcome Measures       02/28/17 1342 02/28/17 1200 02/28/17 1127    How much help from another person do you currently need...    Turning from your back to your side while in flat bed without using bedrails?  4  -CW     Moving from lying on back to sitting on the side of a flat bed without bedrails?  3  -CW     Moving to and from a bed to a chair (including a wheelchair)?  3  -CW     Standing up from a chair using your arms (e.g., wheelchair, bedside chair)?  3  -CW     Climbing 3-5 steps with a railing?  1  -CW     To walk in hospital room?  3  -CW     AM-PAC 6 Clicks Score  17  -CW     How much help from another is currently needed...    Putting on and taking off regular lower body clothing? 2  -JR  2  -JR    Bathing (including washing, rinsing, and drying) 2  -JR  2  -JR    Toileting (which includes using toilet bed pan or urinal) 2  -JR  2  -JR    Putting on and taking off regular upper body clothing 3  -JR  3  -JR    Taking care of personal grooming (such as brushing teeth) 3  -JR  3  -JR    Eating meals 4  -JR  4  -JR    Score 16  -JR  16  -JR    Functional Assessment    Outcome Measure Options AM-PAC 6 Clicks Daily Activity (OT)  -JR  AM-PAC 6 Clicks Daily Activity (OT)  -JR      User Key  (r) = Recorded By, (t) = Taken By, (c) = Cosigned By    Initials Name Provider Type    JR Paris Cochran OT Occupational Therapist    MICHAEL Greene, PT Physical Therapist          Therapy Charges for Today     Code Description Service Date Service Provider Modifiers Qty    16283723385  OT THERAPEUTIC ACT EA 15 MIN 2/28/2017 Paris Cochran OT GO 1    [id number]  OT  THERAPEUTIC ACT EA 15 MIN 2/28/2017 Paris Cochran OT GO 1          OT Discharge Summary  Reason for Discharge: Discharge from facility  Outcomes Achieved: Refer to plan of care for updates on goals achieved  Discharge Destination: Home      Paris Cochran OT  3/1/2017

## 2017-03-04 NOTE — THERAPY DISCHARGE NOTE
Acute Care - Physical Therapy Discharge Summary  [facility]       Patient Name: Bjorn Pollock  : 1934  MRN: [id number]    Today's Date: 3/3/2017  Onset of Illness/Injury or Date of Surgery Date: 17    Date of Referral to PT: 17  Referring Physician: MD Shahid and MARLENE Gay      Admit Date: 2017      PT Recommendation and Plan    Visit Dx:    ICD-10-CM ICD-9-CM   1. Sepsis due to urinary tract infection A41.9 038.9    N39.0 995.91     599.0   2. Type 2 diabetes mellitus without complication, unspecified long term insulin use status E11.9 250.00   3. Essential hypertension I10 401.9   4. Chronic atrial fibrillation I48.2 427.31   5. CKD (chronic kidney disease), stage 2 (mild) N18.2 585.2   6. Diastolic heart failure secondary to hypertension I11.0 402.91    I50.30 428.30   7. History of coronary artery disease Z86.79 V12.59   8. Disorientation R41.0 780.99   9. Impaired mobility and ADLs Z74.09 799.89   10. Impaired functional mobility, balance, gait, and endurance Z74.09 V49.89   11. Sepsis, due to unspecified organism A41.9 038.9     995.91                       IP PT Goals       17 0944 17 1000 17 1547    Bed Mobility PT LTG    Bed Mobility PT LTG, Date Established   17  -SR    Bed Mobility PT LTG, Time to Achieve   by discharge  -SR    Bed Mobility PT LTG, Activity Type   all bed mobility  -SR    Bed Mobility PT LTG, [place] Level   independent  -SR    Bed Mobility PT LTG, Outcome goal ongoing  -KALIN      Transfer Training PT LTG    Transfer Training PT LTG, Date Established   17  -SR    Transfer Training PT LTG, Time to Achieve   by discharge  -SR    Transfer Training PT LTG, Activity Type   all transfers  -SR    Transfer Training PT LTG, [place] Level   supervision required  -SR    Transfer Training PT LTG, Assist Device   walker, rolling  -SR    Transfer Training PT LTG, Outcome goal ongoing  -KALIN      Gait Training PT LTG    Gait Training  Goal PT LTG, Date Established   02/23/17  -SR    Gait Training Goal PT LTG, Time to Achieve   by discharge  -SR    Gait Training Goal PT LTG, [place] Level   contact guard assist  -SR    Gait Training Goal PT LTG, Assist Device   walker, rolling  -SR    Gait Training Goal PT LTG, Distance to Achieve   50  -SR    Gait Training Goal PT LTG, Outcome goal ongoing  -KALIN      Wound Care PT LTG    Wound Care PT LTG 1, Outcome  goal ongoing  -MF       02/23/17 1315          Wound Care PT LTG    Wound Care PT LTG 1, Date Established 02/23/17  -MF      Wound Care PT LTG 1, Time to Achieve other (see comments)   10 days  -MF      Wound Care PT LTG 1, Location BLEs  -MF      Wound Care PT LTG 1, No S&S of Infection yes  -MF      Wound Care PT LTG 1, Decrease Limb Girth % 10  -MF      Wound Care PT LTG 1, No New Skin Break Down yes  -        User Key  (r) = Recorded By, (t) = Taken By, (c) = Cosigned By    Initials Name Provider Type    KALIN Bishop, PT Physical Therapist    MF Aly Mckeon, PT Physical Therapist    SR Lizz Dolan, PT Physical Therapist           Goals Status: Treatment plan discontinued secondary to discharge from acute facility.      PT Discharge Summary  Reason for Discharge: Discharge from facility  Outcomes Achieved: Refer to plan of care for updates on goals achieved  Discharge Destination: Home with assist      Shanice Dawn, PT   3/3/2017

## 2017-07-17 PROBLEM — J45.909 REACTIVE AIRWAY DISEASE WITH WHEEZING: Status: ACTIVE | Noted: 2017-01-01

## 2017-07-21 PROBLEM — K59.00 CONSTIPATION: Status: ACTIVE | Noted: 2017-01-01

## 2017-07-22 PROBLEM — J18.9 PNEUMONIA DUE TO INFECTIOUS ORGANISM: Status: ACTIVE | Noted: 2017-01-01

## 2017-07-22 PROBLEM — R33.9 URINARY RETENTION: Status: ACTIVE | Noted: 2017-01-01

## 2017-07-25 PROBLEM — R15.9 STOOL INCONTINENCE: Status: ACTIVE | Noted: 2017-01-01

## 2017-07-27 PROBLEM — R15.9 STOOL INCONTINENCE: Status: RESOLVED | Noted: 2017-01-01 | Resolved: 2017-01-01

## 2017-07-27 PROBLEM — M70.21 OLECRANON BURSITIS OF RIGHT ELBOW: Status: ACTIVE | Noted: 2017-01-01

## 2017-08-01 NOTE — THERAPY WOUND CARE TREATMENT
Acute Care - Wound/Debridement Treatment Note  [facility]     Patient Name: Bjorn Pollock  : 1934  MRN: [id number]  Today's Date: 2017  Onset of Illness/Injury or Date of Surgery Date: 17   Date of Referral to PT: 17   Referring Physician: MD KAYODE (MOBILITY order)       Admit Date: 2017    Visit Dx:    ICD-10-CM ICD-9-CM   1. Reactive airway disease with wheezing, severe persistent, with acute exacerbation J45.51 493.92   2. Fatigue, unspecified type R53.83 780.79   3. Bronchitis J40 490   4. Impaired functional mobility, balance, gait, and endurance Z74.09 V49.89       Patient Active Problem List   Diagnosis   • Intractable low back pain   • HTN (hypertension)   • DM type 2 (diabetes mellitus, type 2)   • Intertriginous candidiasis   • COPD (chronic obstructive pulmonary disease)   • NAYLA (obstructive sleep apnea)   • Atrial fibrillation   • Supratherapeutic INR   • CKD (chronic kidney disease)   • Diastolic heart failure secondary to hypertension   • Anemia   • DJD (degenerative joint disease)   • CAD (coronary artery disease) s/p CABG history    • Crawley's esophagus   • Chronic thrombocytopenic purpura   • Cerebrovascular accident   • NAYLA on CPAP   • Obesity (BMI 30-39.9)   • Dyslipidemia   • BPH (benign prostatic hyperplasia)   • Lower extremity edema   • Falls   • Sepsis due to urinary tract infection   • Reactive airway disease with wheezing   • Constipation   • Pneumonia due to infectious organism   • Urinary retention   • Olecranon bursitis of right elbow                 Lymphedema       17 0930          Lymphedema Edema Assessment    Ptting Edema Category By severity  -MF      Pitting Edema Mild  -MF      Skin Changes/Observations    Location/Assessment Lower Extremity  -MF      Lower Extremity Conditions bilateral:;intact;clean;dry;shiny;fragile  -MF      Lower Extremity Color/Pigment bilateral:;hyperpigmented  -MF      Compression/Skin Care    Bandaging Comments  Pt cont to have significant BLE pain / sensitivity.   -        User Key  (r) = Recorded By, (t) = Taken By, (c) = Cosigned By    Initials Name Provider Type     Aly Mckeon, PT Physical Therapist          WOUND DEBRIDEMENT                     Adult Rehabilitation Note       08/01/17 0930 08/01/17 0922 07/31/17 1540    Rehab Assessment/Intervention    Discipline physical therapist  - physical therapist  - physical therapist  -LR    Document Type therapy note (daily note)  - therapy note (daily note)  - therapy note (daily note)  -LR    Subjective Information agree to therapy;complains of;weakness;fatigue;pain  - agree to therapy;complains of;pain  - agree to therapy;complains of;weakness;fatigue;pain   c/o R elbow pain and discomfort from compressogrip on LEs.   -LR    Patient Effort, Rehab Treatment  adequate  -MJ good  -LR    Symptoms Noted During/After Treatment  fatigue;shortness of breath  - fatigue;shortness of breath  -LR    Symptoms Noted Comment   Notified RN. Drainage from IV site. Notified RN.   -LR    Precautions/Limitations  fall precautions  - fall precautions  -LR    Specific Treatment Considerations   Spoke with wound care PT Rebecca who instructed to remove compression from LEs d/t patient's c/o discomfort and wound care PT will address tomorrow. Notified RN.   -LR    Recorded by [MF] Aly Mckeon, PT [MJ] Eladio Geller, PT [LR] Yuridia Rees, PT    Vital Signs    Pre Systolic BP Rehab  125  -MJ     Pre Treatment Diastolic BP  66  -MJ     Pretreatment Heart Rate (beats/min)  90  -MJ     Posttreatment Heart Rate (beats/min)  105  -MJ 92  -LR    Pre SpO2 (%)  93  -MJ     O2 Delivery Pre Treatment  room air  -MJ     Post SpO2 (%)  94  -MJ 94  -LR    O2 Delivery Post Treatment  room air  -MJ room air  -LR    Pre Patient Position  Supine  -MJ     Post Patient Position  Sitting  -MJ Sitting  -LR    Recorded by  [MJ] Eladio Geller PT [LR] Yuridia Rees, PT    Pain  Assessment    Pain Assessment Arrington-Baker FACES  -MF Arrington-Moon FACES  -MJ Arrington-Moon FACES  -LR    Arrington-Moon FACES Pain Rating 6  -MF 4  -MJ 4  -LR    Pain Type  Acute pain  -MJ Acute pain  -LR    Pain Location Generalized  -MF Generalized  -MJ Elbow  -LR    Pain Orientation   Right  -LR    Pain Intervention(s) Repositioned  -MF Repositioned;Ambulation/increased activity  -MJ Repositioned;Ambulation/increased activity  -LR    Recorded by [MF] Aly Mckeon, PT [MJ] Eladio Geller, PT [LR] Yuridia Rees, PT    Cognitive Assessment/Intervention    Current Cognitive/Communication Assessment  functional  -MJ functional  -LR    Orientation Status  oriented x 4;required verbal cueing (specifiy in comments)  -MJ oriented x 4;required verbal cueing (specifiy in comments)  -LR    Follows Commands/Answers Questions  100% of the time;able to follow single-step instructions;needs cueing;needs increased time;needs repetition  -% of the time;able to follow single-step instructions;needs cueing;needs repetition  -LR    Personal Safety  mild impairment  - mild impairment;at risk behaviors demonstrated  -LR    Recorded by  [MJ] Eladio Geller, PT [LR] Yuridia Rees, PT    Bed Mobility, Assessment/Treatment    Bed Mobility, Assistive Device  bed rails;head of bed elevated  -     Bed Mob, Supine to Sit, [place]  minimum assist (75% patient effort);verbal cues required  - not tested   UIC on arrival.   -LR    Bed Mob, Sit to Supine, [place]  not tested   Pt UIC  - not tested   UIC at end of treatment.   -LR    Bed Mobility, Safety Issues  decreased use of arms for pushing/pulling;decreased use of legs for bridging/pushing  -     Bed Mobility, Impairments  strength decreased;pain  -MJ     Bed Mobility, Comment  Verbal cues for sequencing, assist with trunk. Increased time and effort to perform. Pt required increased time sitting EOB before standing due to fatigue/SOA  -MJ     Recorded by  [MJ]  Eladio Geller, PT [LR] Yuridia Rees, PT    Transfer Assessment/Treatment    Transfers, Bed-Chair [place]  contact guard assist;verbal cues required  -MJ     Transfers, Bed-Chair-Bed, Assist Device  rolling walker  -MJ     Transfers, Sit-Stand [place]  minimum assist (75% patient effort);verbal cues required  -MJ verbal cues required;minimum assist (75% patient effort)  -LR    Transfers, Stand-Sit [place]  minimum assist (75% patient effort);verbal cues required  -MJ verbal cues required;contact guard assist  -LR    Transfers, Sit-Stand-Sit, Assist Device  rolling walker  -MJ rolling walker  -LR    Transfer, Safety Issues  sequencing ability decreased;step length decreased  -MJ sequencing ability decreased;step length decreased;weight-shifting ability decreased  -LR    Transfer, Impairments  strength decreased;impaired balance;pain  -MJ ROM decreased;strength decreased;impaired balance;pain  -LR    Transfer, Comment  Verbal cues for correct hand placement. Assisted pt from bed to chair, cues for sequencing with RW.   -MJ Verbal cues to push up from chair to stand and to reach back for chair to lower into sitting.   -LR    Recorded by  [MJ] Eladio Geller, PT [LR] Yuridia Rees, PT    Gait Assessment/Treatment    Gait, [place] Level  not tested  -MJ verbal cues required;contact guard assist  -LR    Gait, Assistive Device   rolling walker  -LR    Gait, Distance (Feet)   40  -LR    Gait, Gait Pattern Analysis   swing-through gait  -LR    Gait, Gait Deviations   bilateral:;forward flexed posture;step length decreased;toe-to-floor clearance decreased;weight-shifting ability decreased;narrow base  -LR    Gait, Safety Issues   sequencing ability decreased;step length decreased;weight-shifting ability decreased;balance decreased during turns  -LR    Gait, Impairments   ROM decreased;strength decreased;impaired balance;pain  -LR    Gait, Comment  Pt refused  -MJ Patient ambulated with step  through gait pattern with short steps and forward flexed posture. No improvement with cues for upright posture, increased step length, increased base of support, and increased LE weight bearing. Chair brought up behind patient to return to sitting once he was too fatigued and SOA to progress.   -LR    Recorded by  [MJ] Eladio Geller, PT [LR] Yuridia Rees, PT    Therapy Exercises    Bilateral Lower Extremities  AROM:;10 reps;ankle pumps/circles;quad sets;SLR;heel slides;hip abduction/adduction   Pt required rest breaks between each exercise  - --   patient declined ther ex d/t fatigue;states he will do later  -LR    Recorded by  [MJ] Eladio Geller, PT [LR] Yuridia Rees, PT    Positioning and Restraints    Pre-Treatment Position sitting in chair/recliner  - in bed  - sitting in chair/recliner  -    Post Treatment Position chair  - chair  - chair  -LR    In Chair reclined;call light within reach  - notified nsg;reclined;call light within reach;encouraged to call for assist;exit alarm on  - notified nsg;reclined;sitting;call light within reach;encouraged to call for assist;exit alarm on;legs elevated  -LR    Recorded by [MF] Aly Mckeon, PT [MJ] Eladio Geller, PT [LR] Yuridia Rees, PT      07/30/17 0815          Rehab Assessment/Intervention    Discipline physical therapist  -      Document Type therapy note (daily note)  -      Subjective Information agree to therapy;complains of;weakness;pain  -      Recorded by [] Aly Mckeon PT      Pain Assessment    Pain Assessment Arrington-Baker FACES  -      Arrington-Baker FACES Pain Rating 4  -      Pain Intervention(s) Repositioned  -      Recorded by [MF] Aly Mckeon PT      Positioning and Restraints    Pre-Treatment Position in bed  -      Post Treatment Position bed  -      In Bed supine;call light within reach  -      Recorded by [] Aly Mckeon PT        User Key  (r) = Recorded By, (t) = Taken  By, (c) = Cosigned By    Initials Name Effective Dates    MF Aly ZULEIKA Marxaleja, PT 06/19/15 -     LR Yuridia Rees, PT 06/19/15 -     MJ Eladio Geller, PT 03/14/16 -                 IP PT Goals       08/01/17 1028 07/31/17 1540 07/28/17 1513    Bed Mobility PT LTG    Bed Mobility PT LTG, Date Established  07/17/17  -LR     Bed Mobility PT LTG, Time to Achieve  1 wk  -LR     Bed Mobility PT LTG, Activity Type  all bed mobility  -LR     Bed Mobility PT LTG, [place] Level  independent  -LR     Bed Mobility PT LTG, Date Goal Reviewed 08/01/17  -MJ 07/31/17  -LR     Bed Mobility PT LTG, Outcome goal ongoing  -MJ goal ongoing  -LR goal ongoing  -EH    Transfer Training PT LTG    Transfer Training PT LTG, Date Established  07/17/17  -LR     Transfer Training PT LTG, Time to Achieve  1 wk  -LR     Transfer Training PT LTG, Activity Type  bed to chair /chair to bed;sit to stand/stand to sit  -LR     Transfer Training PT LTG, [place] Level  independent  -LR     Transfer Training PT LTG, Assist Device  walker, standard  -LR     Transfer Training PT  LTG, Date Goal Reviewed 08/01/17  -MJ 07/31/17  -LR     Transfer Training PT LTG, Outcome goal ongoing  -MJ goal ongoing  -LR goal ongoing  -EH    Gait Training PT LTG    Gait Training Goal PT LTG, Date Established  07/17/16  -LR     Gait Training Goal PT LTG, Time to Achieve  1 wk  -LR     Gait Training Goal PT LTG, [place] Level  independent  -LR     Gait Training Goal PT LTG, Assist Device  walker, standard  -LR     Gait Training Goal PT LTG, Distance to Achieve  200 feet  -LR     Gait Training Goal PT LTG, Date Goal Reviewed 08/01/17  -MJ 07/31/17  -LR     Gait Training Goal PT LTG, Outcome goal ongoing  -MJ goal ongoing  -LR goal ongoing  -EH      07/28/17 1100 07/27/17 1100 07/27/17 1057    Bed Mobility PT LTG    Bed Mobility PT LTG, Outcome   goal ongoing  -MB    Transfer Training PT LTG    Transfer Training PT LTG, Outcome   goal ongoing  -MB    Gait  Training PT LTG    Gait Training Goal PT LTG, Outcome   goal ongoing  -MB    Wound Care PT LTG    Wound Care PT LTG 1, Outcome goal ongoing  - goal partially met  -MF       07/23/17 1106 07/20/17 1540 07/20/17 1145    Bed Mobility PT LTG    Bed Mobility PT LTG, Outcome goal ongoing  -UD  goal ongoing  -UD    Transfer Training PT LTG    Transfer Training PT LTG, Outcome goal ongoing  -UD  goal ongoing  -UD    Gait Training PT LTG    Gait Training Goal PT LTG, Outcome goal ongoing  -UD  goal ongoing  -UD    Wound Care PT LTG    Wound Care PT LTG 1, Outcome  goal ongoing  -MC       07/19/17 1045          Wound Care PT LTG    Wound Care PT LTG 1, Outcome goal ongoing  -        User Key  (r) = Recorded By, (t) = Taken By, (c) = Cosigned By    Initials Name Provider Type    UD Mary Kay Alcocer, PTA Physical Therapy Assistant     Aly Mckeon, PT Physical Therapist     Chantell Nazario, PT Physical Therapist     Yuridia Rees, PT Physical Therapist    MB Nicole Camejo, PT Physical Therapist     Maddison Gamble, PT Physical Therapist    ECHO Geller, PT Physical Therapist          Physical Therapy Education     Title: PT OT SLP Therapies (Done)     Topic: Physical Therapy (Done)     Point: Mobility training (Done)    Learning Progress Summary    Learner Readiness Method Response Comment Documented by Status   Patient Acceptance E,D VU,NR   08/01/17 1028 Done    Acceptance E,D VU,NR Educated on correct gait mechanics.  07/31/17 1625 Done    Acceptance E,D VU,NR HEP, fall precautions MB 07/27/17 1057 Done    Acceptance E,TB,D VU,DU,NR   07/26/17 0151 Done    Eager E KAROLINE PANTOJA reviewed benefits fo activity SC 07/25/17 1537 Done    Acceptance E,TB,D VU,DU,NR   07/25/17 0520 Done    Acceptance E,TB,D VU,DU,NR   07/24/17 0421 Done    Acceptance E,D DU,NR   07/23/17 1106 Done    Eager E VU  SC 07/21/17 1620 Done    Acceptance E,D DU,NR   07/20/17 1145 Done    Acceptance E NR   07/18/17  1200 Active    Eager E,D NR   07/17/17 1722 Active               Point: Home exercise program (Done)    Learning Progress Summary    Learner Readiness Method Response Comment Documented by Status   Patient Acceptance E,D VU,NR   08/01/17 1028 Done    Acceptance E,D VU,NR Educated on correct gait mechanics.  07/31/17 1625 Done    Acceptance E,D VU,NR HEP, fall precautions MB 07/27/17 1057 Done    Acceptance E,TB,D VU,DU,NR   07/26/17 0151 Done    Eager E VU,DU reviewed benefits fo activity SC 07/25/17 1537 Done    Acceptance E,TB,D VU,DU,NR   07/25/17 0520 Done    Acceptance E,TB,D VU,DU,NR   07/24/17 0421 Done    Acceptance E,D DU,NR   07/23/17 1106 Done    Eager E VU  SC 07/21/17 1620 Done    Acceptance E,D DU,[facility] 07/20/17 1145 Done    Acceptance E NR   07/18/17 1200 Active    Eager E,D NR   07/17/17 1722 Active               Point: Body mechanics (Done)    Learning Progress Summary    Learner Readiness Method Response Comment Documented by Status   Patient Acceptance E,D VU,NR   08/01/17 1028 Done    Acceptance E,D VU,NR Educated on correct gait mechanics.  07/31/17 1625 Done    Acceptance E,D VU,NR HEP, fall precautions MB 07/27/17 1057 Done    Acceptance E,TB,D VU,DU,NR   07/26/17 0151 Done    Eager E VU,DU reviewed benefits fo activity SC 07/25/17 1537 Done    Acceptance E,TB,D VU,DU,NR   07/25/17 0520 Done    Acceptance E,TB,D VU,DU,NR   07/24/17 0421 Done    Acceptance E,D DU,NR   07/23/17 1106 Done    Eager E VU  SC 07/21/17 1620 Done    Acceptance E,D DU,NR   07/20/17 1145 Done    Acceptance E NR   07/18/17 1200 Active    Eager E,D NR   07/17/17 1722 Active               Point: Precautions (Done)    Learning Progress Summary    Learner Readiness Method Response Comment Documented by Status   Patient Acceptance E,D VU,NR   08/01/17 1028 Done    Acceptance BRADEN SANTOS,JAY Educated on correct gait mechanics. LR 07/31/17 1625 Done    Acceptance BRADEN SANTOS NR HEP, fall precautions  MB 07/27/17 1057 Done    Acceptance E,TB,D VU,DU,NR   07/26/17 0151 Done    Eager E VU,DU reviewed benefits fo activity SC 07/25/17 1537 Done    Acceptance E,TB,D VU,DU,NR   07/25/17 0520 Done    Acceptance E,TB,D VU,DU,NR   07/24/17 0421 Done    Acceptance E,D DU,NR   07/23/17 1106 Done    Eager E VU  SC 07/21/17 1620 Done    Acceptance E,D DU,NR   07/20/17 1145 Done    Acceptance E NR   07/18/17 1200 Active    Eager E,D NR   07/17/17 1722 Active                      User Key     Initials Effective Dates Name Provider Type Discipline    SC 06/19/15 -  Oscar Alexis, PT Physical Therapist PT     06/22/15 -  Mary Kay Alcocer, PTA Physical Therapy Assistant PT     06/19/15 -  Keyona Hawk, PT Physical Therapist PT     06/19/15 -  Noa Pope, PT Physical Therapist PT     06/19/15 -  Yuridia Rees, PT Physical Therapist PT    MB 03/14/16 -  Nicole Camejo, PT Physical Therapist PT     03/14/16 -  Eladio Geller, PT Physical Therapist PT     08/22/16 -  Mercedes Silverio, RN Registered Nurse Nurse                   PT ASSESSMENT (last 72 hours)      PT Evaluation       08/01/17 0930 08/01/17 0922    Rehab Evaluation    Document Type therapy note (daily note)  - therapy note (daily note)  -    Subjective Information agree to therapy;complains of;weakness;fatigue;pain  - agree to therapy;complains of;pain  -    Patient Effort, Rehab Treatment  adequate  -    Symptoms Noted During/After Treatment  fatigue;shortness of breath  -    General Information    Precautions/Limitations  fall precautions  -    Vital Signs    Pre Systolic BP Rehab  125  -MJ    Pre Treatment Diastolic BP  66  -MJ    Pretreatment Heart Rate (beats/min)  90  -MJ    Posttreatment Heart Rate (beats/min)  105  -MJ    Pre SpO2 (%)  93  -MJ    O2 Delivery Pre Treatment  room air  -MJ    Post SpO2 (%)  94  -MJ    O2 Delivery Post Treatment  room air  -MJ    Pre Patient Position  Supine  -MJ    Post Patient  Position  Sitting  -    Pain Assessment    Pain Assessment Arrington-Baker FACES  -MF Arrington-Moon FACES  -MJ    Arrington-Moon FACES Pain Rating 6  -MF 4  -MJ    Pain Type  Acute pain  -MJ    Pain Location Generalized  -MF Generalized  -MJ    Pain Intervention(s) Repositioned  - Repositioned;Ambulation/increased activity  -    Cognitive Assessment/Intervention    Current Cognitive/Communication Assessment  functional  -    Orientation Status  oriented x 4;required verbal cueing (specifiy in comments)  -    Follows Commands/Answers Questions  100% of the time;able to follow single-step instructions;needs cueing;needs increased time;needs repetition  -    Personal Safety  mild impairment  -MJ    Bed Mobility, Assessment/Treatment    Bed Mobility, Assistive Device  bed rails;head of bed elevated  -MJ    Bed Mob, Supine to Sit, [place]  minimum assist (75% patient effort);verbal cues required  -MJ    Bed Mob, Sit to Supine, [place]  not tested   Pt UIC  -MJ    Bed Mobility, Safety Issues  decreased use of arms for pushing/pulling;decreased use of legs for bridging/pushing  -    Bed Mobility, Impairments  strength decreased;pain  -MJ    Bed Mobility, Comment  Verbal cues for sequencing, assist with trunk. Increased time and effort to perform. Pt required increased time sitting EOB before standing due to fatigue/SOA  -MJ    Transfer Assessment/Treatment    Transfers, Bed-Chair [place]  contact guard assist;verbal cues required  -MJ    Transfers, Bed-Chair-Bed, Assist Device  rolling walker  -MJ    Transfers, Sit-Stand [place]  minimum assist (75% patient effort);verbal cues required  -MJ    Transfers, Stand-Sit [place]  minimum assist (75% patient effort);verbal cues required  -MJ    Transfers, Sit-Stand-Sit, Assist Device  rolling walker  -MJ    Transfer, Safety Issues  sequencing ability decreased;step length decreased  -MJ    Transfer, Impairments  strength decreased;impaired balance;pain   -    Transfer, Comment  Verbal cues for correct hand placement. Assisted pt from bed to chair, cues for sequencing with RW.   -MJ    Gait Assessment/Treatment    Gait, [place] Level  not tested  -    Gait, Comment  Pt refused  -    Therapy Exercises    Bilateral Lower Extremities  AROM:;10 reps;ankle pumps/circles;quad sets;SLR;heel slides;hip abduction/adduction   Pt required rest breaks between each exercise  -    Positioning and Restraints    Pre-Treatment Position sitting in chair/recliner  - in bed  -    Post Treatment Position chair  - chair  -    In Chair reclined;call light within reach  - notified nsg;reclined;call light within reach;encouraged to call for assist;exit alarm on  -      07/31/17 1540 07/30/17 0815    Rehab Evaluation    Document Type therapy note (daily note)  -LR therapy note (daily note)  -    Subjective Information agree to therapy;complains of;weakness;fatigue;pain   c/o R elbow pain and discomfort from compressogrip on LEs.   -LR agree to therapy;complains of;weakness;pain  -    Patient Effort, Rehab Treatment good  -LR     Symptoms Noted During/After Treatment fatigue;shortness of breath  -LR     Symptoms Noted Comment Notified RN. Drainage from IV site. Notified RN.   -LR     General Information    Precautions/Limitations fall precautions  -LR     Vital Signs    Posttreatment Heart Rate (beats/min) 92  -LR     Post SpO2 (%) 94  -LR     O2 Delivery Post Treatment room air  -LR     Post Patient Position Sitting  -LR     Pain Assessment    Pain Assessment Ar[place] FACES  -LR Arrington-B[place] FACES  -MF    Arrington-Baker FACES Pain Rating 4  -LR 4  -MF    Pain Type Acute pain  -LR     Pain Location Elbow  -LR     Pain Orientation Right  -LR     Pain Intervention(s) Repositioned;Ambulation/increased activity  -LR Repositioned  -MF    Cognitive Assessment/Intervention    Current Cognitive/Communication Assessment functional  -LR     Orientation Status oriented x  4;required verbal cueing (specifiy in comments)  -LR     Follows Commands/Answers Questions 100% of the time;able to follow single-step instructions;needs cueing;needs repetition  -LR     Personal Safety mild impairment;at risk behaviors demonstrated  -LR     Bed Mobility, Assessment/Treatment    Bed Mob, Supine to Sit, [place] not tested   UIC on arrival.   -LR     Bed Mob, Sit to Supine, [place] not tested   UIC at end of treatment.   -LR     Transfer Assessment/Treatment    Transfers, Sit-Stand [place] verbal cues required;minimum assist (75% patient effort)  -LR     Transfers, Stand-Sit [place] verbal cues required;contact guard assist  -LR     Transfers, Sit-Stand-Sit, Assist Device rolling walker  -LR     Transfer, Safety Issues sequencing ability decreased;step length decreased;weight-shifting ability decreased  -LR     Transfer, Impairments ROM decreased;strength decreased;impaired balance;pain  -LR     Transfer, Comment Verbal cues to push up from chair to stand and to reach back for chair to lower into sitting.   -LR     Gait Assessment/Treatment    Gait, [place] Level verbal cues required;contact guard assist  -LR     Gait, Assistive Device rolling walker  -LR     Gait, Distance (Feet) 40  -LR     Gait, Gait Pattern Analysis swing-through gait  -LR     Gait, Gait Deviations bilateral:;forward flexed posture;step length decreased;toe-to-floor clearance decreased;weight-shifting ability decreased;narrow base  -LR     Gait, Safety Issues sequencing ability decreased;step length decreased;weight-shifting ability decreased;balance decreased during turns  -LR     Gait, Impairments ROM decreased;strength decreased;impaired balance;pain  -LR     Gait, Comment Patient ambulated with step through gait pattern with short steps and forward flexed posture. No improvement with cues for upright posture, increased step length, increased base of support, and increased LE weight bearing. Chair  brought up behind patient to return to sitting once he was too fatigued and SOA to progress.   -LR     Therapy Exercises    Bilateral Lower Extremities --   patient declined ther ex d/t fatigue;states he will do later  -LR     Positioning and Restraints    Pre-Treatment Position sitting in chair/recliner  -LR in bed  -MF    Post Treatment Position chair  -LR bed  -MF    In Bed  supine;call light within reach  -MF    In Chair notified nsg;reclined;sitting;call light within reach;encouraged to call for assist;exit alarm on;legs elevated  -LR       User Key  (r) = Recorded By, (t) = Taken By, (c) = Cosigned By    Initials Name Provider Type    MF Aly Mckeon, PT Physical Therapist    LR Yuridia Rees, PT Physical Therapist    ECHO Geller, PT Physical Therapist            PT Recommendation and Plan  Anticipated Discharge Disposition: home with home health  PT Frequency: daily    Plan Of Care Reviewed With: patient       Outcome Summary/Follow up Plan: Pt cont to have moderate pain in LEs and sensitivity limiting compression wraps. PT will cont to hold compression wraps at this point.           Outcome Measures       08/01/17 0922 07/31/17 1540       How much help from another person do you currently need...    Turning from your back to your side while in flat bed without using bedrails? 3  -MJ 4  -LR     Moving from lying on back to sitting on the side of a flat bed without bedrails? 3  -MJ 3  -LR     Moving to and from a bed to a chair (including a wheelchair)? 3  -MJ 3  -LR     Standing up from a chair using your arms (e.g., wheelchair, bedside chair)? 3  -MJ 3  -LR     Climbing 3-5 steps with a railing? 2  -MJ 2  -LR     To walk in hospital room? 3  -MJ 3  -LR     AM-PAC 6 Clicks Score 17  -MJ 18  -LR     Functional Assessment    Outcome Measure Options AM-PAC 6 Clicks Basic Mobility (PT)  -MJ AM-PAC 6 Clicks Basic Mobility (PT)  -LR       User Key  (r) = Recorded By, (t) = Taken By, (c) = Cosigned  By    Initials Name Provider Type    LR Yuridia Rees, PT Physical Therapist    ECHO Geller, PT Physical Therapist              Time Calculation        PT Charges       08/01/17 1030 08/01/17 0930       Time Calculation    Start Time 0922  -MJ 0930  -MF     PT Received On 08/01/17  -MJ      PT Goal Re-Cert Due Date 08/06/17  -MJ 08/06/17  -MF     Time Calculation- PT    Total Timed Code Minutes- PT 23 minute(s)  -MJ 10 minute(s)  -MF       User Key  (r) = Recorded By, (t) = Taken By, (c) = Cosigned By    Initials Name Provider Type    LITO Mckeon, PT Physical Therapist    ECHO Geller, PT Physical Therapist             Therapy Charges for Today     Code Description Service Date Service Provider Modifiers Qty    46310099242 HC PT THER PROC EA 15 MIN 8/1/2017 Aly Mckeon, PT GP 1            PT G-Codes  PT Professional Judgement Used?: Yes  Outcome Measure Options: AM-PAC 6 Clicks Basic Mobility (PT)  Score: 17  Functional Limitation: Mobility: Walking and moving around  Mobility: Walking and Moving Around Current Status (): At least 40 percent but less than 60 percent impaired, limited or restricted  Mobility: Walking and Moving Around Goal Status (): At least 20 percent but less than 40 percent impaired, limited or restricted        Aly Mckeon, PT  8/1/2017

## 2017-08-01 NOTE — PLAN OF CARE
Problem: Patient Care Overview (Adult)  Goal: Plan of Care Review  Outcome: Ongoing (interventions implemented as appropriate)    08/01/17 0453   Patient Care Overview   Progress no change   Coping/Psychosocial Response Interventions   Plan Of Care Reviewed With patient   Outcome Evaluation   Outcome Summary/Follow up Plan Pt c/o of back pain. Fair nonproductive cough. Tolerates RA.         Problem: Fall Risk (Adult)  Goal: Absence of Falls  Outcome: Ongoing (interventions implemented as appropriate)    08/01/17 0453   Fall Risk (Adult)   Absence of Falls making progress toward outcome         Problem: Skin Integrity Impairment, Risk/Actual (Adult)  Goal: Skin Integrity/Wound Healing  Outcome: Ongoing (interventions implemented as appropriate)    08/01/17 0453   Skin Integrity Impairment, Risk/Actual (Adult)   Skin Integrity/Wound Healing making progress toward outcome         Problem: Pain, Acute (Adult)  Goal: Identify Related Risk Factors and Signs and Symptoms  Outcome: Ongoing (interventions implemented as appropriate)    08/01/17 0453   Pain, Acute   Related Risk Factors (Acute Pain) disease process;persistent pain;infection   Signs and Symptoms (Acute Pain) fatigue/weakness;moaning;verbalization of pain descriptors       Goal: Acceptable Pain Control/Comfort Level  Outcome: Ongoing (interventions implemented as appropriate)    08/01/17 0453   Pain, Acute (Adult)   Acceptable Pain Control/Comfort Level making progress toward outcome

## 2017-08-01 NOTE — THERAPY TREATMENT NOTE
Acute Care - Physical Therapy Treatment Note  [facility]     Patient Name: Bjorn Pollock  : 1934  MRN: [id number]  Today's Date: 2017  Onset of Illness/Injury or Date of Surgery Date: 17  Date of Referral to PT: 17  Referring Physician: MD KAYODE (MOBILITY order)    Admit Date: 2017    Visit Dx:    ICD-10-CM ICD-9-CM   1. Reactive airway disease with wheezing, severe persistent, with acute exacerbation J45.51 493.92   2. Fatigue, unspecified type R53.83 780.79   3. Bronchitis J40 490   4. Impaired functional mobility, balance, gait, and endurance Z74.09 V49.89     Patient Active Problem List   Diagnosis   • Intractable low back pain   • HTN (hypertension)   • DM type 2 (diabetes mellitus, type 2)   • Intertriginous candidiasis   • COPD (chronic obstructive pulmonary disease)   • NAYLA (obstructive sleep apnea)   • Atrial fibrillation   • Supratherapeutic INR   • CKD (chronic kidney disease)   • Diastolic heart failure secondary to hypertension   • Anemia   • DJD (degenerative joint disease)   • CAD (coronary artery disease) s/p CABG history    • Crawley's esophagus   • Chronic thrombocytopenic purpura   • Cerebrovascular accident   • NAYLA on CPAP   • Obesity (BMI 30-39.9)   • Dyslipidemia   • BPH (benign prostatic hyperplasia)   • Lower extremity edema   • Falls   • Sepsis due to urinary tract infection   • Reactive airway disease with wheezing   • Constipation   • Pneumonia due to infectious organism   • Urinary retention   • Olecranon bursitis of right elbow               Adult Rehabilitation Note       17 0922 17 1540 17 0815    Rehab Assessment/Intervention    Discipline physical therapist  -MJ physical therapist  -LR physical therapist  -    Document Type therapy note (daily note)  -MJ therapy note (daily note)  -LR therapy note (daily note)  -    Subjective Information agree to therapy;complains of;pain  -MJ agree to therapy;complains  of;weakness;fatigue;pain   c/o R elbow pain and discomfort from compressogrip on LEs.   -LR agree to therapy;complains of;weakness;pain  -    Patient Effort, Rehab Treatment adequate  -MJ good  -LR     Symptoms Noted During/After Treatment fatigue;shortness of breath  - fatigue;shortness of breath  -LR     Symptoms Noted Comment  Notified RN. Drainage from IV site. Notified RN.   -LR     Precautions/Limitations fall precautions  -MJ fall precautions  -LR     Specific Treatment Considerations  Spoke with wound care PT Rebecca who instructed to remove compression from LEs d/t patient's c/o discomfort and wound care PT will address tomorrow. Notified RN.   -LR     Recorded by [MJ] Eladio Geller, PT [LR] Yuridia Rees, PT [MF] Aly Mckeon, PT    Vital Signs    Pre Systolic BP Rehab 125  -MJ      Pre Treatment Diastolic BP 66  -MJ      Pretreatment Heart Rate (beats/min) 90  -MJ      Posttreatment Heart Rate (beats/min) 105  -MJ 92  -LR     Pre SpO2 (%) 93  -MJ      O2 Delivery Pre Treatment room air  -MJ      Post SpO2 (%) 94  -MJ 94  -LR     O2 Delivery Post Treatment room air  -MJ room air  -LR     Pre Patient Position Supine  -MJ      Post Patient Position Sitting  -MJ Sitting  -LR     Recorded by [MJ] Eladio Geller, PT [LR] Yuridia Rees, PT     Pain Assessment    Pain Assessment Arrington-Moon FACES  -MJ Arrington-Moon FACES  -LR Arrington-Baker FACES  -    Arrington-Baker FACES Pain Rating 4  -MJ 4  -LR 4  -MF    Pain Type Acute pain  -MJ Acute pain  -LR     Pain Location Generalized  -MJ Elbow  -LR     Pain Orientation  Right  -LR     Pain Intervention(s) Repositioned;Ambulation/increased activity  -MJ Repositioned;Ambulation/increased activity  -LR Repositioned  -    Recorded by [MJ] Eladio Geller, PT [LR] Yuridia Rees, PT [MF] Aly Mckeon, PT    Cognitive Assessment/Intervention    Current Cognitive/Communication Assessment functional  -MJ functional  -LR     Orientation Status oriented x  4;required verbal cueing (specifiy in comments)  -MJ oriented x 4;required verbal cueing (specifiy in comments)  -LR     Follows Commands/Answers Questions 100% of the time;able to follow single-step instructions;needs cueing;needs increased time;needs repetition  -% of the time;able to follow single-step instructions;needs cueing;needs repetition  -LR     Personal Safety mild impairment  -MJ mild impairment;at risk behaviors demonstrated  -LR     Recorded by [MJ] Eladio Geller, PT [LR] Yuridia Rees, PT     Bed Mobility, Assessment/Treatment    Bed Mobility, Assistive Device bed rails;head of bed elevated  -MJ      Bed Mob, Supine to Sit, [place] minimum assist (75% patient effort);verbal cues required  -MJ not tested   UIC on arrival.   -LR     Bed Mob, Sit to Supine, [place] not tested   Pt UIC  -MJ not tested   UIC at end of treatment.   -LR     Bed Mobility, Safety Issues decreased use of arms for pushing/pulling;decreased use of legs for bridging/pushing  -      Bed Mobility, Impairments strength decreased;pain  -MJ      Bed Mobility, Comment Verbal cues for sequencing, assist with trunk. Increased time and effort to perform. Pt required increased time sitting EOB before standing due to fatigue/SOA  -MJ      Recorded by [MJ] Eladio Geller, PT [LR] Yuridia Rees, PT     Transfer Assessment/Treatment    Transfers, Bed-Chair [place] contact guard assist;verbal cues required  -      Transfers, Bed-Chair-Bed, Assist Device rolling walker  -MJ      Transfers, Sit-Stand [place] minimum assist (75% patient effort);verbal cues required  - verbal cues required;minimum assist (75% patient effort)  -LR     Transfers, Stand-Sit [place] minimum assist (75% patient effort);verbal cues required  - verbal cues required;contact guard assist  -LR     Transfers, Sit-Stand-Sit, Assist Device rolling walker  -MJ rolling walker  -LR     Transfer, Safety Issues sequencing ability  decreased;step length decreased  -MJ sequencing ability decreased;step length decreased;weight-shifting ability decreased  -LR     Transfer, Impairments strength decreased;impaired balance;pain  -MJ ROM decreased;strength decreased;impaired balance;pain  -LR     Transfer, Comment Verbal cues for correct hand placement. Assisted pt from bed to chair, cues for sequencing with RW.   -MJ Verbal cues to push up from chair to stand and to reach back for chair to lower into sitting.   -LR     Recorded by [MJ] Eladio Geller, PT [LR] Yuridia Rees, PT     Gait Assessment/Treatment    Gait, [place] Level not tested  -MJ verbal cues required;contact guard assist  -LR     Gait, Assistive Device  rolling walker  -LR     Gait, Distance (Feet)  40  -LR     Gait, Gait Pattern Analysis  swing-through gait  -LR     Gait, Gait Deviations  bilateral:;forward flexed posture;step length decreased;toe-to-floor clearance decreased;weight-shifting ability decreased;narrow base  -LR     Gait, Safety Issues  sequencing ability decreased;step length decreased;weight-shifting ability decreased;balance decreased during turns  -LR     Gait, Impairments  ROM decreased;strength decreased;impaired balance;pain  -LR     Gait, Comment Pt refused  -MJ Patient ambulated with step through gait pattern with short steps and forward flexed posture. No improvement with cues for upright posture, increased step length, increased base of support, and increased LE weight bearing. Chair brought up behind patient to return to sitting once he was too fatigued and SOA to progress.   -LR     Recorded by [MJ] Eladio Geller, PT [LR] Yuridia Rees, PT     Therapy Exercises    Bilateral Lower Extremities AROM:;10 reps;ankle pumps/circles;quad sets;SLR;heel slides;hip abduction/adduction   Pt required rest breaks between each exercise  - --   patient declined ther ex d/t fatigue;states he will do later  -LR     Recorded by [MJ] Eladio Geller, PT [LR]  Yuridia Rees, PT     Positioning and Restraints    Pre-Treatment Position in bed  -MJ sitting in chair/recliner  -LR in bed  -MF    Post Treatment Position chair  -MJ chair  -LR bed  -MF    In Bed   supine;call light within reach  -MF    In Chair notified nsg;reclined;call light within reach;encouraged to call for assist;exit alarm on  -MJ notified nsg;reclined;sitting;call light within reach;encouraged to call for assist;exit alarm on;legs elevated  -LR     Recorded by [MJ] Eladio Geller, PT [LR] Yuridia Rees, PT [MF] Aly Mckeon, PT      User Key  (r) = Recorded By, (t) = Taken By, (c) = Cosigned By    Initials Name Effective Dates     Aly Mckeon, PT 06/19/15 -     LR Yuridia Rees, PT 06/19/15 -     MJ Eladio Geller, PT 03/14/16 -                 IP PT Goals       08/01/17 1028 07/31/17 1540 07/28/17 1513    Bed Mobility PT LTG    Bed Mobility PT LTG, Date Established  07/17/17  -LR     Bed Mobility PT LTG, Time to Achieve  1 wk  -LR     Bed Mobility PT LTG, Activity Type  all bed mobility  -LR     Bed Mobility PT LTG, [place] Level  independent  -LR     Bed Mobility PT LTG, Date Goal Reviewed 08/01/17  - 07/31/17  -LR     Bed Mobility PT LTG, Outcome goal ongoing  - goal ongoing  -LR goal ongoing  -EH    Transfer Training PT LTG    Transfer Training PT LTG, Date Established  07/17/17  -LR     Transfer Training PT LTG, Time to Achieve  1 wk  -LR     Transfer Training PT LTG, Activity Type  bed to chair /chair to bed;sit to stand/stand to sit  -LR     Transfer Training PT LTG, [place] Level  independent  -LR     Transfer Training PT LTG, Assist Device  walker, standard  -LR     Transfer Training PT  LTG, Date Goal Reviewed 08/01/17  -MJ 07/31/17  -LR     Transfer Training PT LTG, Outcome goal ongoing  - goal ongoing  -LR goal ongoing  -EH    Gait Training PT LTG    Gait Training Goal PT LTG, Date Established  07/17/16  -LR     Gait Training Goal PT LTG, Time to  Achieve  1 wk  -LR     Gait Training Goal PT LTG, [place] Level  independent  -LR     Gait Training Goal PT LTG, Assist Device  walker, standard  -LR     Gait Training Goal PT LTG, Distance to Achieve  200 feet  -LR     Gait Training Goal PT LTG, Date Goal Reviewed 08/01/17  -MJ 07/31/17  -LR     Gait Training Goal PT LTG, Outcome goal ongoing  -MJ goal ongoing  -LR goal ongoing  -EH      07/28/17 1100 07/27/17 1100 07/27/17 1057    Bed Mobility PT LTG    Bed Mobility PT LTG, Outcome   goal ongoing  -MB    Transfer Training PT LTG    Transfer Training PT LTG, Outcome   goal ongoing  -MB    Gait Training PT LTG    Gait Training Goal PT LTG, Outcome   goal ongoing  -MB    Wound Care PT LTG    Wound Care PT LTG 1, Outcome goal ongoing  -MC goal partially met  -MF       07/23/17 1106 07/20/17 1540 07/20/17 1145    Bed Mobility PT LTG    Bed Mobility PT LTG, Outcome goal ongoing  -UD  goal ongoing  -UD    Transfer Training PT LTG    Transfer Training PT LTG, Outcome goal ongoing  -UD  goal ongoing  -UD    Gait Training PT LTG    Gait Training Goal PT LTG, Outcome goal ongoing  -UD  goal ongoing  -UD    Wound Care PT LTG    Wound Care PT LTG 1, Outcome  goal ongoing  -MC       07/19/17 1045          Wound Care PT LTG    Wound Care PT LTG 1, Outcome goal ongoing  -MC        User Key  (r) = Recorded By, (t) = Taken By, (c) = Cosigned By    Initials Name Provider Type    UD Mary Kay Alcocer, PTA Physical Therapy Assistant    LITO Mckeon, PT Physical Therapist    EH Chantell Nazario, PT Physical Therapist    LR Yuridia Rees, PT Physical Therapist    MB Nicole Camejo, PT Physical Therapist    MC Maddison Gamble, PT Physical Therapist    ECHO Geller, PT Physical Therapist          Physical Therapy Education     Title: PT OT SLP Therapies (Done)     Topic: Physical Therapy (Done)     Point: Mobility training (Done)    Learning Progress Summary    Learner Readiness Method Response Comment Documented  by Status   Patient Acceptance E,D VU,NR   08/01/17 1028 Done    Acceptance E,D VU,NR Educated on correct gait mechanics.  07/31/17 1625 Done    Acceptance E,D VU,NR HEP, fall precautions MB 07/27/17 1057 Done    Acceptance E,TB,D VU,DU,NR   07/26/17 0151 Done    Eager E VU,DU reviewed benefits fo activity SC 07/25/17 1537 Done    Acceptance E,TB,D VU,DU,NR   07/25/17 0520 Done    Acceptance E,TB,D VU,DU,NR   07/24/17 0421 Done    Acceptance E,D DU,NR   07/23/17 1106 Done    Eager E VU  SC 07/21/17 1620 Done    Acceptance E,D DU,NR   07/20/17 1145 Done    Acceptance E NR   07/18/17 1200 Active    Eager E,D NR   07/17/17 1722 Active               Point: Home exercise program (Done)    Learning Progress Summary    Learner Readiness Method Response Comment Documented by Status   Patient Acceptance E,D VU,NR   08/01/17 1028 Done    Acceptance E,D VU,NR Educated on correct gait mechanics.  07/31/17 1625 Done    Acceptance E,D VU,NR HEP, fall precautions MB 07/27/17 1057 Done    Acceptance E,TB,D VU,DU,NR   07/26/17 0151 Done    Eager E VU,DU reviewed benefits fo activity SC 07/25/17 1537 Done    Acceptance E,TB,D VU,DU,NR   07/25/17 0520 Done    Acceptance E,TB,D VU,DU,NR   07/24/17 0421 Done    Acceptance E,D DU,NR   07/23/17 1106 Done    Eager E VU  SC 07/21/17 1620 Done    Acceptance E,D DU,NR   07/20/17 1145 Done    Acceptance E NR   07/18/17 1200 Active    Eager E,D NR   07/17/17 1722 Active               Point: Body mechanics (Done)    Learning Progress Summary    Learner Readiness Method Response Comment Documented by Status   Patient Acceptance E,D VU,NR   08/01/17 1028 Done    Acceptance E,D VU,NR Educated on correct gait mechanics.  07/31/17 1625 Done    Acceptance E,D VU,NR HEP, fall precautions MB 07/27/17 1057 Done    Acceptance ANA SANTOS D VU, DU,JAY   07/26/17 0151 Done    Eager KAROLINE ROBLEDO reviewed benefits fo activity SC 07/25/17 1537 Done    Acceptance ANA SANTOS D VU, DU,JAY    07/25/17 0520 Done    Acceptance E,TB,D VU,DU,NR   07/24/17 0421 Done    Acceptance E,D DU,NR   07/23/17 1106 Done    Eager E VU  SC 07/21/17 1620 Done    Acceptance E,D DU,NR   07/20/17 1145 Done    Acceptance E NR   07/18/17 1200 Active    Eager E,D NR   07/17/17 1722 Active               Point: Precautions (Done)    Learning Progress Summary    Learner Readiness Method Response Comment Documented by Status   Patient Acceptance E,D VU,NR   08/01/17 1028 Done    Acceptance E,D VU,NR Educated on correct gait mechanics.  07/31/17 1625 Done    Acceptance E,D VU,NR HEP, fall precautions MB 07/27/17 1057 Done    Acceptance E,TB,D VU,DU,NR   07/26/17 0151 Done    Eager E VU,DU reviewed benefits fo activity SC 07/25/17 1537 Done    Acceptance E,TB,D VU,DU,NR   07/25/17 0520 Done    Acceptance E,TB,D VU,DU,NR   07/24/17 0421 Done    Acceptance E,D DU,NR   07/23/17 1106 Done    Eager E VU  SC 07/21/17 1620 Done    Acceptance E,D DU,NR   07/20/17 1145 Done    Acceptance E NR   07/18/17 1200 Active    Eager E,D NR   07/17/17 1722 Active                      User Key     Initials Effective Dates Name Provider Type Discipline    SC 06/19/15 -  Oscar Alexis, PT Physical Therapist PT     06/22/15 -  Mary Kay Alcocer, PTA Physical Therapy Assistant PT     06/19/15 -  Keyona Hawk, PT Physical Therapist PT     06/19/15 -  Noa Pope, PT Physical Therapist PT     06/19/15 -  Yuridia Rees, PT Physical Therapist PT    MB 03/14/16 -  Nicole Camejo, PT Physical Therapist PT     03/14/16 -  Eladio Geller, PT Physical Therapist PT     08/22/16 -  Mercedes Silverio, RN Registered Nurse Nurse                    PT Recommendation and Plan  Anticipated Discharge Disposition: home with home health  PT Frequency: daily  Plan of Care Review  Plan Of Care Reviewed With: patient  Progress: progress toward functional goals is gradual  Outcome Summary/Follow up Plan: Pt tolerated transfer from bed  to chair with CGA and RW, refused gait this date. Pt continues to be limited by fatigue and weakness. Will continue to progress mobility as able          Outcome Measures       08/01/17 0922 07/31/17 1540       How much help from another person do you currently need...    Turning from your back to your side while in flat bed without using bedrails? 3  -MJ 4  -LR     Moving from lying on back to sitting on the side of a flat bed without bedrails? 3  -MJ 3  -LR     Moving to and from a bed to a chair (including a wheelchair)? 3  -MJ 3  -LR     Standing up from a chair using your arms (e.g., wheelchair, bedside chair)? 3  -MJ 3  -LR     Climbing 3-5 steps with a railing? 2  -MJ 2  -LR     To walk in hospital room? 3  -MJ 3  -LR     AM-PAC 6 Clicks Score 17  -MJ 18  -LR     Functional Assessment    Outcome Measure Options AM-PAC 6 Clicks Basic Mobility (PT)  -MJ AM-PAC 6 Clicks Basic Mobility (PT)  -LR       User Key  (r) = Recorded By, (t) = Taken By, (c) = Cosigned By    Initials Name Provider Type    LR Yuridia Rees, PT Physical Therapist    ECHO Geller PT Physical Therapist           Time Calculation:         PT Charges       08/01/17 1030          Time Calculation    Start Time 0922  -MJ      PT Received On 08/01/17  -      PT Goal Re-Cert Due Date 08/06/17  -      Time Calculation- PT    Total Timed Code Minutes- PT 23 minute(s)  -        User Key  (r) = Recorded By, (t) = Taken By, (c) = Cosigned By    Initials Name Provider Type    ECHO Geller PT Physical Therapist          Therapy Charges for Today     Code Description Service Date Service Provider Modifiers Qty    22117830404 HC PT THER PROC EA 15 MIN 8/1/2017 Eladio Geller PT GP 2          PT G-Codes  PT Professional Judgement Used?: Yes  Outcome Measure Options: AM-PAC 6 Clicks Basic Mobility (PT)  Score: 17  Functional Limitation: Mobility: Walking and moving around  Mobility: Walking and Moving Around Current Status (): At least  40 percent but less than 60 percent impaired, limited or restricted  Mobility: Walking and Moving Around Goal Status (): At least 20 percent but less than 40 percent impaired, limited or restricted    Eladio Geller, PT  8/1/2017

## 2017-08-01 NOTE — PLAN OF CARE
Problem: Patient Care Overview (Adult)  Goal: Plan of Care Review  Outcome: Ongoing (interventions implemented as appropriate)    08/01/17 1028   Patient Care Overview   Progress progress toward functional goals is gradual   Coping/Psychosocial Response Interventions   Plan Of Care Reviewed With patient   Outcome Evaluation   Outcome Summary/Follow up Plan Pt tolerated transfer from bed to chair with CGA and RW, refused gait this date. Pt continues to be limited by fatigue and weakness. Will continue to progress mobility as able         Problem: Inpatient Physical Therapy  Goal: Bed Mobility Goal LTG- PT  Outcome: Ongoing (interventions implemented as appropriate)    07/31/17 1540 08/01/17 1028   Bed Mobility PT LTG   Bed Mobility PT LTG, Date Established 07/17/17 --    Bed Mobility PT LTG, Time to Achieve 1 wk --    Bed Mobility PT LTG, Activity Type all bed mobility --    Bed Mobility PT LTG, [place] Level independent --    Bed Mobility PT LTG, Date Goal Reviewed --  08/01/17   Bed Mobility PT LTG, Outcome --  goal ongoing       Goal: Transfer Training Goal 1 LTG- PT  Outcome: Ongoing (interventions implemented as appropriate)    07/31/17 1540 08/01/17 1028   Transfer Training PT LTG   Transfer Training PT LTG, Date Established 07/17/17 --    Transfer Training PT LTG, Time to Achieve 1 wk --    Transfer Training PT LTG, Activity Type bed to chair /chair to bed;sit to stand/stand to sit --    Transfer Training PT LTG, [place] Level independent --    Transfer Training PT LTG, Assist Device walker, standard --    Transfer Training PT LTG, Date Goal Reviewed --  08/01/17   Transfer Training PT LTG, Outcome --  goal ongoing       Goal: Gait Training Goal LTG- PT  Outcome: Ongoing (interventions implemented as appropriate)    07/31/17 1540 08/01/17 1028   Gait Training PT LTG   Gait Training Goal PT LTG, Date Established 07/17/16 --    Gait Training Goal PT LTG, Time to Achieve 1 wk --    Gait Training Goal PT  LTG, [place] Level independent --    Gait Training Goal PT LTG, Assist Device walker, standard --    Gait Training Goal PT LTG, Distance to Achieve 200 feet --    Gait Training Goal PT LTG, Date Goal Reviewed --  08/01/17   Gait Training Goal PT LTG, Outcome --  goal ongoing

## 2017-08-01 NOTE — PROGRESS NOTES
"Pharmacy Consult  -  Warfarin    Bjorn Pollock is a 84 yo man admitted 7/17/17 for cough and dyspnea. He is on warfarin for chronic atrial fibrillation.  Pharmacy Service was asked to dose and monitor his warfarin therapy during this hospitalization.    Height - 72\" (182.9 cm)  Weight - 235 lb 1.6 oz (107 kg)    Consulting Provider: - Hospitalist group   Indication: - Afib  Goal INR: -  2-3  Home Regimen:                        - 2 mg daily                         - 14 mg weekly      Bridge Therapy: No     Drug-Drug Interactions with current regimen:                        ASA - increased risk of bleed    Doxycycline - may increase warfarin sensitivity                 Warfarin Dosing During Admission:     Date  7/16 7/17 7/18 7/19 7/20 7/21 7/22 7/23 7/24   INR  2.96 2.78 2.9 2.7 2.69 2.58 2.25 1.77 1.66   Dose  (home) 2mg 2mg 2mg 2mg  2mg 2mg 3mg 3mg      Date  7/25 7/26 7/27 7/28 7/29 7/30 7/31 8/1     INR  1.64 1.64  1.60  1.65  1.87  1.85  2.16  2.08     Dose  3mg 4mg  4 mg  5 mg  4mg  5mg  2 mg  4 mg        Labs:      Results from last 7 days  Lab Units 07/28/17  0510 07/27/17  0400 07/26/17  0411 07/26/17  0020 07/25/17  0339 07/24/17  0423 07/23/17  0510 07/22/17  0500   INR  1.65 1.60 1.64  --  1.64 1.66 1.77 2.25   HEMOGLOBIN g/dL  --   --  12.4* 13.0* 12.8*  --   --   --    HEMATOCRIT %  --   --  40.4 43.0 40.3  --   --   --    PLATELETS 10*3/mm3  --   --  80* 81* 88*  --   --   --        Results from last 7 days  Lab Units 07/26/17  0411 07/26/17  0020 07/22/17  0500   SODIUM mmol/L 136 137 143   POTASSIUM mmol/L 4.2 4.2 4.4   CHLORIDE mmol/L 97* 98* 105   CO2 mmol/L 33.0* 31.0 31.0   BUN mg/dL 35* 35* 41*   CREATININE mg/dL 0.90 1.00 1.00   CALCIUM mg/dL 9.0 9.2 9.0   BILIRUBIN mg/dL 1.7* 1.3*  --    ALK PHOS U/L 72 73  --    ALT (SGPT) U/L 32 32  --    AST (SGOT) U/L 26 25  --    GLUCOSE mg/dL 154* 154* 107*       Current dietary intake: 100% meals (regular)  Diet Order   Procedures   • Diet " Regular; Cardiac, Consistent Carbohydrate         Assessment/Plan:     INR remains therapeutic today, although slightly decreased after dose reduction of warfarin.  Will increase back to warfarin 4 mg daily for now and continue to follow daily INR.    Thank you for this consult,  Claudia Ulrcih, PharmD  08/01/17  10:22 AM

## 2017-08-01 NOTE — PROGRESS NOTES
"      HOSPITALIST DAILY PROGRESS NOTE    Chief Complaint: weakness, cough    Subjective   SUBJECTIVE/OVERNIGHT EVENTS   Still with cough, but difficulty with sputum. Significant CORTEZ. Very tired. No n/v. No diarrhea. R elbow hurts, but better.    Review of Systems:  Gen-no fevers, no chills  CV-no chest pain, no palpitations  Resp- +cough, dyspnea on exertion  GI-no N/V/D, no abd pain  Neuro - + confusion  MS - right elbow pain    Objective   OBJECTIVE   I have reviewed the vital signs.  /66  Pulse 69  Temp 97.4 °F (36.3 °C) (Oral)   Resp 16  Ht 72\" (182.9 cm)  Wt 232 lb (105 kg)  SpO2 92%  BMI 31.46 kg/m2  Physical Exam:  General: acute and chronically ill appearing, weak, up in chair  Head: Normocephalic atraumatic  Eyes: PERRLA, EOMI, nonicteric, conjunctiva normal  Cardiovascular: RRR  no M/R/G +1 DP pulses bilaterally  Respiratory: L rhonchi, no distress  Abdomen: Obese, soft, nontender, nondistended,  positive bowel sounds in all 4 quadrants   Extremities: R elbow with tr edema, tender, but not warm  Skin: Pink/warm/dry.  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  No focal deficits  Psych: Patient is pleasant and cooperative.  Normal affect.  Negative suicidal ideation or homicidal ideation.    Results:  I have reviewed the labs, culture data, radiology results, and diagnostic studies.      Results from last 7 days  Lab Units 07/26/17  0411 07/26/17  0020   WBC 10*3/mm3 10.57 9.69   HEMOGLOBIN g/dL 12.4* 13.0*   HEMATOCRIT % 40.4 43.0   PLATELETS 10*3/mm3 80* 81*       Results from last 7 days  Lab Units 07/26/17  0411   SODIUM mmol/L 136   POTASSIUM mmol/L 4.2   CHLORIDE mmol/L 97*   CO2 mmol/L 33.0*   BUN mg/dL 35*   CREATININE mg/dL 0.90   GLUCOSE mg/dL 154*   CALCIUM mg/dL 9.0     Sputum cultures - proteus - resistant to levaquin    CXR - 7/25 CMG, increased vascularity    I have reviewed the medications.    Assessment/Plan   ASSESSMENT/PLAN    Principal " Problem:    Pneumonia due to infectious organism  Active Problems:    DM type 2 (diabetes mellitus, type 2)    COPD (chronic obstructive pulmonary disease)    Atrial fibrillation    Reactive airway disease with wheezing    Urinary retention    Olecranon bursitis of right elbow    83 year old male presenting from home with SOA and cough with right lower lobe bacterial pneumonia.       Plan:  -- Sputum with proteus, discontinue antibiotics after tomorrow    --Patient continuing to have urinary retention, home with stewart per urology, on Flomax    - has new right elbow bursitis, s/p aspiration, Dr. Gayle follows    Plan;  -cont current care.  -labs in am.    Aftab Loco MD  08/01/17  12:55 PM

## 2017-08-01 NOTE — PLAN OF CARE
Problem: Patient Care Overview (Adult)  Goal: Plan of Care Review  Outcome: Ongoing (interventions implemented as appropriate)  Goal: Adult Individualization and Mutuality  Outcome: Ongoing (interventions implemented as appropriate)  Goal: Discharge Needs Assessment  Outcome: Ongoing (interventions implemented as appropriate)    Problem: Fall Risk (Adult)  Goal: Absence of Falls  Outcome: Ongoing (interventions implemented as appropriate)    Problem: Skin Integrity Impairment, Risk/Actual (Adult)  Goal: Skin Integrity/Wound Healing  Outcome: Ongoing (interventions implemented as appropriate)    Problem: Pain, Acute (Adult)  Goal: Identify Related Risk Factors and Signs and Symptoms  Outcome: Ongoing (interventions implemented as appropriate)  Goal: Acceptable Pain Control/Comfort Level  Outcome: Ongoing (interventions implemented as appropriate)

## 2017-08-01 NOTE — PLAN OF CARE
Problem: Patient Care Overview (Adult)  Goal: Plan of Care Review  Outcome: Ongoing (interventions implemented as appropriate)    08/01/17 0930   Coping/Psychosocial Response Interventions   Plan Of Care Reviewed With patient   Outcome Evaluation   Outcome Summary/Follow up Plan Pt cont to have moderate pain in LEs and sensitivity limiting compression wraps. PT will cont to hold compression wraps at this point.          Problem: Inpatient Physical Therapy  Goal: Wound Care Goal 1 LTG- PT  Outcome: Ongoing (interventions implemented as appropriate)    07/17/17 1500 07/28/17 1100   Wound Care PT LTG   Wound Care PT LTG 1, Date Established 07/17/17 --    Wound Care PT LTG 1, Time to Achieve other (see comments)  (10 days) --    Wound Care PT LTG 1, Location BLEs --    Wound Care PT LTG 1, No S&S of Infection yes --    Wound Care PT LTG 1, No Skin Break Down yes --    Wound Care PT LTG 1, No New Skin Break Down yes --    Wound Care PT LTG 1, Education edema management --    Wound Care PT LTG 1, Education Understanding demonstrate adequately --    Wound Care PT LTG 1, Outcome --  goal ongoing

## 2017-08-01 NOTE — PROGRESS NOTES
"Orthopaedic Progress Note     LOS: 12 days   Patient Care Team:  Vincent Mccullough MD as PCP - General  Vincent Mccullough MD as PCP - Family Medicine    Chief Complaint:   Pneumonia due to infectious organism    Subjective     Interval History:    No new complaints when seen earlier this afternoon.    Objective     Vital Signs  Temp (24hrs), Av.4 °F (36.3 °C), Min:97.4 °F (36.3 °C), Max:97.4 °F (36.3 °C)      /66  Pulse 69  Temp 97.4 °F (36.3 °C) (Oral)   Resp 16  Ht 72\" (182.9 cm)  Wt 232 lb (105 kg)  SpO2 92%  BMI 31.46 kg/m2    Physical Exam:   Examination the right elbow: Minimal swelling in the olecranon bursa, with mild erythema. Full range of motion of the elbow, with 0° of extension, 140° of flexion, and full pronation and supination.  Sensation and motor is intact in the hand.    Labs:    Results from last 7 days  Lab Units 17  0411   WBC 10*3/mm3 10.57   RBC 10*6/mm3 4.42   HEMOGLOBIN g/dL 12.4*   HEMATOCRIT % 40.4   PLATELETS 10*3/mm3 80*       PT:  Gait, Distance (Feet): 40     Results Review:     I reviewed the patient's new clinical results.      Assessment/Plan      Olecranon bursitis right elbow.  Cultures:Scant growth (1+) Corynebacterium species (A).  Currently on antibiotics.  Continue with conservative treatment.    Principal Problem:    Pneumonia due to infectious organism  Active Problems:    DM type 2 (diabetes mellitus, type 2)    COPD (chronic obstructive pulmonary disease)    Atrial fibrillation    Reactive airway disease with wheezing    Urinary retention    Olecranon bursitis of right elbow      Plan for disposition: Per hospitalist team.    Aly Gayle MD  17  6:26 PM  "

## 2017-08-02 NOTE — PROGRESS NOTES
Continued Stay Note  [facility]     Patient Name: Bjorn Pollock  MRN: [id number]  Today's Date: 8/2/2017    Admit Date: 7/16/2017          Discharge Plan       08/02/17 0755    Case Management/Social Work Plan    Plan Home    Patient/Family In Agreement With Plan yes    Additional Comments Plan remains home with Wilmer .   continuing to follow for other d/c needs.  [facility] [phone]              Discharge Codes     None        Expected Discharge Date and Time     Expected Discharge Date Expected Discharge Time    Aug 4, 2017             LONNIE Block

## 2017-08-02 NOTE — PLAN OF CARE
Problem: Patient Care Overview (Adult)  Goal: Plan of Care Review  Outcome: Ongoing (interventions implemented as appropriate)    08/02/17 0437   Patient Care Overview   Progress no change   Coping/Psychosocial Response Interventions   Plan Of Care Reviewed With patient   Outcome Evaluation   Outcome Summary/Follow up Plan VSS, pt tolerated transfer from chair to bed. c/o pain in shoulder, medicated per orders. Rested well most of the night. No further issues or concerns.        Goal: Discharge Needs Assessment  Outcome: Ongoing (interventions implemented as appropriate)    Problem: Fall Risk (Adult)  Goal: Absence of Falls  Outcome: Ongoing (interventions implemented as appropriate)    Problem: Skin Integrity Impairment, Risk/Actual (Adult)  Goal: Skin Integrity/Wound Healing  Outcome: Ongoing (interventions implemented as appropriate)    Problem: Pain, Acute (Adult)  Goal: Identify Related Risk Factors and Signs and Symptoms  Outcome: Outcome(s) achieved Date Met:  08/02/17  Goal: Acceptable Pain Control/Comfort Level  Outcome: Ongoing (interventions implemented as appropriate)

## 2017-08-02 NOTE — PLAN OF CARE
Problem: Patient Care Overview (Adult)  Goal: Plan of Care Review  Outcome: Ongoing (interventions implemented as appropriate)    08/02/17 0437 08/02/17 1615   Patient Care Overview   Progress no change --    Coping/Psychosocial Response Interventions   Plan Of Care Reviewed With --  patient   Outcome Evaluation   Outcome Summary/Follow up Plan VSS, pt tolerated transfer from chair to bed. c/o pain in shoulder, medicated per orders. Rested well most of the night. No further issues or concerns.  --        Goal: Adult Individualization and Mutuality    07/26/17 0149   Individualization   Patient Specific Interventions Pt became confused overnight. Will continue to monitor pt.        Goal: Discharge Needs Assessment  Outcome: Ongoing (interventions implemented as appropriate)    07/17/17 1621 07/21/17 1617   Discharge Needs Assessment   Concerns To Be Addressed --  denies needs/concerns at this time   Readmission Within The Last 30 Days --  no previous admission in last 30 days   Equipment Needed After Discharge grab bar;power chair;commode;walker, rolling --    Discharge Facility/Level Of Care Needs home with home health --    Discharge Disposition still a patient --    Discharge Planning Comments Pt has [employer] [employer] insurance and denies recent changes in insurance. Pt states his prescriptions are affordable and pt uses [facility] and denies difficulty obtaining meds. Pt goal is to return home with spouse when medically ready for discharge. Pt currently has [facility] for skilled nursing and PT. CM spoke with Margot at [facility]( [phone]) and confirmed pt is current with [facility] . CM will need to notify [facility] upon discharge. If pt remains in observation, will not need new  order. CM will cont to follow. --    Current Health   Outpatient/Agency/Support Group Needs homecare agency (specify level of care)  (Currently has [facility]  for skilled nursing and PT) --    Anticipated Changes Related to  Illness other (see comments);inability to care for someone else  (Pt states plan to go home when medically ready for discharge. Plans to cont to use [facility]) --    Living Environment   Transportation Available car --    Self-Care   Equipment Currently Used at Home bath bench;commode;grab bar;power chair, (recliner lift);walker, rolling --          Problem: Fall Risk (Adult)  Goal: Absence of Falls  Outcome: Ongoing (interventions implemented as appropriate)    08/02/17 1802   Fall Risk (Adult)   Absence of Falls making progress toward outcome         Problem: Skin Integrity Impairment, Risk/Actual (Adult)  Goal: Skin Integrity/Wound Healing  Outcome: Ongoing (interventions implemented as appropriate)    08/02/17 1802   Skin Integrity Impairment, Risk/Actual (Adult)   Skin Integrity/Wound Healing making progress toward outcome         Problem: Pain, Acute (Adult)  Goal: Acceptable Pain Control/Comfort Level  Outcome: Ongoing (interventions implemented as appropriate)    08/02/17 1802   Pain, Acute (Adult)   Acceptable Pain Control/Comfort Level making progress toward outcome

## 2017-08-02 NOTE — THERAPY TREATMENT NOTE
Acute Care - Physical Therapy Treatment Note  [facility]     Patient Name: Bjorn Pollock  : 1934  MRN: [id number]  Today's Date: 2017  Onset of Illness/Injury or Date of Surgery Date: 17  Date of Referral to PT: 17  Referring Physician: MD KAYODE (MOBILITY order)    Admit Date: 2017    Visit Dx:    ICD-10-CM ICD-9-CM   1. Reactive airway disease with wheezing, severe persistent, with acute exacerbation J45.51 493.92   2. Fatigue, unspecified type R53.83 780.79   3. Bronchitis J40 490   4. Impaired functional mobility, balance, gait, and endurance Z74.09 V49.89     Patient Active Problem List   Diagnosis   • Intractable low back pain   • HTN (hypertension)   • DM type 2 (diabetes mellitus, type 2)   • Intertriginous candidiasis   • COPD (chronic obstructive pulmonary disease)   • NAYLA (obstructive sleep apnea)   • Atrial fibrillation   • Supratherapeutic INR   • CKD (chronic kidney disease)   • Diastolic heart failure secondary to hypertension   • Anemia   • DJD (degenerative joint disease)   • CAD (coronary artery disease) s/p CABG history    • Crawley's esophagus   • Chronic thrombocytopenic purpura   • Cerebrovascular accident   • NAYLA on CPAP   • Obesity (BMI 30-39.9)   • Dyslipidemia   • BPH (benign prostatic hyperplasia)   • Lower extremity edema   • Falls   • Sepsis due to urinary tract infection   • Reactive airway disease with wheezing   • Constipation   • Pneumonia due to infectious organism   • Urinary retention   • Olecranon bursitis of right elbow               Adult Rehabilitation Note       17 1600 17 0930 17 0922    Rehab Assessment/Intervention    Discipline physical therapist  -DM physical therapist  -MF physical therapist  -    Document Type therapy note (daily note)  -DM therapy note (daily note)  -MF therapy note (daily note)  -MJ    Subjective Information agree to therapy;complains of;weakness;fatigue;pain   legs weaker  today;3tries(family/eating, present,fatig)  -DM agree to therapy;complains of;weakness;fatigue;pain  -MF agree to therapy;complains of;pain  -MJ    Patient Effort, Rehab Treatment good  -DM  adequate  -MJ    Symptoms Noted During/After Treatment fatigue;increased pain;shortness of breath;significant change in vital signs  -DM  fatigue;shortness of breath  -MJ    Precautions/Limitations fall precautions;other (see comments)   BURSITIS R elbow(ABX)  -DM  fall precautions  -MJ    Precautions/Limitations, Vision WFL  -DM      Precautions/Limitations, Hearing hearing impairment, bilaterally  -DM      Recorded by [DM] Noa Pope, PT [MF] Aly Mckeon, PT [MJ] Eladio Geller, PT    Vital Signs    Pre Systolic BP Rehab 117  -DM  125  -MJ    Pre Treatment Diastolic BP 71  -DM  66  -MJ    Post Systolic BP Rehab 146  -DM      Post Treatment Diastolic BP 72  -DM      Pretreatment Heart Rate (beats/min) 89  -DM  90  -MJ    Intratreatment Heart Rate (beats/min) 94   IRREG.  -DM      Posttreatment Heart Rate (beats/min) 92  -DM  105  -MJ    Pre SpO2 (%) 93  -DM  93  -MJ    O2 Delivery Pre Treatment room air  -DM  room air  -MJ    Intra SpO2 (%) 87  -DM      O2 Delivery Intra Treatment room air  -DM      Post SpO2 (%) 92  -DM  94  -MJ    O2 Delivery Post Treatment room air  -DM  room air  -MJ    Pre Patient Position Supine  -DM  Supine  -MJ    Intra Patient Position Standing  -DM      Post Patient Position Sitting  -DM  Sitting  -MJ    Recorded by [DM] Noa Pope, PT  [MJ] Eladio Geller, PT    Pain Assessment    Pain Assessment 0-10  -DM Arrington-Moon FACES  -MF Arrington-Moon FACES  -MJ    Arrington-Moon FACES Pain Rating  6  -MF 4  -MJ    Pain Score 7  -DM      Post Pain Score 8  -DM      Pain Type Acute pain  -DM  Acute pain  -MJ    Pain Location Generalized  -DM Generalized  -MF Generalized  -MJ    Pain Orientation Left;Right   michelle.B LE'S,back  -DM      Pain Descriptors Aching  -DM      Pain Frequency Constant/continuous   "-DM      Patient's Stated Pain Goal No pain  -DM      Pain Intervention(s) Repositioned;Ambulation/increased activity  -DM Repositioned  - Repositioned;Ambulation/increased activity  -    Response to Interventions tolerated  -DM      Recorded by [DM] Noa Pope, PT [] Aly Mckeon, PT [MJ] Eladio Geller, PT    Vision Assessment/Intervention    Visual Impairment WFL with corrective lenses  -DM      Recorded by [DM] Noa Pope, PT      Cognitive Assessment/Intervention    Current Cognitive/Communication Assessment functional  -DM  functional  -    Orientation Status oriented x 4  -DM  oriented x 4;required verbal cueing (specifiy in comments)  -MJ    Follows Commands/Answers Questions 100% of the time;able to follow single-step instructions;needs cueing;needs increased time;needs repetition  -DM  100% of the time;able to follow single-step instructions;needs cueing;needs increased time;needs repetition  -    Personal Safety mild impairment;decreased awareness, need for assist;decreased awareness, need for safety;decreased insight to deficits  -DM  mild impairment  -    Personal Safety Interventions fall prevention program maintained;gait belt;nonskid shoes/slippers when out of bed  -DM      Recorded by [DM] Noa Pope, PT  [MJ] Eladio Geller, PT    Bed Mobility, Assessment/Treatment    Bed Mobility, Assistive Device bed rails;head of bed elevated  -DM  bed rails;head of bed elevated  -    Bed Mobility, Roll Right, [place] conditional independence  -DM      Bed Mob, Supine to Sit, [place] conditional independence   \"let me do it\" (slow transit.mvmts;moaning throughout)  -DM  minimum assist (75% patient effort);verbal cues required  -    Bed Mob, Sit to Supine, [place]   not tested   Pt UIC  -    Bed Mobility, Safety Issues decreased use of arms for pushing/pulling;decreased use of legs for bridging/pushing  -DM  decreased use of arms for pushing/pulling;decreased use of " "legs for bridging/pushing  -MJ    Bed Mobility, Impairments strength decreased;impaired balance;pain  -DM  strength decreased;pain  -MJ    Bed Mobility, Comment cues for hand placement  -DM  Verbal cues for sequencing, assist with trunk. Increased time and effort to perform. Pt required increased time sitting EOB before standing due to fatigue/SOA  -MJ    Recorded by [DM] Noa Pope, PT  [MJ] Eladio Geller, PT    Transfer Assessment/Treatment    Transfers, Bed-Chair [place]   contact guard assist;verbal cues required  -MJ    Transfers, Bed-Chair-Bed, Assist Device   rolling walker  -MJ    Transfers, Sit-Stand [place] moderate assist (50% patient effort);verbal cues required  -DM  minimum assist (75% patient effort);verbal cues required  -MJ    Transfers, Stand-Sit [place] moderate assist (50% patient effort);verbal cues required  -DM  minimum assist (75% patient effort);verbal cues required  -MJ    Transfers, Sit-Stand-Sit, Assist Device rolling walker;elevated surface  -DM  rolling walker  -MJ    Transfer, Safety Issues sequencing ability decreased;loses balance backward;weight-shifting ability decreased;knees buckling  -DM  sequencing ability decreased;step length decreased  -MJ    Transfer, Impairments strength decreased;impaired balance;pain  -DM  strength decreased;impaired balance;pain  -MJ    Transfer, Comment cues for hand placement;\"let me take my time;SOB\"   2 trials; mod.SOB/moaning;5 Min. rest btwn  -DM  Verbal cues for correct hand placement. Assisted pt from bed to chair, cues for sequencing with RW.   -MJ    Recorded by [DM] Noa Pope, PT  [MJ] Eladio Geller, PT    Gait Assessment/Treatment    Gait, [place] Level contact guard assist;verbal cues required  -DM  not tested  -MJ    Gait, Assistive Device rolling walker  -DM      Gait, Distance (Feet) 25  -DM      Gait, Gait Pattern Analysis swing-through gait  -DM      Gait, Gait Deviations bilateral:;antalgic;nagi " decreased;decreased heel strike;forward flexed posture;step length decreased;weight-shifting ability decreased  -DM      Gait, Safety Issues sequencing ability decreased;step length decreased;weight-shifting ability decreased  -DM      Gait, Impairments strength decreased;impaired balance;pain   mod.SOB;grimacing w/ pain; tech brought chair; rolled to BS  -DM      Gait, Comment CUES for trunk ext,incr.step length,PLB  -DM  Pt refused  -MJ    Recorded by [DM] Noa Pope, PT  [MJ] Eladio Geller, PT    Motor Skills/Interventions    Additional Documentation Balance Skills Training (Group)  -DM      Recorded by [DM] Noa Pope PT      Balance Skills Training    Sitting-Level of Assistance Close supervision  -DM      Sitting-Balance Support Feet supported  -DM      Sitting-Balance Activities Trunk control activities  -DM      Sitting # of Minutes 6  -DM      Standing-Level of Assistance Minimum assistance  -DM      Static Standing Balance Support assistive device  -DM      Standing-Balance Activities Weight Shift A-P  -DM      Standing Balance # of Minutes 1  -DM      Gait Balance-Level of Assistance Minimum assistance  -DM      Gait Balance Support assistive device  -DM      Gait Balance Activities side-stepping;scanning environment R/L  -DM      Recorded by [DM] Noa Pope, PT      Therapy Exercises    Bilateral Lower Extremities AROM:;AAROM:;10 reps;sitting;ankle pumps/circles;glut sets;heel slides;hip abduction/adduction;hip ER;hip flexion;hip IR;LAQ;quad sets;SAQ   HS sets;BKFO  -DM  AROM:;10 reps;ankle pumps/circles;quad sets;SLR;heel slides;hip abduction/adduction   Pt required rest breaks between each exercise  -MJ    Bilateral Upper Extremity AROM:;10 reps;sitting;elbow flexion/extension;shoulder abduction/adduction;shoulder extension/flexion  -DM      Recorded by [DM] Noa Pope, PT  [MJ] Eladio Geller, PT    Positioning and Restraints    Pre-Treatment Position in bed  -DM sitting in  chair/recliner  - in bed  -MJ    Post Treatment Position chair  -DM chair  - chair  -MJ    In Chair notified nsg;reclined;call light within reach;encouraged to call for assist;exit alarm on;RUE elevated;LUE elevated;waffle cushion;legs elevated   Wanting to converse at length about 's visit  -DM reclined;call light within reach  - notified nsg;reclined;call light within reach;encouraged to call for assist;exit alarm on  -    Recorded by [DM] Noa Pope, PT [] Aly Mckeon, PT [] Eladio Geller, PT      07/31/17 7650          Rehab Assessment/Intervention    Discipline physical therapist  -LR      Document Type therapy note (daily note)  -LR      Subjective Information agree to therapy;complains of;weakness;fatigue;pain   c/o R elbow pain and discomfort from compressogrip on LEs.   -LR      Patient Effort, Rehab Treatment good  -LR      Symptoms Noted During/After Treatment fatigue;shortness of breath  -LR      Symptoms Noted Comment Notified RN. Drainage from IV site. Notified RN.   -LR      Precautions/Limitations fall precautions  -LR      Specific Treatment Considerations Spoke with wound care PT Rebecca who instructed to remove compression from LEs d/t patient's c/o discomfort and wound care PT will address tomorrow. Notified RN.   -LR      Recorded by [LR] Yuridia Rees, PT      Vital Signs    Posttreatment Heart Rate (beats/min) 92  -LR      Post SpO2 (%) 94  -LR      O2 Delivery Post Treatment room air  -LR      Post Patient Position Sitting  -LR      Recorded by [LR] Yuridia eRes, PT      Pain Assessment    Pain Assessment Arrington-Moon FACES  -LR      Arrington-Moon FACES Pain Rating 4  -LR      Pain Type Acute pain  -LR      Pain Location Elbow  -LR      Pain Orientation Right  -LR      Pain Intervention(s) Repositioned;Ambulation/increased activity  -LR      Recorded by [LR] Yuridia Rees, PT      Cognitive Assessment/Intervention    Current  Cognitive/Communication Assessment functional  -LR      Orientation Status oriented x 4;required verbal cueing (specifiy in comments)  -LR      Follows Commands/Answers Questions 100% of the time;able to follow single-step instructions;needs cueing;needs repetition  -LR      Personal Safety mild impairment;at risk behaviors demonstrated  -LR      Recorded by [LR] Yuridia Rees, PT      Bed Mobility, Assessment/Treatment    Bed Mob, Supine to Sit, [place] not tested   UIC on arrival.   -LR      Bed Mob, Sit to Supine, [place] not tested   UIC at end of treatment.   -LR      Recorded by [LR] Yuridia Rees, PT      Transfer Assessment/Treatment    Transfers, Sit-Stand [place] verbal cues required;minimum assist (75% patient effort)  -LR      Transfers, Stand-Sit [place] verbal cues required;contact guard assist  -LR      Transfers, Sit-Stand-Sit, Assist Device rolling walker  -LR      Transfer, Safety Issues sequencing ability decreased;step length decreased;weight-shifting ability decreased  -LR      Transfer, Impairments ROM decreased;strength decreased;impaired balance;pain  -LR      Transfer, Comment Verbal cues to push up from chair to stand and to reach back for chair to lower into sitting.   -LR      Recorded by [LR] Yuridia Rees, PT      Gait Assessment/Treatment    Gait, [place] Level verbal cues required;contact guard assist  -LR      Gait, Assistive Device rolling walker  -LR      Gait, Distance (Feet) 40  -LR      Gait, Gait Pattern Analysis swing-through gait  -LR      Gait, Gait Deviations bilateral:;forward flexed posture;step length decreased;toe-to-floor clearance decreased;weight-shifting ability decreased;narrow base  -LR      Gait, Safety Issues sequencing ability decreased;step length decreased;weight-shifting ability decreased;balance decreased during turns  -LR      Gait, Impairments ROM decreased;strength decreased;impaired balance;pain  -LR       Gait, Comment Patient ambulated with step through gait pattern with short steps and forward flexed posture. No improvement with cues for upright posture, increased step length, increased base of support, and increased LE weight bearing. Chair brought up behind patient to return to sitting once he was too fatigued and SOA to progress.   -LR      Recorded by [LR] Yuridia Rees, PT      Therapy Exercises    Bilateral Lower Extremities --   patient declined ther ex d/t fatigue;states he will do later  -LR      Recorded by [LR] Yuridia Rees, PT      Positioning and Restraints    Pre-Treatment Position sitting in chair/recliner  -LR      Post Treatment Position chair  -LR      In Chair notified nsg;reclined;sitting;call light within reach;encouraged to call for assist;exit alarm on;legs elevated  -LR      Recorded by [LR] Yuridia Rees, PT        User Key  (r) = Recorded By, (t) = Taken By, (c) = Cosigned By    Initials Name Effective Dates    MF Aly Mckeon, PT 06/19/15 -     DM Noa Pope, PT 06/19/15 -     LR Yuridia Rees, PT 06/19/15 -     MJ Eladio Geller, PT 03/14/16 -                 IP PT Goals       08/02/17 1650 08/01/17 1028 07/31/17 1540    Bed Mobility PT LTG    Bed Mobility PT LTG, Date Established   07/17/17  -LR    Bed Mobility PT LTG, Time to Achieve   1 wk  -LR    Bed Mobility PT LTG, Activity Type   all bed mobility  -LR    Bed Mobility PT LTG, [place] Level   independent  -LR    Bed Mobility PT LTG, Date Goal Reviewed  08/01/17  -MJ 07/31/17  -LR    Bed Mobility PT LTG, Outcome goal ongoing  -DM goal ongoing  -MJ goal ongoing  -LR    Transfer Training PT LTG    Transfer Training PT LTG, Date Established   07/17/17  -LR    Transfer Training PT LTG, Time to Achieve   1 wk  -LR    Transfer Training PT LTG, Activity Type   bed to chair /chair to bed;sit to stand/stand to sit  -LR    Transfer Training PT LTG, [place] Level   independent  -LR    Transfer  Training PT LTG, Assist Device   walker, standard  -LR    Transfer Training PT  LTG, Date Goal Reviewed  08/01/17  -MJ 07/31/17  -LR    Transfer Training PT LTG, Outcome goal ongoing  -DM goal ongoing  -MJ goal ongoing  -LR    Gait Training PT LTG    Gait Training Goal PT LTG, Date Established   07/17/16  -LR    Gait Training Goal PT LTG, Time to Achieve   1 wk  -LR    Gait Training Goal PT LTG, [place] Level   independent  -LR    Gait Training Goal PT LTG, Assist Device   walker, standard  -LR    Gait Training Goal PT LTG, Distance to Achieve   200 feet  -LR    Gait Training Goal PT LTG, Date Goal Reviewed  08/01/17  -MJ 07/31/17  -LR    Gait Training Goal PT LTG, Outcome goal ongoing  -DM goal ongoing  -MJ goal ongoing  -LR      07/28/17 1513 07/28/17 1100 07/27/17 1100    Bed Mobility PT LTG    Bed Mobility PT LTG, Outcome goal ongoing  -EH      Transfer Training PT LTG    Transfer Training PT LTG, Outcome goal ongoing  -EH      Gait Training PT LTG    Gait Training Goal PT LTG, Outcome goal ongoing  -EH      Wound Care PT LTG    Wound Care PT LTG 1, Outcome  goal ongoing  -MC goal partially met  -MF      07/27/17 1057 07/23/17 1106 07/20/17 1540    Bed Mobility PT LTG    Bed Mobility PT LTG, Outcome goal ongoing  -MB goal ongoing  -UD     Transfer Training PT LTG    Transfer Training PT LTG, Outcome goal ongoing  -MB goal ongoing  -UD     Gait Training PT LTG    Gait Training Goal PT LTG, Outcome goal ongoing  -MB goal ongoing  -UD     Wound Care PT LTG    Wound Care PT LTG 1, Outcome   goal ongoing  -MC      07/20/17 1145          Bed Mobility PT LTG    Bed Mobility PT LTG, Outcome goal ongoing  -UD      Transfer Training PT LTG    Transfer Training PT LTG, Outcome goal ongoing  -UD      Gait Training PT LTG    Gait Training Goal PT LTG, Outcome goal ongoing  -UD        User Key  (r) = Recorded By, (t) = Taken By, (c) = Cosigned By    Initials Name Provider Type    LOTUS Alcocer, PTA Physical Therapy  Assistant    LITO Mckeon, PT Physical Therapist     Chantell Nazario, PT Physical Therapist    DM Noa Pope, PT Physical Therapist    LR Yuridia Rees, PT Physical Therapist    BREANA Camejo, PT Physical Therapist    MC Maddison Gamble, PT Physical Therapist    ECHO Geller, PT Physical Therapist          Physical Therapy Education     Title: PT OT SLP Therapies (Active)     Topic: Physical Therapy (Active)     Point: Mobility training (Active)    Learning Progress Summary    Learner Readiness Method Response Comment Documented by Status   Patient Eager E,D NR   08/02/17 1650 Active    Acceptance E,D VU,NR   08/01/17 1028 Done    Acceptance E,D VU,NR Educated on correct gait mechanics.  07/31/17 1625 Done    Acceptance E,D VU,NR HEP, fall precautions MB 07/27/17 1057 Done    Acceptance E,TB,D VU,DU,NR   07/26/17 0151 Done    Eager E VU,DU reviewed benefits fo activity SC 07/25/17 1537 Done    Acceptance E,TB,D VU,DU,NR   07/25/17 0520 Done    Acceptance E,TB,D VU,DU,NR   07/24/17 0421 Done    Acceptance E,D DU,NR   07/23/17 1106 Done    Eager E VU  SC 07/21/17 1620 Done    Acceptance E,D DU,NR   07/20/17 1145 Done    Acceptance E NR   07/18/17 1200 Active    Eager E,D NR   07/17/17 1722 Active               Point: Home exercise program (Active)    Learning Progress Summary    Learner Readiness Method Response Comment Documented by Status   Patient Eager E,D NR   08/02/17 1650 Active    Acceptance E,D VU,NR   08/01/17 1028 Done    Acceptance E,D VU,NR Educated on correct gait mechanics.  07/31/17 1625 Done    Acceptance E,D VU,NR HEP, fall precautions MB 07/27/17 1057 Done    Acceptance E,TB,D VU,DU,NR   07/26/17 0151 Done    Eager E VU,DU reviewed benefits fo activity SC 07/25/17 1537 Done    Acceptance E,TB,D VU,DU,NR   07/25/17 0520 Done    Acceptance E,TB,D VU,DU,NR   07/24/17 0421 Done    Acceptance E,D DU,NR  UD 07/23/17 1106 Done    [place] E  VU  SC 07/21/17 1620 Done    Acceptance E,D DU,NR   07/20/17 1145 Done    Acceptance E NR   07/18/17 1200 Active    Eager E,D NR   07/17/17 1722 Active               Point: Body mechanics (Active)    Learning Progress Summary    Learner Readiness Method Response Comment Documented by Status   Patient Eager E,D NR   08/02/17 1650 Active    Acceptance E,D VU,NR   08/01/17 1028 Done    Acceptance E,D VU,NR Educated on correct gait mechanics. LR 07/31/17 1625 Done    Acceptance E,D VU,NR HEP, fall precautions MB 07/27/17 1057 Done    Acceptance E,TB,D VU,DU,NR   07/26/17 0151 Done    Eager E VU,DU reviewed benefits fo activity SC 07/25/17 1537 Done    Acceptance E,TB,D VU,DU,NR   07/25/17 0520 Done    Acceptance E,TB,D VU,DU,NR   07/24/17 0421 Done    Acceptance E,D DU,NR   07/23/17 1106 Done    Eager E VU  SC 07/21/17 1620 Done    Acceptance E,D DU,NR   07/20/17 1145 Done    Acceptance E NR   07/18/17 1200 Active    Eager E,D NR   07/17/17 1722 Active               Point: Precautions (Active)    Learning Progress Summary    Learner Readiness Method Response Comment Documented by Status   Patient Eager E,D NR   08/02/17 1650 Active    Acceptance E,D VU,NR   08/01/17 1028 Done    Acceptance E,D VU,NR Educated on correct gait mechanics. LR 07/31/17 1625 Done    Acceptance E,D VU,NR HEP, fall precautions MB 07/27/17 1057 Done    Acceptance E,TB,D VU,DU,NR   07/26/17 0151 Done    Eager E VU,DU reviewed benefits fo activity SC 07/25/17 1537 Done    Acceptance E,TB,D VU,DU,NR   07/25/17 0520 Done    Acceptance E,TB,D VU,DU,NR   07/24/17 0421 Done    Acceptance E,D DU,NR   07/23/17 1106 Done    Eager E VU  SC 07/21/17 1620 Done    Acceptance E,D DU,NR   07/20/17 1145 Done    Acceptance E NR  SH 07/18/17 1200 Active    Eager E,D NR  DM 07/17/17 1722 Active                      User Key     Initials Effective Dates Name Provider Type Discipline    SC 06/19/15 -  Oscar Alexis, PT Physical  Therapist PT    UD 06/22/15 -  Mary Kay Alcocer, PTA Physical Therapy Assistant PT    SH 06/19/15 -  Keyona Hawk, PT Physical Therapist PT    DM 06/19/15 -  Noa Pope, PT Physical Therapist PT    LR 06/19/15 -  Yuridia Rees, PT Physical Therapist PT    MB 03/14/16 -  Nicole Camejo, PT Physical Therapist PT     03/14/16 -  Eladio Geller, PT Physical Therapist PT     08/22/16 -  Mercedes Silverio, RN Registered Nurse Nurse                    PT Recommendation and Plan  Anticipated Discharge Disposition: home with home health  PT Frequency: daily  Plan of Care Review  Plan Of Care Reviewed With: patient  Progress: progress towards functional goals is fair  Outcome Summary/Follow up Plan: able to amb 25 ft at slow pace, but limited by signif LE/back pain, knee instabil., mod SOB, signif elev SBP, & desat to 87% w/ mobil.           Outcome Measures       08/02/17 1600 08/01/17 0922 07/31/17 1540    How much help from another person do you currently need...    Turning from your back to your side while in flat bed without using bedrails? 4  -DM 3  -MJ 4  -LR    Moving from lying on back to sitting on the side of a flat bed without bedrails? 4  -DM 3  -MJ 3  -LR    Moving to and from a bed to a chair (including a wheelchair)? 3  -DM 3  -MJ 3  -LR    Standing up from a chair using your arms (e.g., wheelchair, bedside chair)? 3  -DM 3  -MJ 3  -LR    Climbing 3-5 steps with a railing? 2  -DM 2  -MJ 2  -LR    To walk in hospital room? 3  -DM 3  -MJ 3  -LR    AM-PAC 6 Clicks Score 19  -DM 17  -MJ 18  -LR    Functional Assessment    Outcome Measure Options AM-PAC 6 Clicks Basic Mobility (PT)  -DM AM-PAC 6 Clicks Basic Mobility (PT)  -MJ AM-PAC 6 Clicks Basic Mobility (PT)  -LR      User Key  (r) = Recorded By, (t) = Taken By, (c) = Cosigned By    Initials Name Provider Type    DM Noa Pope, PT Physical Therapist    LR Yuridia Rees, PT Physical Therapist    MJ Eladio Geller, PT Physical Therapist            Time Calculation:         PT Charges       08/02/17 1650          Time Calculation    Start Time 1600  -DM      PT Received On 08/02/17  -DM      PT Goal Re-Cert Due Date 08/06/17  -DM      Time Calculation- PT    Total Timed Code Minutes- PT 40 minute(s)  -DM        User Key  (r) = Recorded By, (t) = Taken By, (c) = Cosigned By    Initials Name Provider Type    DM Noa Pope, PT Physical Therapist          Therapy Charges for Today     Code Description Service Date Service Provider Modifiers Qty    83072875028 HC PT THER PROC EA 15 MIN 8/2/2017 Noa Pope, PT GP 2    68792211669 HC GAIT TRAINING EA 15 MIN 8/2/2017 Noa Pope, PT GP 1    22974024648 HC PT THER SUPP EA 15 MIN 8/2/2017 Noa Pope, PT GP 2          PT G-Codes  PT Professional Judgement Used?: Yes  Outcome Measure Options: AM-PAC 6 Clicks Basic Mobility (PT)  Score: 17  Functional Limitation: Mobility: Walking and moving around  Mobility: Walking and Moving Around Current Status (): At least 40 percent but less than 60 percent impaired, limited or restricted  Mobility: Walking and Moving Around Goal Status (): At least 20 percent but less than 40 percent impaired, limited or restricted    Noa Pope, PT  8/2/2017

## 2017-08-02 NOTE — PROGRESS NOTES
"      HOSPITALIST DAILY PROGRESS NOTE    Chief Complaint: weakness, cough    Subjective   SUBJECTIVE/OVERNIGHT EVENTS   Still with cough, but difficulty with sputum. Dyspnea better. R elbow sore, but better. No f/c. No other issues.    Review of Systems:  Gen-no fevers, no chills  CV-no chest pain, no palpitations  Resp- +cough, dyspnea on exertion  GI-no N/V/D, no abd pain  Neuro - + confusion  MS - right elbow pain    Objective   OBJECTIVE   I have reviewed the vital signs.  /71  Pulse 76  Temp 97.9 °F (36.6 °C) (Oral)   Resp 16  Ht 72\" (182.9 cm)  Wt 229 lb 11.2 oz (104 kg)  SpO2 92%  BMI 31.15 kg/m2  Physical Exam:  General: acute and chronically ill appearing, weak, up in chair  Head: Normocephalic atraumatic  Eyes: PERRLA, EOMI, nonicteric, conjunctiva normal  Cardiovascular: RRR  no M/R/G +1 DP pulses bilaterally  Respiratory: L rhonchi, no distress  Abdomen: Obese, soft, nontender, nondistended,  positive bowel sounds in all 4 quadrants   Extremities: R elbow with tr edema, tender, but not warm  Skin: Pink/warm/dry.  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  No focal deficits  Psych: Patient is pleasant and cooperative.  Normal affect.  Negative suicidal ideation or homicidal ideation.    Results:  I have reviewed the labs, culture data, radiology results, and diagnostic studies.      Results from last 7 days  Lab Units 08/02/17  0341   WBC 10*3/mm3 3.89   HEMOGLOBIN g/dL 11.3*   HEMATOCRIT % 35.7*   PLATELETS 10*3/mm3 119*       Results from last 7 days  Lab Units 08/02/17  0341   SODIUM mmol/L 136   POTASSIUM mmol/L 4.1   CHLORIDE mmol/L 99   CO2 mmol/L 30.0   BUN mg/dL 23   CREATININE mg/dL 0.90   GLUCOSE mg/dL 201*   CALCIUM mg/dL 9.1     Sputum cultures - proteus - resistant to levaquin    CXR - 7/25 CMG, increased vascularity    I have reviewed the medications.    Assessment/Plan   ASSESSMENT/PLAN    Principal Problem:    Pneumonia due to infectious " organism  Active Problems:    DM type 2 (diabetes mellitus, type 2)    COPD (chronic obstructive pulmonary disease)    Atrial fibrillation    Reactive airway disease with wheezing    Urinary retention    Olecranon bursitis of right elbow    83 year old male presenting from home with SOA and cough with right lower lobe bacterial pneumonia.       Plan:  -- CXR with RUL/RML infiltrate, monitor, needs to mobilize, on mucinex/mucomyst    --Patient continuing to have urinary retention, home with stewart per urology, on Flomax    - has new right elbow bursitis, s/p aspiration, Dr. Gayle follows, feels as if improving    Plan;  -cont current care.  -labs in am.    Aftab Loco MD  08/02/17  8:58 AM

## 2017-08-02 NOTE — PROGRESS NOTES
Adult Nutrition  Assessment/PES    Patient Name:  Bjorn Pollock  YOB: 1934  MRN: [id number]  Admit Date:  7/16/2017    Assessment Date:  8/2/2017        Reason for Assessment       08/02/17 1148    Reason for Assessment    Reason For Assessment/Visit follow up protocol    Time Spent (min) 20    Diagnosis Diagnosis   per notes this admission                Nutrition/Diet History       08/02/17 1155    Nutrition/Diet History    Food Habit/Preferences Uses Supplements    Other Per RN PO intake charting, patient also consuming snacks in addition to main meals            Anthropometrics       08/02/17 1156    Anthropometrics (Special Considerations)    Height Used for Calculations 1.829 m (6')    Weight Used for Calculations 104 kg (229 lb 4.5 oz)   bed scale weight per previous charting            Labs/Tests/Procedures/Meds       08/02/17 1156    Labs/Tests/Procedures/Meds    Labs/Tests Review Reviewed                Nutrition Prescription Ordered       08/02/17 1156    Nutrition Prescription PO    Current PO Diet Regular    Supplement Boost Glucose Control   chocolate    Supplement Frequency 3 times a day    Common Modifiers Cardiac;Consistent Carbohydrate            Evaluation of Received Nutrient/Fluid Intake       08/02/17 1157    PO Evaluation    Number of Days PO Intake Evaluated --   PO intake recorded from 7/28 - 8/2    Number of Meals 13    % PO Intake 77%          Problem/Interventions:        Problem 3       08/02/17 1158    Nutrition Diagnoses Problem 3    Problem 3 Nutrition Appropriate for Condition at this Time    Etiology (related to) --   clinical condition    Signs/Symptoms (evidenced by) PO Intake    Percent (%) intake recorded 77 %    Over number of meals 13                Intervention Goal       08/02/17 1158    Intervention Goal    General Nutrition support treatment            Nutrition Intervention       08/02/17 1158    Nutrition Intervention    RD/Tech Action Follow Tx  progress;Care plan reviewd              Education/Evaluation       08/02/17 4738    Monitor/Evaluation    Monitor Per protocol;PO intake;Supplement intake          Electronically signed by:  Yuridia Byers  08/02/17 11:58 AM

## 2017-08-02 NOTE — PROGRESS NOTES
"Orthopaedic Progress Note     LOS: 13 days   Patient Care Team:  Vincent Mccullough MD as PCP - General  Vincent Mccullough MD as PCP - Family Medicine    Chief Complaint:   Pneumonia due to infectious organism    Subjective     Interval History:    No new complaints this morning.    Objective     Vital Signs  Temp (24hrs), Av.2 °F (36.2 °C), Min:97.2 °F (36.2 °C), Max:97.2 °F (36.2 °C)      /67 (BP Location: Right arm, Patient Position: Lying)  Pulse 97  Temp 97.2 °F (36.2 °C) (Oral)   Resp 18  Ht 72\" (182.9 cm)  Wt 229 lb 11.2 oz (104 kg)  SpO2 92%  BMI 31.15 kg/m2    Physical Exam:   Examination the right elbow: Minimal swelling in the olecranon bursa, with mild erythema. Full range of motion of the elbow, with 0° of extension, 140° of flexion, and full pronation and supination.  Sensation and motor is intact in the hand.    Labs:    Results from last 7 days  Lab Units 17  0341   WBC 10*3/mm3 3.89   RBC 10*6/mm3 4.11*   HEMOGLOBIN g/dL 11.3*   HEMATOCRIT % 35.7*   PLATELETS 10*3/mm3 119*       PT:  Gait, Distance (Feet): 40     Results Review:     I reviewed the patient's new clinical results.      Assessment/Plan      Olecranon bursitis right elbow.  Cultures:Scant growth (1+) Corynebacterium species (A).  Currently on antibiotics.  Continue with conservative treatment.    Principal Problem:    Pneumonia due to infectious organism  Active Problems:    DM type 2 (diabetes mellitus, type 2)    COPD (chronic obstructive pulmonary disease)    Atrial fibrillation    Reactive airway disease with wheezing    Urinary retention    Olecranon bursitis of right elbow      Plan for disposition: Per hospitalist team.    Aly Gayle MD  17  8:05 AM  "

## 2017-08-02 NOTE — PLAN OF CARE
Problem: Patient Care Overview (Adult)  Goal: Plan of Care Review  Outcome: Ongoing (interventions implemented as appropriate)    08/02/17 1650   Patient Care Overview   Progress progress towards functional goals is fair   Coping/Psychosocial Response Interventions   Plan Of Care Reviewed With patient   Outcome Evaluation   Outcome Summary/Follow up Plan able to amb 25 ft at slow pace, but limited by signif LE/back pain, knee instabil., mod SOB, signif elev SBP, & desat to 87% w/ mobil.          Problem: Inpatient Physical Therapy  Goal: Bed Mobility Goal LTG- PT  Outcome: Ongoing (interventions implemented as appropriate)    07/31/17 1540 08/01/17 1028 08/02/17 1650   Bed Mobility PT LTG   Bed Mobility PT LTG, Date Established 07/17/17 --  --    Bed Mobility PT LTG, Time to Achieve 1 wk --  --    Bed Mobility PT LTG, Activity Type all bed mobility --  --    Bed Mobility PT LTG, [place] Level independent --  --    Bed Mobility PT LTG, Date Goal Reviewed --  08/01/17 --    Bed Mobility PT LTG, Outcome --  --  goal ongoing       Goal: Transfer Training Goal 1 LTG- PT  Outcome: Ongoing (interventions implemented as appropriate)    07/31/17 1540 08/01/17 1028 08/02/17 1650   Transfer Training PT LTG   Transfer Training PT LTG, Date Established 07/17/17 --  --    Transfer Training PT LTG, Time to Achieve 1 wk --  --    Transfer Training PT LTG, Activity Type bed to chair /chair to bed;sit to stand/stand to sit --  --    Transfer Training PT LTG, [place] Level independent --  --    Transfer Training PT LTG, Assist Device walker, standard --  --    Transfer Training PT LTG, Date Goal Reviewed --  08/01/17 --    Transfer Training PT LTG, Outcome --  --  goal ongoing       Goal: Gait Training Goal LTG- PT  Outcome: Ongoing (interventions implemented as appropriate)    07/31/17 1540 08/01/17 1028 08/02/17 1650   Gait Training PT LTG   Gait Training Goal PT LTG, Date Established 07/17/16 --  --    Gait Training Goal  PT LTG, Time to Achieve 1 wk --  --    Gait Training Goal PT LTG, [place] Level independent --  --    Gait Training Goal PT LTG, Assist Device walker, standard --  --    Gait Training Goal PT LTG, Distance to Achieve 200 feet --  --    Gait Training Goal PT LTG, Date Goal Reviewed --  08/01/17 --    Gait Training Goal PT LTG, Outcome --  --  goal ongoing

## 2017-08-03 NOTE — PLAN OF CARE
Problem: Patient Care Overview (Adult)  Goal: Discharge Needs Assessment    07/17/17 1621 08/03/17 1700   Discharge Needs Assessment   Concerns To Be Addressed --  denies needs/concerns at this time   Readmission Within The Last 30 Days --  no previous admission in last 30 days   Equipment Needed After Discharge --  none   Discharge Facility/Level Of Care Needs --  rehabilitation facility   Discharge Disposition still a patient --    Current Health   Outpatient/Agency/Support Group Needs --  inpatient rehabilitation facility (specify)   Anticipated Changes Related to Illness --  inability to care for someone else   Living Environment   Transportation Available car --    Self-Care   Equipment Currently Used at Home bath bench;commode;grab bar;power chair, (recliner lift);walker, rolling --

## 2017-08-03 NOTE — THERAPY WOUND CARE TREATMENT
Acute Care - Physical Therapy Lymphedema Treatment Note  [facility]     Patient Name: Bjorn Pollock  : 1934  MRN: [id number]  Today's Date: 8/3/2017  Onset of Illness/Injury or Date of Surgery Date: 17   Date of Referral to PT: 17   Referring Physician: MD KAYODE (MOBILITY order)        Admit Date: 2017    Visit Dx:    ICD-10-CM ICD-9-CM   1. Reactive airway disease with wheezing, severe persistent, with acute exacerbation J45.51 493.92   2. Fatigue, unspecified type R53.83 780.79   3. Bronchitis J40 490   4. Impaired functional mobility, balance, gait, and endurance Z74.09 V49.89       Patient Active Problem List   Diagnosis   • Intractable low back pain   • HTN (hypertension)   • DM type 2 (diabetes mellitus, type 2)   • Intertriginous candidiasis   • COPD (chronic obstructive pulmonary disease)   • NAYLA (obstructive sleep apnea)   • Atrial fibrillation   • Supratherapeutic INR   • CKD (chronic kidney disease)   • Diastolic heart failure secondary to hypertension   • Anemia   • DJD (degenerative joint disease)   • CAD (coronary artery disease) s/p CABG history    • Crawley's esophagus   • Chronic thrombocytopenic purpura   • Cerebrovascular accident   • NAYLA on CPAP   • Obesity (BMI 30-39.9)   • Dyslipidemia   • BPH (benign prostatic hyperplasia)   • Lower extremity edema   • Falls   • Sepsis due to urinary tract infection   • Reactive airway disease with wheezing   • Constipation   • Pneumonia due to infectious organism   • Urinary retention   • Olecranon bursitis of right elbow              Lymphedema       17 1400 17 0930       Lymphedema Edema Assessment    Ptting Edema Category By severity  -MF By severity  -MF     Pitting Edema Mild  -MF Mild  -MF     Recorded by [MF] Aly Mckeon, PT [MF] Aly Mckeon, PT     Skin Changes/Observations    Location/Assessment Lower Extremity  -MF Lower Extremity  -MF     Lower Extremity Conditions  "bilateral:;intact;clean;dry;shiny;fragile  -MF bilateral:;intact;clean;dry;shiny;fragile  -MF     Lower Extremity Color/Pigment bilateral:;hyperpigmented  -MF bilateral:;hyperpigmented  -MF     Recorded by [MF] Aly Mckeon, PT [MF] Aly Mckeon PT     Lymphedema Pulses/Capillary Refill    Lymphedema Pulses/Capillary Refill lower extremity pulses;capillary refill  -MF      Dorsalis Pedis Pulse right:;left:;+1 diminished  -MF      Posterior Tibialis Pulse right:;left:;0 absent  -MF      Capillary Refill lower extremity capillary refill  -MF      Lower Extremity Capillary Refill right:;left:;less than 3 seconds  -MF      Recorded by [MF] Aly Mckeon PT      Lymphedema Measurements    Measurement Type(s) Quick Girth  -MF      Quick Girth Areas Lower extremities  -MF      Recorded by [MF] Aly Mckeon PT      LLE Quick Girth (cm)    Mid foot 25 cm  -MF      Smallest ankle 24 cm  -MF      Largest calf 33 cm  -MF      Recorded by [MF] Aly Mckeon PT      RLE Quick Girth (cm)    Mid foot 25 cm  -MF      Smallest ankle 24 cm  -MF      Largest calf 33.5 cm  -MF      Recorded by [MF] Aly Mckeon, PT      Compression/Skin Care    Compression/Skin Care skin care;wrapping location;bandaging  -MF      Skin Care washed/dried  -MF      Wrapping Location lower extremity  -MF      Wrapping Location LE bilateral:;foot to knee  -MF      Wrapping Comments unna boots with size 2 and 3\" coban  -MF      Bandaging Comments  Pt cont to have significant BLE pain / sensitivity.   -MF     Recorded by [MF] Aly Mckeon PT [MF] Aly Mckeon PT       User Key  (r) = Recorded By, (t) = Taken By, (c) = Cosigned By    Initials Name Effective Dates     Aly Mckeon PT 06/19/15 -                 Adult Rehabilitation Note       08/03/17 1400 08/02/17 1600 08/01/17 0930    Rehab Assessment/Intervention    Discipline physical therapist  -MF physical therapist  -DM physical therapist  -MF    Document " Type therapy note (daily note)  -MF therapy note (daily note)  -DM therapy note (daily note)  -MF    Subjective Information agree to therapy;complains of;weakness;fatigue;pain  -MF agree to therapy;complains of;weakness;fatigue;pain   legs weaker today;3tries(family/eating, present,fatig)  -DM agree to therapy;complains of;weakness;fatigue;pain  -MF    Patient Effort, Rehab Treatment  good  -DM     Symptoms Noted During/After Treatment  fatigue;increased pain;shortness of breath;significant change in vital signs  -DM     Precautions/Limitations  fall precautions;other (see comments)   BURSITIS R elbow(ABX)  -DM     Precautions/Limitations, Vision  WFL  -DM     Precautions/Limitations, Hearing  hearing impairment, bilaterally  -DM     Recorded by [MF] Aly Mckeon, PT [DM] Noa Pope, PT [MF] Aly Mckeon, PT    Vital Signs    Pre Systolic BP Rehab  117  -DM     Pre Treatment Diastolic BP  71  -DM     Post Systolic BP Rehab  146  -DM     Post Treatment Diastolic BP  72  -DM     Pretreatment Heart Rate (beats/min)  89  -DM     Intratreatment Heart Rate (beats/min)  94   IRREG.  -DM     Posttreatment Heart Rate (beats/min)  92  -DM     Pre SpO2 (%)  93  -DM     O2 Delivery Pre Treatment  room air  -DM     Intra SpO2 (%)  87  -DM     O2 Delivery Intra Treatment  room air  -DM     Post SpO2 (%)  92  -DM     O2 Delivery Post Treatment  room air  -DM     Pre Patient Position  Supine  -DM     Intra Patient Position  Standing  -DM     Post Patient Position  Sitting  -DM     Recorded by  [DM] Noa Pope, PT     Pain Assessment    Pain Assessment [place]oon FACES  -MF 0-10  -DM Arrington-Baker FACES  -MF    Arrington-Baker FACES Pain Rating 4  -MF  6  -MF    Pain Score  7  -DM     Post Pain Score  8  -DM     Pain Type Acute pain  -MF Acute pain  -DM     Pain Location Generalized  -MF Generalized  -DM Generalized  -MF    Pain Orientation  Left;Right   [email or web address].B LE'S,back  -DM     Pain Descriptors  Aching  -DM      "Pain Frequency  Constant/continuous  -DM     Patient's Stated Pain Goal  No pain  -DM     Pain Intervention(s) Repositioned  -MF Repositioned;Ambulation/increased activity  -DM Repositioned  -MF    Response to Interventions  tolerated  -DM     Recorded by [MF] Aly Mckeon, PT [DM] Noa Pope, PT [MF] Aly Mckeon, PT    Vision Assessment/Intervention    Visual Impairment  WFL with corrective lenses  -DM     Recorded by  [DM] Noa Pope, PT     Cognitive Assessment/Intervention    Current Cognitive/Communication Assessment  functional  -DM     Orientation Status  oriented x 4  -DM     Follows Commands/Answers Questions  100% of the time;able to follow single-step instructions;needs cueing;needs increased time;needs repetition  -DM     Personal Safety  mild impairment;decreased awareness, need for assist;decreased awareness, need for safety;decreased insight to deficits  -DM     Personal Safety Interventions  fall prevention program maintained;gait belt;nonskid shoes/slippers when out of bed  -DM     Recorded by  [DM] Noa Pope, PT     Bed Mobility, Assessment/Treatment    Bed Mobility, Assistive Device  bed rails;head of bed elevated  -DM     Bed Mobility, Roll Right, [place]  conditional independence  -DM     Bed Mob, Supine to Sit, [place]  conditional independence   \"let me do it\" (slow transit.mvmts;moaning throughout)  -DM     Bed Mobility, Safety Issues  decreased use of arms for pushing/pulling;decreased use of legs for bridging/pushing  -DM     Bed Mobility, Impairments  strength decreased;impaired balance;pain  -DM     Bed Mobility, Comment  cues for hand placement  -DM     Recorded by  [DM] Noa Pope, PT     Transfer Assessment/Treatment    Transfers, Sit-Stand [place]  moderate assist (50% patient effort);verbal cues required  -DM     Transfers, Stand-Sit [place]  moderate assist (50% patient effort);verbal cues required  -DM     Transfers, " "Sit-Stand-Sit, Assist Device  rolling walker;elevated surface  -DM     Transfer, Safety Issues  sequencing ability decreased;loses balance backward;weight-shifting ability decreased;knees buckling  -DM     Transfer, Impairments  strength decreased;impaired balance;pain  -DM     Transfer, Comment  cues for hand placement;\"let me take my time;SOB\"   2 trials; mod.SOB/moaning;5 Min. rest btwn  -DM     Recorded by  [DM] Noa Pope, PT     Gait Assessment/Treatment    Gait, [place] Level  contact guard assist;verbal cues required  -DM     Gait, Assistive Device  rolling walker  -DM     Gait, Distance (Feet)  25  -DM     Gait, Gait Pattern Analysis  swing-through gait  -DM     Gait, Gait Deviations  bilateral:;antalgic;nagi decreased;decreased heel strike;forward flexed posture;step length decreased;weight-shifting ability decreased  -DM     Gait, Safety Issues  sequencing ability decreased;step length decreased;weight-shifting ability decreased  -DM     Gait, Impairments  strength decreased;impaired balance;pain   mod.SOB;grimacing w/ pain; tech brought chair; rolled to BS  -DM     Gait, Comment  CUES for trunk ext,incr.step length,PLB  -DM     Recorded by  [DM] Noa Pope, PT     Motor Skills/Interventions    Additional Documentation  Balance Skills Training (Group)  -DM     Recorded by  [DM] Noa Pope, PT     Balance Skills Training    Sitting-Level of Assistance  Close supervision  -DM     Sitting-Balance Support  Feet supported  -DM     Sitting-Balance Activities  Trunk control activities  -DM     Sitting # of Minutes  6  -DM     Standing-Level of Assistance  Minimum assistance  -DM     Static Standing Balance Support  assistive device  -DM     Standing-Balance Activities  Weight Shift A-P  -DM     Standing Balance # of Minutes  1  -DM     Gait Balance-Level of Assistance  Minimum assistance  -DM     Gait Balance Support  assistive device  -DM     Gait Balance Activities  " side-stepping;scanning environment R/L  -DM     Recorded by  [DM] Noa Pope, PT     Therapy Exercises    Bilateral Lower Extremities  AROM:;AAROM:;10 reps;sitting;ankle pumps/circles;glut sets;heel slides;hip abduction/adduction;hip ER;hip flexion;hip IR;LAQ;quad sets;SAQ   HS sets;BKFO  -DM     Bilateral Upper Extremity  AROM:;10 reps;sitting;elbow flexion/extension;shoulder abduction/adduction;shoulder extension/flexion  -DM     Recorded by  [DM] Noa Pope, PT     Positioning and Restraints    Pre-Treatment Position sitting in chair/recliner  - in bed  -DM sitting in chair/recliner  -    Post Treatment Position chair  - chair  -DM chair  -    In Chair reclined;call light within reach  - notified nsg;reclined;call light within reach;encouraged to call for assist;exit alarm on;RUE elevated;LUE elevated;waffle cushion;legs elevated   Wanting to converse at length about 's visit  - reclined;call light within reach  -    Recorded by [MF] Aly Mckeon, PT [DM] Noa Pope, PT [MF] Aly Mckeon, PT      08/01/17 0922          Rehab Assessment/Intervention    Discipline physical therapist  -      Document Type therapy note (daily note)  -      Subjective Information agree to therapy;complains of;pain  -MJ      Patient Effort, Rehab Treatment adequate  -MJ      Symptoms Noted During/After Treatment fatigue;shortness of breath  -      Precautions/Limitations fall precautions  -MJ      Recorded by [MJ] Eladio Geller, PT      Vital Signs    Pre Systolic BP Rehab 125  -MJ      Pre Treatment Diastolic BP 66  -MJ      Pretreatment Heart Rate (beats/min) 90  -MJ      Posttreatment Heart Rate (beats/min) 105  -MJ      Pre SpO2 (%) 93  -MJ      O2 Delivery Pre Treatment room air  -MJ      Post SpO2 (%) 94  -MJ      O2 Delivery Post Treatment room air  -MJ      Pre Patient Position Supine  -MJ      Post Patient Position Sitting  -MJ      Recorded by [MJ] Eladio Geller, PT      Pain  Assessment    Pain Assessment Arrington-Moon FACES  -      Arrington-Moon FACES Pain Rating 4  -      Pain Type Acute pain  -      Pain Location Generalized  -      Pain Intervention(s) Repositioned;Ambulation/increased activity  -MJ      Recorded by [MJ] Eladio Geller, PT      Cognitive Assessment/Intervention    Current Cognitive/Communication Assessment functional  -      Orientation Status oriented x 4;required verbal cueing (specifiy in comments)  -MJ      Follows Commands/Answers Questions 100% of the time;able to follow single-step instructions;needs cueing;needs increased time;needs repetition  -      Personal Safety mild impairment  -MJ      Recorded by [MJ] Eladio Geller, PT      Bed Mobility, Assessment/Treatment    Bed Mobility, Assistive Device bed rails;head of bed elevated  -      Bed Mob, Supine to Sit, [place] minimum assist (75% patient effort);verbal cues required  -      Bed Mob, Sit to Supine, [place] not tested   Pt UIC  -MJ      Bed Mobility, Safety Issues decreased use of arms for pushing/pulling;decreased use of legs for bridging/pushing  -      Bed Mobility, Impairments strength decreased;pain  -MJ      Bed Mobility, Comment Verbal cues for sequencing, assist with trunk. Increased time and effort to perform. Pt required increased time sitting EOB before standing due to fatigue/SOA  -MJ      Recorded by [MJ] Eladio Geller, PT      Transfer Assessment/Treatment    Transfers, Bed-Chair [place] contact guard assist;verbal cues required  -      Transfers, Bed-Chair-Bed, Assist Device rolling walker  -      Transfers, Sit-Stand [place] minimum assist (75% patient effort);verbal cues required  -      Transfers, Stand-Sit [place] minimum assist (75% patient effort);verbal cues required  -      Transfers, Sit-Stand-Sit, Assist Device rolling walker  -      Transfer, Safety Issues sequencing ability decreased;step length decreased  -MJ      Transfer, Impairments  strength decreased;impaired balance;pain  -MJ      Transfer, Comment Verbal cues for correct hand placement. Assisted pt from bed to chair, cues for sequencing with RW.   -MJ      Recorded by [MJ] Eladio Geller PT      Gait Assessment/Treatment    Gait, [place] Level not tested  -MJ      Gait, Comment Pt refused  -MJ      Recorded by [MJ] Eladio Geller PT      Therapy Exercises    Bilateral Lower Extremities AROM:;10 reps;ankle pumps/circles;quad sets;SLR;heel slides;hip abduction/adduction   Pt required rest breaks between each exercise  -MJ      Recorded by [MJ] Eladio Geller PT      Positioning and Restraints    Pre-Treatment Position in bed  -MJ      Post Treatment Position chair  -MJ      In Chair notified nsg;reclined;call light within reach;encouraged to call for assist;exit alarm on  -MJ      Recorded by [ECHO] Eladio Geller PT        User Key  (r) = Recorded By, (t) = Taken By, (c) = Cosigned By    Initials Name Effective Dates     Aly Mckeon, PT 06/19/15 -     DM Noa Pope, PT 06/19/15 -     MJ Eladio Geller, PT 03/14/16 -                 IP PT Goals       08/02/17 1650 08/01/17 1028 07/31/17 1540    Bed Mobility PT LTG    Bed Mobility PT LTG, Date Established   07/17/17  -LR    Bed Mobility PT LTG, Time to Achieve   1 wk  -LR    Bed Mobility PT LTG, Activity Type   all bed mobility  -LR    Bed Mobility PT LTG, [place] Level   independent  -LR    Bed Mobility PT LTG, Date Goal Reviewed  08/01/17  -MJ 07/31/17  -LR    Bed Mobility PT LTG, Outcome goal ongoing  -DM goal ongoing  -MJ goal ongoing  -LR    Transfer Training PT LTG    Transfer Training PT LTG, Date Established   07/17/17  -LR    Transfer Training PT LTG, Time to Achieve   1 wk  -LR    Transfer Training PT LTG, Activity Type   bed to chair /chair to bed;sit to stand/stand to sit  -LR    Transfer Training PT LTG, [place] Level   independent  -LR    Transfer Training PT LTG, Assist Device   walker, standard  -LR    Transfer  Training PT  LTG, Date Goal Reviewed  08/01/17  -MJ 07/31/17  -LR    Transfer Training PT LTG, Outcome goal ongoing  -DM goal ongoing  -MJ goal ongoing  -LR    Gait Training PT LTG    Gait Training Goal PT LTG, Date Established   07/17/16  -LR    Gait Training Goal PT LTG, Time to Achieve   1 wk  -LR    Gait Training Goal PT LTG, [place] Level   independent  -LR    Gait Training Goal PT LTG, Assist Device   walker, standard  -LR    Gait Training Goal PT LTG, Distance to Achieve   200 feet  -LR    Gait Training Goal PT LTG, Date Goal Reviewed  08/01/17  -MJ 07/31/17  -LR    Gait Training Goal PT LTG, Outcome goal ongoing  -DM goal ongoing  -MJ goal ongoing  -LR      07/28/17 1513 07/28/17 1100 07/27/17 1100    Bed Mobility PT LTG    Bed Mobility PT LTG, Outcome goal ongoing  -EH      Transfer Training PT LTG    Transfer Training PT LTG, Outcome goal ongoing  -EH      Gait Training PT LTG    Gait Training Goal PT LTG, Outcome goal ongoing  -EH      Wound Care PT LTG    Wound Care PT LTG 1, Outcome  goal ongoing  -MC goal partially met  -MF      07/27/17 1057 07/23/17 1106       Bed Mobility PT LTG    Bed Mobility PT LTG, Outcome goal ongoing  -MB goal ongoing  -UD     Transfer Training PT LTG    Transfer Training PT LTG, Outcome goal ongoing  -MB goal ongoing  -UD     Gait Training PT LTG    Gait Training Goal PT LTG, Outcome goal ongoing  -MB goal ongoing  -UD       User Key  (r) = Recorded By, (t) = Taken By, (c) = Cosigned By    Initials Name Provider Type    UD Mary Kay Alcocer, PTA Physical Therapy Assistant    LITO Mckeon, PT Physical Therapist    EH Chantell Nazario, PT Physical Therapist    SHADY Pope, PT Physical Therapist    LR Yuridia Rees, PT Physical Therapist    MB Nicole Camejo, PT Physical Therapist    MC Maddison Gamble, PT Physical Therapist    ECHO Geller, PT Physical Therapist          Physical Therapy Education     Title: PT OT SLP Therapies (Active)      Topic: Physical Therapy (Active)     Point: Mobility training (Active)    Learning Progress Summary    Learner Readiness Method Response Comment Documented by Status   Patient Eager E,D NR   08/02/17 1650 Active    Acceptance E,D VU,NR   08/01/17 1028 Done    Acceptance E,D VU,NR Educated on correct gait mechanics.  07/31/17 1625 Done    Acceptance E,D VU,NR HEP, fall precautions MB 07/27/17 1057 Done    Acceptance E,TB,D VU,DU,NR   07/26/17 0151 Done    Eager E VU,DU reviewed benefits fo activity SC 07/25/17 1537 Done    Acceptance E,TB,D VU,DU,NR   07/25/17 0520 Done    Acceptance E,TB,D VU,DU,NR   07/24/17 0421 Done    Acceptance E,D DU,NR   07/23/17 1106 Done    Eager E VU  SC 07/21/17 1620 Done    Acceptance E,D DU,NR   07/20/17 1145 Done    Acceptance E NR   07/18/17 1200 Active    Eager E,D NR   07/17/17 1722 Active               Point: Home exercise program (Active)    Learning Progress Summary    Learner Readiness Method Response Comment Documented by Status   Patient Eager E,D NR   08/02/17 1650 Active    Acceptance E,D VU,NR   08/01/17 1028 Done    Acceptance E,D VU,NR Educated on correct gait mechanics.  07/31/17 1625 Done    Acceptance E,D VU,NR HEP, fall precautions MB 07/27/17 1057 Done    Acceptance E,TB,D VU,DU,NR   07/26/17 0151 Done    Eager E VU,DU reviewed benefits fo activity SC 07/25/17 1537 Done    Acceptance E,TB,D VU,DU,NR   07/25/17 0520 Done    Acceptance E,TB,D VU,DU,NR   07/24/17 0421 Done    Acceptance E,D DU,NR   07/23/17 1106 Done    Eager E VU  SC 07/21/17 1620 Done    Acceptance E,D DU,NR   07/20/17 1145 Done    Acceptance E NR   07/18/17 1200 Active    Eager E,D NR   07/17/17 1722 Active               Point: Body mechanics (Active)    Learning Progress Summary    Learner Readiness Method Response Comment Documented by Status   Patient Eager BRADEN SANTOS 08/02/17 1650 Active    Acceptance BRADEN SANTOS NR MJ 08/01/17 1028 Done    Acceptance BRADEN SANTOSNR  Educated on correct gait mechanics.  07/31/17 1625 Done    Acceptance E,D VU,NR HEP, fall precautions MB 07/27/17 1057 Done    Acceptance E,TB,D VU,DU,NR   07/26/17 0151 Done    Eager E VU,DU reviewed benefits fo activity SC 07/25/17 1537 Done    Acceptance E,TB,D VU,DU,NR   07/25/17 0520 Done    Acceptance E,TB,D VU,DU,NR   07/24/17 0421 Done    Acceptance E,D DU,NR   07/23/17 1106 Done    Eager E VU  SC 07/21/17 1620 Done    Acceptance E,D DU,NR   07/20/17 1145 Done    Acceptance E NR   07/18/17 1200 Active    Eager E,D NR   07/17/17 1722 Active               Point: Precautions (Active)    Learning Progress Summary    Learner Readiness Method Response Comment Documented by Status   Patient Eager E,D NR   08/02/17 1650 Active    Acceptance E,D VU,NR   08/01/17 1028 Done    Acceptance E,D VU,NR Educated on correct gait mechanics.  07/31/17 1625 Done    Acceptance E,D VU,NR HEP, fall precautions MB 07/27/17 1057 Done    Acceptance E,TB,D VU,DU,NR   07/26/17 0151 Done    Eager E VU,DU reviewed benefits fo activity SC 07/25/17 1537 Done    Acceptance E,TB,D VU,DU,NR   07/25/17 0520 Done    Acceptance E,TB,D VU,DU,NR   07/24/17 0421 Done    Acceptance E,D DU,NR   07/23/17 1106 Done    Eager E VU  SC 07/21/17 1620 Done    Acceptance E,D DU,NR   07/20/17 1145 Done    Acceptance E NR   07/18/17 1200 Active    Eager E,D NR   07/17/17 1722 Active                      User Key     Initials Effective Dates Name Provider Type Discipline    SC 06/19/15 -  Oscar Alexis, PT Physical Therapist PT     06/22/15 -  Mary Kay Alcocer, PTA Physical Therapy Assistant PT     06/19/15 -  Keyona Hawk, PT Physical Therapist PT     06/19/15 -  Noa Pope, PT Physical Therapist PT    LR 06/19/15 -  Yuridia Rees, PT Physical Therapist PT    MB 03/14/16 -  Nicole Camejo, PT Physical Therapist PT    MJ 03/14/16 -  Eladio Geller, PT Physical Therapist PT    MC 08/22/16 -  Mercedes Silverio RN  Registered Nurse Nurse                  PT Recommendation and Plan  Anticipated Discharge Disposition: home with home health  PT Frequency: daily  Plan of Care Review  Plan Of Care Reviewed With: patient  Outcome Summary/Follow up Plan: Pt cont to have mild LE pain and sensitivity.  PT reapplied unna boots to help increase venous return and protect skin, but top of unna boot applied in a clam shell fashion to help decrease pain with possible edema.             Outcome Measures       08/02/17 1600 08/01/17 0922       How much help from another person do you currently need...    Turning from your back to your side while in flat bed without using bedrails? 4  -DM 3  -MJ     Moving from lying on back to sitting on the side of a flat bed without bedrails? 4  -DM 3  -MJ     Moving to and from a bed to a chair (including a wheelchair)? 3  -DM 3  -MJ     Standing up from a chair using your arms (e.g., wheelchair, bedside chair)? 3  -DM 3  -MJ     Climbing 3-5 steps with a railing? 2  -DM 2  -MJ     To walk in hospital room? 3  -DM 3  -MJ     AM-PAC 6 Clicks Score 19  -DM 17  -MJ     Functional Assessment    Outcome Measure Options AM-PAC 6 Clicks Basic Mobility (PT)  -DM AM-PAC 6 Clicks Basic Mobility (PT)  -       User Key  (r) = Recorded By, (t) = Taken By, (c) = Cosigned By    Initials Name Provider Type    DM Noa Pope, PT Physical Therapist    ECHO Geller, PT Physical Therapist            Time Calculation        PT Charges       08/03/17 1400          Time Calculation    Start Time 1400  -      PT Goal Re-Cert Due Date 08/06/17  -      Time Calculation- PT    Total Timed Code Minutes- PT 35 minute(s)  -        User Key  (r) = Recorded By, (t) = Taken By, (c) = Cosigned By    Initials Name Provider Type     Aly Mckeon, PT Physical Therapist            Therapy Charges for Today     Code Description Service Date Service Provider Modifiers Qty    [id number]  PT STAPPING UNNA BOOT 8/3/2017  Aly Mckeon, PT GP 1            PT G-Codes  PT Professional Judgement Used?: Yes  Outcome Measure Options: AM-PAC 6 Clicks Basic Mobility (PT)  Score: 17  Functional Limitation: Mobility: Walking and moving around  Mobility: Walking and Moving Around Current Status (): At least 40 percent but less than 60 percent impaired, limited or restricted  Mobility: Walking and Moving Around Goal Status (): At least 20 percent but less than 40 percent impaired, limited or restricted      Aly Mckeon, PT  8/3/2017

## 2017-08-03 NOTE — PLAN OF CARE
Problem: Patient Care Overview (Adult)  Goal: Plan of Care Review  Outcome: Ongoing (interventions implemented as appropriate)    08/02/17 2007 08/03/17 0442   Patient Care Overview   Progress --  progress toward functional goals is gradual   Coping/Psychosocial Response Interventions   Plan Of Care Reviewed With patient --    Outcome Evaluation   Outcome Summary/Follow up Plan --  Pt c/o SOA, respiratory notified, medicated per orders. Pt c/o shoulder pain, medicated per orders. Rested well majority of shift. No further issues or concerns.        Goal: Discharge Needs Assessment  Outcome: Ongoing (interventions implemented as appropriate)    Problem: Fall Risk (Adult)  Goal: Absence of Falls  Outcome: Ongoing (interventions implemented as appropriate)    Problem: Skin Integrity Impairment, Risk/Actual (Adult)  Goal: Skin Integrity/Wound Healing  Outcome: Ongoing (interventions implemented as appropriate)    Problem: Pain, Acute (Adult)  Goal: Acceptable Pain Control/Comfort Level  Outcome: Ongoing (interventions implemented as appropriate)

## 2017-08-03 NOTE — PLAN OF CARE
Problem: Fall Risk (Adult)  Goal: Absence of Falls    08/03/17 4728   Fall Risk (Adult)   Absence of Falls making progress toward outcome

## 2017-08-03 NOTE — PROGRESS NOTES
"      HOSPITALIST DAILY PROGRESS NOTE    Chief Complaint: weakness, cough    Subjective   SUBJECTIVE/OVERNIGHT EVENTS   Still with cough, but difficulty with sputum. Dyspnea better, but still SOA. Still very weak. Getting better. More PT today.    Review of Systems:  Gen-no fevers, no chills  CV-no chest pain, no palpitations  Resp- +cough, dyspnea on exertion  GI-no N/V/D, no abd pain  Neuro - + confusion  MS - right elbow pain    Objective   OBJECTIVE   I have reviewed the vital signs.  /71  Pulse 78  Temp 97.7 °F (36.5 °C) (Oral)   Resp 20  Ht 72\" (182.9 cm)  Wt 236 lb 12.8 oz (107 kg)  SpO2 96%  BMI 32.12 kg/m2  Physical Exam:  General: acute and chronically ill appearing, weak, up in bed, finishing breakfast  Head: Normocephalic atraumatic  Eyes: PERRLA, EOMI, nonicteric, conjunctiva normal  Cardiovascular: RRR  no M/R/G +1 DP pulses bilaterally  Respiratory: improved BS bilaterally, occ rales on R  Abdomen: Obese, soft, nontender, nondistended,  positive bowel sounds in all 4 quadrants   Extremities: R elbow with tr edema, tender, but not warm, ROM better, less tender today  Skin: Pink/warm/dry.  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  No focal deficits  Psych: Patient is pleasant and cooperative.  Normal affect.  Negative suicidal ideation or homicidal ideation.    Results:  I have reviewed the labs, culture data, radiology results, and diagnostic studies.      Results from last 7 days  Lab Units 08/03/17  0349 08/02/17  0341   WBC 10*3/mm3 4.30 3.89   HEMOGLOBIN g/dL 11.3* 11.3*   HEMATOCRIT % 36.0* 35.7*   PLATELETS 10*3/mm3 120* 119*       Results from last 7 days  Lab Units 08/03/17  0349   SODIUM mmol/L 134   POTASSIUM mmol/L 3.9   CHLORIDE mmol/L 98*   CO2 mmol/L 30.0   BUN mg/dL 20   CREATININE mg/dL 1.00   GLUCOSE mg/dL 234*   CALCIUM mg/dL 8.9     Sputum cultures - proteus - resistant to levaquin    CXR - 7/25 CMG, increased vascularity    I have " reviewed the medications.    Assessment/Plan   ASSESSMENT/PLAN    Principal Problem:    Pneumonia due to infectious organism  Active Problems:    DM type 2 (diabetes mellitus, type 2)    COPD (chronic obstructive pulmonary disease)    Atrial fibrillation    Reactive airway disease with wheezing    Urinary retention    Olecranon bursitis of right elbow    83 year old male presenting from home with SOA and cough with right lower lobe bacterial pneumonia.       Plan:  -- CXR with RLL congestion, monitor, needs to mobilize, on mucinex/mucomyst    --Patient continuing to have urinary retention, home with stewart per urology, on Flomax    - has new right elbow bursitis, s/p aspiration, Dr. Gayle follows, feels as if improving    - Mobilize, repeat CXR in am    Plan;  -cont current care.  -labs in am.    Aftab Loco MD  08/03/17  8:48 AM

## 2017-08-03 NOTE — PROGRESS NOTES
"Pharmacy Consult  -  Warfarin    Bjorn Pollock is a 84 yo man admitted 7/17/17 for cough and dyspnea. He is on warfarin for chronic atrial fibrillation.    Pharmacy consulted to manage his warfarin therapy.    Height - 72\" (182.9 cm)  Weight - 236 lb 12.8 oz (107 kg)    Consulting Provider: - Hospitalist Group  Indication: - AFib  Goal INR: -  2-3  Home Regimen:   - 2mg daily     Bridge Therapy: No   therapeutic    Drug-Drug Interactions with current regimen:   Aspirin - increased risk of bleeding              Augmentin - may result in increased risk of bleed               Doxycycline - may result in increased risk of bleed              Duloxetine - may result in altered anticoagulation effects including increased risk of bleeding.     Warfarin Dosing During Admission:    Date  7/16 7/17 7/18 7/19 7/20 7/21 7/22 7/23 7/24   INR  2.96 2.78 2.9 2.7 2.69 2.58 2.25 1.77 1.66   Dose  (home) 2mg 2mg 2mg 2mg  2mg 2mg 3mg 3mg      Date  7/25 7/26 7/27 7/28 7/29 7/30 7/31 8/1 8/2   8/3   INR  1.64 1.64  1.60  1.65  1.87  1.85  2.16  2.08 1.98   2.19   Dose  3mg 4mg  4 mg  5 mg  4mg  5mg  2 mg  4 mg  5 mg  4 mg     Labs:    Results from last 7 days   Lab Units 08/03/17  0349 08/02/17  0341 08/01/17  0449 07/31/17  0409 07/30/17  0530 07/29/17  0528 07/28/17  0510   INR  2.19 1.98 2.08 2.16 1.85 1.87 1.65   HEMOGLOBIN g/dL 11.3* 11.3* --  --  --  --  --    HEMATOCRIT % 36.0* 35.7* --  --  --  --  --    PLATELETS 10*3/mm3 120* 119* --  --  --  --  --      Results from last 7 days   Lab Units 08/03/17  0349 08/02/17  0341   SODIUM mmol/L 134 136   POTASSIUM mmol/L 3.9 4.1   CHLORIDE mmol/L 98* 99   CO2 mmol/L 30.0 30.0   BUN mg/dL 20 23   CREATININE mg/dL 1.00 0.90   CALCIUM mg/dL 8.9 9.1   GLUCOSE mg/dL 234* 201*     Current dietary intake: Regular Cardiac, Consistent Carbs:    50 - 100% of meals     Assessment/Plan:   1.  Continue Warfarin 4mg oral daily  2.  Daily PT/INR.  Monitor for S/Sx of " bleeding/clotting.  Pharmacy will continue to follow and make adjustments as needed     Thanks,    Mahogany Martino, PharmD

## 2017-08-03 NOTE — PLAN OF CARE
Problem: Patient Care Overview (Adult)  Goal: Plan of Care Review  Outcome: Ongoing (interventions implemented as appropriate)    08/03/17 1400   Coping/Psychosocial Response Interventions   Plan Of Care Reviewed With patient   Outcome Evaluation   Outcome Summary/Follow up Plan Pt cont to have mild LE pain and sensitivity. PT reapplied unna boots to help increase venous return and protect skin, but top of unna boot applied in a clam shell fashion to help decrease pain with possible edema.          Problem: Inpatient Physical Therapy  Goal: Wound Care Goal 1 LTG- PT  Outcome: Ongoing (interventions implemented as appropriate)    07/17/17 1500 07/28/17 1100   Wound Care PT LTG   Wound Care PT LTG 1, Date Established 07/17/17 --    Wound Care PT LTG 1, Time to Achieve other (see comments)  (10 days) --    Wound Care PT LTG 1, Location BLEs --    Wound Care PT LTG 1, No S&S of Infection yes --    Wound Care PT LTG 1, No Skin Break Down yes --    Wound Care PT LTG 1, No New Skin Break Down yes --    Wound Care PT LTG 1, Education edema management --    Wound Care PT LTG 1, Education Understanding demonstrate adequately --    Wound Care PT LTG 1, Outcome --  goal ongoing

## 2017-08-03 NOTE — PLAN OF CARE
Problem: Patient Care Overview (Adult)  Goal: Plan of Care Review    08/03/17 1700   Patient Care Overview   Progress progress toward functional goals as expected   Coping/Psychosocial Response Interventions   Plan Of Care Reviewed With patient

## 2017-08-03 NOTE — PLAN OF CARE
Problem: Pain, Acute (Adult)  Goal: Acceptable Pain Control/Comfort Level    08/03/17 1704   Pain, Acute (Adult)   Acceptable Pain Control/Comfort Level making progress toward outcome         08/03/17 1704   Pain, Acute (Adult)   Acceptable Pain Control/Comfort Level making progress toward outcome

## 2017-08-04 NOTE — THERAPY TREATMENT NOTE
Acute Care - Physical Therapy Treatment Note  [facility]     Patient Name: Bjorn Pollock  : 1934  MRN: [id number]  Today's Date: 2017  Onset of Illness/Injury or Date of Surgery Date: 17  Date of Referral to PT: 17  Referring Physician: MD KAYODE (MOBILITY order)    Admit Date: 2017    Visit Dx:    ICD-10-CM ICD-9-CM   1. Reactive airway disease with wheezing, severe persistent, with acute exacerbation J45.51 493.92   2. Fatigue, unspecified type R53.83 780.79   3. Bronchitis J40 490   4. Impaired functional mobility, balance, gait, and endurance Z74.09 V49.89     Patient Active Problem List   Diagnosis   • Intractable low back pain   • HTN (hypertension)   • DM type 2 (diabetes mellitus, type 2)   • Intertriginous candidiasis   • COPD (chronic obstructive pulmonary disease)   • NAYLA (obstructive sleep apnea)   • Atrial fibrillation   • Supratherapeutic INR   • CKD (chronic kidney disease)   • Diastolic heart failure secondary to hypertension   • Anemia   • DJD (degenerative joint disease)   • CAD (coronary artery disease) s/p CABG history    • Crawley's esophagus   • Chronic thrombocytopenic purpura   • Cerebrovascular accident   • NAYLA on CPAP   • Obesity (BMI 30-39.9)   • Dyslipidemia   • BPH (benign prostatic hyperplasia)   • Lower extremity edema   • Falls   • Sepsis due to urinary tract infection   • Reactive airway disease with wheezing   • Constipation   • Pneumonia due to infectious organism   • Urinary retention   • Olecranon bursitis of right elbow               Adult Rehabilitation Note       17 0955 17 1400 17 1600    Rehab Assessment/Intervention    Discipline physical therapy assistant  -UD physical therapist  -MF physical therapist  -DM    Document Type therapy note (daily note)  -UD therapy note (daily note)  -MF therapy note (daily note)  -DM    Subjective Information agree to therapy;complains of;weakness;dyspnea  -UD agree to therapy;complains  of;weakness;fatigue;pain  -MF agree to therapy;complains of;weakness;fatigue;pain   legs weaker today;3tries(family/eating, present,fatig)  -DM    Patient Effort, Rehab Treatment good  -UD  good  -DM    Symptoms Noted During/After Treatment fatigue;shortness of breath  -UD  fatigue;increased pain;shortness of breath;significant change in vital signs  -DM    Precautions/Limitations fall precautions  -UD  fall precautions;other (see comments)   BURSITIS R elbow(ABX)  -DM    Precautions/Limitations, Vision   WFL  -DM    Precautions/Limitations, Hearing hearing impairment, bilaterally  -UD  hearing impairment, bilaterally  -DM    Recorded by [UD] Mary Kay Alcocer, PTA [MF] Aly Mckeon, PT [DM] Noa Pope, PT    Vital Signs    Pre Systolic BP Rehab   117  -DM    Pre Treatment Diastolic BP   71  -DM    Post Systolic BP Rehab   146  -DM    Post Treatment Diastolic BP   72  -DM    Pretreatment Heart Rate (beats/min)   89  -DM    Intratreatment Heart Rate (beats/min)   94   IRREG.  -DM    Posttreatment Heart Rate (beats/min)   92  -DM    Pre SpO2 (%)   93  -DM    O2 Delivery Pre Treatment   room air  -DM    Intra SpO2 (%)   87  -DM    O2 Delivery Intra Treatment   room air  -DM    Post SpO2 (%)   92  -DM    O2 Delivery Post Treatment room air  -UD  room air  -DM    Pre Patient Position Supine  -UD  Supine  -DM    Intra Patient Position Standing  -UD  Standing  -DM    Post Patient Position Sitting  -UD  Sitting  -DM    Recorded by [UD] Mary Kay Alcocer, PTA  [DM] Noa Pope, PT    Pain Assessment    Pain Assessment 0-10  -UD [place] FACES  -MF 0-10  -DM    Arrington-Moon FACES Pain Rating 4  -UD 4  -MF     Pain Score 4  -UD  7  -DM    Post Pain Score 4  -UD  8  -DM    Pain Type Acute pain  -UD Acute pain  -MF Acute pain  -DM    Pain Location Back  -UD Generalized  -MF Generalized  -DM    Pain Orientation   Left;Right   michelle.B LE'S,back  -DM    Pain Descriptors   Aching  -DM    Pain Frequency   Constant/continuous   "-DM    Patient's Stated Pain Goal   No pain  -DM    Pain Intervention(s) Repositioned  -UD Repositioned  -MF Repositioned;Ambulation/increased activity  -DM    Response to Interventions   tolerated  -DM    Recorded by [UD] Mary Kay Alcocer PTA [] Aly Mckeon, PT [DM] Noa Pope, PT    Vision Assessment/Intervention    Visual Impairment WFL with corrective lenses  -UD  WFL with corrective lenses  -DM    Recorded by [UD] Mary Kay Alcocer PTA  [DM] Noa Pope, PT    Cognitive Assessment/Intervention    Current Cognitive/Communication Assessment   functional  -DM    Orientation Status oriented x 4  -UD  oriented x 4  -DM    Follows Commands/Answers Questions 100% of the time  -UD  100% of the time;able to follow single-step instructions;needs cueing;needs increased time;needs repetition  -DM    Personal Safety   mild impairment;decreased awareness, need for assist;decreased awareness, need for safety;decreased insight to deficits  -DM    Personal Safety Interventions fall prevention program maintained  -UD  fall prevention program maintained;gait belt;nonskid shoes/slippers when out of bed  -DM    Recorded by [UD] Mary Kay Alcocer PTA  [DM] Noa Pope, PT    Bed Mobility, Assessment/Treatment    Bed Mobility, Assistive Device   bed rails;head of bed elevated  -DM    Bed Mobility, Roll Right, [place]   conditional independence  -DM    Bed Mob, Supine to Sit, [place] contact guard assist;verbal cues required  -  conditional independence   \"let me do it\" (slow transit.mvmts;moaning throughout)  -DM    Bed Mobility, Safety Issues   decreased use of arms for pushing/pulling;decreased use of legs for bridging/pushing  -DM    Bed Mobility, Impairments   strength decreased;impaired balance;pain  -DM    Bed Mobility, Comment   cues for hand placement  -DM    Recorded by [UD] Mary Kay Alcocer PTA  [DM] Noa Pope, PT    Transfer Assessment/Treatment    Transfers, Sit-Stand [place] minimum assist " "(75% patient effort);moderate assist (50% patient effort);2 person assist required  -UD  moderate assist (50% patient effort);verbal cues required  -DM    Transfers, Stand-Sit [place] minimum assist (75% patient effort);moderate assist (50% patient effort);2 person assist required  -UD  moderate assist (50% patient effort);verbal cues required  -DM    Transfers, Sit-Stand-Sit, Assist Device rolling walker  -UD  rolling walker;elevated surface  -DM    Transfer, Safety Issues   sequencing ability decreased;loses balance backward;weight-shifting ability decreased;knees buckling  -DM    Transfer, Impairments   strength decreased;impaired balance;pain  -DM    Transfer, Comment   cues for hand placement;\"let me take my time;SOB\"   2 trials; mod.SOB/moaning;5 Min. rest btwn  -DM    Recorded by [UD] Mary Kay Alcocer PTA  [DM] Noa Pope, PT    Gait Assessment/Treatment    Gait, [place] Level minimum assist (75% patient effort);1 person + 1 person to manage equipment  -UD  contact guard assist;verbal cues required  -DM    Gait, Assistive Device rolling walker  -UD  rolling walker  -DM    Gait, Distance (Feet) 45  -UD  25  -DM    Gait, Gait Pattern Analysis   swing-through gait  -DM    Gait, Gait Deviations nagi decreased;forward flexed posture;step length decreased  -UD  bilateral:;antalgic;nagi decreased;decreased heel strike;forward flexed posture;step length decreased;weight-shifting ability decreased  -DM    Gait, Safety Issues step length decreased  -UD  sequencing ability decreased;step length decreased;weight-shifting ability decreased  -DM    Gait, Impairments strength decreased  -UD  strength decreased;impaired balance;pain   mod.SOB;grimacing w/ pain; tech brought chair; rolled to BS  -DM    Gait, Comment   CUES for trunk ext,incr.step length,PLB  -DM    Recorded by [UD] Mary Kay Alcocer PTA  [DM] Noa Pope, PT    Motor Skills/Interventions    Additional Documentation   Balance Skills Training " (Group)  -DM    Recorded by   [DM] Noa Pope, PT    Balance Skills Training    Sitting-Level of Assistance   Close supervision  -DM    Sitting-Balance Support   Feet supported  -DM    Sitting-Balance Activities   Trunk control activities  -DM    Sitting # of Minutes   6  -DM    Standing-Level of Assistance   Minimum assistance  -DM    Static Standing Balance Support   assistive device  -DM    Standing-Balance Activities   Weight Shift A-P  -DM    Standing Balance # of Minutes   1  -DM    Gait Balance-Level of Assistance   Minimum assistance  -DM    Gait Balance Support   assistive device  -DM    Gait Balance Activities   side-stepping;scanning environment R/L  -DM    Recorded by   [DM] Noa Pope, DAVE    Therapy Exercises    Bilateral Lower Extremities AROM:;10 reps;sitting  -UD  AROM:;AAROM:;10 reps;sitting;ankle pumps/circles;glut sets;heel slides;hip abduction/adduction;hip ER;hip flexion;hip IR;LAQ;quad sets;SAQ   HS sets;BKFO  -DM    Bilateral Upper Extremity   AROM:;10 reps;sitting;elbow flexion/extension;shoulder abduction/adduction;shoulder extension/flexion  -DM    Recorded by [UD] Mary Kay Alcocer PTA  [DM] Noa Pope, PT    Positioning and Restraints    Pre-Treatment Position in bed  -UD sitting in chair/recliner  - in bed  -DM    Post Treatment Position chair  -UD chair  -MF chair  -DM    In Chair notified nsg;reclined;sitting;call light within reach;exit alarm on;legs elevated  - reclined;call light within reach  - notified nsg;reclined;call light within reach;encouraged to call for assist;exit alarm on;RUE elevated;LUE elevated;waffle cushion;legs elevated   Wanting to converse at length about 's visit  -DM    Recorded by [UD] Mary Kay Alcocer PTA [MF] Aly Mckeon, PT [DM] Noa Pope, PT      User Key  (r) = Recorded By, (t) = Taken By, (c) = Cosigned By    Initials Name Effective Dates     Mary Kay Alcocer PTA 06/22/15 -      Aly Mckeon, PT 06/19/15 -       Noa Pope, PT 06/19/15 -                 IP PT Goals       08/04/17 1034 08/02/17 1650 08/01/17 1028    Bed Mobility PT LTG    Bed Mobility PT LTG, Date Goal Reviewed   08/01/17  -MJ    Bed Mobility PT LTG, Outcome goal ongoing  -UD goal ongoing  -DM goal ongoing  -MJ    Transfer Training PT LTG    Transfer Training PT  LTG, Date Goal Reviewed   08/01/17  -MJ    Transfer Training PT LTG, Outcome goal ongoing  -UD goal ongoing  -DM goal ongoing  -MJ    Gait Training PT LTG    Gait Training Goal PT LTG, Date Goal Reviewed   08/01/17  -MJ    Gait Training Goal PT LTG, Outcome goal ongoing  -UD goal ongoing  -DM goal ongoing  -MJ      07/31/17 1540 07/28/17 1513 07/28/17 1100    Bed Mobility PT LTG    Bed Mobility PT LTG, Date Established 07/17/17  -LR      Bed Mobility PT LTG, Time to Achieve 1 wk  -LR      Bed Mobility PT LTG, Activity Type all bed mobility  -LR      Bed Mobility PT LTG, [place] Level independent  -LR      Bed Mobility PT LTG, Date Goal Reviewed 07/31/17  -LR      Bed Mobility PT LTG, Outcome goal ongoing  -LR goal ongoing  -EH     Transfer Training PT LTG    Transfer Training PT LTG, Date Established 07/17/17  -LR      Transfer Training PT LTG, Time to Achieve 1 wk  -LR      Transfer Training PT LTG, Activity Type bed to chair /chair to bed;sit to stand/stand to sit  -LR      Transfer Training PT LTG, [place] Level independent  -LR      Transfer Training PT LTG, Assist Device walker, standard  -LR      Transfer Training PT  LTG, Date Goal Reviewed 07/31/17  -LR      Transfer Training PT LTG, Outcome goal ongoing  -LR goal ongoing  -EH     Gait Training PT LTG    Gait Training Goal PT LTG, Date Established 07/17/16  -LR      Gait Training Goal PT LTG, Time to Achieve 1 wk  -LR      Gait Training Goal PT LTG, [place] Level independent  -LR      Gait Training Goal PT LTG, Assist Device walker, standard  -LR      Gait Training Goal PT LTG, Distance to Achieve 200 feet  -LR      Gait  Training Goal PT LTG, Date Goal Reviewed 07/31/17  -LR      Gait Training Goal PT LTG, Outcome goal ongoing  -LR goal ongoing  -EH     Wound Care PT LTG    Wound Care PT LTG 1, Outcome   goal ongoing  -      07/27/17 1100 07/27/17 1057 07/23/17 1106    Bed Mobility PT LTG    Bed Mobility PT LTG, Outcome  goal ongoing  -MB goal ongoing  -UD    Transfer Training PT LTG    Transfer Training PT LTG, Outcome  goal ongoing  -MB goal ongoing  -UD    Gait Training PT LTG    Gait Training Goal PT LTG, Outcome  goal ongoing  -MB goal ongoing  -UD    Wound Care PT LTG    Wound Care PT LTG 1, Outcome goal partially met  -        User Key  (r) = Recorded By, (t) = Taken By, (c) = Cosigned By    Initials Name Provider Type    UD Mary Kay Alcocer, PTA Physical Therapy Assistant     Aly Mckeon, PT Physical Therapist     Chantell Nazario, PT Physical Therapist    DM Noa Pope, PT Physical Therapist    LR Yuridia Rees, PT Physical Therapist    MB Nicole Camejo, PT Physical Therapist     Maddison Gamble, PT Physical Therapist     Eladio Geller, PT Physical Therapist          Physical Therapy Education     Title: PT OT SLP Therapies (Done)     Topic: Physical Therapy (Done)     Point: Mobility training (Done)    Learning Progress Summary    Learner Readiness Method Response Comment Documented by Status   Patient Acceptance E,D DU,NR   08/04/17 1034 Done    Eager E,D NR   08/02/17 1650 Active    Acceptance E,D VU,NR   08/01/17 1028 Done    Acceptance E,D VU,NR Educated on correct gait mechanics.  07/31/17 1625 Done    Acceptance E,D VU,NR HEP, fall precautions MB 07/27/17 1057 Done    Acceptance E,TB,D VU,DU,NR   07/26/17 0151 Done    Eager E KAROLINE PANTOJA reviewed benefits fo activity SC 07/25/17 1537 Done    Acceptance E,TB,D VU,DU,NR   07/25/17 0520 Done    Acceptance E,TB,D VU,DU,NR   07/24/17 0421 Done    Acceptance E,D DU,NR   07/23/17 1106 Done    Eager E VU  SC 07/21/17 1620 Done     Acceptance E,D DU,NR   07/20/17 1145 Done    Acceptance E NR   07/18/17 1200 Active    Eager E,D NR   07/17/17 1722 Active               Point: Home exercise program (Done)    Learning Progress Summary    Learner Readiness Method Response Comment Documented by Status   Patient Acceptance E,D DU,NR   08/04/17 1034 Done    Eager E,D NR   08/02/17 1650 Active    Acceptance E,D VU,NR   08/01/17 1028 Done    Acceptance E,D VU,NR Educated on correct gait mechanics.  07/31/17 1625 Done    Acceptance E,D VU,NR HEP, fall precautions MB 07/27/17 1057 Done    Acceptance E,TB,D VU,DU,NR   07/26/17 0151 Done    Eager E VU,DU reviewed benefits fo activity SC 07/25/17 1537 Done    Acceptance E,TB,D VU,DU,NR   07/25/17 0520 Done    Acceptance E,TB,D VU,DU,NR   07/24/17 0421 Done    Acceptance E,D DU,NR   07/23/17 1106 Done    Eager E VU  SC 07/21/17 1620 Done    Acceptance E,D DU,NR   07/20/17 1145 Done    Acceptance E NR   07/18/17 1200 Active    Eager E,D NR   07/17/17 1722 Active               Point: Body mechanics (Done)    Learning Progress Summary    Learner Readiness Method Response Comment Documented by Status   Patient Acceptance E,D DU,NR   08/04/17 1034 Done    Eager E,D NR   08/02/17 1650 Active    Acceptance E,D VU,NR   08/01/17 1028 Done    Acceptance E,D VU,NR Educated on correct gait mechanics.  07/31/17 1625 Done    Acceptance E,D VU,NR HEP, fall precautions MB 07/27/17 1057 Done    Acceptance E,TB,D VU,DU,NR   07/26/17 0151 Done    Eager E VU,DU reviewed benefits fo activity SC 07/25/17 1537 Done    Acceptance E,TB,D VU,DU,NR   07/25/17 0520 Done    Acceptance E,TB,D VU,DU,NR   07/24/17 0421 Done    Acceptance E,D DU,NR   07/23/17 1106 Done    Eager E VU  SC 07/21/17 1620 Done    Acceptance BRADEN SANTOS NR UD 07/20/17 1145 Done    Acceptance E JAY  SH 07/18/17 1200 Active    Eager BRADEN SANTOS NR  DM 07/17/17 1722 Active               Point: Precautions (Done)    Learning Progress  Summary    Learner Readiness Method Response Comment Documented by Status   Patient Acceptance E,D DU,NR   08/04/17 1034 Done    Eager E,D NR   08/02/17 1650 Active    Acceptance E,D VU,NR   08/01/17 1028 Done    Acceptance E,D VU,NR Educated on correct gait mechanics.  07/31/17 1625 Done    Acceptance E,D VU,NR HEP, fall precautions MB 07/27/17 1057 Done    Acceptance E,TB,D VU,DU,NR   07/26/17 0151 Done    Eager E VU,DU reviewed benefits fo activity SC 07/25/17 1537 Done    Acceptance E,TB,D VU,DU,NR   07/25/17 0520 Done    Acceptance E,TB,D VU,DU,NR   07/24/17 0421 Done    Acceptance E,D DU,NR   07/23/17 1106 Done    Eager E VU  SC 07/21/17 1620 Done    Acceptance E,D DU,NR   07/20/17 1145 Done    Acceptance E NR   07/18/17 1200 Active    Eager E,D NR   07/17/17 1722 Active                      User Key     Initials Effective Dates Name Provider Type Discipline    SC 06/19/15 -  Oscar Alexis, PT Physical Therapist PT     06/22/15 -  Mary Kay Alcocer, PTA Physical Therapy Assistant PT     06/19/15 -  Keyona Hawk, PT Physical Therapist PT     06/19/15 -  Noa Pope, PT Physical Therapist PT     06/19/15 -  Yuridia Rees, PT Physical Therapist PT    MB 03/14/16 -  Nicole Camejo, PT Physical Therapist PT     03/14/16 -  Eladio Geller, PT Physical Therapist PT     08/22/16 -  Mercedes Silverio, RN Registered Nurse Nurse                    PT Recommendation and Plan  Anticipated Discharge Disposition: home with home health  PT Frequency: daily  Plan of Care Review  Plan Of Care Reviewed With: patient  Progress: progress toward functional goals is gradual  Outcome Summary/Follow up Plan: pt feels tired.sob.ambulat. 45 ft cues for posture and chair behind.did exercises in chair          Outcome Measures       08/04/17 0955 08/02/17 1600       How much help from another person do you currently need...    Turning from your back to your side while in flat bed without using  bedrails? 3  -UD 4  -DM     Moving from lying on back to sitting on the side of a flat bed without bedrails? 3  -UD 4  -DM     Moving to and from a bed to a chair (including a wheelchair)? 3  -UD 3  -DM     Standing up from a chair using your arms (e.g., wheelchair, bedside chair)? 3  -UD 3  -DM     Climbing 3-5 steps with a railing? 2  -UD 2  -DM     To walk in hospital room? 3  -UD 3  -DM     AM-PAC 6 Clicks Score 17  -UD 19  -DM     Functional Assessment    Outcome Measure Options  AM-PAC 6 Clicks Basic Mobility (PT)  -DM       User Key  (r) = Recorded By, (t) = Taken By, (c) = Cosigned By    Initials Name Provider Type    UD Mary Kay Alcocer PTA Physical Therapy Assistant    SHADY Pope, PT Physical Therapist           Time Calculation:         PT Charges       08/04/17 1036          Time Calculation    PT Received On 08/04/17  -      PT Goal Re-Cert Due Date 08/06/17  -      Time Calculation- PT    Total Timed Code Minutes- PT 25 minute(s)  -UD        User Key  (r) = Recorded By, (t) = Taken By, (c) = Cosigned By    Initials Name Provider Type    UD Mary Kay Alcocer PTA Physical Therapy Assistant          Therapy Charges for Today     Code Description Service Date Service Provider Modifiers Qty    59243181087 HC PT THER PROC EA 15 MIN 8/4/2017 Mary Kay Alcocer PTA GP 1    08690759299 HC GAIT TRAINING EA 15 MIN 8/4/2017 Mary Kay Alcocer PTA GP 1    12719636266 HC PT THER SUPP EA 15 MIN 8/4/2017 Mary Kay Alcocer PTA GP 1          PT G-Codes  PT Professional Judgement Used?: Yes  Outcome Measure Options: AM-PAC 6 Clicks Basic Mobility (PT)  Score: 17  Functional Limitation: Mobility: Walking and moving around  Mobility: Walking and Moving Around Current Status (): At least 40 percent but less than 60 percent impaired, limited or restricted  Mobility: Walking and Moving Around Goal Status (): At least 20 percent but less than 40 percent impaired, limited or restricted    Mary Kay Alcocer PTA  8/4/2017

## 2017-08-04 NOTE — PROGRESS NOTES
"      HOSPITALIST DAILY PROGRESS NOTE    Chief Complaint: weakness, cough    Subjective   SUBJECTIVE/OVERNIGHT EVENTS   Still with cough, but difficulty with sputum. More dyspneic today. Edema in legs. NO f/c. Tolerating po. Wants to go.    Review of Systems:  Gen-no fevers, no chills  CV-no chest pain, no palpitations  Resp- +cough, dyspnea on exertion  GI-no N/V/D, no abd pain  Neuro - + confusion  MS - right elbow pain    Objective   OBJECTIVE   I have reviewed the vital signs.  /70 (BP Location: Right arm, Patient Position: Lying)  Pulse 72  Temp 97.6 °F (36.4 °C) (Oral)   Resp 18  Ht 72\" (182.9 cm)  Wt 233 lb 4.8 oz (106 kg)  SpO2 94%  BMI 31.64 kg/m2  Physical Exam:  General: acute and chronically ill appearing, weak, up in bed, finishing breakfast  Head: Normocephalic atraumatic  Eyes: PERRLA, EOMI, nonicteric, conjunctiva normal  Cardiovascular: RRR  no M/R/G +1 DP pulses bilaterally  Respiratory: R rales, and L basilar rales  Abdomen: Obese, soft, nontender, nondistended,  positive bowel sounds in all 4 quadrants   Extremities: R elbow with tr edema, tender, but not warm, ROM better, less tender today  Skin: Pink/warm/dry.  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  No focal deficits  Psych: Patient is pleasant and cooperative.  Normal affect.  Negative suicidal ideation or homicidal ideation.    Results:  I have reviewed the labs, culture data, radiology results, and diagnostic studies.      Results from last 7 days  Lab Units 08/04/17  0427 08/03/17  0349 08/02/17  0341   WBC 10*3/mm3 4.11 4.30 3.89   HEMOGLOBIN g/dL 11.0* 11.3* 11.3*   HEMATOCRIT % 34.9* 36.0* 35.7*   PLATELETS 10*3/mm3 110* 120* 119*       Results from last 7 days  Lab Units 08/04/17  0427   SODIUM mmol/L 135   POTASSIUM mmol/L 3.9   CHLORIDE mmol/L 100   CO2 mmol/L 29.0   BUN mg/dL 20   CREATININE mg/dL 0.90   GLUCOSE mg/dL 143*   CALCIUM mg/dL 9.0     Sputum cultures - proteus - resistant " to levaquin    CXR - 7/25 CMG, increased vascularity    I have reviewed the medications.    Assessment/Plan   ASSESSMENT/PLAN    Principal Problem:    Pneumonia due to infectious organism  Active Problems:    DM type 2 (diabetes mellitus, type 2)    COPD (chronic obstructive pulmonary disease)    Atrial fibrillation    Reactive airway disease with wheezing    Urinary retention    Olecranon bursitis of right elbow    83 year old male presenting from home with SOA and cough with right lower lobe bacterial pneumonia.       Plan:  -- CXR with RLL congestion, but today's CXR seems to favor vascular congestion  -- additional lasix today    --Patient continuing to have urinary retention, home with [facility] per urology, on Flomax    - has new right elbow bursitis, s/p aspiration, Dr. Gayle follows, feels as if improving    - Mobilize, repeat CXR in am    Plan;  -cont current care.  -labs in am.    Aftab Loco MD  08/04/17  10:55 AM

## 2017-08-04 NOTE — PLAN OF CARE
Problem: Patient Care Overview (Adult)  Goal: Plan of Care Review  Outcome: Ongoing (interventions implemented as appropriate)    08/04/17 1034   Patient Care Overview   Progress progress toward functional goals is gradual   Coping/Psychosocial Response Interventions   Plan Of Care Reviewed With patient   Outcome Evaluation   Outcome Summary/Follow up Plan pt feels tired.sob.ambulat. 45 ft cues for posture and chair behind.did exercises in chair         Problem: Inpatient Physical Therapy  Goal: Bed Mobility Goal LTG- PT  Outcome: Ongoing (interventions implemented as appropriate)    07/31/17 1540 08/01/17 1028 08/04/17 1034   Bed Mobility PT LTG   Bed Mobility PT LTG, Date Established 07/17/17 --  --    Bed Mobility PT LTG, Time to Achieve 1 wk --  --    Bed Mobility PT LTG, Activity Type all bed mobility --  --    Bed Mobility PT LTG, R[place] Level independent --  --    Bed Mobility PT LTG, Date Goal Reviewed --  08/01/17 --    Bed Mobility PT LTG, Outcome --  --  goal ongoing       Goal: Transfer Training Goal 1 LTG- PT  Outcome: Ongoing (interventions implemented as appropriate)    07/31/17 1540 08/01/17 1028 08/04/17 1034   Transfer Training PT LTG   Transfer Training PT LTG, Date Established 07/17/17 --  --    Transfer Training PT LTG, Time to Achieve 1 wk --  --    Transfer Training PT LTG, Activity Type bed to chair /chair to bed;sit to stand/stand to sit --  --    Transfer Training PT LTG, Roff Level independent --  --    Transfer Training PT LTG, Assist Device walker, standard --  --    Transfer Training PT LTG, Date Goal Reviewed --  08/01/17 --    Transfer Training PT LTG, Outcome --  --  goal ongoing       Goal: Gait Training Goal LTG- PT  Outcome: Ongoing (interventions implemented as appropriate)    07/31/17 1540 08/01/17 1028 08/04/17 1034   Gait Training PT LTG   Gait Training Goal PT LTG, Date Established 07/17/16 --  --    Gait Training Goal PT LTG, Time to Achieve 1 wk --  --    Gait  Training Goal PT LTG, [place] Level independent --  --    Gait Training Goal PT LTG, Assist Device walker, standard --  --    Gait Training Goal PT LTG, Distance to Achieve 200 feet --  --    Gait Training Goal PT LTG, Date Goal Reviewed --  08/01/17 --    Gait Training Goal PT LTG, Outcome --  --  goal ongoing

## 2017-08-04 NOTE — PROGRESS NOTES
Continued Stay Note   [facility]     Patient Name: Bjorn Pollock  MRN: [id number]  Today's Date: 8/4/2017    Admit Date: 7/16/2017          Discharge Plan       08/04/17 1539    Case Management/Social Work Plan    Plan discharge plan    Patient/Family In Agreement With Plan yes    Additional Comments Spoke with pt in room in regards to discharge planning. Plan remains home with [facility]. If discharged over weekend, will need to notify [facility] at [phone] and fax discharge summary to [facility] at [phone]. will cont to follow,              Discharge Codes     None        Expected Discharge Date and Time     Expected Discharge Date Expected Discharge Time    Aug 4, 2017             Chela Ledezma RN

## 2017-08-04 NOTE — PROGRESS NOTES
"Pharmacy Consult  -  Warfarin    Bjorn Pollock is a 84 yo man admitted 7/17/17 for cough and dyspnea. He is on warfarin for chronic atrial fibrillation.    Pharmacy consulted to manage his warfarin therapy.    Height - 72\" (182.9 cm)  Weight - 233 lb 4.8 oz (106 kg)    Consulting Provider: - Hospitalist Group  Indication: - AFib  Goal INR: -  2-3  Home Regimen:   - 2mg daily     Bridge Therapy: No   therapeutic    Drug-Drug Interactions with current regimen:   Aspirin - increased risk of bleeding              Doxycycline - may result in increased risk of bleed              Duloxetine - may result in altered anticoagulation effects including increased risk of bleeding.     Warfarin Dosing During Admission:    Date  7/16 7/17 7/18 7/19 7/20 7/21 7/22 7/23 7/24   INR  2.96 2.78 2.9 2.7 2.69 2.58 2.25 1.77 1.66   Dose  (home) 2mg 2mg 2mg 2mg  2mg 2mg 3mg 3mg      Date  7/25 7/26 7/27 7/28 7/29 7/30 7/31 8/1 8/2  8/3 8/4     INR  1.64 1.64  1.60  1.65  1.87  1.85  2.16  2.08 1.98  2.19 1.96     Dose  3mg 4mg  4 mg  5 mg  4mg  5mg  2 mg  4 mg  5 mg 4 mg 5 mg       Labs:      Results from last 7 days  Lab Units 08/04/17 0427 08/03/17 0349 08/02/17  0341 08/01/17  0449 07/31/17  0409 07/30/17  0530 07/29/17  0528   INR  1.96 2.19 1.98 2.08 2.16 1.85 1.87   HEMOGLOBIN g/dL 11.0* 11.3* 11.3*  --   --   --   --    HEMATOCRIT % 34.9* 36.0* 35.7*  --   --   --   --    PLATELETS 10*3/mm3 110* 120* 119*  --   --   --   --        Results from last 7 days  Lab Units 08/04/17  0427 08/03/17  0349 08/02/17  0341   SODIUM mmol/L 135 134 136   POTASSIUM mmol/L 3.9 3.9 4.1   CHLORIDE mmol/L 100 98* 99   CO2 mmol/L 29.0 30.0 30.0   BUN mg/dL 20 20 23   CREATININE mg/dL 0.90 1.00 0.90   CALCIUM mg/dL 9.0 8.9 9.1   GLUCOSE mg/dL 143* 234* 201*     Current dietary intake: Regular Cardiac, Consistent Carbs:    25-75%    Assessment/Plan:   INR continues to fluctuate.  Will initiate alternating schedule of warfarin 5 mg on MWF and 4 " mg on all other days to see if this will maintain therapeutic INR.     Pharmacy will continue to follow.     Thank you for this consult,  Claudia Ulrich, JohnathanD  08/04/17  2:02 PM

## 2017-08-05 NOTE — PLAN OF CARE
Problem: Patient Care Overview (Adult)  Goal: Plan of Care Review  Outcome: Ongoing (interventions implemented as appropriate)    08/05/17 0950   Patient Care Overview   Progress progress toward functional goals as expected   Coping/Psychosocial Response Interventions   Plan Of Care Reviewed With patient   Outcome Evaluation   Outcome Summary/Follow up Plan BLE wraps intact with no apparent issues. Anticipate change in 2-3 days.         Problem: Inpatient Physical Therapy  Goal: Wound Care Goal 1 LTG- PT  Outcome: Ongoing (interventions implemented as appropriate)    07/17/17 1500 08/05/17 0950   Wound Care PT LTG   Wound Care PT LTG 1, Date Established 07/17/17 --    Wound Care PT LTG 1, Time to Achieve other (see comments)  (10 days) --    Wound Care PT LTG 1, Location BLEs --    Wound Care PT LTG 1, No S&S of Infection yes --    Wound Care PT LTG 1, No Skin Break Down yes --    Wound Care PT LTG 1, No New Skin Break Down yes --    Wound Care PT LTG 1, Education edema management --    Wound Care PT LTG 1, Education Understanding demonstrate adequately --    Wound Care PT LTG 1, Outcome --  goal ongoing

## 2017-08-05 NOTE — PLAN OF CARE
Problem: Fall Risk (Adult)  Goal: Absence of Falls  Outcome: Ongoing (interventions implemented as appropriate)    08/05/17 1430   Fall Risk (Adult)   Absence of Falls making progress toward outcome         Problem: Skin Integrity Impairment, Risk/Actual (Adult)  Goal: Skin Integrity/Wound Healing  Outcome: Ongoing (interventions implemented as appropriate)    08/05/17 1430   Skin Integrity Impairment, Risk/Actual (Adult)   Skin Integrity/Wound Healing making progress toward outcome         Problem: Pain, Acute (Adult)  Goal: Acceptable Pain Control/Comfort Level  Outcome: Outcome(s) achieved Date Met:  08/05/17 08/05/17 1430   Pain, Acute (Adult)   Acceptable Pain Control/Comfort Level making progress toward outcome

## 2017-08-05 NOTE — PLAN OF CARE
Problem: Patient Care Overview (Adult)  Goal: Plan of Care Review  Outcome: Ongoing (interventions implemented as appropriate)    08/05/17 2214   Patient Care Overview   Progress progress toward functional goals is gradual   Coping/Psychosocial Response Interventions   Plan Of Care Reviewed With patient   Outcome Evaluation   Outcome Summary/Follow up Plan Sat in the bedside chair for several hours; complaints of coccyx pain suggest limiting chair times to 2 hours max at a sitting       Goal: Adult Individualization and Mutuality  Outcome: Ongoing (interventions implemented as appropriate)  Goal: Discharge Needs Assessment  Outcome: Ongoing (interventions implemented as appropriate)    07/17/17 1621 08/03/17 1700   Discharge Needs Assessment   Concerns To Be Addressed --  denies needs/concerns at this time   Readmission Within The Last 30 Days --  no previous admission in last 30 days   Equipment Needed After Discharge --  none   Discharge Facility/Level Of Care Needs --  rehabilitation facility   Discharge Disposition still a patient --    Discharge Planning Comments Pt has [employer] [employer] insurance and denies recent changes in insurance. Pt states his prescriptions are affordable and pt uses [facility] and denies difficulty obtaining meds. Pt goal is to return home with spouse when medically ready for discharge. Pt currently has [facility] for skilled nursing and PT. CM spoke with Margot at [facility]( [phone]) and confirmed pt is current with [facility]. CM will need to notify [facility] upon discharge. If pt remains in observation, will not need new  order. CM will cont to follow. --    Current Health   Outpatient/Agency/Support Group Needs --  inpatient rehabilitation facility (specify)   Anticipated Changes Related to Illness --  inability to care for someone else   Living Environment   Transportation Available car --    Self-Care   Equipment Currently Used at Home bath bench;commode;grab  bar;power chair, (recliner lift);walker, rolling --          Problem: Fall Risk (Adult)  Goal: Absence of Falls  Outcome: Ongoing (interventions implemented as appropriate)    08/05/17 0434   Fall Risk (Adult)   Absence of Falls making progress toward outcome         Problem: Skin Integrity Impairment, Risk/Actual (Adult)  Goal: Skin Integrity/Wound Healing  Outcome: Ongoing (interventions implemented as appropriate)    08/05/17 0434   Skin Integrity Impairment, Risk/Actual (Adult)   Skin Integrity/Wound Healing making progress toward outcome         Problem: Pain, Acute (Adult)  Goal: Acceptable Pain Control/Comfort Level  Outcome: Ongoing (interventions implemented as appropriate)    08/05/17 0434   Pain, Acute (Adult)   Acceptable Pain Control/Comfort Level making progress toward outcome

## 2017-08-05 NOTE — PROGRESS NOTES
"Pharmacy Consult  -  Warfarin    Bjorn Pollock is a 84 yo man admitted 7/17/17 for cough and dyspnea. He is on warfarin for chronic atrial fibrillation.    Pharmacy consulted to manage his warfarin therapy.    Height - 72\" (182.9 cm)  Weight - 235 lb 6.4 oz (107 kg)    Consulting Provider: - Hospitalist Group  Indication: - AFib  Goal INR: -  2-3  Home Regimen:   - 2mg daily     Bridge Therapy: No       Drug-Drug Interactions with current regimen:   Aspirin - increased risk of bleeding              Doxycycline - may result in increased risk of bleed              Duloxetine - may result in altered anticoagulation effects including increased risk of bleeding.     Warfarin Dosing During Admission:    Date  7/16 7/17 7/18 7/19 7/20 7/21 7/22 7/23 7/24   INR  2.96 2.78 2.9 2.7 2.69 2.58 2.25 1.77 1.66   Dose  (home) 2mg 2mg 2mg 2mg  2mg 2mg 3mg 3mg      Date  7/25 7/26 7/27 7/28 7/29 7/30 7/31 8/1 8/2  8/3 8/4 8/5    INR  1.64 1.64  1.60  1.65  1.87  1.85  2.16  2.08 1.98  2.19 1.96 1.96    Dose  3mg 4mg  4 mg  5 mg  4mg  5mg  2 mg  4 mg  5 mg 4 mg 5 mg       Labs:      Results from last 7 days  Lab Units 08/05/17  0526 08/04/17 0427 08/03/17 0349 08/02/17  0341 08/01/17  0449 07/31/17  0409 07/30/17  0530   INR  1.96 1.96 2.19 1.98 2.08 2.16 1.85   HEMOGLOBIN g/dL  --  11.0* 11.3* 11.3*  --   --   --    HEMATOCRIT %  --  34.9* 36.0* 35.7*  --   --   --    PLATELETS 10*3/mm3  --  110* 120* 119*  --   --   --        Results from last 7 days  Lab Units 08/05/17  0526 08/04/17 0427 08/03/17  0349   SODIUM mmol/L 135 135 134   POTASSIUM mmol/L 3.9 3.9 3.9   CHLORIDE mmol/L 98* 100 98*   CO2 mmol/L 31.0 29.0 30.0   BUN mg/dL 22 20 20   CREATININE mg/dL 0.90 0.90 1.00   CALCIUM mg/dL 9.0 9.0 8.9   GLUCOSE mg/dL 119* 143* 234*     Current dietary intake: Regular Cardiac, Consistent Carbs:    25-75%    Assessment/Plan:   INR continues to fluctuate.  Started on alternating schedule of warfarin 5 mg on MWF and 4 mg on " all other days to see if this will maintain therapeutic INR.     INR unchanged today.  Will follow for need to adjust again.    Pharmacy will continue to follow.     Thank you for this consult,  Claudia Ulrich, PharmD  08/05/17  2:02 PM

## 2017-08-05 NOTE — THERAPY WOUND CARE TREATMENT
Acute Care - Wound/Debridement Treatment Note  [facility]     Patient Name: Bjorn Pollock  : 1934  MRN: [id number]  Today's Date: 2017  Onset of Illness/Injury or Date of Surgery Date: 17   Date of Referral to PT: 17   Referring Physician: MD KAYODE (MOBILITY order)       Admit Date: 2017    Visit Dx:    ICD-10-CM ICD-9-CM   1. Reactive airway disease with wheezing, severe persistent, with acute exacerbation J45.51 493.92   2. Fatigue, unspecified type R53.83 780.79   3. Bronchitis J40 490   4. Impaired functional mobility, balance, gait, and endurance Z74.09 V49.89       Patient Active Problem List   Diagnosis   • Intractable low back pain   • HTN (hypertension)   • DM type 2 (diabetes mellitus, type 2)   • Intertriginous candidiasis   • COPD (chronic obstructive pulmonary disease)   • NAYLA (obstructive sleep apnea)   • Atrial fibrillation   • Supratherapeutic INR   • CKD (chronic kidney disease)   • Diastolic heart failure secondary to hypertension   • Anemia   • DJD (degenerative joint disease)   • CAD (coronary artery disease) s/p CABG history    • Crawley's esophagus   • Chronic thrombocytopenic purpura   • Cerebrovascular accident   • NAYLA on CPAP   • Obesity (BMI 30-39.9)   • Dyslipidemia   • BPH (benign prostatic hyperplasia)   • Lower extremity edema   • Falls   • Sepsis due to urinary tract infection   • Reactive airway disease with wheezing   • Constipation   • Pneumonia due to infectious organism   • Urinary retention   • Olecranon bursitis of right elbow                 Lymphedema       17 0950          Lymphedema Pulses/Capillary Refill    Lower Extremity Capillary Refill right:;left:;less than 3 seconds  -      Compression/Skin Care    Bandaging Comments BLE wraps intact with no apparent issues or complaints from the pt.  -        User Key  (r) = Recorded By, (t) = Taken By, (c) = Cosigned By    Initials Name Provider Type    CHRISTIANNE Gamble, PT Physical  Therapist          WOUND DEBRIDEMENT                     Adult Rehabilitation Note       08/05/17 0950 08/04/17 0955 08/03/17 1400    Rehab Assessment/Intervention    Discipline physical therapist  -MC physical therapy assistant  -UD physical therapist  -    Document Type therapy note (daily note)  - therapy note (daily note)  - therapy note (daily note)  -    Subjective Information agree to therapy;complains of;weakness  - agree to therapy;complains of;weakness;dyspnea  - agree to therapy;complains of;weakness;fatigue;pain  -    Patient Effort, Rehab Treatment  good  -UD     Symptoms Noted During/After Treatment  fatigue;shortness of breath  -UD     Precautions/Limitations  fall precautions  -UD     Precautions/Limitations, Hearing  hearing impairment, bilaterally  -UD     Recorded by [] Maddison Gamble, PT [] Mary Kay Alcocer PTA [] Aly Mckeon, PT    Vital Signs    O2 Delivery Post Treatment  room air  -UD     Pre Patient Position  Supine  -UD     Intra Patient Position  Standing  -UD     Post Patient Position  Sitting  -UD     Recorded by  [UD] Mary Kay Alcocer PTA     Pain Assessment    Pain Assessment No/denies pain  - 0-10  -UD Arrington-Baker FACES  -    Arrington-Baker FACES Pain Rating  4  -UD 4  -MF    Pain Score  4  -UD     Post Pain Score  4  -UD     Pain Type  Acute pain  -UD Acute pain  -    Pain Location  Back  -UD Generalized  -    Pain Intervention(s)  Repositioned  -UD Repositioned  -    Recorded by [] Maddison Gamble, PT [UD] Mary Kay Alcocer PTA [] Aly Mckeon, PT    Vision Assessment/Intervention    Visual Impairment  WFL with corrective lenses  -UD     Recorded by  [UD] Mary Kay Alcocer PTA     Cognitive Assessment/Intervention    Orientation Status  oriented x 4  -UD     Follows Commands/Answers Questions  100% of the time  -UD     Personal Safety Interventions  fall prevention program maintained  -UD     Recorded by  [UD] Mary Kay Alcocer PTA     Bed Mobility,  Assessment/Treatment    Bed Mob, Supine to Sit, [place]  contact guard assist;verbal cues required  -UD     Recorded by  [UD] Mary Kay Alcocer PTA     Transfer Assessment/Treatment    Transfers, Sit-Stand [place]  minimum assist (75% patient effort);moderate assist (50% patient effort);2 person assist required  -UD     Transfers, Stand-Sit [place]  minimum assist (75% patient effort);moderate assist (50% patient effort);2 person assist required  -UD     Transfers, Sit-Stand-Sit, Assist Device  rolling walker  -UD     Recorded by  [UD] Mary Kay Alcocer PTA     Gait Assessment/Treatment    Gait, Bibb Level  minimum assist (75% patient effort);1 person + 1 person to manage equipment  -UD     Gait, Assistive Device  rolling walker  -UD     Gait, Distance (Feet)  45  -UD     Gait, Gait Deviations  nagi decreased;forward flexed posture;step length decreased  -UD     Gait, Safety Issues  step length decreased  -UD     Gait, Impairments  strength decreased  -UD     Recorded by  [UD] Mary Kay Alcocer PTA     Therapy Exercises    Bilateral Lower Extremities  AROM:;10 reps;sitting  -UD     Recorded by  [UD] Mary Kay Alcocer PTA     Positioning and Restraints    Pre-Treatment Position in bed  - in bed  -UD sitting in chair/recliner  -    Post Treatment Position bed  - chair  -UD chair  -    In Bed supine;call light within reach;encouraged to call for assist  -      In Chair  notified nsg;reclined;sitting;call light within reach;exit alarm on;legs elevated  -UD reclined;call light within reach  -    Recorded by [] Maddison Gamble, PT [UD] Mary Kay Alcocer PTA [] Aly Mckeon, PT      08/02/17 1600          Rehab Assessment/Intervention    Discipline physical therapist  -DM      Document Type therapy note (daily note)  -DM      Subjective Information agree to therapy;complains of;weakness;fatigue;pain   legs weaker today;3tries(family/eating, present,fatig)  -DM      Patient Effort, Rehab  Treatment good  -DM      Symptoms Noted During/After Treatment fatigue;increased pain;shortness of breath;significant change in vital signs  -DM      Precautions/Limitations fall precautions;other (see comments)   BURSITIS R elbow(ABX)  -DM      Precautions/Limitations, Vision WFL  -DM      Precautions/Limitations, Hearing hearing impairment, bilaterally  -DM      Recorded by [DM] Noa Pope, PT      Vital Signs    Pre Systolic BP Rehab 117  -DM      Pre Treatment Diastolic BP 71  -DM      Post Systolic BP Rehab 146  -DM      Post Treatment Diastolic BP 72  -DM      Pretreatment Heart Rate (beats/min) 89  -DM      Intratreatment Heart Rate (beats/min) 94   IRREG.  -DM      Posttreatment Heart Rate (beats/min) 92  -DM      Pre SpO2 (%) 93  -DM      O2 Delivery Pre Treatment room air  -DM      Intra SpO2 (%) 87  -DM      O2 Delivery Intra Treatment room air  -DM      Post SpO2 (%) 92  -DM      O2 Delivery Post Treatment room air  -DM      Pre Patient Position Supine  -DM      Intra Patient Position Standing  -DM      Post Patient Position Sitting  -DM      Recorded by [DM] Noa Pope, PT      Pain Assessment    Pain Assessment 0-10  -DM      Pain Score 7  -DM      Post Pain Score 8  -DM      Pain Type Acute pain  -DM      Pain Location Generalized  -DM      Pain Orientation Left;Right   michelle.B LE'S,back  -DM      Pain Descriptors Aching  -DM      Pain Frequency Constant/continuous  -DM      Patient's Stated Pain Goal No pain  -DM      Pain Intervention(s) Repositioned;Ambulation/increased activity  -DM      Response to Interventions tolerated  -DM      Recorded by [DM] Noa Pope, PT      Vision Assessment/Intervention    Visual Impairment WFL with corrective lenses  -DM      Recorded by [DM] Noa Pope, PT      Cognitive Assessment/Intervention    Current Cognitive/Communication Assessment functional  -DM      Orientation Status oriented x 4  -DM      Follows Commands/Answers Questions 100% of the  "time;able to follow single-step instructions;needs cueing;needs increased time;needs repetition  -DM      Personal Safety mild impairment;decreased awareness, need for assist;decreased awareness, need for safety;decreased insight to deficits  -DM      Personal Safety Interventions fall prevention program maintained;gait belt;nonskid shoes/slippers when out of bed  -DM      Recorded by [DM] Noa Pope, PT      Bed Mobility, Assessment/Treatment    Bed Mobility, Assistive Device bed rails;head of bed elevated  -DM      Bed Mobility, Roll Right, Butts conditional independence  -DM      Bed Mob, Supine to Sit, Butts conditional independence   \"let me do it\" (slow transit.mvmts;moaning throughout)  -DM      Bed Mobility, Safety Issues decreased use of arms for pushing/pulling;decreased use of legs for bridging/pushing  -DM      Bed Mobility, Impairments strength decreased;impaired balance;pain  -DM      Bed Mobility, Comment cues for hand placement  -DM      Recorded by [DM] Noa Pope, PT      Transfer Assessment/Treatment    Transfers, Sit-Stand Butts moderate assist (50% patient effort);verbal cues required  -DM      Transfers, Stand-Sit Butts moderate assist (50% patient effort);verbal cues required  -DM      Transfers, Sit-Stand-Sit, Assist Device rolling walker;elevated surface  -DM      Transfer, Safety Issues sequencing ability decreased;loses balance backward;weight-shifting ability decreased;knees buckling  -DM      Transfer, Impairments strength decreased;impaired balance;pain  -DM      Transfer, Comment cues for hand placement;\"let me take my time;SOB\"   2 trials; mod.SOB/moaning;5 Min. rest btwn  -DM      Recorded by [DM] Noa Pope, PT      Gait Assessment/Treatment    Gait, Butts Level contact guard assist;verbal cues required  -DM      Gait, Assistive Device rolling walker  -DM      Gait, Distance (Feet) 25  -DM      Gait, Gait Pattern Analysis " swing-through gait  -DM      Gait, Gait Deviations bilateral:;antalgic;nagi decreased;decreased heel strike;forward flexed posture;step length decreased;weight-shifting ability decreased  -DM      Gait, Safety Issues sequencing ability decreased;step length decreased;weight-shifting ability decreased  -DM      Gait, Impairments strength decreased;impaired balance;pain   mod.SOB;grimacing w/ pain; tech brought chair; rolled to BS  -DM      Gait, Comment CUES for trunk ext,incr.step length,PLB  -DM      Recorded by [DM] Noa Pope, PT      Motor Skills/Interventions    Additional Documentation Balance Skills Training (Group)  -DM      Recorded by [DM] Noa Pope, PT      Balance Skills Training    Sitting-Level of Assistance Close supervision  -DM      Sitting-Balance Support Feet supported  -DM      Sitting-Balance Activities Trunk control activities  -DM      Sitting # of Minutes 6  -DM      Standing-Level of Assistance Minimum assistance  -DM      Static Standing Balance Support assistive device  -DM      Standing-Balance Activities Weight Shift A-P  -DM      Standing Balance # of Minutes 1  -DM      Gait Balance-Level of Assistance Minimum assistance  -DM      Gait Balance Support assistive device  -DM      Gait Balance Activities side-stepping;scanning environment R/L  -DM      Recorded by [DM] Noa Pope, PT      Therapy Exercises    Bilateral Lower Extremities AROM:;AAROM:;10 reps;sitting;ankle pumps/circles;glut sets;heel slides;hip abduction/adduction;hip ER;hip flexion;hip IR;LAQ;quad sets;SAQ   HS sets;BKFO  -DM      Bilateral Upper Extremity AROM:;10 reps;sitting;elbow flexion/extension;shoulder abduction/adduction;shoulder extension/flexion  -DM      Recorded by [DM] Noa Pope, PT      Positioning and Restraints    Pre-Treatment Position in bed  -DM      Post Treatment Position chair  -DM      In Chair notified nsg;reclined;call light within reach;encouraged to call for assist;exit  alarm on;RUE elevated;LUE elevated;waffle cushion;legs elevated   Wanting to converse at length about 's visit  -DM      Recorded by [DM] Noa Pope, PT        User Key  (r) = Recorded By, (t) = Taken By, (c) = Cosigned By    Initials Name Effective Dates    UD Mary Kay Leodan, PTA 06/22/15 -      Aly Mckeon, PT 06/19/15 -     DM Noa Pope, PT 06/19/15 -      Maddison Gamble, PT 03/14/16 -                 IP PT Goals       08/05/17 0950 08/04/17 1034 08/02/17 1650    Bed Mobility PT LTG    Bed Mobility PT LTG, Outcome  goal ongoing  -UD goal ongoing  -DM    Transfer Training PT LTG    Transfer Training PT LTG, Outcome  goal ongoing  -UD goal ongoing  -DM    Gait Training PT LTG    Gait Training Goal PT LTG, Outcome  goal ongoing  -UD goal ongoing  -DM    Wound Care PT LTG    Wound Care PT LTG 1, Outcome goal ongoing  -        08/01/17 1028 07/31/17 1540 07/28/17 1513    Bed Mobility PT LTG    Bed Mobility PT LTG, Date Established  07/17/17  -LR     Bed Mobility PT LTG, Time to Achieve  1 wk  -LR     Bed Mobility PT LTG, Activity Type  all bed mobility  -LR     Bed Mobility PT LTG, [place] Level  independent  -LR     Bed Mobility PT LTG, Date Goal Reviewed 08/01/17  -MJ 07/31/17  -LR     Bed Mobility PT LTG, Outcome goal ongoing  - goal ongoing  -LR goal ongoing  -EH    Transfer Training PT LTG    Transfer Training PT LTG, Date Established  07/17/17  -LR     Transfer Training PT LTG, Time to Achieve  1 wk  -LR     Transfer Training PT LTG, Activity Type  bed to chair /chair to bed;sit to stand/stand to sit  -LR     Transfer Training PT LTG, [place] Level  independent  -LR     Transfer Training PT LTG, Assist Device  walker, standard  -LR     Transfer Training PT  LTG, Date Goal Reviewed 08/01/17  -MJ 07/31/17  -LR     Transfer Training PT LTG, Outcome goal ongoing  -MJ goal ongoing  -LR goal ongoing  -EH    Gait Training PT LTG    Gait Training Goal PT LTG, Date Established   07/17/16  -LR     Gait Training Goal PT LTG, Time to Achieve  1 wk  -LR     Gait Training Goal PT LTG, [place] Level  independent  -LR     Gait Training Goal PT LTG, Assist Device  walker, standard  -LR     Gait Training Goal PT LTG, Distance to Achieve  200 feet  -LR     Gait Training Goal PT LTG, Date Goal Reviewed 08/01/17  -MJ 07/31/17  -LR     Gait Training Goal PT LTG, Outcome goal ongoing  -MJ goal ongoing  -LR goal ongoing  -EH      07/28/17 1100 07/27/17 1100 07/27/17 1057    Bed Mobility PT LTG    Bed Mobility PT LTG, Outcome   goal ongoing  -MB    Transfer Training PT LTG    Transfer Training PT LTG, Outcome   goal ongoing  -MB    Gait Training PT LTG    Gait Training Goal PT LTG, Outcome   goal ongoing  -MB    Wound Care PT LTG    Wound Care PT LTG 1, Outcome goal ongoing  -MC goal partially met  -MF       07/23/17 1106          Bed Mobility PT LTG    Bed Mobility PT LTG, Outcome goal ongoing  -UD      Transfer Training PT LTG    Transfer Training PT LTG, Outcome goal ongoing  -UD      Gait Training PT LTG    Gait Training Goal PT LTG, Outcome goal ongoing  -UD        User Key  (r) = Recorded By, (t) = Taken By, (c) = Cosigned By    Initials Name Provider Type    UD Mary Kay Alcocer, PTA Physical Therapy Assistant    LITO Mckeon, PT Physical Therapist    EH Chantell Nazario, PT Physical Therapist    DM Noa Pope, PT Physical Therapist    LR Yuridia Rees, PT Physical Therapist    MB Nicole Camejo, PT Physical Therapist     Maddison Gamble, PT Physical Therapist    ECHO Geller, PT Physical Therapist          Physical Therapy Education     Title: PT OT SLP Therapies (Done)     Topic: Physical Therapy (Done)     Point: Mobility training (Done)    Learning Progress Summary    Learner Readiness Method Response Comment Documented by Status   Patient Acceptance E VU  CE 08/05/17 0128 Done    Acceptance BRADEN SANTOS NR UD 08/04/17 1034 Done    Eager BRADEN SANTOS NR  DM 08/02/17 1047  Active    Acceptance E,D VU,NR   08/01/17 1028 Done    Acceptance E,D VU,NR Educated on correct gait mechanics.  07/31/17 1625 Done    Acceptance E,D VU,NR HEP, fall precautions MB 07/27/17 1057 Done    Acceptance E,TB,D VU,DU,NR   07/26/17 0151 Done    Eager E VU,DU reviewed benefits fo activity SC 07/25/17 1537 Done    Acceptance E,TB,D VU,DU,NR   07/25/17 0520 Done    Acceptance E,TB,D VU,DU,NR   07/24/17 0421 Done    Acceptance E,D DU,NR   07/23/17 1106 Done    Eager E VU  SC 07/21/17 1620 Done    Acceptance E,D DU,NR   07/20/17 1145 Done    Acceptance E NR   07/18/17 1200 Active    Eager E,D NR   07/17/17 1722 Active               Point: Home exercise program (Done)    Learning Progress Summary    Learner Readiness Method Response Comment Documented by Status   Patient Acceptance E VU   08/05/17 0128 Done    Acceptance E,D DU,NR   08/04/17 1034 Done    Eager E,D NR   08/02/17 1650 Active    Acceptance E,D VU,NR   08/01/17 1028 Done    Acceptance E,D VU,NR Educated on correct gait mechanics.  07/31/17 1625 Done    Acceptance E,D VU,NR HEP, fall precautions MB 07/27/17 1057 Done    Acceptance E,TB,D VU,DU,NR   07/26/17 0151 Done    Eager E VU,DU reviewed benefits fo activity SC 07/25/17 1537 Done    Acceptance E,TB,D VU,DU,NR   07/25/17 0520 Done    Acceptance E,TB,D VU,DU,NR   07/24/17 0421 Done    Acceptance E,D DU,[facility] 07/23/17 1106 Done    Eager E VU  SC 07/21/17 1620 Done    Acceptance E,D DU,[facility] 07/20/17 1145 Done    Acceptance E NR   07/18/17 1200 Active    Eager E,D NR   07/17/17 1722 Active               Point: Body mechanics (Done)    Learning Progress Summary    Learner Readiness Method Response Comment Documented by Status   Patient Acceptance E VU   08/05/17 0128 Done    Acceptance BRADEN SANTOS NR UD 08/04/17 1034 Done    Eager BRADEN SANTOS  DM 08/02/17 1650 Active    Acceptance BRADEN SANTOS NR   08/01/17 1028 Done    Acceptance BRADEN SANTOS NR Educated on correct gait  mechanics.  07/31/17 1625 Done    Acceptance E,D VU,NR HEP, fall precautions MB 07/27/17 1057 Done    Acceptance E,TB,D VU,DU,NR   07/26/17 0151 Done    Eager E VU,DU reviewed benefits fo activity SC 07/25/17 1537 Done    Acceptance E,TB,D VU,DU,NR   07/25/17 0520 Done    Acceptance E,TB,D VU,DU,NR   07/24/17 0421 Done    Acceptance E,D DU,NR   07/23/17 1106 Done    Eager E VU  SC 07/21/17 1620 Done    Acceptance E,D DU,[facility] 07/20/17 1145 Done    Acceptance E NR   07/18/17 1200 Active    Eager E,D NR   07/17/17 1722 Active               Point: Precautions (Done)    Learning Progress Summary    Learner Readiness Method Response Comment Documented by Status   Patient Acceptance E VU   08/05/17 0128 Done    Acceptance E,D DU,NR   08/04/17 1034 Done    Eager E,D NR   08/02/17 1650 Active    Acceptance E,D VU,NR   08/01/17 1028 Done    Acceptance E,D VU,NR Educated on correct gait mechanics.  07/31/17 1625 Done    Acceptance E,D VU,NR HEP, fall precautions MB 07/27/17 1057 Done    Acceptance E,TB,D VU,DU,NR   07/26/17 0151 Done    Eager E VU,DU reviewed benefits fo activity SC 07/25/17 1537 Done    Acceptance E,TB,D VU,DU,NR   07/25/17 0520 Done    Acceptance E,TB,D VU,DU,NR   07/24/17 0421 Done    Acceptance E,D DU,NR   07/23/17 1106 Done    Eager E VU  SC 07/21/17 1620 Done    Acceptance E,D DU,[facility] 07/20/17 1145 Done    Acceptance E NR   07/18/17 1200 Active    Eager E,D [facility] 07/17/17 1722 Active                      User Key     Initials Effective Dates Name Provider Type Discipline    SC 06/19/15 -  Oscar Alexis, PT Physical Therapist PT     06/22/15 -  Mary Kay Alcocer, PTA Physical Therapy Assistant PT     06/19/15 -  Keyona Hawk, PT Physical Therapist PT    DM 06/19/15 -  Noa Pope, PT Physical Therapist PT    LR 06/19/15 -  Yuridia Rees, PT Physical Therapist PT    MB 03/14/16 -  Nicole Camejo, PT Physical Therapist PT    MJ 03/14/16 -  Eladio  Duyen, PT Physical Therapist PT     08/22/16 -  Mercedes Silverio, RN Registered Nurse Nurse     10/17/16 -  Sabrina Gomez, RN Registered Nurse Nurse                   PT ASSESSMENT (last 72 hours)      PT Evaluation       08/05/17 0950 08/04/17 0955    Rehab Evaluation    Document Type therapy note (daily note)  - therapy note (daily note)  -    Subjective Information agree to therapy;complains of;weakness  - agree to therapy;complains of;weakness;dyspnea  -UD    Patient Effort, Rehab Treatment  good  -UD    Symptoms Noted During/After Treatment  fatigue;shortness of breath  -UD    General Information    Precautions/Limitations  fall precautions  -UD    Vital Signs    O2 Delivery Post Treatment  room air  -UD    Pre Patient Position  Supine  -UD    Intra Patient Position  Standing  -UD    Post Patient Position  Sitting  -UD    Pain Assessment    Pain Assessment No/denies pain  - 0-10  -UD    Arrington-Moon FACES Pain Rating  4  -UD    Pain Score  4  -UD    Post Pain Score  4  -UD    Pain Type  Acute pain  -UD    Pain Location  Back  -UD    Pain Intervention(s)  Repositioned  -UD    Vision Assessment/Intervention    Visual Impairment  WFL with corrective lenses  -UD    Cognitive Assessment/Intervention    Orientation Status  oriented x 4  -UD    Follows Commands/Answers Questions  100% of the time  -UD    Personal Safety Interventions  fall prevention program maintained  -UD    Bed Mobility, Assessment/Treatment    Bed Mob, Supine to Sit, [place]  contact guard assist;verbal cues required  -UD    Transfer Assessment/Treatment    Transfers, Sit-Stand [place]  minimum assist (75% patient effort);moderate assist (50% patient effort);2 person assist required  -UD    Transfers, Stand-Sit [place]  minimum assist (75% patient effort);moderate assist (50% patient effort);2 person assist required  -UD    Transfers, Sit-Stand-Sit, Assist Device  rolling walker  -UD    Gait Assessment/Treatment    Gait,  [place] Level  minimum assist (75% patient effort);1 person + 1 person to manage equipment  -UD    Gait, Assistive Device  rolling walker  -UD    Gait, Distance (Feet)  45  -UD    Gait, Gait Deviations  nagi decreased;forward flexed posture;step length decreased  -UD    Gait, Safety Issues  step length decreased  -UD    Gait, Impairments  strength decreased  -UD    Therapy Exercises    Bilateral Lower Extremities  AROM:;10 reps;sitting  -    Positioning and Restraints    Pre-Treatment Position in bed  - in bed  -    Post Treatment Position bed  - chair  -    In Bed supine;call light within reach;encouraged to call for assist  -     In Chair  notified nsg;reclined;sitting;call light within reach;exit alarm on;legs elevated  -UD      08/03/17 1400 08/02/17 1708    Rehab Evaluation    Document Type therapy note (daily note)  -     Subjective Information agree to therapy;complains of;weakness;fatigue;pain  -MF     Vital Signs    Pretreatment Heart Rate (beats/min)  90  -KS    Pain Assessment    Pain Assessment Arrington-Baker FACES  -     Arrington-Baker FACES Pain Rating 4  -     Pain Type Acute pain  -     Pain Location Generalized  -     Pain Intervention(s) Repositioned  -     Positioning and Restraints    Pre-Treatment Position sitting in chair/recliner  -     Post Treatment Position chair  -     In Chair reclined;call light within reach  -       08/02/17 1600       Rehab Evaluation    Document Type therapy note (daily note)  -DM     Subjective Information agree to therapy;complains of;weakness;fatigue;pain   legs weaker today;3tries(family/eating, present,fatig)  -DM     Patient Effort, Rehab Treatment good  -DM     Symptoms Noted During/After Treatment fatigue;increased pain;shortness of breath;significant change in vital signs  -DM     General Information    Precautions/Limitations fall precautions;other (see comments)   BURSITIS R elbow(ABX)  -DM     Vital Signs    Pre Systolic  BP Rehab 117  -DM     Pre Treatment Diastolic BP 71  -DM     Post Systolic BP Rehab 146  -DM     Post Treatment Diastolic BP 72  -DM     Pretreatment Heart Rate (beats/min) 89  -DM     Intratreatment Heart Rate (beats/min) 94   IRREG.  -DM     Posttreatment Heart Rate (beats/min) 92  -DM     Pre SpO2 (%) 93  -DM     O2 Delivery Pre Treatment room air  -DM     Intra SpO2 (%) 87  -DM     O2 Delivery Intra Treatment room air  -DM     Post SpO2 (%) 92  -DM     O2 Delivery Post Treatment room air  -DM     Pre Patient Position Supine  -DM     Intra Patient Position Standing  -DM     Post Patient Position Sitting  -DM     Pain Assessment    Pain Assessment 0-10  -DM     Pain Score 7  -DM     Post Pain Score 8  -DM     Pain Type Acute pain  -DM     Pain Location Generalized  -DM     Pain Orientation Left;Right   michelle.B LE'S,back  -DM     Pain Descriptors Aching  -DM     Pain Frequency Constant/continuous  -DM     Patient's Stated Pain Goal No pain  -DM     Pain Intervention(s) Repositioned;Ambulation/increased activity  -DM     Response to Interventions tolerated  -DM     Vision Assessment/Intervention    Visual Impairment WFL with corrective lenses  -DM     Cognitive Assessment/Intervention    Current Cognitive/Communication Assessment functional  -DM     Orientation Status oriented x 4  -DM     Follows Commands/Answers Questions 100% of the time;able to follow single-step instructions;needs cueing;needs increased time;needs repetition  -DM     Personal Safety mild impairment;decreased awareness, need for assist;decreased awareness, need for safety;decreased insight to deficits  -DM     Personal Safety Interventions fall prevention program maintained;gait belt;nonskid shoes/slippers when out of bed  -DM     Bed Mobility, Assessment/Treatment    Bed Mobility, Assistive Device bed rails;head of bed elevated  -DM     Bed Mobility, Roll Right, [place] conditional independence  -DM     Bed Mob, Supine to Sit, [place]  "conditional independence   \"let me do it\" (slow transit.mvmts;moaning throughout)  -DM     Bed Mobility, Safety Issues decreased use of arms for pushing/pulling;decreased use of legs for bridging/pushing  -DM     Bed Mobility, Impairments strength decreased;impaired balance;pain  -DM     Bed Mobility, Comment cues for hand placement  -DM     Transfer Assessment/Treatment    Transfers, Sit-Stand [place] moderate assist (50% patient effort);verbal cues required  -DM     Transfers, Stand-Sit [place] moderate assist (50% patient effort);verbal cues required  -DM     Transfers, Sit-Stand-Sit, Assist Device rolling walker;elevated surface  -DM     Transfer, Safety Issues sequencing ability decreased;loses balance backward;weight-shifting ability decreased;knees buckling  -DM     Transfer, Impairments strength decreased;impaired balance;pain  -DM     Transfer, Comment cues for hand placement;\"let me take my time;SOB\"   2 trials; mod.SOB/moaning;5 Min. rest btwn  -DM     Gait Assessment/Treatment    Gait, [place] Level contact guard assist;verbal cues required  -DM     Gait, Assistive Device rolling walker  -DM     Gait, Distance (Feet) 25  -DM     Gait, Gait Pattern Analysis swing-through gait  -DM     Gait, Gait Deviations bilateral:;antalgic;nagi decreased;decreased heel strike;forward flexed posture;step length decreased;weight-shifting ability decreased  -DM     Gait, Safety Issues sequencing ability decreased;step length decreased;weight-shifting ability decreased  -DM     Gait, Impairments strength decreased;impaired balance;pain   mod.SOB;grimacing w/ pain; tech brought chair; rolled to BS  -DM     Gait, Comment CUES for trunk ext,incr.step length,PLB  -DM     Motor Skills/Interventions    Additional Documentation Balance Skills Training (Group)  -DM     Balance Skills Training    Sitting-Level of Assistance Close supervision  -DM     Sitting-Balance Support Feet supported  -DM     Sitting-Balance " Activities Trunk control activities  -DM     Sitting # of Minutes 6  -DM     Standing-Level of Assistance Minimum assistance  -DM     Static Standing Balance Support assistive device  -DM     Standing-Balance Activities Weight Shift A-P  -DM     Standing Balance # of Minutes 1  -DM     Gait Balance-Level of Assistance Minimum assistance  -DM     Gait Balance Support assistive device  -DM     Gait Balance Activities side-stepping;scanning environment R/L  -DM     Therapy Exercises    Bilateral Lower Extremities AROM:;AAROM:;10 reps;sitting;ankle pumps/circles;glut sets;heel slides;hip abduction/adduction;hip ER;hip flexion;hip IR;LAQ;quad sets;SAQ   HS sets;BKFO  -DM     Bilateral Upper Extremity AROM:;10 reps;sitting;elbow flexion/extension;shoulder abduction/adduction;shoulder extension/flexion  -DM     Positioning and Restraints    Pre-Treatment Position in bed  -DM     Post Treatment Position chair  -DM     In Chair notified nsg;reclined;call light within reach;encouraged to call for assist;exit alarm on;RUE elevated;LUE elevated;waffle cushion;legs elevated   Wanting to converse at length about 's visit  -DM       User Key  (r) = Recorded By, (t) = Taken By, (c) = Cosigned By    Initials Name Provider Type    UD Mary Kay Alcocer, PTA Physical Therapy Assistant    LITO Mckeon, PT Physical Therapist    SHADY Pope, PT Physical Therapist    CHRISTIANNE Gamble, PT Physical Therapist    JUNIOR Ferrer, RRT Respiratory Therapist            PT Recommendation and Plan  Anticipated Discharge Disposition: home with home health  PT Frequency: daily    Plan Of Care Reviewed With: patient   Progress: progress toward functional goals as expected   Outcome Summary/Follow up Plan: BLE wraps intact with no apparent issues. Anticipate change in 2-3 days.          Outcome Measures       08/04/17 0955 08/02/17 1600       How much help from another person do you currently need...    Turning from your back to  your side while in flat bed without using bedrails? 3  -UD 4  -DM     Moving from lying on back to sitting on the side of a flat bed without bedrails? 3  -UD 4  -DM     Moving to and from a bed to a chair (including a wheelchair)? 3  -UD 3  -DM     Standing up from a chair using your arms (e.g., wheelchair, bedside chair)? 3  -UD 3  -DM     Climbing 3-5 steps with a railing? 2  -UD 2  -DM     To walk in hospital room? 3  -UD 3  -DM     AM-PAC 6 Clicks Score 17  -UD 19  -DM     Functional Assessment    Outcome Measure Options  AM-PAC 6 Clicks Basic Mobility (PT)  -       User Key  (r) = Recorded By, (t) = Taken By, (c) = Cosigned By    Initials Name Provider Type    LOTUS Alcocer, PTA Physical Therapy Assistant    SHADY Pope, PT Physical Therapist              Time Calculation        PT Charges       08/05/17 0950          Time Calculation    Start Time 0950  -      PT Goal Re-Cert Due Date 08/06/17  -      Time Calculation- PT    Total Timed Code Minutes- PT 10 minute(s)  -        User Key  (r) = Recorded By, (t) = Taken By, (c) = Cosigned By    Initials Name Provider Type     Maddison Gamble, PT Physical Therapist             Therapy Charges for Today     Code Description Service Date Service Provider Modifiers Qty    [id number]  PT THER PROC EA 15 MIN 8/5/2017 Maddison Gamble, PT GP 1            PT G-Codes  PT Professional Judgement Used?: Yes  Outcome Measure Options: AM-PAC 6 Clicks Basic Mobility (PT)  Score: 17  Functional Limitation: Mobility: Walking and moving around  Mobility: Walking and Moving Around Current Status (): At least 40 percent but less than 60 percent impaired, limited or restricted  Mobility: Walking and Moving Around Goal Status (): At least 20 percent but less than 40 percent impaired, limited or restricted        Maddison Gamble, PT  8/5/2017

## 2017-08-05 NOTE — PROGRESS NOTES
"      HOSPITALIST DAILY PROGRESS NOTE    Chief Complaint: weakness, cough    Subjective   SUBJECTIVE/OVERNIGHT EVENTS   Still with cough, but difficulty with sputum. Says he still feels SOA. No n/v. No diarrhea    Review of Systems:  Gen-no fevers, no chills  CV-no chest pain, no palpitations  Resp- +cough, dyspnea on exertion  GI-no N/V/D, no abd pain  Neuro - + confusion  MS - right elbow pain    Objective   OBJECTIVE   I have reviewed the vital signs.  /67 (BP Location: Left arm, Patient Position: Lying)  Pulse 66  Temp 97.4 °F (36.3 °C) (Oral)   Resp 16  Ht 72\" (182.9 cm)  Wt 235 lb 6.4 oz (107 kg)  SpO2 92%  BMI 31.93 kg/m2  Physical Exam:  General: acute and chronically ill appearing, weak, up in bed, watching TV, still dyspneic  Head: Normocephalic atraumatic  Eyes: PERRLA, EOMI, nonicteric, conjunctiva normal  Cardiovascular: RRR  no M/R/G +1 DP pulses bilaterally  Respiratory: R rales/crackles, faint rhonchi, clear L  Abdomen: Obese, soft, nontender, nondistended,  positive bowel sounds in all 4 quadrants   Extremities: R elbow with tr edema, tender, but not warm, ROM better, less tender today  Skin: Pink/warm/dry.  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  No focal deficits  Psych: Patient is pleasant and cooperative.  Normal affect.  Negative suicidal ideation or homicidal ideation.    Results:  I have reviewed the labs, culture data, radiology results, and diagnostic studies.      Results from last 7 days  Lab Units 08/04/17  0427 08/03/17  0349 08/02/17  0341   WBC 10*3/mm3 4.11 4.30 3.89   HEMOGLOBIN g/dL 11.0* 11.3* 11.3*   HEMATOCRIT % 34.9* 36.0* 35.7*   PLATELETS 10*3/mm3 110* 120* 119*       Results from last 7 days  Lab Units 08/05/17  0526   SODIUM mmol/L 135   POTASSIUM mmol/L 3.9   CHLORIDE mmol/L 98*   CO2 mmol/L 31.0   BUN mg/dL 22   CREATININE mg/dL 0.90   GLUCOSE mg/dL 119*   CALCIUM mg/dL 9.0     Sputum cultures - proteus - resistant to " [facility]    CXR - 7/25 CMG, increased vascularity    I have reviewed the medications.    Assessment/Plan   ASSESSMENT/PLAN    Principal Problem:    Pneumonia due to infectious organism  Active Problems:    DM type 2 (diabetes mellitus, type 2)    COPD (chronic obstructive pulmonary disease)    Atrial fibrillation    Reactive airway disease with wheezing    Urinary retention    Olecranon bursitis of right elbow    83 year old male presenting from home with SOA and cough with right lower lobe bacterial pneumonia.       Plan:  --CXR with RLL congestion, additional lasix again, home 1-2 days    --Patient continuing to have urinary retention, home with [facility] per urology, on Flomax    --has new right elbow bursitis, s/p aspiration, Dr. Gayle follows, feels as if improving, no complaints today    - Mobilize, repeat CXR in am    Plan;  -cont current care.  -labs in am.    Aftab Loco MD  08/05/17  11:10 AM

## 2017-08-06 NOTE — PLAN OF CARE
Problem: Fall Risk (Adult)  Goal: Absence of Falls  Outcome: Ongoing (interventions implemented as appropriate)    08/06/17 1643   Fall Risk (Adult)   Absence of Falls making progress toward outcome         Problem: Skin Integrity Impairment, Risk/Actual (Adult)  Goal: Skin Integrity/Wound Healing  Outcome: Ongoing (interventions implemented as appropriate)    08/06/17 1643   Skin Integrity Impairment, Risk/Actual (Adult)   Skin Integrity/Wound Healing unable to achieve outcome

## 2017-08-06 NOTE — PROGRESS NOTES
"      HOSPITALIST DAILY PROGRESS NOTE    Chief Complaint: weakness, cough    Subjective   SUBJECTIVE/OVERNIGHT EVENTS   Still with cough, but difficulty with sputum. Still SOA, but weak. Just feels tired    Review of Systems:  Gen-no fevers, no chills  CV-no chest pain, no palpitations  Resp- +cough, dyspnea on exertion  GI-no N/V/D, no abd pain  Neuro - + confusion  MS - right elbow pain    Objective   OBJECTIVE   I have reviewed the vital signs.  /74  Pulse 96  Temp 97.4 °F (36.3 °C) (Oral)   Resp 16  Ht 72\" (182.9 cm)  Wt 236 lb 8 oz (107 kg)  SpO2 94%  BMI 32.08 kg/m2  Physical Exam:  NAD, but chronically ill appearing  Op clear  MMM  PERRL  Neck supple  No LAD  RRR  Coarse RUL, otherwise clear  +BS, ND, NT  MIGUEL  Tr edema    Results:  I have reviewed the labs, culture data, radiology results, and diagnostic studies.      Results from last 7 days  Lab Units 08/04/17  0427 08/03/17  0349 08/02/17  0341   WBC 10*3/mm3 4.11 4.30 3.89   HEMOGLOBIN g/dL 11.0* 11.3* 11.3*   HEMATOCRIT % 34.9* 36.0* 35.7*   PLATELETS 10*3/mm3 110* 120* 119*       Results from last 7 days  Lab Units 08/06/17  0514   SODIUM mmol/L 135   POTASSIUM mmol/L 3.7   CHLORIDE mmol/L 100   CO2 mmol/L 31.0   BUN mg/dL 22   CREATININE mg/dL 0.90   GLUCOSE mg/dL 144*   CALCIUM mg/dL 8.8     Sputum cultures - proteus - resistant to levaquin    CXR - 7/25 CMG, increased vascularity    I have reviewed the medications.    Assessment/Plan   ASSESSMENT/PLAN    Principal Problem:    Pneumonia due to infectious organism  Active Problems:    DM type 2 (diabetes mellitus, type 2)    COPD (chronic obstructive pulmonary disease)    Atrial fibrillation    Reactive airway disease with wheezing    Urinary retention    Olecranon bursitis of right elbow    83 year old male presenting from home with SOA and cough with right lower lobe bacterial pneumonia.       Plan:  --CXR with RLL congestion, additional lasix given  --Observe OFF abx, CXR more " suggestive of chronic changes, home in am if stable    --Patient continuing to have urinary retention, home with [facility] per urology, on Flomax    --Bursitis s/p aspiration, seen by orthopedics, improved        Plan;  -cont current care.      Aftab Loco MD  08/06/17  11:41 AM

## 2017-08-06 NOTE — PROGRESS NOTES
"Pharmacy Consult  -  Warfarin    Bjorn Pollock is a 84 yo man admitted 7/17/17 for cough and dyspnea. He is on warfarin for chronic atrial fibrillation.    Pharmacy consulted to manage his warfarin therapy.    Height - 72\" (182.9 cm)  Weight - 236 lb 8 oz (107 kg)    Consulting Provider: - Hospitalist Group  Indication: - AFib  Goal INR: -  2-3  Home Regimen:   - 2mg daily     Bridge Therapy: No       Drug-Drug Interactions with current regimen:   Aspirin - increased risk of bleeding              Doxycycline - may result in increased risk of bleed              Duloxetine - may result in altered anticoagulation effects including increased risk of bleeding.     Warfarin Dosing During Admission:    Date  7/16 7/17 7/18 7/19 7/20 7/21 7/22 7/23 7/24   INR  2.96 2.78 2.9 2.7 2.69 2.58 2.25 1.77 1.66   Dose  (home) 2mg 2mg 2mg 2mg  2mg 2mg 3mg 3mg      Date  7/25 7/26 7/27 7/28 7/29 7/30 7/31 8/1 8/2  8/3 8/4 8/5 8/6   INR  1.64 1.64  1.60  1.65  1.87  1.85  2.16  2.08 1.98  2.19 1.96 1.96 2.15   Dose  3mg 4mg  4 mg  5 mg  4mg  5mg  2 mg  4 mg  5 mg 4 mg 5 mg 4 mg 4 mg     Labs:      Results from last 7 days  Lab Units 08/06/17  0514 08/05/17  0526 08/04/17  0427 08/03/17  0349 08/02/17  0341 08/01/17  0449 07/31/17  0409   INR  2.15 1.96 1.96 2.19 1.98 2.08 2.16   HEMOGLOBIN g/dL  --   --  11.0* 11.3* 11.3*  --   --    HEMATOCRIT %  --   --  34.9* 36.0* 35.7*  --   --    PLATELETS 10*3/mm3  --   --  110* 120* 119*  --   --        Results from last 7 days  Lab Units 08/06/17 0514 08/05/17 0526 08/04/17 0427   SODIUM mmol/L 135 135 135   POTASSIUM mmol/L 3.7 3.9 3.9   CHLORIDE mmol/L 100 98* 100   CO2 mmol/L 31.0 31.0 29.0   BUN mg/dL 22 22 20   CREATININE mg/dL 0.90 0.90 0.90   CALCIUM mg/dL 8.8 9.0 9.0   GLUCOSE mg/dL 144* 119* 143*     Current dietary intake: Regular Cardiac, Consistent Carbs:    25-75%    Assessment/Plan:   INR continues to fluctuate.  Started on alternating schedule of warfarin 5 mg on MWF " and 4 mg on all other days to see if this will maintain therapeutic INR.     INR is therapeutic today.  Will continue current dose.     Pharmacy will continue to follow.     Thank you for this consult,  Claudia Ulrich, JohnathanD  08/06/17  2:02 PM

## 2017-08-06 NOTE — PLAN OF CARE
Problem: Patient Care Overview (Adult)  Goal: Plan of Care Review  Outcome: Ongoing (interventions implemented as appropriate)    08/06/17 0108   Patient Care Overview   Progress progress toward functional goals as expected   Coping/Psychosocial Response Interventions   Plan Of Care Reviewed With patient       Goal: Adult Individualization and Mutuality  Outcome: Ongoing (interventions implemented as appropriate)    08/06/17 0108   Mutuality/Individual Preferences   What Anxieties, Fears or Concerns Do You Have About Your Health or Care? concerned about whe nhe will be getting discharged from [facility] hospital; says it seems he has been here a long time and discharge keeps getting put off       Goal: Discharge Needs Assessment  Outcome: Ongoing (interventions implemented as appropriate)    07/17/17 1621 08/03/17 1700   Discharge Needs Assessment   Concerns To Be Addressed --  denies needs/concerns at this time   Readmission Within The Last 30 Days --  no previous admission in last 30 days   Equipment Needed After Discharge --  none   Discharge Facility/Level Of Care Needs --  rehabilitation facility   Discharge Disposition still a patient --    Current Health   Outpatient/Agency/Support Group Needs --  inpatient rehabilitation facility (specify)   Anticipated Changes Related to Illness --  inability to care for someone else   Living Environment   Transportation Available car --    Self-Care   Equipment Currently Used at Home bath bench;commode;grab bar;power chair, (recliner lift);walker, rolling --          Problem: Fall Risk (Adult)  Goal: Absence of Falls  Outcome: Ongoing (interventions implemented as appropriate)    08/06/17 0108   Fall Risk (Adult)   Absence of Falls making progress toward outcome         Problem: Skin Integrity Impairment, Risk/Actual (Adult)  Goal: Skin Integrity/Wound Healing  Outcome: Ongoing (interventions implemented as appropriate)    08/06/17 0108   Skin Integrity Impairment, Risk/Actual  (Adult)   Skin Integrity/Wound Healing making progress toward outcome

## 2017-08-07 NOTE — PAYOR COMM NOTE
"Holger Pollock (83 y.o. Male)   /UR- Paris HoganRN [phone]    Date of Birth Social Security Number Address Home Phone MRN    [id number]  [id number] [facility] [address] [phone] [id number]    Nondenominational Marital Status          Baptism        Admission Date Admission Type Admitting Provider Attending Provider Department, Room/Bed    7/16/17 Emergency Raymond Pollard MD Tovar, Jesus Victor, MD [address], [address]    Discharge Date Discharge Disposition Discharge Destination         Home or Self Care             Attending Provider: Raymond Pollard MD     Allergies:  Phenergan [Promethazine Hcl], Promethazine    Isolation:  None   Infection:  None   Code Status:  FULL    Ht:  72\" (182.9 cm)   Wt:  236 lb 8 oz (107 kg)    Admission Cmt:  None   Principal Problem:  Pneumonia due to infectious organism [J18.9]                 Active Insurance as of 7/16/2017     Primary Coverage     Payor Plan Insurance Group Employer/Plan Group    [employer] [facility] [id number]     Payor Plan Address Payor Plan Phone Number Effective From Effective To    [id number] [phone] 1/1/2015     [place] [address]       Subscriber Name Subscriber Birth Date Member ID       HOLGER POLLOCK 1934 [id number]                 Emergency Contacts      (Rel.) Home Phone Work Phone Mobile Phone    Ade Pollock (Spouse) [phone] -- --    Ayah Godinez (Daughter) -- -- [phone]            [facility] Medications (active)       Dose Frequency Start End    acetaminophen (TYLENOL) tablet 650 mg 650 mg Every 4 Hours PRN 7/17/2017     Sig - Route: Take 2 tablets by mouth Every 4 (Four) Hours As Needed for Mild Pain (1-3). - Oral    acetaminophen-codeine (TYLENOL #3) 300-30 MG per tablet 1 tablet 1 tablet Every 4 Hours PRN 8/1/2017     Sig - Route: Take 1 tablet by mouth Every 4 (Four) Hours As Needed for Moderate Pain " (4-6). - Oral    acetylcysteine (MUCOMYST) 10 % solution 4 mL 4 mL 2 Times Daily - RT 8/2/2017     Sig - Route: Take 4 mL by nebulization 2 (Two) Times a Day. - Nebulization    aspirin EC tablet 81 mg 81 mg Daily 7/17/2017     Sig - Route: Take 1 tablet by mouth Daily. - Oral    benzonatate (TESSALON) capsule 100 mg 100 mg 3 Times Daily PRN 7/17/2017     Sig - Route: Take 1 capsule by mouth 3 (Three) Times a Day As Needed for Cough. - Oral    bisacodyl (DULCOLAX) EC tablet 10 mg 10 mg Daily PRN 7/21/2017     Sig - Route: Take 2 tablets by mouth Daily As Needed for Constipation. - Oral    budesonide (PULMICORT) nebulizer solution 0.5 mg 0.5 mg 2 Times Daily - RT 7/30/2017     Sig - Route: Take 2 mL by nebulization 2 (Two) Times a Day. - Nebulization    cyclobenzaprine (FLEXERIL) tablet 10 mg 10 mg 3 Times Daily PRN 7/17/2017     Sig - Route: Take 1 tablet by mouth 3 (Three) Times a Day As Needed for Muscle Spasms. - Oral    dextrose (D50W) solution 25 g 25 g Every 15 Minutes PRN 7/17/2017     Sig - Route: Infuse 50 mL into a venous catheter Every 15 (Fifteen) Minutes As Needed for Low Blood Sugar (Blood Sugar Less Than 70, Patient Has IV Access - Unresponsive, NPO or Unable To Safely Swallow). - Intravenous    dextrose (GLUTOSE) oral gel 15 g 15 g Every 15 Minutes PRN 7/17/2017     Sig - Route: Take 15 g by mouth Every 15 (Fifteen) Minutes As Needed for Low Blood Sugar (Blood Sugar Less Than 70, Patient Alert, Is Not NPO & Can Safely Swallow). - Oral    diltiaZEM CD (CARDIZEM CD) 24 hr capsule 120 mg 120 mg Daily 7/17/2017     Sig - Route: Take 1 capsule by mouth Daily. - Oral    docusate sodium (COLACE) capsule 100 mg 100 mg 2 Times Daily PRN 7/17/2017     Sig - Route: Take 1 capsule by mouth 2 (Two) Times a Day As Needed for Constipation. - Oral    DULoxetine (CYMBALTA) DR capsule 30 mg 30 mg Every 12 Hours Scheduled 8/3/2017     Sig - Route: Take 1 capsule by mouth Every 12 (Twelve) Hours. - Oral    furosemide  (LASIX) tablet 40 mg 40 mg Daily 7/24/2017     Sig - Route: Take 1 tablet by mouth Daily. - Oral    glucagon (GLUCAGEN) injection 1 mg 1 mg Every 15 Minutes PRN 7/17/2017     Sig - Route: Inject 1 mg under the skin Every 15 (Fifteen) Minutes As Needed (Blood Glucose Less Than 70 - Patient Without IV Access - Unresponsive, NPO or Unable To Safely Swallow). - Subcutaneous    guaiFENesin (MUCINEX) 12 hr tablet 600 mg 600 mg 2 Times Daily 8/1/2017     Sig - Route: Take 1 tablet by mouth 2 (Two) Times a Day. - Oral    guaifenesin-dextromethorphan (ROBITUSSIN DM) 100-10 MG/5ML syrup 5 mL 5 mL Every 4 Hours PRN 7/17/2017     Sig - Route: Take 5 mL by mouth Every 4 (Four) Hours As Needed for Cough. - Oral    insulin lispro (humaLOG) injection 0-7 Units 0-7 Units 4 Times Daily Before Meals & Nightly 7/17/2017     Sig - Route: Inject 0-7 Units under the skin 4 (Four) Times a Day Before Meals & at Bedtime. - Subcutaneous    ipratropium-albuterol (DUO-NEB) nebulizer solution 3 mL 3 mL Every 4 Hours PRN 7/22/2017     Sig - Route: Take 3 mL by nebulization Every 4 (Four) Hours As Needed for Shortness of Air. - Nebulization    ipratropium-albuterol (DUO-NEB) nebulizer solution 3 mL 3 mL 4 Times Daily - RT 7/22/2017     Sig - Route: Take 3 mL by nebulization 4 (Four) Times a Day. - Nebulization    magnesium citrate solution 296 mL 296 mL Once As Needed 7/21/2017     Sig - Route: Take 296 mL by mouth 1 (One) Time As Needed (constipation). - Oral    melatonin sublingual tablet 5 mg 5 mg Nightly PRN 7/17/2017     Sig - Route: Place 1 tablet under the tongue At Night As Needed (sleep). - Sublingual    nystatin (MYCOSTATIN) 020932 UNIT/GM cream  Every 12 Hours Scheduled 7/17/2017     Sig - Route: Apply  topically Every 12 (Twelve) Hours. - Topical    Cosign for Ordering: Accepted by Martin Arias MD on 7/27/2017  9:24 PM    nystatin (MYCOSTATIN) 538831 UNIT/ML suspension 500,000 Units 5 mL 4 Times Daily 7/17/2017     Sig - Route:  "Apply 5 mL to the mouth or throat 4 (Four) Times a Day. - Mouth/Throat    ondansetron (ZOFRAN) injection 4 mg 4 mg Every 6 Hours PRN 7/17/2017     Sig - Route: Infuse 2 mL into a venous catheter Every 6 (Six) Hours As Needed for Nausea or Vomiting. - Intravenous    Linked Group 1:  \"Or\" Linked Group Details        ondansetron (ZOFRAN) tablet 4 mg 4 mg Every 6 Hours PRN 7/17/2017     Sig - Route: Take 1 tablet by mouth Every 6 (Six) Hours As Needed for Nausea or Vomiting. - Oral    Linked Group 1:  \"Or\" Linked Group Details        pantoprazole (PROTONIX) EC tablet 40 mg 40 mg 2 Times Daily 7/17/2017     Sig - Route: Take 1 tablet by mouth 2 (Two) Times a Day. - Oral    Pharmacy Meds to Bed Consult  Daily (Monday-Friday) 7/17/2017     Sig - Route: Daily (Monday-Friday). - Does not apply    Pharmacy to dose warfarin  Continuous PRN 7/17/2017     Sig - Route: Continuous As Needed for Consult. - Does not apply    saccharomyces boulardii (FLORASTOR) capsule 250 mg 250 mg 2 Times Daily 8/1/2017     Sig - Route: Take 1 capsule by mouth 2 (Two) Times a Day. - Oral    sennosides-docusate sodium (SENOKOT-S) 8.6-50 MG tablet 2 tablet 2 tablet 2 Times Daily PRN 7/21/2017     Sig - Route: Take 2 tablets by mouth 2 (Two) Times a Day As Needed for Constipation. - Oral    sodium chloride 0.9 % flush 1-10 mL 1-10 mL As Needed 7/17/2017     Sig - Route: Infuse 1-10 mL into a venous catheter As Needed for Line Care. - Intravenous    sodium chloride 0.9 % flush 10 mL 10 mL As Needed 7/16/2017     Sig - Route: Infuse 10 mL into a venous catheter As Needed for Line Care. - Intravenous    Cosign for Ordering: Accepted by Juan Nolan MD on 7/17/2017  8:30 AM    tamsulosin (FLOMAX) 24 hr capsule 0.8 mg 0.8 mg Nightly 7/22/2017     Sig - Route: Take 2 capsules by mouth Every Night. - Oral    warfarin (COUMADIN) tablet 4 mg 4 mg User Specified 8/5/2017     Sig - Route: Take 1 tablet by mouth Once per day on Sun Tue Thu Sat. - Oral    " warfarin (COUMADIN) tablet 5 mg 5 mg User Specified 8/4/2017     Sig - Route: Take 1 tablet by mouth Once per day on Mon Wed Fri. - Oral          Lab Results (last 24 hours)     Procedure Component Value Units Date/Time    POC Glucose Fingerstick [[id number]]  (Abnormal) Collected:  08/06/17 1644    Specimen:  Blood Updated:  08/06/17 1646     Glucose 166 (H) mg/dL     Narrative:       Meter: [id number] : [id number] Trent Donahue    POC Glucose Fingerstick [[id number]]  (Abnormal) Collected:  08/06/17 2029    Specimen:  Blood Updated:  08/06/17 2031     Glucose 199 (H) mg/dL     Narrative:       Meter: [id number] : [id number] Amado Gordon    Protime-INR [[id number]]  (Abnormal) Collected:  08/07/17 0326    Specimen:  Blood Updated:  08/07/17 0524     Protime 22.3 (H) Seconds      INR 2.00    Narrative:       Therapeutic Ranges for INR: 2.0-3.0 (PT 20-30)                              2.5-3.5 (PT 25-34)    POC Glucose Fingerstick [[id number]]  (Abnormal) Collected:  08/07/17 0720    Specimen:  Blood Updated:  08/07/17 0722     Glucose 133 (H) mg/dL     Narrative:       Meter: [id number] : [id number] Pedrito Mendoza    POC Glucose Fingerstick [[id number]]  (Abnormal) Collected:  08/07/17 1143    Specimen:  Blood Updated:  08/07/17 1145     Glucose 214 (H) mg/dL     Narrative:       Meter: [id number] : [id number] Pedrito Mendoza        Imaging Results (last 24 hours)     ** No results found for the last 24 hours. **        Physician Progress Notes (last 24 hours) (Notes from 8/6/2017  4:07 PM through 8/7/2017  4:07 PM)     No notes of this type exist for this encounter.        Consult Notes (last 24 hours) (Notes from 8/6/2017  4:07 PM through 8/7/2017  4:07 PM)     No notes of this type exist for this encounter.           Physical Therapy Notes (last 24 hours) (Notes from 8/6/2017  4:07 PM through 8/7/2017  4:07 PM)      Mary Kay Alcocer PTA at 8/7/2017 11:45 AM  Version 1 of 1         Problem: Patient Care  Overview (Adult)  Goal: Plan of Care Review  Outcome: Ongoing (interventions implemented as appropriate)    08/07/17 1143   Patient Care Overview   Progress progress towards functional goals is fair   Coping/Psychosocial Response Interventions   Plan Of Care Reviewed With patient   Outcome Evaluation   Outcome Summary/Follow up Plan pt c/o severe headache today and then dizziness.only able to take a few steps to chair today.unable to take regular walk         Problem: Inpatient Physical Therapy  Goal: Bed Mobility Goal LTG- PT  Outcome: Ongoing (interventions implemented as appropriate)    07/31/17 1540 08/01/17 1028 08/07/17 1143   Bed Mobility PT LTG   Bed Mobility PT LTG, Date Established 07/17/17 --  --    Bed Mobility PT LTG, Time to Achieve 1 wk --  --    Bed Mobility PT LTG, Activity Type all bed mobility --  --    Bed Mobility PT LTG, [place] Level independent --  --    Bed Mobility PT LTG, Date Goal Reviewed --  08/01/17 --    Bed Mobility PT LTG, Outcome --  --  goal ongoing       Goal: Transfer Training Goal 1 LTG- PT  Outcome: Ongoing (interventions implemented as appropriate)    07/31/17 1540 08/01/17 1028 08/07/17 1143   Transfer Training PT LTG   Transfer Training PT LTG, Date Established 07/17/17 --  --    Transfer Training PT LTG, Time to Achieve 1 wk --  --    Transfer Training PT LTG, Activity Type bed to chair /chair to bed;sit to stand/stand to sit --  --    Transfer Training PT LTG, [place] Level independent --  --    Transfer Training PT LTG, Assist Device walker, standard --  --    Transfer Training PT LTG, Date Goal Reviewed --  08/01/17 --    Transfer Training PT LTG, Outcome --  --  goal ongoing       Goal: Gait Training Goal LTG- PT  Outcome: Ongoing (interventions implemented as appropriate)    07/31/17 1540 08/01/17 1028 08/07/17 1143   Gait Training PT LTG   Gait Training Goal PT LTG, Date Established 07/17/16 --  --    Gait Training Goal PT LTG, Time to Achieve 1 wk --  --     Gait Training Goal PT LTG, [place] Level independent --  --    Gait Training Goal PT LTG, Assist Device walker, standard --  --    Gait Training Goal PT LTG, Distance to Achieve 200 feet --  --    Gait Training Goal PT LTG, Date Goal Reviewed --  17 --    Gait Training Goal PT LTG, Outcome --  --  goal ongoing              Electronically signed by Mary Kay Alcocer PTA at 2017 11:45 AM      Mary Kay Alcocer PTA at 2017 11:46 AM  Version 1 of 1         Acute Care - Physical Therapy Treatment Note  [facility]     Patient Name: Bjorn Pollock  : 1934  MRN: [id number]  Today's Date: 2017  Onset of Illness/Injury or Date of Surgery Date: 17  Date of Referral to PT: 17  Referring Physician: MD KAYODE (MOBILITY order)    Admit Date: 2017    Visit Dx:    ICD-10-CM ICD-9-CM   1. Reactive airway disease with wheezing, severe persistent, with acute exacerbation J45.51 493.92   2. Fatigue, unspecified type R53.83 780.79   3. Bronchitis J40 490   4. Impaired functional mobility, balance, gait, and endurance Z74.09 V49.89     Patient Active Problem List   Diagnosis   • Intractable low back pain   • HTN (hypertension)   • DM type 2 (diabetes mellitus, type 2)   • Intertriginous candidiasis   • COPD (chronic obstructive pulmonary disease)   • NAYLA (obstructive sleep apnea)   • Atrial fibrillation   • Supratherapeutic INR   • CKD (chronic kidney disease)   • Diastolic heart failure secondary to hypertension   • Anemia   • DJD (degenerative joint disease)   • CAD (coronary artery disease) s/p CABG history    • Crawley's esophagus   • Chronic thrombocytopenic purpura   • Cerebrovascular accident   • NAYLA on CPAP   • Obesity (BMI 30-39.9)   • Dyslipidemia   • BPH (benign prostatic hyperplasia)   • Lower extremity edema   • Falls   • Sepsis due to urinary tract infection   • Reactive airway disease with wheezing   • Constipation   • Pneumonia due to infectious organism   • Urinary retention    • Olecranon bursitis of right elbow               Adult Rehabilitation Note       08/07/17 1058 08/05/17 0950       Rehab Assessment/Intervention    Discipline physical therapy assistant  -UD physical therapist  -     Document Type therapy note (daily note)  - therapy note (daily note)  -     Subjective Information agree to therapy;complains of;weakness;pain  -UD agree to therapy;complains of;weakness  -     Patient Effort, Rehab Treatment fair  -UD      Symptoms Noted During/After Treatment increased pain;dizziness;fatigue  -UD      Precautions/Limitations fall precautions  -UD      Precautions/Limitations, Hearing hearing impairment, bilaterally  -UD      Recorded by [UD] Mary Kay Alcocer PTA [MC] Maddison Gamble, PT     Vital Signs    O2 Delivery Post Treatment room air  -UD      Pre Patient Position Supine  -UD      Intra Patient Position Standing  -UD      Post Patient Position Sitting  -UD      Recorded by [UD] Mary Kay Alcocer PTA      Pain Assessment    Pain Assessment Arrington-Moon FACES  -UD No/denies pain  -     Arrington-Moon FACES Pain Rating 6  -UD      Pain Score 6  -UD      Post Pain Score 8  -UD      Pain Type Acute pain  -UD      Pain Location Head  -UD      Pain Intervention(s) Medication (See MAR);Repositioned  -UD      Recorded by [UD] Mary Kay Alcocer PTA [MC] Maddison Gamble, PT     Cognitive Assessment/Intervention    Orientation Status oriented x 4  -UD      Follows Commands/Answers Questions 100% of the time  -UD      Personal Safety Interventions fall prevention program maintained  -UD      Recorded by [UD] Mary Kay Alcocer PTA      Bed Mobility, Assessment/Treatment    Bed Mob, Supine to Sit, [place] minimum assist (75% patient effort)  -UD      Recorded by [UD] Mary Kay Alcocer PTA      Transfer Assessment/Treatment    Transfers, Sit-Stand [place] contact guard assist  -UD      Transfers, Stand-Sit [place] contact guard assist  -UD      Transfers, Sit-Stand-Sit, Assist Device  rolling walker  -UD      Recorded by [UD] Mary Kay Alcocer PTA      Gait Assessment/Treatment    Gait, [place] Level other (see comments)   only took a few steps to chair too much pain today  -UD      Recorded by [UD] Mary Kay Alcocer PTA      Therapy Exercises    Bilateral Lower Extremities AROM:;10 reps;sitting  -UD      Recorded by [UD] Mary Kay Alcocer PTA      Positioning and Restraints    Pre-Treatment Position in bed  -UD in bed  -MC     Post Treatment Position chair  -UD bed  -     In Bed  supine;call light within reach;encouraged to call for assist  -     In Chair notified nsg;reclined;sitting;call light within reach;exit alarm on;legs elevated  -UD      Recorded by [UD] Mary Kay Alcocer PTA [MC] Maddison Gamble, PT       User Key  (r) = Recorded By, (t) = Taken By, (c) = Cosigned By    Initials Name Effective Dates    UD Mary Kay Alcocer, MOON 06/22/15 -     MC Maddison Gamble, PT 03/14/16 -                 IP PT Goals       08/07/17 1143 08/05/17 0950 08/04/17 1034    Bed Mobility PT LTG    Bed Mobility PT LTG, Outcome goal ongoing  -UD  goal ongoing  -UD    Transfer Training PT LTG    Transfer Training PT LTG, Outcome goal ongoing  -UD  goal ongoing  -UD    Gait Training PT LTG    Gait Training Goal PT LTG, Outcome goal ongoing  -UD  goal ongoing  -UD    Wound Care PT LTG    Wound Care PT LTG 1, Outcome  goal ongoing  -MC       08/02/17 1650 08/01/17 1028 07/31/17 1540    Bed Mobility PT LTG    Bed Mobility PT LTG, Date Established   07/17/17  -LR    Bed Mobility PT LTG, Time to Achieve   1 wk  -LR    Bed Mobility PT LTG, Activity Type   all bed mobility  -LR    Bed Mobility PT LTG, [place] Level   independent  -LR    Bed Mobility PT LTG, Date Goal Reviewed  08/01/17  -MJ 07/31/17  -LR    Bed Mobility PT LTG, Outcome goal ongoing  -DM goal ongoing  -MJ goal ongoing  -LR    Transfer Training PT LTG    Transfer Training PT LTG, Date Established   07/17/17  -LR    Transfer Training PT LTG, Time to Achieve    1 wk  -LR    Transfer Training PT LTG, Activity Type   bed to chair /chair to bed;sit to stand/stand to sit  -LR    Transfer Training PT LTG, [place] Level   independent  -LR    Transfer Training PT LTG, Assist Device   walker, standard  -LR    Transfer Training PT  LTG, Date Goal Reviewed  08/01/17  -MJ 07/31/17  -LR    Transfer Training PT LTG, Outcome goal ongoing  -DM goal ongoing  -MJ goal ongoing  -LR    Gait Training PT LTG    Gait Training Goal PT LTG, Date Established   07/17/16  -LR    Gait Training Goal PT LTG, Time to Achieve   1 wk  -LR    Gait Training Goal PT LTG, [place] Level   independent  -LR    Gait Training Goal PT LTG, Assist Device   walker, standard  -LR    Gait Training Goal PT LTG, Distance to Achieve   200 feet  -LR    Gait Training Goal PT LTG, Date Goal Reviewed  08/01/17  -MJ 07/31/17  -LR    Gait Training Goal PT LTG, Outcome goal ongoing  -DM goal ongoing  -MJ goal ongoing  -LR      07/28/17 1513 07/28/17 1100 07/27/17 1100    Bed Mobility PT LTG    Bed Mobility PT LTG, Outcome goal ongoing  -EH      Transfer Training PT LTG    Transfer Training PT LTG, Outcome goal ongoing  -EH      Gait Training PT LTG    Gait Training Goal PT LTG, Outcome goal ongoing  -EH      Wound Care PT LTG    Wound Care PT LTG 1, Outcome  goal ongoing  -MC goal partially met  -MF      07/27/17 1057          Bed Mobility PT LTG    Bed Mobility PT LTG, Outcome goal ongoing  -MB      Transfer Training PT LTG    Transfer Training PT LTG, Outcome goal ongoing  -MB      Gait Training PT LTG    Gait Training Goal PT LTG, Outcome goal ongoing  -MB        User Key  (r) = Recorded By, (t) = Taken By, (c) = Cosigned By    Initials Name Provider Type    LOTUS Alcocer, PTA Physical Therapy Assistant    MF Aly Mckeon, PT Physical Therapist    EH Chantell Nazario, PT Physical Therapist    SHADY Pope, PT Physical Therapist    LR Yuridia Rees, PT Physical Therapist    MB Nicole HART  Nell, PT Physical Therapist     Maddison Gamble, PT Physical Therapist    ECHO Geller, PT Physical Therapist          Physical Therapy Education     Title: PT OT SLP Therapies (Done)     Topic: Physical Therapy (Done)     Point: Mobility training (Done)    Learning Progress Summary    Learner Readiness Method Response Comment Documented by Status   Patient Acceptance E,D DU,NR   08/07/17 1143 Done    Acceptance E VU  CE 08/06/17 0318 Done    Acceptance E VU  CE 08/05/17 0128 Done    Acceptance E,D DU,NR   08/04/17 1034 Done    Eager E,D NR   08/02/17 1650 Active    Acceptance E,D VU,NR   08/01/17 1028 Done    Acceptance E,D VU,NR Educated on correct gait mechanics.  07/31/17 1625 Done    Acceptance E,D VU,NR HEP, fall precautions MB 07/27/17 1057 Done    Acceptance E,TB,D VU,DU,NR   07/26/17 0151 Done    Eager E VU,DU reviewed benefits fo activity SC 07/25/17 1537 Done    Acceptance E,TB,D VU,DU,NR   07/25/17 0520 Done    Acceptance E,TB,D VU,DU,NR   07/24/17 0421 Done    Acceptance E,D DU,NR   07/23/17 1106 Done    Eager E VU  SC 07/21/17 1620 Done    Acceptance E,D DU,NR   07/20/17 1145 Done    Acceptance E NR   07/18/17 1200 Active    Eager E,D NR   07/17/17 1722 Active               Point: Home exercise program (Done)    Learning Progress Summary    Learner Readiness Method Response Comment Documented by Status   Patient Acceptance E,D DU,NR   08/07/17 1143 Done    Acceptance E VU  CE 08/06/17 0318 Done    Acceptance E VU   08/05/17 0128 Done    Acceptance E,D DU,NR   08/04/17 1034 Done    Eager E,D NR   08/02/17 1650 Active    Acceptance E,D VU,NR   08/01/17 1028 Done    Acceptance E,D VU,NR Educated on correct gait mechanics.  07/31/17 1625 Done    Acceptance E,D VU,NR HEP, fall precautions MB 07/27/17 1057 Done    Acceptance E,TB,D VU,DU,NR   07/26/17 0151 Done    Eager E KAROLINE PANTOJA reviewed benefits fo activity SC 07/25/17 1537 Done    Acceptance E,TB,D KAROLINE PANTOJA,NR    07/25/17 0520 Done    Acceptance E,TB,D VU,DU,NR   07/24/17 0421 Done    Acceptance E,D DU,NR   07/23/17 1106 Done    Eager E VU  SC 07/21/17 1620 Done    Acceptance E,D DU,NR   07/20/17 1145 Done    Acceptance E NR   07/18/17 1200 Active    Eager E,D NR   07/17/17 1722 Active               Point: Body mechanics (Done)    Learning Progress Summary    Learner Readiness Method Response Comment Documented by Status   Patient Acceptance E,D DU,NR   08/07/17 1143 Done    Acceptance E VU  CE 08/06/17 0318 Done    Acceptance E VU  CE 08/05/17 0128 Done    Acceptance E,D DU,NR   08/04/17 1034 Done    Eager E,D NR   08/02/17 1650 Active    Acceptance E,D VU,NR   08/01/17 1028 Done    Acceptance E,D VU,NR Educated on correct gait mechanics.  07/31/17 1625 Done    Acceptance E,D VU,NR HEP, fall precautions MB 07/27/17 1057 Done    Acceptance E,TB,D VU,DU,NR   07/26/17 0151 Done    Eager E VU,DU reviewed benefits fo activity SC 07/25/17 1537 Done    Acceptance E,TB,D VU,DU,NR   07/25/17 0520 Done    Acceptance E,TB,D VU,DU,NR   07/24/17 0421 Done    Acceptance E,D DU,NR   07/23/17 1106 Done    Eager E VU  SC 07/21/17 1620 Done    Acceptance E,D DU,NR   07/20/17 1145 Done    Acceptance E NR   07/18/17 1200 Active    Eager E,D NR   07/17/17 1722 Active               Point: Precautions (Done)    Learning Progress Summary    Learner Readiness Method Response Comment Documented by Status   Patient Acceptance E,D DU,NR   08/07/17 1143 Done    Acceptance E VU  CE 08/06/17 0318 Done    Acceptance E VU   08/05/17 0128 Done    Acceptance E,D DU,NR   08/04/17 1034 Done    Eager E,D NR   08/02/17 1650 Active    Acceptance E,D VU,NR   08/01/17 1028 Done    Acceptance E,D VU,NR Educated on correct gait mechanics. LR 07/31/17 1625 Done    Acceptance BRADEN SANTOS NR HEP, fall precautions MB 07/27/17 1057 Done    Acceptance ANA SANTOS D VU, DU,JAY   07/26/17 0151 Done    Eager KAROLINE ROBLEDO reviewed benefits fo  activity SC 07/25/17 1537 Done    Acceptance E,TB,D VU,DU,NR   07/25/17 0520 Done    Acceptance E,TB,D VU,DU,NR   07/24/17 0421 Done    Acceptance E,D DU,NR   07/23/17 1106 Done    Eager E VU  SC 07/21/17 1620 Done    Acceptance E,D DU,NR   07/20/17 1145 Done    Acceptance E NR   07/18/17 1200 Active    Eager E,D NR   07/17/17 1722 Active                      User Key     Initials Effective Dates Name Provider Type Discipline    SC 06/19/15 -  Oscar Alexis, PT Physical Therapist PT     06/22/15 -  Mary Kay Alcocer, PTA Physical Therapy Assistant PT     06/19/15 -  Keyona Hawk, PT Physical Therapist PT     06/19/15 -  Noa Pope, PT Physical Therapist PT     06/19/15 -  Yuridia Rees, PT Physical Therapist PT    MB 03/14/16 -  Nicole Camejo, PT Physical Therapist PT     03/14/16 -  Eladio Geller, PT Physical Therapist PT     08/22/16 -  Mercedes Silverio, RN Registered Nurse Nurse     10/17/16 -  Sabrina Gomez, RN Registered Nurse Nurse                    PT Recommendation and Plan  Anticipated Discharge Disposition: home with home health  PT Frequency: daily  Plan of Care Review  Plan Of Care Reviewed With: patient  Progress: progress towards functional goals is fair  Outcome Summary/Follow up Plan: pt c/o severe headache today and then dizziness.only able to take a few steps to chair today.unable to take regular walk          Outcome Measures       08/07/17 1058          How much help from another person do you currently need...    Turning from your back to your side while in flat bed without using bedrails? 3  -UD      Moving from lying on back to sitting on the side of a flat bed without bedrails? 3  -UD      Moving to and from a bed to a chair (including a wheelchair)? 3  -UD      Standing up from a chair using your arms (e.g., wheelchair, bedside chair)? 3  -UD      Climbing 3-5 steps with a railing? 1  -UD      To walk in hospital room? 2  -UD      AM-PAC 6 Clicks Score  15  -UD        User Key  (r) = Recorded By, (t) = Taken By, (c) = Cosigned By    Initials Name Provider Type    UD Mary Kay Alcocer PTA Physical Therapy Assistant           Time Calculation:         PT Charges       08/07/17 1145          Time Calculation    PT Received On 08/07/17  -UD      PT Goal Re-Cert Due Date 08/06/17  -UD      Time Calculation- PT    Total Timed Code Minutes- PT 20 minute(s)  -UD        User Key  (r) = Recorded By, (t) = Taken By, (c) = Cosigned By    Initials Name Provider Type    LOTUS Alcocer PTA Physical Therapy Assistant          Therapy Charges for Today     Code Description Service Date Service Provider Modifiers Qty    42891128558 HC PT THER PROC EA 15 MIN 8/7/2017 Mary Kay Alcocer PTA GP 1    13640181404 HC PT THER SUPP EA 15 MIN 8/7/2017 Mary Kay Alcocer PTA GP 1          PT G-Codes  PT Professional Judgement Used?: Yes  Outcome Measure Options: AM-PAC 6 Clicks Basic Mobility (PT)  Score: 17  Functional Limitation: Mobility: Walking and moving around  Mobility: Walking and Moving Around Current Status (): At least 40 percent but less than 60 percent impaired, limited or restricted  Mobility: Walking and Moving Around Goal Status (): At least 20 percent but less than 40 percent impaired, limited or restricted    Mary Kay Alcocer PTA  8/7/2017            Electronically signed by Mary Kay Alcocer PTA at 8/7/2017 11:46 AM      Aly Mckeon, PT at 8/7/2017  3:37 PM  Version 1 of 1         Problem: Patient Care Overview (Adult)  Goal: Plan of Care Review  Outcome: Outcome(s) achieved Date Met:  08/07/17 08/07/17 1500   Coping/Psychosocial Response Interventions   Plan Of Care Reviewed With patient   Outcome Evaluation   Outcome Summary/Follow up Plan unna boots replaced with no issues noted. PT unable to transition pt to compression stockings as pt freq had increased C/o pain with stockings and needed removal of compression for several days. PT will cont with unna boots to help  increase venous return to imrpove skin integrity.               Electronically signed by Aly Mckeon, PT at 2017  3:37 PM      Aly Mckeon, PT at 2017  3:39 PM  Version 1 of 1         Acute Care - Physical Therapy Lymphedema Treatment Note   [facility]     Patient Name: Bjorn Pollock  : 1934  MRN: [id number]  Today's Date: 2017  Onset of Illness/Injury or Date of Surgery Date: 17   Date of Referral to PT: 17   Referring Physician: MD KAYODE (MOBILITY order)        Admit Date: 2017    Visit Dx:    ICD-10-CM ICD-9-CM   1. Reactive airway disease with wheezing, severe persistent, with acute exacerbation J45.51 493.92   2. Fatigue, unspecified type R53.83 780.79   3. Bronchitis J40 490   4. Impaired functional mobility, balance, gait, and endurance Z74.09 V49.89   5. Type 2 diabetes mellitus without complication, with long-term current use of insulin E11.9 250.00    Z79.4 V58.67   6. Olecranon bursitis of right elbow M70.21 726.33   7. Supratherapeutic INR R79.1 790.92   8. Bilateral edema of lower extremity R60.0 782.3       Patient Active Problem List   Diagnosis   • Intractable low back pain   • HTN (hypertension)   • DM type 2 (diabetes mellitus, type 2)   • Intertriginous candidiasis   • COPD (chronic obstructive pulmonary disease)   • NAYLA (obstructive sleep apnea)   • Atrial fibrillation   • Supratherapeutic INR   • CKD (chronic kidney disease)   • Diastolic heart failure secondary to hypertension   • Anemia   • DJD (degenerative joint disease)   • CAD (coronary artery disease) s/p CABG history    • Crawley's esophagus   • Chronic thrombocytopenic purpura   • Cerebrovascular accident   • NAYLA on CPAP   • Obesity (BMI 30-39.9)   • Dyslipidemia   • BPH (benign prostatic hyperplasia)   • Lower extremity edema   • Falls   • Sepsis due to urinary tract infection   • Reactive airway disease with wheezing   • Constipation   • Pneumonia due to infectious organism   •  "Urinary retention   • Olecranon bursitis of right elbow              Lymphedema       08/07/17 1500 08/05/17 0950       Lymphedema Edema Assessment    Ptting Edema Category By severity  -      Pitting Edema Mild  -      Recorded by [] Aly Mckeon, PT      Lymphedema Pulses/Capillary Refill    Lymphedema Pulses/Capillary Refill lower extremity pulses;capillary refill  -      Dorsalis Pedis Pulse right:;left:;+1 diminished  -      Posterior Tibialis Pulse right:;left:;0 absent  -      Capillary Refill lower extremity capillary refill  -      Lower Extremity Capillary Refill right:;left:;less than 3 seconds  - right:;left:;less than 3 seconds  -     Recorded by [] Aly Mckeon, PT [MC] Maddison Gamble, PT     Compression/Skin Care    Compression/Skin Care skin care;wrapping location;bandaging  -      Skin Care washed/dried  -      Wrapping Location lower extremity  -      Wrapping Location LE bilateral:;foot to knee  -      Wrapping Comments unna boots with 4\" coban and spandage to secure  -      Bandaging Comments  BLE wraps intact with no apparent issues or complaints from the pt.  -     Recorded by [] Aly Mckeon, PT [MC] Maddison Gamble, PT       User Key  (r) = Recorded By, (t) = Taken By, (c) = Cosigned By    Initials Name Effective Dates     Aly Mckeon, PT 06/19/15 -      Maddison Gamble, PT 03/14/16 -                 Adult Rehabilitation Note       08/07/17 1500 08/07/17 1058 08/05/17 0950    Rehab Assessment/Intervention    Discipline physical therapist  - physical therapy assistant  -UD physical therapist  -    Document Type therapy note (daily note)  - therapy note (daily note)  -UD therapy note (daily note)  -    Subjective Information agree to therapy;complains of;weakness;fatigue;pain  - agree to therapy;complains of;weakness;pain  -UD agree to therapy;complains of;weakness  -    Patient Effort, Rehab Treatment  fair  -UD     " Symptoms Noted During/After Treatment  increased pain;dizziness;fatigue  -UD     Precautions/Limitations  fall precautions  -UD     Precautions/Limitations, Hearing  hearing impairment, bilaterally  -UD     Recorded by [] Aly Mckeon, PT [UD] Mary Kay Alcocer PTA [] Maddison Gamble, PT    Vital Signs    O2 Delivery Post Treatment  room air  -UD     Pre Patient Position  Supine  -UD     Intra Patient Position  Standing  -UD     Post Patient Position  Sitting  -UD     Recorded by  [UD] Mary Kay Alcocer PTA     Pain Assessment    Pain Assessment Arrington-Baker FACES  - Arrington-Baker FACES  -UD No/denies pain  -    Arrington-Moon FACES Pain Rating 4  - 6  -UD     Pain Score  6  -UD     Post Pain Score  8  -UD     Pain Type  Acute pain  -UD     Pain Location  Head  -UD     Pain Intervention(s)  Medication (See MAR);Repositioned  -UD     Recorded by [] Aly Mckeon, PT [UD] Mary Kay Alcocer PTA [] Maddison Gamble, PT    Cognitive Assessment/Intervention    Orientation Status  oriented x 4  -UD     Follows Commands/Answers Questions  100% of the time  -UD     Personal Safety Interventions  fall prevention program maintained  -UD     Recorded by  [UD] Mary Kay Alcocer PTA     Bed Mobility, Assessment/Treatment    Bed Mob, Supine to Sit, [place]  minimum assist (75% patient effort)  -UD     Recorded by  [UD] Mary Kay Alcocer PTA     Transfer Assessment/Treatment    Transfers, Sit-Stand [place]  contact guard assist  -UD     Transfers, Stand-Sit [place]  contact guard assist  -UD     Transfers, Sit-Stand-Sit, Assist Device  rolling walker  -UD     Recorded by  [UD] Mary Kay Alcocer PTA     Gait Assessment/Treatment    Gait, [place] Level  other (see comments)   only took a few steps to chair too much pain today  -UD     Recorded by  [UD] Mary Kay Alcocer PTA     Therapy Exercises    Bilateral Lower Extremities  AROM:;10 reps;sitting  -UD     Recorded by  [UD] Mary Kay Alcocer PTA     Positioning and Restraints     Pre-Treatment Position sitting in chair/recliner  - in bed  -UD in bed  -MC    Post Treatment Position chair  - chair  -UD bed  -    In Bed   supine;call light within reach;encouraged to call for assist  -    In Chair reclined;call light within reach  - notified nsg;reclined;sitting;call light within reach;exit alarm on;legs elevated  -UD     Recorded by [] Aly Mckeon, PT [UD] Mary Kay Alcocer, PTA [MC] Maddison Gamble, PT      User Key  (r) = Recorded By, (t) = Taken By, (c) = Cosigned By    Initials Name Effective Dates    UD Mary Kay Alcocer, PTA 06/22/15 -     MF Aly Mckeon, PT 06/19/15 -     MC Maddison Gamble, PT 03/14/16 -                 IP PT Goals       08/07/17 1143 08/05/17 0950 08/04/17 1034    Bed Mobility PT LTG    Bed Mobility PT LTG, Outcome goal ongoing  -UD  goal ongoing  -UD    Transfer Training PT LTG    Transfer Training PT LTG, Outcome goal ongoing  -UD  goal ongoing  -UD    Gait Training PT LTG    Gait Training Goal PT LTG, Outcome goal ongoing  -UD  goal ongoing  -UD    Wound Care PT LTG    Wound Care PT LTG 1, Outcome  goal ongoing  -MC       08/02/17 1650 08/01/17 1028 07/31/17 1540    Bed Mobility PT LTG    Bed Mobility PT LTG, Date Established   07/17/17  -LR    Bed Mobility PT LTG, Time to Achieve   1 wk  -LR    Bed Mobility PT LTG, Activity Type   all bed mobility  -LR    Bed Mobility PT LTG, [place] Level   independent  -LR    Bed Mobility PT LTG, Date Goal Reviewed  08/01/17  -MJ 07/31/17  -LR    Bed Mobility PT LTG, Outcome goal ongoing  -DM goal ongoing  -MJ goal ongoing  -LR    Transfer Training PT LTG    Transfer Training PT LTG, Date Established   07/17/17  -LR    Transfer Training PT LTG, Time to Achieve   1 wk  -LR    Transfer Training PT LTG, Activity Type   bed to chair /chair to bed;sit to stand/stand to sit  -LR    Transfer Training PT LTG, [place] Level   independent  -LR    Transfer Training PT LTG, Assist Device   walker, standard  -LR     Transfer Training PT  LTG, Date Goal Reviewed  08/01/17  -MJ 07/31/17  -LR    Transfer Training PT LTG, Outcome goal ongoing  -DM goal ongoing  -MJ goal ongoing  -LR    Gait Training PT LTG    Gait Training Goal PT LTG, Date Established   07/17/16  -LR    Gait Training Goal PT LTG, Time to Achieve   1 wk  -LR    Gait Training Goal PT LTG, [place] Level   independent  -LR    Gait Training Goal PT LTG, Assist Device   walker, standard  -LR    Gait Training Goal PT LTG, Distance to Achieve   200 feet  -LR    Gait Training Goal PT LTG, Date Goal Reviewed  08/01/17  -MJ 07/31/17  -LR    Gait Training Goal PT LTG, Outcome goal ongoing  -DM goal ongoing  -MJ goal ongoing  -LR      07/28/17 1513 07/28/17 1100 07/27/17 1100    Bed Mobility PT LTG    Bed Mobility PT LTG, Outcome goal ongoing  -EH      Transfer Training PT LTG    Transfer Training PT LTG, Outcome goal ongoing  -EH      Gait Training PT LTG    Gait Training Goal PT LTG, Outcome goal ongoing  -EH      Wound Care PT LTG    Wound Care PT LTG 1, Outcome  goal ongoing  -MC goal partially met  -MF      07/27/17 1057          Bed Mobility PT LTG    Bed Mobility PT LTG, Outcome goal ongoing  -MB      Transfer Training PT LTG    Transfer Training PT LTG, Outcome goal ongoing  -MB      Gait Training PT LTG    Gait Training Goal PT LTG, Outcome goal ongoing  -MB        User Key  (r) = Recorded By, (t) = Taken By, (c) = Cosigned By    Initials Name Provider Type    LOTUS Alcocer, PTA Physical Therapy Assistant    LITO Mckeon, PT Physical Therapist    EH Chantell Nazario, PT Physical Therapist    SHADY Pope, PT Physical Therapist    LR Yuridia Rees, PT Physical Therapist    BREANA Camejo, PT Physical Therapist    MC Maddison Gamble, PT Physical Therapist    ECHO Geller, PT Physical Therapist          Physical Therapy Education     Title: PT OT SLP Therapies (Resolved)     Topic: Physical Therapy (Resolved)     Point:  Mobility training (Resolved)    Learning Progress Summary    Learner Readiness Method Response Comment Documented by Status   Patient Acceptance E,D DU,NR   08/07/17 1143 Done    Acceptance E VU  CE 08/06/17 0318 Done    Acceptance E VU  CE 08/05/17 0128 Done    Acceptance E,D DU,NR   08/04/17 1034 Done    Eager E,D NR   08/02/17 1650 Active    Acceptance E,D VU,NR   08/01/17 1028 Done    Acceptance E,D VU,NR Educated on correct gait mechanics. LR 07/31/17 1625 Done    Acceptance E,D VU,NR HEP, fall precautions MB 07/27/17 1057 Done    Acceptance E,TB,D VU,DU,NR   07/26/17 0151 Done    Eager E VU,DU reviewed benefits fo activity SC 07/25/17 1537 Done    Acceptance E,TB,D VU,DU,NR   07/25/17 0520 Done    Acceptance E,TB,D VU,DU,NR   07/24/17 0421 Done    Acceptance E,D DU,NR   07/23/17 1106 Done    Eager E VU  SC 07/21/17 1620 Done    Acceptance E,D DU,NR   07/20/17 1145 Done    Acceptance E NR   07/18/17 1200 Active    Eager E,D NR   07/17/17 1722 Active               Point: Home exercise program (Resolved)    Learning Progress Summary    Learner Readiness Method Response Comment Documented by Status   Patient Acceptance E,D DU,NR   08/07/17 1143 Done    Acceptance E VU  CE 08/06/17 0318 Done    Acceptance E VU  CE 08/05/17 0128 Done    Acceptance E,D DU,NR   08/04/17 1034 Done    Eager E,D NR   08/02/17 1650 Active    Acceptance E,D VU,NR   08/01/17 1028 Done    Acceptance E,D VU,NR Educated on correct gait mechanics.  07/31/17 1625 Done    Acceptance E,D VU,NR HEP, fall precautions MB 07/27/17 1057 Done    Acceptance E,TB,D VU,DU,NR   07/26/17 0151 Done    Eager E VU,DU reviewed benefits fo activity SC 07/25/17 1537 Done    Acceptance E,TB,D VU,DU,NR   07/25/17 0520 Done    Acceptance E,TB,D KAROLINE PANTOJA NR   07/24/17 0421 Done    Acceptance E,D JAY RAMEY   07/23/17 1106 Done    Eager E SCARLETT  SC 07/21/17 1620 Done    Acceptance E,D JAY RAMEY   07/20/17 1145 Done    Acceptance E JAY DEY  07/18/17 1200 Active    Eager E,D NR   07/17/17 1722 Active               Point: Body mechanics (Resolved)    Learning Progress Summary    Learner Readiness Method Response Comment Documented by Status   Patient Acceptance E,D DU,NR   08/07/17 1143 Done    Acceptance E VU   08/06/17 0318 Done    Acceptance E VU  CE 08/05/17 0128 Done    Acceptance E,D DU,NR   08/04/17 1034 Done    Eager E,D NR   08/02/17 1650 Active    Acceptance E,D VU,NR   08/01/17 1028 Done    Acceptance E,D VU,NR Educated on correct gait mechanics. LR 07/31/17 1625 Done    Acceptance E,D VU,NR HEP, fall precautions MB 07/27/17 1057 Done    Acceptance E,TB,D VU,DU,NR   07/26/17 0151 Done    Eager E VU,DU reviewed benefits fo activity SC 07/25/17 1537 Done    Acceptance E,TB,D VU,DU,NR   07/25/17 0520 Done    Acceptance E,TB,D VU,DU,NR   07/24/17 0421 Done    Acceptance E,D DU,NR   07/23/17 1106 Done    Eager E VU  SC 07/21/17 1620 Done    Acceptance E,D DU,NR   07/20/17 1145 Done    Acceptance E NR   07/18/17 1200 Active    Eager E,D NR   07/17/17 1722 Active               Point: Precautions (Resolved)    Learning Progress Summary    Learner Readiness Method Response Comment Documented by Status   Patient Acceptance E,D DU,NR   08/07/17 1143 Done    Acceptance E VU  CE 08/06/17 0318 Done    Acceptance E VU   08/05/17 0128 Done    Acceptance E,D DU,NR   08/04/17 1034 Done    Eager E,D NR   08/02/17 1650 Active    Acceptance E,D VU,NR   08/01/17 1028 Done    Acceptance E,D VU,NR Educated on correct gait mechanics.  07/31/17 1625 Done    Acceptance E,D VU,NR HEP, fall precautions MB 07/27/17 1057 Done    Acceptance E,TB,D VU,DU,NR   07/26/17 0151 Done    Eager E VU,DU reviewed benefits fo activity SC 07/25/17 1537 Done    Acceptance E,TB,D KAROLINE PANTOJA NR   07/25/17 0520 Done    Acceptance E,TB,D KAROLINE PANTOJA NR   07/24/17 0421 Done    Acceptance BRADEN SANTOS NR   07/23/17 1106 Done    Eager TOM PANTOJA  SC 07/21/17 1620 Done     Acceptance E,D DU,NR  UD 07/20/17 1145 Done    Acceptance E NR  SH 07/18/17 1200 Active    Eager E,D NR  DM 07/17/17 1722 Active                      User Key     Initials Effective Dates Name Provider Type Discipline    SC 06/19/15 -  Oscar Alexis, PT Physical Therapist PT    UD 06/22/15 -  Mary Kay Alcocer, PTA Physical Therapy Assistant PT    SH 06/19/15 -  Keyona Hawk, PT Physical Therapist PT    DM 06/19/15 -  Noa Pope, PT Physical Therapist PT    LR 06/19/15 -  Yuridia Rees, PT Physical Therapist PT    MB 03/14/16 -  Nicole Camejo, PT Physical Therapist PT    MJ 03/14/16 -  Eladio Geller, PT Physical Therapist PT     08/22/16 -  Mercedes Silverio, RN Registered Nurse Nurse    CE 10/17/16 -  Sabrina Gomez, RN Registered Nurse Nurse                  PT Recommendation and Plan  Anticipated Discharge Disposition: home with home health  PT Frequency: daily  Plan of Care Review  Plan Of Care Reviewed With: patient  Outcome Summary/Follow up Plan: unna boots replaced with no issues noted. PT unable to transition pt to compression stockings as pt freq had increased C/o pain with stockings and needed removal of compression for several days. PT will cont with unna boots to help increase venous return to imrpove skin integrity.             Outcome Measures       08/07/17 1058          How much help from another person do you currently need...    Turning from your back to your side while in flat bed without using bedrails? 3  -UD      Moving from lying on back to sitting on the side of a flat bed without bedrails? 3  -UD      Moving to and from a bed to a chair (including a wheelchair)? 3  -UD      Standing up from a chair using your arms (e.g., wheelchair, bedside chair)? 3  -UD      Climbing 3-5 steps with a railing? 1  -UD      To walk in hospital room? 2  -UD      AM-PAC 6 Clicks Score 15  -UD        User Key  (r) = Recorded By, (t) = Taken By, (c) = Cosigned By    Initials Name Provider Type    UD  Mary Kay Alcocer PTA Physical Therapy Assistant            Time Calculation        PT Charges       08/07/17 1500 08/07/17 1145       Time Calculation    Start Time 1500  -      PT Received On  08/07/17  -     PT Goal Re-Cert Due Date  08/06/17  -     Time Calculation- PT    Total Timed Code Minutes- PT 25 minute(s)  -MF 20 minute(s)  -       User Key  (r) = Recorded By, (t) = Taken By, (c) = Cosigned By    Initials Name Provider Type    UD Mary Kay Alcocer PTA Physical Therapy Assistant     Aly Mckeon, PT Physical Therapist            Therapy Charges for Today     Code Description Service Date Service Provider Modifiers Qty    11477577044 HC PT STAPPING UNNA BOOT 8/7/2017 Aly Mckeon, PT GP 1            PT G-Codes  PT Professional Judgement Used?: Yes  Outcome Measure Options: AM-PAC 6 Clicks Basic Mobility (PT)  Score: 17  Functional Limitation: Mobility: Walking and moving around  Mobility: Walking and Moving Around Current Status (): At least 40 percent but less than 60 percent impaired, limited or restricted  Mobility: Walking and Moving Around Goal Status (): At least 20 percent but less than 40 percent impaired, limited or restricted      Aly Mckeon, PT  8/7/2017            Electronically signed by Aly Mckeon, PT at 8/7/2017  3:39 PM        Aftab Loco MD Physician Signed Medicine Progress Notes Date of Service: 8/6/2017 11:41 AM      Expand All Collapse All    []Hide copied text                                                   HOSPITALIST DAILY PROGRESS NOTE     Chief Complaint: weakness, cough        Subjective      SUBJECTIVE/OVERNIGHT EVENTS   Still with cough, but difficulty with sputum. Still SOA, but weak. Just feels tired     Review of Systems:  Gen-no fevers, no chills  CV-no chest pain, no palpitations  Resp- +cough, dyspnea on exertion  GI-no N/V/D, no abd pain  Neuro - + confusion  MS - right elbow pain        Objective      OBJECTIVE   I have  "reviewed the vital signs.  /74  Pulse 96  Temp 97.4 °F (36.3 °C) (Oral)   Resp 16  Ht 72\" (182.9 cm)  Wt 236 lb 8 oz (107 kg)  SpO2 94%  BMI 32.08 kg/m2  Physical Exam:  NAD, but chronically ill appearing  Op clear  MMM  PERRL  Neck supple  No LAD  RRR  Coarse RUL, otherwise clear  +BS, ND, NT  MIGUEL  Tr edema     Results:  I have reviewed the labs, culture data, radiology results, and diagnostic studies.        Results from last 7 days  Lab Units 08/04/17  0427 08/03/17  0349 08/02/17  0341   WBC 10*3/mm3 4.11 4.30 3.89   HEMOGLOBIN g/dL 11.0* 11.3* 11.3*   HEMATOCRIT % 34.9* 36.0* 35.7*   PLATELETS 10*3/mm3 110* 120* 119*         Results from last 7 days  Lab Units 08/06/17  0514   SODIUM mmol/L 135   POTASSIUM mmol/L 3.7   CHLORIDE mmol/L 100   CO2 mmol/L 31.0   BUN mg/dL 22   CREATININE mg/dL 0.90   GLUCOSE mg/dL 144*   CALCIUM mg/dL 8.8      Sputum cultures - proteus - resistant to levaquin     CXR - 7/25 CMG, increased vascularity     I have reviewed the medications.        Assessment/Plan      ASSESSMENT/PLAN     Principal Problem:    Pneumonia due to infectious organism  Active Problems:    DM type 2 (diabetes mellitus, type 2)    COPD (chronic obstructive pulmonary disease)    Atrial fibrillation    Reactive airway disease with wheezing    Urinary retention    Olecranon bursitis of right elbow     83 year old male presenting from home with SOA and cough with right lower lobe bacterial pneumonia.       Plan:  --CXR with RLL congestion, additional lasix given  --Observe OFF abx, CXR more suggestive of chronic changes, home in am if stable     --Patient continuing to have urinary retention, home with stewart per urology, on Flomax     --Bursitis s/p aspiration, seen by orthopedics, improved           Plan;  -cont current care.        Aftab Loco MD  08/06/17  11:41 AM                                       Occupational Therapy Notes (last 24 hours) (Notes from 8/6/2017  4:07 PM through 8/7/2017  " 4:07 PM)     No notes of this type exist for this encounter.

## 2017-08-07 NOTE — THERAPY TREATMENT NOTE
Acute Care - Physical Therapy Treatment Note  [facility]     Patient Name: Bjorn Pollock  : 1934  MRN: [id number]  Today's Date: 2017  Onset of Illness/Injury or Date of Surgery Date: 17  Date of Referral to PT: 17  Referring Physician: MD KAYODE (MOBILITY order)    Admit Date: 2017    Visit Dx:    ICD-10-CM ICD-9-CM   1. Reactive airway disease with wheezing, severe persistent, with acute exacerbation J45.51 493.92   2. Fatigue, unspecified type R53.83 780.79   3. Bronchitis J40 490   4. Impaired functional mobility, balance, gait, and endurance Z74.09 V49.89     Patient Active Problem List   Diagnosis   • Intractable low back pain   • HTN (hypertension)   • DM type 2 (diabetes mellitus, type 2)   • Intertriginous candidiasis   • COPD (chronic obstructive pulmonary disease)   • NAYLA (obstructive sleep apnea)   • Atrial fibrillation   • Supratherapeutic INR   • CKD (chronic kidney disease)   • Diastolic heart failure secondary to hypertension   • Anemia   • DJD (degenerative joint disease)   • CAD (coronary artery disease) s/p CABG history    • Crawley's esophagus   • Chronic thrombocytopenic purpura   • Cerebrovascular accident   • NYALA on CPAP   • Obesity (BMI 30-39.9)   • Dyslipidemia   • BPH (benign prostatic hyperplasia)   • Lower extremity edema   • Falls   • Sepsis due to urinary tract infection   • Reactive airway disease with wheezing   • Constipation   • Pneumonia due to infectious organism   • Urinary retention   • Olecranon bursitis of right elbow               Adult Rehabilitation Note       17 1058 17 0950       Rehab Assessment/Intervention    Discipline physical therapy assistant  -UD physical therapist  -MC     Document Type therapy note (daily note)  -UD therapy note (daily note)  -MC     Subjective Information agree to therapy;complains of;weakness;pain  -UD agree to therapy;complains of;weakness  -MC     Patient Effort, Rehab Treatment fair  -UD       Symptoms Noted During/After Treatment increased pain;dizziness;fatigue  -UD      Precautions/Limitations fall precautions  -UD      Precautions/Limitations, Hearing hearing impairment, bilaterally  -UD      Recorded by [UD] Mary Kay Alcocer PTA [] Maddison Gamble, PT     Vital Signs    O2 Delivery Post Treatment room air  -UD      Pre Patient Position Supine  -UD      Intra Patient Position Standing  -UD      Post Patient Position Sitting  -UD      Recorded by [UD] Mary Kay Alcocer PTA      Pain Assessment    Pain Assessment Arrington-Moon FACES  -UD No/denies pain  -     Arrington-Moon FACES Pain Rating 6  -UD      Pain Score 6  -UD      Post Pain Score 8  -UD      Pain Type Acute pain  -UD      Pain Location Head  -UD      Pain Intervention(s) Medication (See MAR);Repositioned  -UD      Recorded by [UD] Mary Kay Alcocer PTA [] Maddison Gamble, PT     Cognitive Assessment/Intervention    Orientation Status oriented x 4  -UD      Follows Commands/Answers Questions 100% of the time  -UD      Personal Safety Interventions fall prevention program maintained  -UD      Recorded by [UD] Mary Kay Alcocer PTA      Bed Mobility, Assessment/Treatment    Bed Mob, Supine to Sit, [place] minimum assist (75% patient effort)  -UD      Recorded by [UD] Mary Kay Alcocer PTA      Transfer Assessment/Treatment    Transfers, Sit-Stand [place] contact guard assist  -UD      Transfers, Stand-Sit [place] contact guard assist  -UD      Transfers, Sit-Stand-Sit, Assist Device rolling walker  -UD      Recorded by [UD] Mary Kay Alcocer PTA      Gait Assessment/Treatment    Gait, [place] Level other (see comments)   only took a few steps to chair too much pain today  -UD      Recorded by [UD] Mary Kay Alcocer PTA      Therapy Exercises    Bilateral Lower Extremities AROM:;10 reps;sitting  -UD      Recorded by [UD] Mary Kay Alcocer PTA      Positioning and Restraints    Pre-Treatment Position in bed  -UD in bed  -     Post Treatment Position  chair  -UD bed  -MC     In Bed  supine;call light within reach;encouraged to call for assist  -MC     In Chair notified nsg;reclined;sitting;call light within reach;exit alarm on;legs elevated  -UD      Recorded by [UD] Mary Kay Alcocer, PTA [MC] Maddison Gamble, PT       User Key  (r) = Recorded By, (t) = Taken By, (c) = Cosigned By    Initials Name Effective Dates    UD Mary Kay Alcocer, PTA 06/22/15 -     MC Maddison Gamble, PT 03/14/16 -                 IP PT Goals       08/07/17 1143 08/05/17 0950 08/04/17 1034    Bed Mobility PT LTG    Bed Mobility PT LTG, Outcome goal ongoing  -UD  goal ongoing  -UD    Transfer Training PT LTG    Transfer Training PT LTG, Outcome goal ongoing  -UD  goal ongoing  -UD    Gait Training PT LTG    Gait Training Goal PT LTG, Outcome goal ongoing  -UD  goal ongoing  -UD    Wound Care PT LTG    Wound Care PT LTG 1, Outcome  goal ongoing  -MC       08/02/17 1650 08/01/17 1028 07/31/17 1540    Bed Mobility PT LTG    Bed Mobility PT LTG, Date Established   07/17/17  -LR    Bed Mobility PT LTG, Time to Achieve   1 wk  -LR    Bed Mobility PT LTG, Activity Type   all bed mobility  -LR    Bed Mobility PT LTG, [place] Level   independent  -LR    Bed Mobility PT LTG, Date Goal Reviewed  08/01/17  -MJ 07/31/17  -LR    Bed Mobility PT LTG, Outcome goal ongoing  -DM goal ongoing  -MJ goal ongoing  -LR    Transfer Training PT LTG    Transfer Training PT LTG, Date Established   07/17/17  -LR    Transfer Training PT LTG, Time to Achieve   1 wk  -LR    Transfer Training PT LTG, Activity Type   bed to chair /chair to bed;sit to stand/stand to sit  -LR    Transfer Training PT LTG, [place] Level   independent  -LR    Transfer Training PT LTG, Assist Device   walker, standard  -LR    Transfer Training PT  LTG, Date Goal Reviewed  08/01/17  -MJ 07/31/17  -LR    Transfer Training PT LTG, Outcome goal ongoing  -DM goal ongoing  -MJ goal ongoing  -LR    Gait Training PT LTG    Gait Training Goal PT  LTG, Date Established   07/17/16  -LR    Gait Training Goal PT LTG, Time to Achieve   1 wk  -LR    Gait Training Goal PT LTG, [place] Level   independent  -LR    Gait Training Goal PT LTG, Assist Device   walker, standard  -LR    Gait Training Goal PT LTG, Distance to Achieve   200 feet  -LR    Gait Training Goal PT LTG, Date Goal Reviewed  08/01/17  -MJ 07/31/17  -LR    Gait Training Goal PT LTG, Outcome goal ongoing  -DM goal ongoing  -MJ goal ongoing  -LR      07/28/17 1513 07/28/17 1100 07/27/17 1100    Bed Mobility PT LTG    Bed Mobility PT LTG, Outcome goal ongoing  -EH      Transfer Training PT LTG    Transfer Training PT LTG, Outcome goal ongoing  -EH      Gait Training PT LTG    Gait Training Goal PT LTG, Outcome goal ongoing  -EH      Wound Care PT LTG    Wound Care PT LTG 1, Outcome  goal ongoing  -MC goal partially met  -MF      07/27/17 1057          Bed Mobility PT LTG    Bed Mobility PT LTG, Outcome goal ongoing  -MB      Transfer Training PT LTG    Transfer Training PT LTG, Outcome goal ongoing  -MB      Gait Training PT LTG    Gait Training Goal PT LTG, Outcome goal ongoing  -MB        User Key  (r) = Recorded By, (t) = Taken By, (c) = Cosigned By    Initials Name Provider Type    UD Mary Kay Alcocer, PTA Physical Therapy Assistant    LITO Mckeon, PT Physical Therapist    EH Chantell Nazario, PT Physical Therapist    SHADY Pope, PT Physical Therapist    LR Yuridia Rees, PT Physical Therapist    BREANA Camejo, PT Physical Therapist    CHRISTIANNE Gamble, PT Physical Therapist    ECHO Geller, PT Physical Therapist          Physical Therapy Education     Title: PT OT SLP Therapies (Done)     Topic: Physical Therapy (Done)     Point: Mobility training (Done)    Learning Progress Summary    Learner Readiness Method Response Comment Documented by Status   Patient Acceptance E,D KAROLINE,NR  UD 08/07/17 1143 Done    Acceptance E SCARLETT WU 08/06/17 0318 Done    Acceptance E  VU   08/05/17 0128 Done    Acceptance E,D DU,NR   08/04/17 1034 Done    Eager E,D NR   08/02/17 1650 Active    Acceptance E,D VU,NR   08/01/17 1028 Done    Acceptance E,D VU,NR Educated on correct gait mechanics.  07/31/17 1625 Done    Acceptance E,D VU,NR HEP, fall precautions MB 07/27/17 1057 Done    Acceptance E,TB,D VU,DU,NR   07/26/17 0151 Done    Eager E VU,DU reviewed benefits fo activity SC 07/25/17 1537 Done    Acceptance E,TB,D VU,DU,NR   07/25/17 0520 Done    Acceptance E,TB,D VU,DU,NR   07/24/17 0421 Done    Acceptance E,D DU,NR   07/23/17 1106 Done    Eager E VU  SC 07/21/17 1620 Done    Acceptance E,D DU,[facility] 07/20/17 1145 Done    Acceptance E NR   07/18/17 1200 Active    Eager E,D NR   07/17/17 1722 Active               Point: Home exercise program (Done)    Learning Progress Summary    Learner Readiness Method Response Comment Documented by Status   Patient Acceptance E,D DU,NR   08/07/17 1143 Done    Acceptance E VU   08/06/17 0318 Done    Acceptance E VU   08/05/17 0128 Done    Acceptance E,D DU,NR   08/04/17 1034 Done    Eager E,D NR   08/02/17 1650 Active    Acceptance E,D VU,NR   08/01/17 1028 Done    Acceptance E,D VU,NR Educated on correct gait mechanics.  07/31/17 1625 Done    Acceptance E,D VU,NR HEP, fall precautions MB 07/27/17 1057 Done    Acceptance E,TB,D VU,DU,NR   07/26/17 0151 Done    Eager E VU,DU reviewed benefits fo activity SC 07/25/17 1537 Done    Acceptance E,TB,D VU,DU,NR   07/25/17 0520 Done    Acceptance E,TB,D VU,DU,NR   07/24/17 0421 Done    Acceptance E,D DU,NR   07/23/17 1106 Done    Eager E VU  SC 07/21/17 1620 Done    Acceptance E,D DU,NR  UD 07/20/17 1145 Done    Acceptance E NR   07/18/17 1200 Active    Eager BRADEN SANTOS   07/17/17 1722 Active               Point: Body mechanics (Done)    Learning Progress Summary    Learner Readiness Method Response Comment Documented by Status   Patient Acceptance BRADEN SANTOS NR UD 08/07/17  1143 Done    Acceptance E VU  CE 08/06/17 0318 Done    Acceptance E VU  CE 08/05/17 0128 Done    Acceptance E,D DU,NR   08/04/17 1034 Done    Eager E,D NR   08/02/17 1650 Active    Acceptance E,D VU,NR   08/01/17 1028 Done    Acceptance E,D VU,NR Educated on correct gait mechanics.  07/31/17 1625 Done    Acceptance E,D VU,NR HEP, fall precautions MB 07/27/17 1057 Done    Acceptance E,TB,D VU,DU,NR   07/26/17 0151 Done    Eager E VU,DU reviewed benefits fo activity SC 07/25/17 1537 Done    Acceptance E,TB,D VU,DU,NR   07/25/17 0520 Done    Acceptance E,TB,D VU,DU,NR   07/24/17 0421 Done    Acceptance E,D DU,NR   07/23/17 1106 Done    Eager E VU  SC 07/21/17 1620 Done    Acceptance E,D DU,NR   07/20/17 1145 Done    Acceptance E NR   07/18/17 1200 Active    Eager E,D NR   07/17/17 1722 Active               Point: Precautions (Done)    Learning Progress Summary    Learner Readiness Method Response Comment Documented by Status   Patient Acceptance E,D DU,NR   08/07/17 1143 Done    Acceptance E VU   08/06/17 0318 Done    Acceptance E VU   08/05/17 0128 Done    Acceptance E,D DU,NR   08/04/17 1034 Done    Eager E,D NR   08/02/17 1650 Active    Acceptance E,D VU,NR   08/01/17 1028 Done    Acceptance E,D VU,NR Educated on correct gait mechanics. LR 07/31/17 1625 Done    Acceptance E,D VU,NR HEP, fall precautions MB 07/27/17 1057 Done    Acceptance E,TB,D VU,DU,NR   07/26/17 0151 Done    Eager E VU,DU reviewed benefits fo activity SC 07/25/17 1537 Done    Acceptance E,TB,D VU,DU,NR   07/25/17 0520 Done    Acceptance E,TB,D VU,DU,NR   07/24/17 0421 Done    Acceptance E,D DU,NR   07/23/17 1106 Done    Eager E VU  SC 07/21/17 1620 Done    Acceptance E,D JAY RAMEY  UD 07/20/17 1145 Done    Acceptance E NR   07/18/17 1200 Active    Eageloina SANTOSD NR   07/17/17 1722 Active                      User Key     Initials Effective Dates Name Provider Type Discipline    SC 06/19/15 -  Oscar Alexis,  PT Physical Therapist PT    UD 06/22/15 -  Mary Kay Alcocer, PTA Physical Therapy Assistant PT    SH 06/19/15 -  Keyona Hawk, PT Physical Therapist PT    DM 06/19/15 -  Noa Pope, PT Physical Therapist PT    LR 06/19/15 -  Yuridia Rees, PT Physical Therapist PT    MB 03/14/16 -  Nicole Camejo, PT Physical Therapist PT    MJ 03/14/16 -  Eladio Geller, PT Physical Therapist PT     08/22/16 -  Mercedes Silverio, RN Registered Nurse Nurse    CE 10/17/16 -  Sabrina Gomez RN Registered Nurse Nurse                    PT Recommendation and Plan  Anticipated Discharge Disposition: home with home health  PT Frequency: daily  Plan of Care Review  Plan Of Care Reviewed With: patient  Progress: progress towards functional goals is fair  Outcome Summary/Follow up Plan: pt c/o severe headache today and then dizziness.only able to take a few steps to chair today.unable to take regular walk          Outcome Measures       08/07/17 1058          How much help from another person do you currently need...    Turning from your back to your side while in flat bed without using bedrails? 3  -UD      Moving from lying on back to sitting on the side of a flat bed without bedrails? 3  -UD      Moving to and from a bed to a chair (including a wheelchair)? 3  -UD      Standing up from a chair using your arms (e.g., wheelchair, bedside chair)? 3  -UD      Climbing 3-5 steps with a railing? 1  -UD      To walk in hospital room? 2  -UD      AM-PAC 6 Clicks Score 15  -UD        User Key  (r) = Recorded By, (t) = Taken By, (c) = Cosigned By    Initials Name Provider Type    UD Mary Kay Alcocer, PTA Physical Therapy Assistant           Time Calculation:         PT Charges       08/07/17 1145          Time Calculation    PT Received On 08/07/17  -UD      PT Goal Re-Cert Due Date 08/06/17  -UD      Time Calculation- PT    Total Timed Code Minutes- PT 20 minute(s)  -UD        User Key  (r) = Recorded By, (t) = Taken By, (c) = Cosigned By     Initials Name Provider Type    UD Mary Kay Alcocer PTA Physical Therapy Assistant          Therapy Charges for Today     Code Description Service Date Service Provider Modifiers Qty    93770155785 HC PT THER PROC EA 15 MIN 8/7/2017 Mary Kay Alcocer PTA GP 1    23325037805 HC PT THER SUPP EA 15 MIN 8/7/2017 Mary Kay Alcocer PTA GP 1          PT G-Codes  PT Professional Judgement Used?: Yes  Outcome Measure Options: AM-PAC 6 Clicks Basic Mobility (PT)  Score: 17  Functional Limitation: Mobility: Walking and moving around  Mobility: Walking and Moving Around Current Status (): At least 40 percent but less than 60 percent impaired, limited or restricted  Mobility: Walking and Moving Around Goal Status (): At least 20 percent but less than 40 percent impaired, limited or restricted    Mary Kay Alcocer PTA  8/7/2017

## 2017-08-07 NOTE — DISCHARGE SUMMARY
[facility] Medicine Services  DISCHARGE SUMMARY       Date of Admission: 7/16/2017  Date of Discharge:  8/7/2017  Primary Care Physician: Vincent Mccullough MD  Consulting Physician(s)     Provider Relationship    Kj Castellanos MD Consulting Physician    Aly Gayle MD Consulting Physician          Discharge Diagnoses:  Active Hospital Problems (** Indicates Principal Problem)    Diagnosis Date Noted   • **Pneumonia due to infectious organism [J18.9] 07/22/2017   • Olecranon bursitis of right elbow [M70.21] 07/27/2017   • Urinary retention [R33.9] 07/22/2017   • Reactive airway disease with wheezing [J45.909] 07/17/2017   • COPD (chronic obstructive pulmonary disease) [J44.9] 09/04/2016   • DM type 2 (diabetes mellitus, type 2) [E11.9] 09/04/2016   • Atrial fibrillation [I48.91] 09/04/2016     a. Chronic CHADS-VASc of 5.  b. Chronic anticoagulation.  Atrial fibrillation, rates well-controlled.         Resolved Hospital Problems    Diagnosis Date Noted Date Resolved   • Stool incontinence [R15.9] 07/25/2017 07/27/2017       Presenting Problem/History of Present Illness  Reactive airway disease with wheezing, severe persistent, with acute exacerbation [J45.51]  Reactive airway disease with wheezing, severe persistent, with acute exacerbation [J45.51]     Chief Complaint on Day of Discharge: f/u Pneumonia/ rt elbow pain     History of Present Illness on Day of Discharge:   Pt was seen resting upright in chair in [facility].  NO visitors at bs.  Reports he feels better with less cough, improved BLE swelling.  Also that he feels better with stewart cath in place.  Says his Rt elbow pain is much better since it was drained and the redness and swelling are a lot better.  Reports that he has prior home oxygen in place.  He is tolerating diet and denies n/v, abd pain, cp or soa (beyond his baseline).  NO new issues and is asking for dc home today.  He is recommended to dc to rehab but continues to  decline.  He is agreeable to HH/PT/nsg for BLE wraps at dc.  No new issues.      Hospital Course  Patient is a 83 y.o. male past medical history significant for anemia, anxiety, chronic atrial fibrillation, Crawley's esophagus, BPH, CHF (diastolic echo 2/17 with EF of 45%), CKD stage 2-3, and CAD with history of CABG in 2005, DDD lumbar and cervical spine, depression, diabetes type 2 with associated diabetic retinopathy and neuropathy, hyperlipidemia, hypertension, obesity, NAYLA with CPAP use, prior TIA, and history of necrotizing fasciitis to the right arm post skin graft, thrombocytopenia.    Pt presents to [facility] ER with chief complaint of worsening cough and dyspnea for 2 days.  Reported it as acute in onset.  States that he woke up Friday morning with complaint of cough and productive for clear sputum.  Had associated dyspnea on exertion and at rest with some orthopnea.  Reported some wheezing.  Denied any weight gain or worsening of his pedal edema.  Does have noted history of CHF but is not compliant with his medications including his Lasix at home.  He denied chest pain, abdominal pain, dysuria, fever or chills on presentation.  ER evaluation patient was felt likely to have bronchitis and started on doxycycline and nebs, IV steroids, oxygen and cough syrup as needed.  CXR did not appear to have any major infiltrates at that time.  He had further history of mixed CHF with no acute exacerbation and his home Lasix was continued.  He did have history as well of A. fib rate controlled on his home Cardizem and Coumadin which were continued.  Pt was admitted to hospital medicine service for further evaluation and management.  PT/OT were consulted for therapy and lower extremity leg wraps.  SLP were consulted and followed due to probable dysphagia.  Initial recommendations for NPO as patient felt unsafe for food or liquid intake.  He then underwent a FEES test to further eval for dysphagia.  Per S [facility]  recommendations, he was able to return to regular textures and thin liquids.  He continued being treated with doxycycline for possible reactive airway with shortness of air/bronchitis however did not improve readily.  Question of pneumonia arise.  He was then started on Levaquin and doxycycline was discontinued.  Bili patient was found to have a right lower lobe bacterial pneumonia.  He was treated with antibiotics through 7/30/17 and steroids through 7/24/17.  His symptoms slowly improved and his glucoses also improved once steroids were DC'd.    Patient then had complaints of redness and swelling to his right elbow with no history of trauma.  This appeared to be slightly worsened and orthopedics was consulted on 7/28 for evaluation.  [place] to have a probable septic olecranon bursitis of the right elbow and underwent aspiration at the bedside of 3 cc of cloudy fluid sent for pathology.  Continued on antibiotic coverage.  Symptoms improved slowly with no further aspirations required.  Also had urinary retention.  White catheter was anchored.  Dr. Castellanos was consulted per patient request as he sees him outpatient.  Instructions were given by Dr. Benito to continue White, Flomax and have him follow up in office outpatient for further voiding trials.    On day of discharge patient was seen sitting upright in chair in no acute distress with no visitors at bedside.  He reports his symptoms much improved.  Decreased cough.  Shortness of air at baseline with home oxygen prior to this admission.  This therapy has been doing leg wraps which will continue with home health/PT and nursing on discharge.  He reports his right elbow is significantly improved.  He's had no further fever or chills.  His INR is therapeutic at 2.0.  Glucoses have improved off of steroids.  He continues to have White catheter in place to bedside drain due to urinary retention and will follow-up with Dr. Kj Castellanos outpatient for further voiding trials.   Patient is currently hemodynamically stable and afebrile and wishes to discharge home.  He has been observed off antibiotics and doing well.  Follow-up chest x-ray suggestive more of chronic changes.  Plan to discharge home to follow-up with PCP within 1 week of discharge, urology per their recommendations, her Irwin with orthopedics as needed, and home health to follow for PT/nursing and lower extremities wraps.         Consults:   Consults     Date and Time Order Name Status Description    7/27/2017 1358 Inpatient Consult to Orthopedic Surgery Completed     7/25/2017 1413 Inpatient Consult to Urology            Pertinent Test Results:    Updated:  08/04/17 1330    Narrative:       EXAMINATION: XR CHEST 1 VW- 08/04/2017     INDICATION: DYSPNEA, ON EXERTION      COMPARISON: 08/02/2017     FINDINGS:   1. Cardiomegaly is noted. There is increased vascular linear and  interstitial markings identified throughout the mid and lower chest.  2. There is no evidence of pleural effusion or dense consolidation.           Impression:       1. Cardiomegaly persists with perihilar and upper lung vascular  congestion. Mild increased interstitial and granulomatous markings are  noted in the mid and lower lung zones, chronic and stable.  2. Considering technical differences, no interval change has occurred  since studies of 2 days ago.     D:  08/04/2017  E:  08/04/2017     This report was finalized on 8/4/2017 1:28 PM by Dr. Mitch Mascorro MD.       XR Chest 1 View [[id number]] Collected:  08/05/17 1514     Updated:  08/05/17 1520    Narrative:          EXAMINATION: XR CHEST, SINGLE VIEW - 08/05/2017     INDICATION: Dyspnea on exertion.     COMPARISON: 8/04/2017.     FINDINGS: There is patchy airspace disease in the right mid and both  lower lung regions. The heart is large. There is no pneumothorax or  acute finding. There has been no change since 8/04/2017.            Impression:       There has been no change since previous  "examination.     DICTATED:     08/05/2017  EDITED:         08/05/2017     This report was finalized on 8/5/2017 3:17 PM by Dr. Emerson Mackenzie MD.       XR Chest 1 View [[id number]] Collected:  08/06/17 1212     Updated:  08/06/17 1335    Narrative:          EXAMINATION: XR CHEST, SINGLE VIEW - 08/06/2017     INDICATION: Dyspnea on exertion.     COMPARISON: 08/05/2017     FINDINGS: The heart is large. There is patchy airspace disease in the  right mid and lower lung region which is unchanged. The heart is  radiographically compensated and there is no significant disease in the  left lung.           Impression:       Right mid and lower lung airspace disease, insignificantly  changed since 08/05/2017.     DICTATED:     08/06/2017  EDITED:         08/06/2017     This report was finalized on 8/6/2017 1:33 PM by Dr. Emerson Mackenzie MD.           Results for HOLGER JOHNSON (MRN [id number]) as of 8/20/2017 16:39   Ref. Range 8/6/2017 16:44 8/6/2017 20:29 8/7/2017 03:26 8/7/2017 07:20 8/7/2017 11:43   Glucose Latest Ref Range: 70 - 130 mg/dL 166 (H) 199 (H)  133 (H) 214 (H)   Results for HOLGER JOHNSON (MRN [id number]) as of 8/20/2017 16:39   Ref. Range 8/7/2017 03:26   Protime Latest Ref Range: 9.6 - 11.5 Seconds 22.3 (H)   INR Unknown 2.00       Condition on Discharge:  Stable     Physical Exam on Discharge:/70 (BP Location: Right arm, Patient Position: Lying)  Pulse 91  Temp 97.8 °F (36.6 °C) (Oral)   Resp 17  Ht 72\" (182.9 cm)  Wt 236 lb 8 oz (107 kg)  SpO2 92%  BMI 32.08 kg/m2     Physical Exam  NAD, but chronically ill appearing.  Sitting upright in chair with no visitors at bs.   Op clear, MMM  PERRL  Neck supple, trachea midline   No LAD  RRR no m/g/r appreciated   Coarse RUL, No wheezes, no rales.  No respiratory distress.  +BS, ND, NT, soft. Obese abd. White cath in place to BSD.   MIGUEL spontaneously.  Trace edema to LE's. BLE wraps in place.  Rt elbow with mild edema and mild erythema.  No warmth.  " Much improved per chart review and pt report post aspiration this admit.    Alert and oriented.  Follows commands.      Discharge Disposition  Home or Self Care    Discharge Medications   Bjorn Pollock ZULEIKA   Home Medication Instructions FERNANDO:[id number]    Printed on:08/07/17 6905   Medication Information                      acetaminophen (TYLENOL) 500 MG tablet  Take 1 tablet by mouth every 6 (six) hours as needed for mild pain (1-3).             acetaminophen-codeine (TYLENOL #3) 300-30 MG per tablet  Take 1 tablet by mouth Every 6 (Six) Hours As Needed for moderate pain (4-6).             aspirin 81 MG EC tablet  Take 1 tablet by mouth Daily.             benzonatate (TESSALON) 100 MG capsule  Take 1 capsule by mouth 3 (Three) Times a Day As Needed for Cough.             cyclobenzaprine (FLEXERIL) 10 MG tablet  Take 1 tablet by mouth 3 (three) times a day as needed for muscle spasms.             diltiaZEM CD (CARDIZEM CD) 120 MG 24 hr capsule  Take 1 capsule by mouth Daily.             DULoxetine (CYMBALTA) 30 MG capsule  Take 30 mg by mouth 2 (Two) Times a Day.             fluvastatin XL (LESCOL XL) 80 MG 24 hr tablet  Take 1 tablet by mouth Every Night.             furosemide (LASIX) 40 MG tablet  Take 1 tablet by mouth Daily.             guaiFENesin (MUCINEX) 600 MG 12 hr tablet  Take 1 tablet by mouth 2 (Two) Times a Day.             insulin aspart (novoLOG) 100 UNIT/ML injection  Inject 10 Units under the skin 4 (Four) Times a Day.             insulin glargine (LANTUS) 100 UNIT/ML injection  Inject 20 Units under the skin Daily.             nitroglycerin (NITROLINGUAL) 0.4 MG/SPRAY spray  Place 1 spray under the tongue Every 5 (Five) Minutes As Needed for chest pain.             nystatin (MYCOSTATIN) 611724 UNIT/GM cream  Apply  topically Every 12 (Twelve) Hours. May use hospital supply             nystatin (MYCOSTATIN) 168700 UNIT/GM powder  Apply  topically Every 12 (Twelve) Hours. May use hospital  supply             ondansetron (ZOFRAN) 8 MG tablet  Take 8 mg by mouth Every 8 (Eight) Hours As Needed for nausea or vomiting.             pantoprazole (PROTONIX) 40 MG EC tablet  Take 40 mg by mouth 2 (Two) Times a Day.             potassium chloride (K-DUR) 10 MEQ CR tablet  Take 1 tablet by mouth 2 (Two) Times a Day.             saccharomyces boulardii (FLORASTOR) 250 MG capsule  Take 1 capsule by mouth 2 (Two) Times a Day for 14 days.             sertraline (ZOLOFT) 100 MG tablet  Take 200 mg by mouth Daily.             tamsulosin (FLOMAX) 0.4 MG capsule 24 hr capsule  Take 2 capsules by mouth Every Night.             warfarin (COUMADIN) 4 MG tablet  1 tab po every Sunday, Tuesday, Thursday and Saturday             warfarin (COUMADIN) 5 MG tablet  1 tab po every Monday, Wednesday, and Friday                 Discharge Diet:   Diet Instructions     Diet: Regular, Cardiac, Consistent Carbohydrate; Thin Liquids, No Restrictions       Discharge Diet:   Regular  Cardiac  Consistent Carbohydrate      Fluid Consistency:  Thin Liquids, No Restrictions                 Discharge Care Plan / Instructions:    Activity at Discharge:   Activity Instructions     Activity as Tolerated           Measure Blood Pressure                     Follow-up Appointments  No future appointments.  Additional Instructions for the Follow-ups that You Need to Schedule     Additional Discharge Follow-Up (Specify Provider)    As directed    To:  Dr Irwin boyd as needed       Ambulatory Referral to Home Health    As directed    Face to Face Visit Date:  8/7/2017   Follow-up Provider for Plan of Care?:  I treated the patient in an acute care facility and will not continue treatment after discharge.   Follow-up Provider:  HAIM HU   Reason/Clinical Findings:  generalized weakness, PT/INR and BMP on Wed. 8/9 , BLE wound/leg wraps   Describe mobility limitations that make leaving home difficult:  generalized weakness   Nursing/Therapeutic  Services Requested:   Skilled Nursing  Physical Therapy  Occupational Therapy      Skilled nursing orders:   Wound care dressing/changes  Other  Medication education      Instructions:  PT/INR and BMP on 8/9(Wednesday) and PCP to manage Coumadin   PT orders:   Gait Training  Strengthening  Home safety assessment      Weight Bearing Status:  As Tolerated   Occupational orders:   Activities of daily living  Strengthening  Home safety assessment      Frequency:  1 Week 1       Discharge Follow-Up With Specified Provider    As directed    To:  Home health/skilled nursing/PT and wound care, and lab draw per home health.  Next PT/INR and BM P due to be drawn on 8/9/17 with results to PCP for management       Discharge Follow-Up With Specified Provider    As directed    To:  Dr Castellanos (urology)   Follow Up:  1 Week   Follow Up Details:  for voiding trial       Discharge Follow-up with PCP    As directed    Follow Up Details:  PCP 1 week   Has the patient’s follow-up appointment been scheduled and documented in the discharge navigator?:  Yes, documented in the appointment section                 Test Results Pending at Discharge   Order Current Status    Body Fluid Culture Preliminary result           Katia Thomas, APRN 08/07/17 2:43 PM    Time: 45 minutes    Please note that portions of this note may have been completed with a voice recognition program. Efforts were made to edit the dictations, but occasionally words are mistranscribed.

## 2017-08-07 NOTE — PLAN OF CARE
Problem: Patient Care Overview (Adult)  Goal: Plan of Care Review  Outcome: Ongoing (interventions implemented as appropriate)    08/07/17 1143   Patient Care Overview   Progress progress towards functional goals is fair   Coping/Psychosocial Response Interventions   Plan Of Care Reviewed With patient   Outcome Evaluation   Outcome Summary/Follow up Plan pt c/o severe headache today and then dizziness.only able to take a few steps to chair today.unable to take regular walk         Problem: Inpatient Physical Therapy  Goal: Bed Mobility Goal LTG- PT  Outcome: Ongoing (interventions implemented as appropriate)    07/31/17 1540 08/01/17 1028 08/07/17 1143   Bed Mobility PT LTG   Bed Mobility PT LTG, Date Established 07/17/17 --  --    Bed Mobility PT LTG, Time to Achieve 1 wk --  --    Bed Mobility PT LTG, Activity Type all bed mobility --  --    Bed Mobility PT LTG, [place] Level independent --  --    Bed Mobility PT LTG, Date Goal Reviewed --  08/01/17 --    Bed Mobility PT LTG, Outcome --  --  goal ongoing       Goal: Transfer Training Goal 1 LTG- PT  Outcome: Ongoing (interventions implemented as appropriate)    07/31/17 1540 08/01/17 1028 08/07/17 1143   Transfer Training PT LTG   Transfer Training PT LTG, Date Established 07/17/17 --  --    Transfer Training PT LTG, Time to Achieve 1 wk --  --    Transfer Training PT LTG, Activity Type bed to chair /chair to bed;sit to stand/stand to sit --  --    Transfer Training PT LTG, [place] Level independent --  --    Transfer Training PT LTG, Assist Device walker, standard --  --    Transfer Training PT LTG, Date Goal Reviewed --  08/01/17 --    Transfer Training PT LTG, Outcome --  --  goal ongoing       Goal: Gait Training Goal LTG- PT  Outcome: Ongoing (interventions implemented as appropriate)    07/31/17 1540 08/01/17 1028 08/07/17 1143   Gait Training PT LTG   Gait Training Goal PT LTG, Date Established 07/17/16 --  --    Gait Training Goal PT LTG, Time to  Achieve 1 wk --  --    Gait Training Goal PT LTG, [place] Level independent --  --    Gait Training Goal PT LTG, Assist Device walker, standard --  --    Gait Training Goal PT LTG, Distance to Achieve 200 feet --  --    Gait Training Goal PT LTG, Date Goal Reviewed --  08/01/17 --    Gait Training Goal PT LTG, Outcome --  --  goal ongoing

## 2017-08-07 NOTE — DISCHARGE PLACEMENT REQUEST
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 (interventions implemented as appropriate)    07/31/17 1540 08/01/17 1028 08/07/17 1143   Bed Mobility PT LTG   Bed Mobility PT LTG, Date Established 07/17/17 --  --    Bed Mobility PT LTG, Time to Achieve 1 wk --  --    Bed Mobility PT LTG, Activity Type all bed mobility --  --    Bed Mobility PT LTG, [place] Level independent --  --    Bed Mobility PT LTG, Date Goal Reviewed --  08/01/17 --    Bed Mobility PT LTG, Outcome --  --  goal ongoing       Goal: Transfer Training Goal 1 LTG- PT  Outcome: Ongoing (interventions implemented as appropriate)    07/31/17 1540 08/01/17 1028 08/07/17 1143   Transfer Training PT LTG   Transfer Training PT LTG, Date Established 07/17/17 --  --    Transfer Training PT LTG, Time to Achieve 1 wk --  --    Transfer Training PT LTG, Activity Type bed to chair /chair  to bed;sit to stand/stand to sit --  --    Transfer Training PT LTG, [place] Level independent --  --    Transfer Training PT LTG, Assist Device walker, standard --  --    Transfer Training PT LTG, Date Goal Reviewed --  17 --    Transfer Training PT LTG, Outcome --  --  goal ongoing       Goal: Gait Training Goal LTG- PT  Outcome: Ongoing (interventions implemented as appropriate)    17 1540 17 1028 17 1143   Gait Training PT LTG   Gait Training Goal PT LTG, Date Established 16 --  --    Gait Training Goal PT LTG, Time to Achieve 1 wk --  --    Gait Training Goal PT LTG, [place] Level independent --  --    Gait Training Goal PT LTG, Assist Device walker, standard --  --    Gait Training Goal PT LTG, Distance to Achieve 200 feet --  --    Gait Training Goal PT LTG, Date Goal Reviewed --  17 --    Gait Training Goal PT LTG, Outcome --  --  goal ongoing              Electronically signed by Mary Kay Alcocer PTA at 2017 11:45 AM      Mary Kay Alcocer PTA at 2017 11:46 AM  Version 1 of 1         Acute Care - Physical Therapy Treatment Note  [facility]     Patient Name: Bjorn Pollock  : 1934  MRN: [id number]  Today's Date: 2017  Onset of Illness/Injury or Date of Surgery Date: 17  Date of Referral to PT: 17  Referring Physician: MD KAYODE (MOBILITY order)    Admit Date: 2017    Visit Dx:    ICD-10-CM ICD-9-CM   1. Reactive airway disease with wheezing, severe persistent, with acute exacerbation J45.51 493.92   2. Fatigue, unspecified type R53.83 780.79   3. Bronchitis J40 490   4. Impaired functional mobility, balance, gait, and endurance Z74.09 V49.89     Patient Active Problem List   Diagnosis   • Intractable low back pain   • HTN (hypertension)   • DM type 2 (diabetes mellitus, type 2)   • Intertriginous candidiasis   • COPD (chronic obstructive pulmonary disease)   • NAYLA (obstructive sleep apnea)   • Atrial fibrillation   •  Supratherapeutic INR   • CKD (chronic kidney disease)   • Diastolic heart failure secondary to hypertension   • Anemia   • DJD (degenerative joint disease)   • CAD (coronary artery disease) s/p CABG history    • Crawley's esophagus   • Chronic thrombocytopenic purpura   • Cerebrovascular accident   • NAYLA on CPAP   • Obesity (BMI 30-39.9)   • Dyslipidemia   • BPH (benign prostatic hyperplasia)   • Lower extremity edema   • Falls   • Sepsis due to urinary tract infection   • Reactive airway disease with wheezing   • Constipation   • Pneumonia due to infectious organism   • Urinary retention   • Olecranon bursitis of right elbow               Adult Rehabilitation Note       08/07/17 1058 08/05/17 0950       Rehab Assessment/Intervention    Discipline physical therapy assistant  -UD physical therapist  -     Document Type therapy note (daily note)  - therapy note (daily note)  -     Subjective Information agree to therapy;complains of;weakness;pain  - agree to therapy;complains of;weakness  -     Patient Effort, Rehab Treatment fair  -UD      Symptoms Noted During/After Treatment increased pain;dizziness;fatigue  -UD      Precautions/Limitations fall precautions  -UD      Precautions/Limitations, Hearing hearing impairment, bilaterally  -UD      Recorded by [UD] Mary Kay Alcocer PTA [MC] Maddison Gamble, PT     Vital Signs    O2 Delivery Post Treatment room air  -UD      Pre Patient Position Supine  -UD      Intra Patient Position Standing  -UD      Post Patient Position Sitting  -UD      Recorded by [UD] Mary Kay Alcocer PTA      Pain Assessment    Pain Assessment [place] FACES  -UD No/denies pain  -     Arrington-Mono FACES Pain Rating 6  -UD      Pain Score 6  -UD      Post Pain Score 8  -UD      Pain Type Acute pain  -UD      Pain Location Head  -UD      Pain Intervention(s) Medication (See MAR);Repositioned  -UD      Recorded by [UD] Mary Kay Alcocer PTA [MC] Maddison Gamble, PT     Cognitive  Assessment/Intervention    Orientation Status oriented x 4  -UD      Follows Commands/Answers Questions 100% of the time  -UD      Personal Safety Interventions fall prevention program maintained  -UD      Recorded by [UD] Mary Kay Alcocer PTA      Bed Mobility, Assessment/Treatment    Bed Mob, Supine to Sit, [place] minimum assist (75% patient effort)  -UD      Recorded by [UD] Mary Kay Alcocer PTA      Transfer Assessment/Treatment    Transfers, Sit-Stand [place] contact guard assist  -UD      Transfers, Stand-Sit [place] contact guard assist  -UD      Transfers, Sit-Stand-Sit, Assist Device rolling walker  -UD      Recorded by [UD] Mary Kay Alcocer PTA      Gait Assessment/Treatment    Gait, [place] Level other (see comments)   only took a few steps to chair too much pain today  -UD      Recorded by [UD] Mary Kay Alcocer PTA      Therapy Exercises    Bilateral Lower Extremities AROM:;10 reps;sitting  -UD      Recorded by [UD] Mary Kay Alcocer PTA      Positioning and Restraints    Pre-Treatment Position in bed  -UD in bed  -MC     Post Treatment Position chair  -UD bed  -MC     In Bed  supine;call light within reach;encouraged to call for assist  -MC     In Chair notified nsg;reclined;sitting;call light within reach;exit alarm on;legs elevated  -UD      Recorded by [UD] Mary Kay Alcocer PTA [MC] Maddison Gamble, PT       User Key  (r) = Recorded By, (t) = Taken By, (c) = Cosigned By    Initials Name Effective Dates     Mary Kay Alcocer, PTA 06/22/15 -      Maddison Gamble, PT 03/14/16 -                 IP PT Goals       08/07/17 1143 08/05/17 0950 08/04/17 1034    Bed Mobility PT LTG    Bed Mobility PT LTG, Outcome goal ongoing  -UD  goal ongoing  -UD    Transfer Training PT LTG    Transfer Training PT LTG, Outcome goal ongoing  -UD  goal ongoing  -UD    Gait Training PT LTG    Gait Training Goal PT LTG, Outcome goal ongoing  -UD  goal ongoing  -UD    Wound Care PT LTG    Wound Care PT LTG 1, Outcome  goal  ongoing  -MC       08/02/17 1650 08/01/17 1028 07/31/17 1540    Bed Mobility PT LTG    Bed Mobility PT LTG, Date Established   07/17/17  -LR    Bed Mobility PT LTG, Time to Achieve   1 wk  -LR    Bed Mobility PT LTG, Activity Type   all bed mobility  -LR    Bed Mobility PT LTG, [place] Level   independent  -LR    Bed Mobility PT LTG, Date Goal Reviewed  08/01/17  -MJ 07/31/17  -LR    Bed Mobility PT LTG, Outcome goal ongoing  -DM goal ongoing  -MJ goal ongoing  -LR    Transfer Training PT LTG    Transfer Training PT LTG, Date Established   07/17/17  -LR    Transfer Training PT LTG, Time to Achieve   1 wk  -LR    Transfer Training PT LTG, Activity Type   bed to chair /chair to bed;sit to stand/stand to sit  -LR    Transfer Training PT LTG, [place] Level   independent  -LR    Transfer Training PT LTG, Assist Device   walker, standard  -LR    Transfer Training PT  LTG, Date Goal Reviewed  08/01/17  -MJ 07/31/17  -LR    Transfer Training PT LTG, Outcome goal ongoing  -DM goal ongoing  -MJ goal ongoing  -LR    Gait Training PT LTG    Gait Training Goal PT LTG, Date Established   07/17/16  -LR    Gait Training Goal PT LTG, Time to Achieve   1 wk  -LR    Gait Training Goal PT LTG, [place] Level   independent  -LR    Gait Training Goal PT LTG, Assist Device   walker, standard  -LR    Gait Training Goal PT LTG, Distance to Achieve   200 feet  -LR    Gait Training Goal PT LTG, Date Goal Reviewed  08/01/17  -MJ 07/31/17  -LR    Gait Training Goal PT LTG, Outcome goal ongoing  -DM goal ongoing  -MJ goal ongoing  -LR      07/28/17 1513 07/28/17 1100 07/27/17 1100    Bed Mobility PT LTG    Bed Mobility PT LTG, Outcome goal ongoing  -EH      Transfer Training PT LTG    Transfer Training PT LTG, Outcome goal ongoing  -EH      Gait Training PT LTG    Gait Training Goal PT LTG, Outcome goal ongoing  -EH      Wound Care PT LTG    Wound Care PT LTG 1, Outcome  goal ongoing  -MC goal partially met  -MF      07/27/17 1057           Bed Mobility PT LTG    Bed Mobility PT LTG, Outcome goal ongoing  -MB      Transfer Training PT LTG    Transfer Training PT LTG, Outcome goal ongoing  -MB      Gait Training PT LTG    Gait Training Goal PT LTG, Outcome goal ongoing  -MB        User Key  (r) = Recorded By, (t) = Taken By, (c) = Cosigned By    Initials Name Provider Type    UD Mary Kay Alcocer, PTA Physical Therapy Assistant    MF Aly Mckeon, PT Physical Therapist     Chantell Nazario, PT Physical Therapist    DM Noa Pope, PT Physical Therapist    LR Yuridia Rees, PT Physical Therapist    MB Nicole Camejo, PT Physical Therapist    MC Maddison Gamble, PT Physical Therapist    MJ Eladio Geller, PT Physical Therapist          Physical Therapy Education     Title: PT OT SLP Therapies (Done)     Topic: Physical Therapy (Done)     Point: Mobility training (Done)    Learning Progress Summary    Learner Readiness Method Response Comment Documented by Status   Patient Acceptance E,D DU,NR   08/07/17 1143 Done    Acceptance E VU  CE 08/06/17 0318 Done    Acceptance E VU   08/05/17 0128 Done    Acceptance E,D DU,NR   08/04/17 1034 Done    Eager E,D NR   08/02/17 1650 Active    Acceptance E,D VU,NR   08/01/17 1028 Done    Acceptance E,D VU,NR Educated on correct gait mechanics.  07/31/17 1625 Done    Acceptance E,D VU,NR HEP, fall precautions MB 07/27/17 1057 Done    Acceptance E,TB,D VU,DU,NR   07/26/17 0151 Done    Eager E VU,DU reviewed benefits fo activity SC 07/25/17 1537 Done    Acceptance E,TB,D VU,DU,NR   07/25/17 0520 Done    Acceptance E,TB,D VU,DU,NR   07/24/17 0421 Done    Acceptance E,D DU,NR   07/23/17 1106 Done    Eager E VU  SC 07/21/17 1620 Done    Acceptance E,D DU,NR   07/20/17 1145 Done    Acceptance E NR   07/18/17 1200 Active    Eager E,D NR   07/17/17 1722 Active               Point: Home exercise program (Done)    Learning Progress Summary    Learner Readiness Method Response  Comment Documented by Status   Patient Acceptance E,D DU,NR   08/07/17 1143 Done    Acceptance E VU  CE 08/06/17 0318 Done    Acceptance E VU  CE 08/05/17 0128 Done    Acceptance E,D DU,NR   08/04/17 1034 Done    Eager E,D NR   08/02/17 1650 Active    Acceptance E,D VU,NR   08/01/17 1028 Done    Acceptance E,D VU,NR Educated on correct gait mechanics.  07/31/17 1625 Done    Acceptance E,D VU,NR HEP, fall precautions MB 07/27/17 1057 Done    Acceptance E,TB,D VU,DU,NR   07/26/17 0151 Done    Eager E VU,DU reviewed benefits fo activity SC 07/25/17 1537 Done    Acceptance E,TB,D VU,DU,NR   07/25/17 0520 Done    Acceptance E,TB,D VU,DU,NR   07/24/17 0421 Done    Acceptance E,D DU,NR   07/23/17 1106 Done    Eager E VU  SC 07/21/17 1620 Done    Acceptance E,D DU,NR   07/20/17 1145 Done    Acceptance E NR   07/18/17 1200 Active    Eager E,D NR   07/17/17 1722 Active               Point: Body mechanics (Done)    Learning Progress Summary    Learner Readiness Method Response Comment Documented by Status   Patient Acceptance E,D DU,NR   08/07/17 1143 Done    Acceptance E VU  CE 08/06/17 0318 Done    Acceptance E VU  CE 08/05/17 0128 Done    Acceptance E,D DU,NR   08/04/17 1034 Done    Eager E,D NR   08/02/17 1650 Active    Acceptance E,D VU,NR   08/01/17 1028 Done    Acceptance E,D VU,NR Educated on correct gait mechanics.  07/31/17 1625 Done    Acceptance E,D VU,NR HEP, fall precautions MB 07/27/17 1057 Done    Acceptance E,TB,D VU,DU,NR   07/26/17 0151 Done    Eager E VU,DU reviewed benefits fo activity SC 07/25/17 1537 Done    Acceptance E,TB,D VU,DU,NR   07/25/17 0520 Done    Acceptance E,TB,D VU,DU,NR   07/24/17 0421 Done    Acceptance E,D KAROLINENR   07/23/17 1106 Done    Eager E SCARLETT  SC 07/21/17 1620 Done    Acceptance E,D JAY RAMEY   07/20/17 1145 Done    Acceptance E NR   07/18/17 1200 Active    Eager ED NR   07/17/17 1722 Active               Point: Precautions (Done)     Learning Progress Summary    Learner Readiness Method Response Comment Documented by Status   Patient Acceptance E,D DU,NR   08/07/17 1143 Done    Acceptance E VU   08/06/17 0318 Done    Acceptance E VU   08/05/17 0128 Done    Acceptance E,D DU,NR   08/04/17 1034 Done    Eager E,D NR   08/02/17 1650 Active    Acceptance E,D VU,NR   08/01/17 1028 Done    Acceptance E,D VU,NR Educated on correct gait mechanics.  07/31/17 1625 Done    Acceptance E,D VU,NR HEP, fall precautions MB 07/27/17 1057 Done    Acceptance E,TB,D VU,DU,NR   07/26/17 0151 Done    Eager E VU,DU reviewed benefits fo activity SC 07/25/17 1537 Done    Acceptance E,TB,D VU,DU,NR   07/25/17 0520 Done    Acceptance E,TB,D VU,DU,NR   07/24/17 0421 Done    Acceptance E,D DU,NR   07/23/17 1106 Done    Eager E VU  SC 07/21/17 1620 Done    Acceptance E,D DU,NR   07/20/17 1145 Done    Acceptance E NR   07/18/17 1200 Active    Eager E,D NR   07/17/17 1722 Active                      User Key     Initials Effective Dates Name Provider Type Discipline    SC 06/19/15 -  Oscar Alexis, PT Physical Therapist PT     06/22/15 -  Mary Kay Alcocer, PTA Physical Therapy Assistant PT     06/19/15 -  Keyona Hawk, PT Physical Therapist PT     06/19/15 -  Noa Pope, PT Physical Therapist PT     06/19/15 -  Yuridia Rees, PT Physical Therapist PT    MB 03/14/16 -  Nicole Camejo, PT Physical Therapist PT     03/14/16 -  Eladio Geller, PT Physical Therapist PT     08/22/16 -  Mercedes Silverio, RN Registered Nurse Nurse     10/17/16 -  Sabrina Gomez, RN Registered Nurse Nurse                    PT Recommendation and Plan  Anticipated Discharge Disposition: home with home health  PT Frequency: daily  Plan of Care Review  Plan Of Care Reviewed With: patient  Progress: progress towards functional goals is fair  Outcome Summary/Follow up Plan: pt c/o severe headache today and then dizziness.only able to take a few steps to  chair today.unable to take regular walk          Outcome Measures       08/07/17 1058          How much help from another person do you currently need...    Turning from your back to your side while in flat bed without using bedrails? 3  -UD      Moving from lying on back to sitting on the side of a flat bed without bedrails? 3  -UD      Moving to and from a bed to a chair (including a wheelchair)? 3  -UD      Standing up from a chair using your arms (e.g., wheelchair, bedside chair)? 3  -UD      Climbing 3-5 steps with a railing? 1  -UD      To walk in hospital room? 2  -UD      AM-PAC 6 Clicks Score 15  -UD        User Key  (r) = Recorded By, (t) = Taken By, (c) = Cosigned By    Initials Name Provider Type    LOTUS Alcocer PTA Physical Therapy Assistant           Time Calculation:         PT Charges       08/07/17 1145          Time Calculation    PT Received On 08/07/17  -      PT Goal Re-Cert Due Date 08/06/17  -      Time Calculation- PT    Total Timed Code Minutes- PT 20 minute(s)  -UD        User Key  (r) = Recorded By, (t) = Taken By, (c) = Cosigned By    Initials Name Provider Type    LOTUS Alcocer PTA Physical Therapy Assistant          Therapy Charges for Today     Code Description Service Date Service Provider Modifiers Qty    07053458359 HC PT THER PROC EA 15 MIN 8/7/2017 Mary Kay Alcocer PTA GP 1    48328765491 HC PT THER SUPP EA 15 MIN 8/7/2017 Mary Kay Alcocer PTA GP 1          PT G-Codes  PT Professional Judgement Used?: Yes  Outcome Measure Options: AM-PAC 6 Clicks Basic Mobility (PT)  Score: 17  Functional Limitation: Mobility: Walking and moving around  Mobility: Walking and Moving Around Current Status (): At least 40 percent but less than 60 percent impaired, limited or restricted  Mobility: Walking and Moving Around Goal Status (): At least 20 percent but less than 40 percent impaired, limited or restricted    Mary Kay Alcocer PTA  8/7/2017            Electronically signed by Mary Kay  MOON Alcocer at 8/7/2017 11:46 AM        Occupational Therapy Notes (last 24 hours) (Notes from 8/6/2017  2:41 PM through 8/7/2017  2:41 PM)     No notes of this type exist for this encounter.

## 2017-08-07 NOTE — PLAN OF CARE
Problem: Patient Care Overview (Adult)  Goal: Plan of Care Review  Outcome: Outcome(s) achieved Date Met:  08/07/17 08/07/17 1500   Coping/Psychosocial Response Interventions   Plan Of Care Reviewed With patient   Outcome Evaluation   Outcome Summary/Follow up Plan unna boots replaced with no issues noted. PT unable to transition pt to compression stockings as pt freq had increased C/o pain with stockings and needed removal of compression for several days. PT will cont with unna boots to help increase venous return to imrpove skin integrity.

## 2017-08-07 NOTE — DISCHARGE INSTR - APPOINTMENTS
Follow up with  on August 16 @ 9:30AM  [facility]   [address] [place] [address]     Follow up with Dr. Princess Meeks on August 16 @ 11AM  [address], [address]  [place], [place] [address]    Patient to schedule appointment with  as needed

## 2017-08-07 NOTE — PLAN OF CARE
Problem: Patient Care Overview (Adult)  Goal: Plan of Care Review  Outcome: Ongoing (interventions implemented as appropriate)    08/06/17 0108 08/07/17 0454   Patient Care Overview   Progress progress toward functional goals as expected --    Coping/Psychosocial Response Interventions   Plan Of Care Reviewed With --  patient   Outcome Evaluation   Outcome Summary/Follow up Plan --  VSS, pt c/o abdominal discomfort related to constipation. Offered PRN medications, pt refused. Pt reported feeling tired, rested well during shift. No further issues or concerns.        Goal: Adult Individualization and Mutuality  Outcome: Ongoing (interventions implemented as appropriate)  Goal: Discharge Needs Assessment  Outcome: Ongoing (interventions implemented as appropriate)    Problem: Fall Risk (Adult)  Goal: Absence of Falls  Outcome: Ongoing (interventions implemented as appropriate)    Problem: Skin Integrity Impairment, Risk/Actual (Adult)  Goal: Skin Integrity/Wound Healing  Outcome: Ongoing (interventions implemented as appropriate)

## 2017-08-07 NOTE — THERAPY WOUND CARE TREATMENT
Acute Care - Physical Therapy Lymphedema Treatment Note  [facility]     Patient Name: Bjorn Pollock  : 1934  MRN: [id number]  Today's Date: 2017  Onset of Illness/Injury or Date of Surgery Date: 17   Date of Referral to PT: 17   Referring Physician: MD KAYODE (MOBILITY order)        Admit Date: 2017    Visit Dx:    ICD-10-CM ICD-9-CM   1. Reactive airway disease with wheezing, severe persistent, with acute exacerbation J45.51 493.92   2. Fatigue, unspecified type R53.83 780.79   3. Bronchitis J40 490   4. Impaired functional mobility, balance, gait, and endurance Z74.09 V49.89   5. Type 2 diabetes mellitus without complication, with long-term current use of insulin E11.9 250.00    Z79.4 V58.67   6. Olecranon bursitis of right elbow M70.21 726.33   7. Supratherapeutic INR R79.1 790.92   8. Bilateral edema of lower extremity R60.0 782.3       Patient Active Problem List   Diagnosis   • Intractable low back pain   • HTN (hypertension)   • DM type 2 (diabetes mellitus, type 2)   • Intertriginous candidiasis   • COPD (chronic obstructive pulmonary disease)   • NAYLA (obstructive sleep apnea)   • Atrial fibrillation   • Supratherapeutic INR   • CKD (chronic kidney disease)   • Diastolic heart failure secondary to hypertension   • Anemia   • DJD (degenerative joint disease)   • CAD (coronary artery disease) s/p CABG history    • Crawley's esophagus   • Chronic thrombocytopenic purpura   • Cerebrovascular accident   • NAYLA on CPAP   • Obesity (BMI 30-39.9)   • Dyslipidemia   • BPH (benign prostatic hyperplasia)   • Lower extremity edema   • Falls   • Sepsis due to urinary tract infection   • Reactive airway disease with wheezing   • Constipation   • Pneumonia due to infectious organism   • Urinary retention   • Olecranon bursitis of right elbow              Lymphedema       17 1500 17 0950       Lymphedema Edema Assessment    Ptting Edema Category By severity  -MF      Pitting  "Edema Mild  -      Recorded by [] Aly Mckeon, PT      Lymphedema Pulses/Capillary Refill    Lymphedema Pulses/Capillary Refill lower extremity pulses;capillary refill  -      Dorsalis Pedis Pulse right:;left:;+1 diminished  -      Posterior Tibialis Pulse right:;left:;0 absent  -      Capillary Refill lower extremity capillary refill  -      Lower Extremity Capillary Refill right:;left:;less than 3 seconds  - right:;left:;less than 3 seconds  -     Recorded by [] Aly Mckeon, PT [MC] Maddison Gamble, PT     Compression/Skin Care    Compression/Skin Care skin care;wrapping location;bandaging  -      Skin Care washed/dried  -      Wrapping Location lower extremity  -      Wrapping Location LE bilateral:;foot to knee  -      Wrapping Comments unna boots with 4\" coban and spandage to secure  -      Bandaging Comments  BLE wraps intact with no apparent issues or complaints from the pt.  -MC     Recorded by [] Aly Mckeon, PT [MC] Maddison Gamble, PT       User Key  (r) = Recorded By, (t) = Taken By, (c) = Cosigned By    Initials Name Effective Dates     Aly Mckeon, PT 06/19/15 -      Maddison Gamble, PT 03/14/16 -                 Adult Rehabilitation Note       08/07/17 1500 08/07/17 1058 08/05/17 0950    Rehab Assessment/Intervention    Discipline physical therapist  -MF physical therapy assistant  -UD physical therapist  -MC    Document Type therapy note (daily note)  - therapy note (daily note)  - therapy note (daily note)  -    Subjective Information agree to therapy;complains of;weakness;fatigue;pain  - agree to therapy;complains of;weakness;pain  - agree to therapy;complains of;weakness  -    Patient Effort, Rehab Treatment  fair  -     Symptoms Noted During/After Treatment  increased pain;dizziness;fatigue  -UD     Precautions/Limitations  fall precautions  -UD     Precautions/Limitations, Hearing  hearing impairment, bilaterally  -UD  "    Recorded by [] Aly Mckeon, PT [UD] Mary Kay Alcocre PTA [] Maddison Gamble, PT    Vital Signs    O2 Delivery Post Treatment  room air  -UD     Pre Patient Position  Supine  -UD     Intra Patient Position  Standing  -UD     Post Patient Position  Sitting  -UD     Recorded by  [UD] Mary Kay Alcocer PTA     Pain Assessment    Pain Assessment [place]-Baker FACES  - Arrington-Baker FACES  -UD No/denies pain  -    Arrington-Moon FACES Pain Rating 4  - 6  -UD     Pain Score  6  -UD     Post Pain Score  8  -UD     Pain Type  Acute pain  -UD     Pain Location  Head  -UD     Pain Intervention(s)  Medication (See MAR);Repositioned  -UD     Recorded by [] Aly Mckeon, PT [UD] Mary Kay Aloccer PTA [] Maddison Gamble, PT    Cognitive Assessment/Intervention    Orientation Status  oriented x 4  -UD     Follows Commands/Answers Questions  100% of the time  -UD     Personal Safety Interventions  fall prevention program maintained  -UD     Recorded by  [UD] Mary Kay Alcocer PTA     Bed Mobility, Assessment/Treatment    Bed Mob, Supine to Sit, [place]  minimum assist (75% patient effort)  -UD     Recorded by  [UD] Mary Kay Alcocer PTA     Transfer Assessment/Treatment    Transfers, Sit-Stand Spicer  contact guard assist  -UD     Transfers, Stand-Sit Sp[place]  contact guard assist  -UD     Transfers, Sit-Stand-Sit, Assist Device  rolling walker  -UD     Recorded by  [UD] Mary Kay Alcocer PTA     Gait Assessment/Treatment    Gait, Spicer Level  other (see comments)   only took a few steps to chair too much pain today  -UD     Recorded by  [UD] Mary Kay Alcocer PTA     Therapy Exercises    Bilateral Lower Extremities  AROM:;10 reps;sitting  -UD     Recorded by  [UD] Mary Kay Alcocer PTA     Positioning and Restraints    Pre-Treatment Position sitting in chair/recliner  - in bed  -UD in bed  -    Post Treatment Position chair  - chair  - bed  -    In Bed   supine;call light within reach;encouraged to call for  assist  -MC    In Chair reclined;call light within reach  - notified nsg;reclined;sitting;call light within reach;exit alarm on;legs elevated  -UD     Recorded by [] Aly Mckeon, PT [UD] Mary Kay Alcocer, PTA [MC] Maddison Gamble, PT      User Key  (r) = Recorded By, (t) = Taken By, (c) = Cosigned By    Initials Name Effective Dates    UD Mary Kay Alcocer, PTA 06/22/15 -      Aly Mckeon, PT 06/19/15 -      Maddison Gamble, PT 03/14/16 -                 IP PT Goals       08/07/17 1143 08/05/17 0950 08/04/17 1034    Bed Mobility PT LTG    Bed Mobility PT LTG, Outcome goal ongoing  -UD  goal ongoing  -UD    Transfer Training PT LTG    Transfer Training PT LTG, Outcome goal ongoing  -UD  goal ongoing  -UD    Gait Training PT LTG    Gait Training Goal PT LTG, Outcome goal ongoing  -UD  goal ongoing  -UD    Wound Care PT LTG    Wound Care PT LTG 1, Outcome  goal ongoing  -MC       08/02/17 1650 08/01/17 1028 07/31/17 1540    Bed Mobility PT LTG    Bed Mobility PT LTG, Date Established   07/17/17  -LR    Bed Mobility PT LTG, Time to Achieve   1 wk  -LR    Bed Mobility PT LTG, Activity Type   all bed mobility  -LR    Bed Mobility PT LTG, [place] Level   independent  -LR    Bed Mobility PT LTG, Date Goal Reviewed  08/01/17  -MJ 07/31/17  -LR    Bed Mobility PT LTG, Outcome goal ongoing  -DM goal ongoing  -MJ goal ongoing  -LR    Transfer Training PT LTG    Transfer Training PT LTG, Date Established   07/17/17  -LR    Transfer Training PT LTG, Time to Achieve   1 wk  -LR    Transfer Training PT LTG, Activity Type   bed to chair /chair to bed;sit to stand/stand to sit  -LR    Transfer Training PT LTG, [place] Level   independent  -LR    Transfer Training PT LTG, Assist Device   walker, standard  -LR    Transfer Training PT  LTG, Date Goal Reviewed  08/01/17  -MJ 07/31/17  -LR    Transfer Training PT LTG, Outcome goal ongoing  -DM goal ongoing  -MJ goal ongoing  -LR    Gait Training PT LTG    Gait  Training Goal PT LTG, Date Established   07/17/16  -LR    Gait Training Goal PT LTG, Time to Achieve   1 wk  -LR    Gait Training Goal PT LTG, [place] Level   independent  -LR    Gait Training Goal PT LTG, Assist Device   walker, standard  -LR    Gait Training Goal PT LTG, Distance to Achieve   200 feet  -LR    Gait Training Goal PT LTG, Date Goal Reviewed  08/01/17  -MJ 07/31/17  -LR    Gait Training Goal PT LTG, Outcome goal ongoing  -DM goal ongoing  -MJ goal ongoing  -LR      07/28/17 1513 07/28/17 1100 07/27/17 1100    Bed Mobility PT LTG    Bed Mobility PT LTG, Outcome goal ongoing  -EH      Transfer Training PT LTG    Transfer Training PT LTG, Outcome goal ongoing  -EH      Gait Training PT LTG    Gait Training Goal PT LTG, Outcome goal ongoing  -EH      Wound Care PT LTG    Wound Care PT LTG 1, Outcome  goal ongoing  -MC goal partially met  -MF      07/27/17 1057          Bed Mobility PT LTG    Bed Mobility PT LTG, Outcome goal ongoing  -MB      Transfer Training PT LTG    Transfer Training PT LTG, Outcome goal ongoing  -MB      Gait Training PT LTG    Gait Training Goal PT LTG, Outcome goal ongoing  -MB        User Key  (r) = Recorded By, (t) = Taken By, (c) = Cosigned By    Initials Name Provider Type    LOTUS Alcocer, PTA Physical Therapy Assistant    LITO Mckeon, PT Physical Therapist    EH Chantell Nazario, PT Physical Therapist    SHADY Pope, PT Physical Therapist    LR Yuridia Rees, PT Physical Therapist    MB Nicole Camejo, PT Physical Therapist    MC Maddison Gamble, PT Physical Therapist    ECHO Geller, PT Physical Therapist          Physical Therapy Education     Title: PT OT SLP Therapies (Resolved)     Topic: Physical Therapy (Resolved)     Point: Mobility training (Resolved)    Learning Progress Summary    Learner Readiness Method Response Comment Documented by Status   Patient Acceptance E,D KAROLINE,NR  LOTUS 08/07/17 1143 Done    Acceptance E SCARLETT WU  08/06/17 0318 Done    Acceptance E VU  CE 08/05/17 0128 Done    Acceptance E,D DU,NR   08/04/17 1034 Done    Eager E,D NR   08/02/17 1650 Active    Acceptance E,D VU,NR   08/01/17 1028 Done    Acceptance E,D VU,NR Educated on correct gait mechanics.  07/31/17 1625 Done    Acceptance E,D VU,NR HEP, fall precautions MB 07/27/17 1057 Done    Acceptance E,TB,D VU,DU,NR   07/26/17 0151 Done    Eager E VU,DU reviewed benefits fo activity SC 07/25/17 1537 Done    Acceptance E,TB,D VU,DU,NR   07/25/17 0520 Done    Acceptance E,TB,D VU,DU,NR   07/24/17 0421 Done    Acceptance E,D DU,NR   07/23/17 1106 Done    Eager E VU  SC 07/21/17 1620 Done    Acceptance E,D DU,NR   07/20/17 1145 Done    Acceptance E NR   07/18/17 1200 Active    Eager E,D NR   07/17/17 1722 Active               Point: Home exercise program (Resolved)    Learning Progress Summary    Learner Readiness Method Response Comment Documented by Status   Patient Acceptance E,D DU,NR   08/07/17 1143 Done    Acceptance E VU   08/06/17 0318 Done    Acceptance E VU   08/05/17 0128 Done    Acceptance E,D DU,NR   08/04/17 1034 Done    Eager E,D NR   08/02/17 1650 Active    Acceptance E,D VU,NR   08/01/17 1028 Done    Acceptance E,D VU,NR Educated on correct gait mechanics.  07/31/17 1625 Done    Acceptance E,D VU,NR HEP, fall precautions MB 07/27/17 1057 Done    Acceptance E,TB,D VU,DU,NR   07/26/17 0151 Done    Eager E VU,DU reviewed benefits fo activity SC 07/25/17 1537 Done    Acceptance E,TB,D VU,DU,NR   07/25/17 0520 Done    Acceptance E,TB,D VU,DU,NR   07/24/17 0421 Done    Acceptance E,D DU,NR   07/23/17 1106 Done    Eager E VU  SC 07/21/17 1620 Done    Acceptance E,D KAROLINE,NR  UD 07/20/17 1145 Done    Acceptance E NR  SH 07/18/17 1200 Active    Eager E,D NR  DM 07/17/17 1722 Active               Point: Body mechanics (Resolved)    Learning Progress Summary    Learner Readiness Method Response Comment Documented by Status    Patient Acceptance E,D DU,NR   08/07/17 1143 Done    Acceptance E VU  CE 08/06/17 0318 Done    Acceptance E VU  CE 08/05/17 0128 Done    Acceptance E,D DU,NR   08/04/17 1034 Done    Eager E,D NR   08/02/17 1650 Active    Acceptance E,D VU,NR   08/01/17 1028 Done    Acceptance E,D VU,NR Educated on correct gait mechanics.  07/31/17 1625 Done    Acceptance E,D VU,NR HEP, fall precautions MB 07/27/17 1057 Done    Acceptance E,TB,D VU,DU,NR   07/26/17 0151 Done    Eager E VU,DU reviewed benefits fo activity SC 07/25/17 1537 Done    Acceptance E,TB,D VU,DU,NR   07/25/17 0520 Done    Acceptance E,TB,D VU,DU,NR   07/24/17 0421 Done    Acceptance E,D DU,[facility] 07/23/17 1106 Done    Eager E VU  SC 07/21/17 1620 Done    Acceptance E,D DU,NR   07/20/17 1145 Done    Acceptance E NR   07/18/17 1200 Active    Eager E,D NR   07/17/17 1722 Active               Point: Precautions (Resolved)    Learning Progress Summary    Learner Readiness Method Response Comment Documented by Status   Patient Acceptance E,D DU,NR   08/07/17 1143 Done    Acceptance E VU  CE 08/06/17 0318 Done    Acceptance E VU  CE 08/05/17 0128 Done    Acceptance E,D DU,NR   08/04/17 1034 Done    Eager E,D NR   08/02/17 1650 Active    Acceptance E,D VU,NR   08/01/17 1028 Done    Acceptance E,D VU,NR Educated on correct gait mechanics.  07/31/17 1625 Done    Acceptance E,D VU,NR HEP, fall precautions MB 07/27/17 1057 Done    Acceptance E,TB,D VU,DU,NR   07/26/17 0151 Done    Eager E VU,DU reviewed benefits fo activity SC 07/25/17 1537 Done    Acceptance E,TB,D VU,DU,NR   07/25/17 0520 Done    Acceptance E,TB,D VU,DU,NR   07/24/17 0421 Done    Acceptance E,D JAY RAMEY  UD 07/23/17 1106 Done    Eager E SCARLETT  SC 07/21/17 1620 Done    Acceptance E,D JAY RAMEY  UD 07/20/17 1145 Done    Acceptance E NR   07/18/17 1200 Active    Eageloina SANTOSD NR  DM 07/17/17 1722 Active                      User Key     Initials Effective Dates Name Provider  Type Discipline    SC 06/19/15 -  Oscar Alexis, PT Physical Therapist PT    UD 06/22/15 -  Mary Kay Alcocer PTA Physical Therapy Assistant PT    SH 06/19/15 -  Keyona Hawk, PT Physical Therapist PT    DM 06/19/15 -  Noa Pope, PT Physical Therapist PT    LR 06/19/15 -  Yuridia Rees, PT Physical Therapist PT    MB 03/14/16 -  Nicole Camejo, PT Physical Therapist PT    MJ 03/14/16 -  Eladio Gelelr, PT Physical Therapist PT    MC 08/22/16 -  Mercedes Silverio, RN Registered Nurse Nurse    CE 10/17/16 -  Sbarina Gomez RN Registered Nurse Nurse                  PT Recommendation and Plan  Anticipated Discharge Disposition: home with home health  PT Frequency: daily  Plan of Care Review  Plan Of Care Reviewed With: patient  Outcome Summary/Follow up Plan: unna boots replaced with no issues noted. PT unable to transition pt to compression stockings as pt freq had increased C/o pain with stockings and needed removal of compression for several days. PT will cont with unna boots to help increase venous return to imrpove skin integrity.             Outcome Measures       08/07/17 1058          How much help from another person do you currently need...    Turning from your back to your side while in flat bed without using bedrails? 3  -UD      Moving from lying on back to sitting on the side of a flat bed without bedrails? 3  -UD      Moving to and from a bed to a chair (including a wheelchair)? 3  -UD      Standing up from a chair using your arms (e.g., wheelchair, bedside chair)? 3  -UD      Climbing 3-5 steps with a railing? 1  -UD      To walk in hospital room? 2  -UD      AM-PAC 6 Clicks Score 15  -UD        User Key  (r) = Recorded By, (t) = Taken By, (c) = Cosigned By    Initials Name Provider Type    UD Mary Kay Alcocer PTA Physical Therapy Assistant            Time Calculation        PT Charges       08/07/17 1500 08/07/17 1145       Time Calculation    Start Time 1500  -MF      PT Received On  08/07/17   -UD     PT Goal Re-Cert Due Date  08/06/17  -UD     Time Calculation- PT    Total Timed Code Minutes- PT 25 minute(s)  -MF 20 minute(s)  -UD       User Key  (r) = Recorded By, (t) = Taken By, (c) = Cosigned By    Initials Name Provider Type    UD Mary Kay Alcocer, PTA Physical Therapy Assistant     Aly Mckeon, PT Physical Therapist            Therapy Charges for Today     Code Description Service Date Service Provider Modifiers Qty    71044303958 HC PT ROSSANA UNNA BOOT 8/7/2017 Aly Mckeon, PT GP 1            PT G-Codes  PT Professional Judgement Used?: Yes  Outcome Measure Options: AM-PAC 6 Clicks Basic Mobility (PT)  Score: 17  Functional Limitation: Mobility: Walking and moving around  Mobility: Walking and Moving Around Current Status (): At least 40 percent but less than 60 percent impaired, limited or restricted  Mobility: Walking and Moving Around Goal Status (): At least 20 percent but less than 40 percent impaired, limited or restricted      Aly Mckeon, PT  8/7/2017

## 2017-08-07 NOTE — PROGRESS NOTES
Continued Stay Note  Psychiatric     Patient Name: Bjorn Pollock  MRN: [id number]  Today's Date: 8/7/2017    Admit Date: 7/16/2017          Discharge Plan       08/07/17 1444    Case Management/Social Work Plan    Plan discharge plan    Patient/Family In Agreement With Plan yes    Additional Comments Plan is home with family and [facility]. Spoke with Amy at [facility]([phone]). Per Amy at [facility], pt will need new HH order as pt current HH session has been closed out.  New order for PT/OT, and skilled nursing (with instructions for BLE and wraps along with H/P and face sheet faxed to Amy at [facility]([phone]). [facility] is aware pt medically ready for discharge today and will need labs Wed.  [facility] will be in contact with pt within 24 hours to arrange HH visits. Pt is aware and agreeable. Spouse will provide transportation upon discharge.               Discharge Codes     None        Expected Discharge Date and Time     Expected Discharge Date Expected Discharge Time    Aug 7, 2017             Chela Ledezma RN

## 2017-08-08 NOTE — PAYOR COMM NOTE
"Holger Johnson (83 y.o. Male)     Date of Birth Social Security Number Address Home Phone MRN    1934  [address] [facility] [address] [phone] [id number]    Jainism Marital Status          Jewish        Admission Date Admission Type Admitting Provider Attending Provider Department, Room/Bed    7/16/17 Emergency Raymond Pollard MD  [facility] 5H, S573/1    Discharge Date Discharge Disposition Discharge Destination        8/7/2017 Home or Self Care             Attending Provider: (none)    Allergies:  Phenergan [Promethazine Hcl], Promethazine    Isolation:  None   Infection:  None   Code Status:  Prior    Ht:  72\" (182.9 cm)   Wt:  236 lb 8 oz (107 kg)    Admission Cmt:  None   Principal Problem:  Pneumonia due to infectious organism [J18.9]                 Active Insurance as of 7/16/2017     Primary Coverage     Payor Plan Insurance Group Employer/Plan Group    ANTHEM BLUE CROSS Pending sale to [facility] [employer] BLUE [facility] [id number]     Payor Plan Address Payor Plan Phone Number Effective From Effective To    [address] [phone] 1/1/2015     [place] [address]       Subscriber Name Subscriber Birth Date Member ID       HOLGER JOHNSON 1934 [id number]                 Emergency Contacts      (Rel.) Home Phone Work Phone Mobile Phone    Ade Johnson (Spouse) [phone] -- --    Ayah Godinez (Daughter) -- -- [phone]            Discharge Order     Start     Ordered    08/07/17 1342  Discharge patient  Once     Comments:  With continued home health, skilled nursing, PT, wound care to bilateral lower extremities patient also to have next PT INR drawn on Wed 8/9/17 along with a BMP per home health with results to PCP to manage   Expected Discharge Date:  08/07/17    Discharge Disposition:  Home or Self Care        08/07/17 1347          "

## 2017-08-11 NOTE — THERAPY DISCHARGE NOTE
Acute Care - Physical Therapy Discharge Summary  [facility]       Patient Name: Bjorn Pollock  : 1934  MRN: [id number]    Today's Date: 2017  Onset of Illness/Injury or Date of Surgery Date: 17    Date of Referral to PT: 17  Referring Physician: MD KAYODE (MOBILITY order)      Admit Date: 2017      PT Recommendation and Plan    Visit Dx:    ICD-10-CM ICD-9-CM   1. Reactive airway disease with wheezing, severe persistent, with acute exacerbation J45.51 493.92   2. Fatigue, unspecified type R53.83 780.79   3. Bronchitis J40 490   4. Impaired functional mobility, balance, gait, and endurance Z74.09 V49.89   5. Type 2 diabetes mellitus without complication, with long-term current use of insulin E11.9 250.00    Z79.4 V58.67   6. Olecranon bursitis of right elbow M70.21 726.33   7. Supratherapeutic INR R79.1 790.92   8. Bilateral edema of lower extremity R60.0 782.3                       IP PT Goals       17 1143 17 0950 17 1034    Bed Mobility PT LTG    Bed Mobility PT LTG, Outcome goal ongoing  -UD  goal ongoing  -UD    Transfer Training PT LTG    Transfer Training PT LTG, Outcome goal ongoing  -UD  goal ongoing  -UD    Gait Training PT LTG    Gait Training Goal PT LTG, Outcome goal ongoing  -UD  goal ongoing  -UD    Wound Care PT LTG    Wound Care PT LTG 1, Outcome  goal ongoing  -MC       17 1650 17 1028 17 1540    Bed Mobility PT LTG    Bed Mobility PT LTG, Date Established   17  -LR    Bed Mobility PT LTG, Time to Achieve   1 wk  -LR    Bed Mobility PT LTG, Activity Type   all bed mobility  -LR    Bed Mobility PT LTG, [place] Level   independent  -LR    Bed Mobility PT LTG, Date Goal Reviewed  17  -MJ 17  -LR    Bed Mobility PT LTG, Outcome goal ongoing  -DM goal ongoing  -MJ goal ongoing  -LR    Transfer Training PT LTG    Transfer Training PT LTG, Date Established   17  -LR    Transfer Training PT LTG, Time to  Achieve   1 wk  -LR    Transfer Training PT LTG, Activity Type   bed to chair /chair to bed;sit to stand/stand to sit  -LR    Transfer Training PT LTG, [place] Level   independent  -LR    Transfer Training PT LTG, Assist Device   walker, standard  -LR    Transfer Training PT  LTG, Date Goal Reviewed  08/01/17  -MJ 07/31/17  -LR    Transfer Training PT LTG, Outcome goal ongoing  -DM goal ongoing  -MJ goal ongoing  -LR    Gait Training PT LTG    Gait Training Goal PT LTG, Date Established   07/17/16  -LR    Gait Training Goal PT LTG, Time to Achieve   1 wk  -LR    Gait Training Goal PT LTG, [place] Level   independent  -LR    Gait Training Goal PT LTG, Assist Device   walker, standard  -LR    Gait Training Goal PT LTG, Distance to Achieve   200 feet  -LR    Gait Training Goal PT LTG, Date Goal Reviewed  08/01/17  -MJ 07/31/17  -LR    Gait Training Goal PT LTG, Outcome goal ongoing  -DM goal ongoing  -MJ goal ongoing  -LR      User Key  (r) = Recorded By, (t) = Taken By, (c) = Cosigned By    Initials Name Provider Type    LOTUS Alcocer, PTA Physical Therapy Assistant    SHADY Pope, PT Physical Therapist    LR Yuridia Rees, PT Physical Therapist    CHRISTIANNE Gamble, PT Physical Therapist    ECHO Geller, PT Physical Therapist            Goals Status: Treatment plan discontinued secondary to discharge from acute facility.    PT Discharge Summary  Reason for Discharge: Discharge from facility  Outcomes Achieved: Refer to plan of care for updates on goals achieved  Discharge Destination: Home with home health      Shanice Dawn, PT   8/11/2017

## 2017-09-19 PROBLEM — I50.9 ACUTE EXACERBATION OF CHF (CONGESTIVE HEART FAILURE) (HCC): Status: ACTIVE | Noted: 2017-01-01

## 2017-09-19 PROBLEM — I50.43 SYSTOLIC AND DIASTOLIC CHF, ACUTE ON CHRONIC (HCC): Chronic | Status: ACTIVE | Noted: 2017-01-01

## 2017-09-19 PROBLEM — J96.21 ACUTE ON CHRONIC RESPIRATORY FAILURE WITH HYPOXEMIA (HCC): Status: ACTIVE | Noted: 2017-01-01

## 2017-09-19 PROBLEM — Z79.01 CHRONIC ANTICOAGULATION: Chronic | Status: ACTIVE | Noted: 2017-01-01

## 2017-09-22 PROBLEM — E87.70 HYPERVOLEMIA: Status: ACTIVE | Noted: 2017-01-01

## 2017-10-01 NOTE — PLAN OF CARE
Problem: Patient Care Overview (Adult)  Goal: Plan of Care Review  Outcome: Ongoing (interventions implemented as appropriate)    09/30/17 0357 10/01/17 0800 10/01/17 1515   Coping/Psychosocial Response Interventions   Plan Of Care Reviewed With --  patient --    Patient Care Overview   Progress improving --  --    Outcome Evaluation   Outcome Summary/Follow up Plan --  --  VSS, no acute changes, no complaints. Given dulcolax one time dose, awaiting results. Plan to give fleet enema soon if no results yet.        Goal: Adult Individualization and Mutuality  Outcome: Ongoing (interventions implemented as appropriate)  Goal: Discharge Needs Assessment  Outcome: Ongoing (interventions implemented as appropriate)    Problem: Fall Risk (Adult)  Goal: Identify Related Risk Factors and Signs and Symptoms  Outcome: Ongoing (interventions implemented as appropriate)  Goal: Absence of Falls  Outcome: Ongoing (interventions implemented as appropriate)    Problem: Pressure Ulcer Risk (Abelardo Scale) (Adult,Obstetrics,Pediatric)  Goal: Identify Related Risk Factors and Signs and Symptoms  Outcome: Ongoing (interventions implemented as appropriate)  Goal: Skin Integrity  Outcome: Ongoing (interventions implemented as appropriate)    Problem: Respiratory Insufficiency (Adult)  Goal: Identify Related Risk Factors and Signs and Symptoms  Outcome: Ongoing (interventions implemented as appropriate)  Goal: Acid/Base Balance  Outcome: Ongoing (interventions implemented as appropriate)  Goal: Effective Ventilation  Outcome: Ongoing (interventions implemented as appropriate)

## 2017-10-01 NOTE — PROGRESS NOTES
"      HOSPITALIST DAILY PROGRESS NOTE    Chief Complaint: dyspnea    Subjective   SUBJECTIVE/OVERNIGHT EVENTS   The patient continues to make progress.  Denies dyspnea, orthopnea or paroxysmal nocturnal dyspnea.  Denies fever, cough or hemoptysis.  Improved appetite.  Resolved obstructive urinary symptoms    Review of Systems:  General - No fevers, no chills  CVS - No chest pain, no palpitations  Resp - +Cough, no dyspnea  GI - No N/V/D, no abd pain    Objective   OBJECTIVE   I have reviewed the vital signs.  /62 (BP Location: Right arm, Patient Position: Lying)  Pulse 78  Temp 98.6 °F (37 °C) (Oral)   Resp 16  Ht 74\" (188 cm)  Wt 239 lb 14.4 oz (109 kg)  SpO2 92%  BMI 30.8 kg/m2    Physical Exam:  General - NAD, pt was sitting in chair comfortably on room air  HEENT - EOMI, PERRL, oropharynx clear, hearing intact  CVS - RRR, S1 S2, no rubs, gallops or murmurs, 2s cap refill, 1+ peripheral edema, 2+ pulses  Resp - CTAB, no crackles and wheezes   GI - +BS, soft, ND/NT  MSK - No joint pain or joint edema  Neuro - Awake and oriented, follows commands, interactive, moves all extremities equally    Results:  I have reviewed the labs, culture data, radiology results, and diagnostic studies.      Results from last 7 days  Lab Units 10/01/17  0645 09/30/17  0657 09/29/17  0559   WBC 10*3/mm3 4.27 3.86 3.64   HEMOGLOBIN g/dL 10.7* 10.3* 10.4*   HEMATOCRIT % 35.5* 34.2* 34.0*   PLATELETS 10*3/mm3 95* 98* 99*       Results from last 7 days  Lab Units 10/01/17  0645   SODIUM mmol/L 139   POTASSIUM mmol/L 4.3   CHLORIDE mmol/L 102   CO2 mmol/L 31.0   BUN mg/dL 20   CREATININE mg/dL 1.20   GLUCOSE mg/dL 71   CALCIUM mg/dL 8.8       Culture Data:  Cultures:         I have reviewed the medications.    aspirin 81 mg Oral Daily   atorvastatin 40 mg Oral Nightly   budesonide 0.5 mg Nebulization BID - RT   docusate sodium 100 mg Oral BID   furosemide 40 mg Oral Daily   guaiFENesin 600 mg Oral Q12H   insulin detemir 8 Units " Subcutaneous QAM   insulin lispro 0-7 Units Subcutaneous TID With Meals   insulin lispro 3 Units Subcutaneous TID With Meals   lisinopril 2.5 mg Oral Q24H   metoprolol succinate XL 12.5 mg Oral Q24H   nystatin  Topical Q12H   nystatin  Topical Q12H   pantoprazole 40 mg Oral BID   pharmacy consult - MTM  Does not apply Daily   piperacillin-tazobactam 4.5 g Intravenous Q8H   polyethylene glycol 17 g Oral Daily   potassium chloride 10 mEq Oral BID With Meals   saccharomyces boulardii 250 mg Oral BID   tamsulosin 0.8 mg Oral Nightly   warfarin 4 mg Oral Daily       Assessment/Plan   ASSESSMENT/PLAN      This is an 83-year-old  male with a past medical history significant for essential hypertension, hyperlipidemia, type 2 diabetes mellitus insulin-dependent, obstructive sleep apnea on CPAP, coronary artery disease with history of coronary artery bypass grafting, ischemic systolic and diastolic heart failure, paroxysmal atrial fibrillation on chronic anticoagulation, remote history of ischemic stroke possibly cardioembolic in association with her atrial fibrillation, chronic venous stasis dermatitis who was admitted on September 19, 2017 for increased dyspnea, hypoxia and found to have healthcare associated pneumonia as well as acute on chronic decompensated systolic heart failure     Acute hypoxic respiratory failure due to healthcare associated pneumonia of right and left lung with superimposed acute on chronic decompensated systolic heart failure and volume overload are now resolved.  The patient is afebrile.  An is euvolemic.  Transition to by mouth Augmentin    E. Fecalis UTI  - Sensitive to ampicillin      #Acute on chronic Chronic biventricular CHF exacerbation now euvolemic  - ECHO 9/28: EF 35-40%, LVGH, LVH, mild-to-moderate aortic regurgitation and mitral regurgitation  - ECHO 02/2017: EF 45%  - Started PO lasix 40mg daily for maintenance  - Continue metoprolol succinate 12.5mg and lisinopril 2.5mg.  Uptitrate as tolerated  - NYHA Class IV.    # Coronary artery disease with history of coronary artery bypass grafting ×3 in December 1989.  Subsequent left heart catheterization showing complete total occlusion of RCA with collaterals and a patent LAD graft in 2005.    # Paroxysmal Atrial fibrillation. [id number] score of 5.  On chronic anticoagulation with warfarin      # CKD: Stable  - Baseline creatinine 1.0-1.1    # NAYLA  - BiPAP at night    # Chronic thrombocytopenia: Stable  - No schistocytes on peripheral smear    DVT PPx: Warfarin  I expect patient to be discharged in:  2-3 days to [facility] [place]    Villa Key MD  10/01/17  11:25 AM

## 2017-10-01 NOTE — NURSING NOTE
Nursing took verbal order for mag citrate 150 mL x1 at 16:30. At approximately 16:35 patient notified nursing that a bowel movement had been passed after more than 30 minutes post mineral oil enema.     Nursing did not administer or enter the order for mag citrate due to successful bowel movement.

## 2017-10-01 NOTE — PLAN OF CARE
Problem: Patient Care Overview (Adult)  Goal: Plan of Care Review  Outcome: Ongoing (interventions implemented as appropriate)    10/01/17 0428   Coping/Psychosocial Response Interventions   Plan Of Care Reviewed With patient   Outcome Evaluation   Outcome Summary/Follow up Plan pt up in chair most of shift. VSS, A&O. pt restng comfortabaly       Goal: Adult Individualization and Mutuality  Outcome: Ongoing (interventions implemented as appropriate)  Goal: Discharge Needs Assessment  Outcome: Ongoing (interventions implemented as appropriate)    Problem: Fall Risk (Adult)  Goal: Identify Related Risk Factors and Signs and Symptoms  Outcome: Ongoing (interventions implemented as appropriate)  Goal: Absence of Falls  Outcome: Ongoing (interventions implemented as appropriate)    Problem: Pressure Ulcer Risk (Abelardo Scale) (Adult,Obstetrics,Pediatric)  Goal: Identify Related Risk Factors and Signs and Symptoms  Outcome: Ongoing (interventions implemented as appropriate)  Goal: Skin Integrity  Outcome: Ongoing (interventions implemented as appropriate)

## 2017-10-01 NOTE — PROGRESS NOTES
"Pharmacy Consult  -  Warfarin    Bjorn Pollock is an 84 yo man with a hx of CHF admitted 9/19/17 for with acute on chronic respiratory failure with hypoxia likely secondary to CHF.      PMH includes chronic afib on warfarin, Crawley's esophagus, BPH, CAD (s/p CABG), CKD stage 2-3, COPD, DDD of lumbar and cervical spine, DM, depression, GERD, HLD, HTN, obesity, NAYLA on CPAP, CVA, thrombocytopenia, urinary incontinence.  Warfarin has been held since admission.    Pharmacy has been consulted to dose and monitor warfarin during this hospitalization.    Height - 74\" (188 cm)  Weight - 235 lb (107 kg)    Consulting Provider: - MARLENE Villeda  Indication: - atrial fibrillation  Goal INR: -  2-3  Home Regimen:   - 5 mg on Sunday, Tuesday, Wednesday, Friday and Saturday   - 7.5 mg on Monday and Thursday    Bridge Therapy: no    Drug-Drug Interactions with current regimen:  Aspirin - increases bleeding risk  Zosyn, Vancomycin - may result increased risk of bleed  Pantoprazole - intermediate impact  Sertraline - intermediate impact  Patient is using prn medications containing acetaminophen    Warfarin Dosing During Admission:    Date  9/20 9/22 9/26 9/27  9/28 9/29 9/30 10/1    INR  4.08 2.70 1.49 1.33  1.47 1.98 2.1 2.45    Dose  hold hold 7.5 mg 5 mg  5 mg 4mg 4mg 4mg      Labs:  Results from last 7 days   Lab Units 10/01/17  0938 09/30/17  0818 09/29/17  0559   INR  2.45 2.10 1.98   Results from last 7 days   Lab Units 10/01/17  0645 09/30/17  0657 09/29/17  0559   WBC 10*3/mm3 4.27 3.86 3.64   HEMOGLOBIN g/dL 10.7* 10.3* 10.4*   HEMATOCRIT % 35.5* 34.2* 34.0*   PLATELETS 10*3/mm3 95* 98* 99*   ALT/AST/T. Bili   16/20/0.9     Albumin = 3.6 gm/dL  Results from last 7 days   Lab Units 10/01/17  0645 09/30/17  0657 09/29/17  0559   SODIUM mmol/L 139 141 137   POTASSIUM mmol/L 4.3 4.2 4.0   CHLORIDE mmol/L 102 104 98*   CO2 mmol/L 31.0 28.0 32.0*   BUN mg/dL 20 16 16   CREATININE mg/dL 1.20 1.20 1.20   CALCIUM mg/dL 8.8 " 8.9 9.1   GLUCOSE mg/dL 71 88 87     Current dietary intake: Diet Regular, cardiac, consistent carbohydrate, ~50%-100% of meals    Assessment/Plan:   Will continue 4mg dose of warfarin daily since now in therapeutic range.  Will monitor for s/s of bleeding and toxicity.    INR likely to fluctuate depending on heart failure status, as a reduction in liver blood flow will reduce warfarin clearance and increase the therapeutic effect.    Pharmacy will follow the patient's clinical progress and monitor for evidence of adverse effects of warfarin therapy.   Vincent Moralez RPH  10/1/2017  11:36 AM

## 2017-10-02 NOTE — THERAPY TREATMENT NOTE
Acute Care - Occupational Therapy Treatment Note  [facility]     Patient Name: Bjorn Pollock  : 1934  MRN: [id number]  Today's Date: 10/2/2017  Onset of Illness/Injury or Date of Surgery Date: 17  Date of Referral to OT: 17  Referring Physician: MARLENE Boykin      Admit Date: 2017    Visit Dx:     ICD-10-CM ICD-9-CM   1. Hypervolemia, unspecified hypervolemia type E87.70 276.69   2. Acute on chronic systolic congestive heart failure I50.23 428.23     428.0   3. Supratherapeutic INR R79.1 790.92   4. Peripheral edema R60.9 782.3   5. Hypoxia R09.02 799.02   6. Impaired mobility and ADLs Z74.09 799.89   7. Impaired functional mobility, balance, gait, and endurance Z74.09 V49.89     Patient Active Problem List   Diagnosis   • Intractable low back pain   • HTN (hypertension)   • DM type 2 (diabetes mellitus, type 2)   • Intertriginous candidiasis   • COPD (chronic obstructive pulmonary disease)   • NAYLA (obstructive sleep apnea)   • Atrial fibrillation   • Supratherapeutic INR   • CKD (chronic kidney disease) stage 2, GFR 60-89 ml/min   • Diastolic heart failure secondary to hypertension   • Anemia   • DJD (degenerative joint disease)   • CAD (coronary artery disease) s/p CABG history    • Crawley's esophagus   • Chronic thrombocytopenic purpura   • Cerebrovascular accident   • NAYLA on CPAP   • Obesity (BMI 30-39.9)   • Dyslipidemia   • BPH (benign prostatic hyperplasia)   • Lower extremity edema   • Falls   • Sepsis due to urinary tract infection   • Reactive airway disease with wheezing   • Constipation   • Pneumonia due to infectious organism   • Urinary retention   • Olecranon bursitis of right elbow   • Acute exacerbation of CHF (congestive heart failure)   • Systolic and diastolic CHF, acute on chronic   • Acute on chronic respiratory failure with hypoxemia   • Chronic anticoagulation   • Hypervolemia             Adult Rehabilitation Note       10/02/17 0901          Rehab  Assessment/Intervention    Discipline occupational therapist  -AC      Document Type therapy note (daily note)  -AC      Subjective Information agree to therapy;complains of;weakness;fatigue;dyspnea  -AC      Patient Effort, Rehab Treatment adequate  -AC      Symptoms Noted During/After Treatment fatigue;shortness of breath  -AC      Precautions/Limitations fall precautions;oxygen therapy device and L/min  -AC      Specific Treatment Considerations SOA, anxiety  -AC      Recorded by [AC] Chanel Wheeler OT      Vital Signs    Intra SpO2 (%) 96  -AC      O2 Delivery Intra Treatment supplemental O2  -AC      Post SpO2 (%) 97  -AC      O2 Delivery Post Treatment supplemental O2  -AC      Pre Patient Position Supine  -AC      Intra Patient Position Standing  -AC      Post Patient Position Sitting  -AC      Recorded by [AC] Chanel Wheeler OT      Pain Assessment    Pain Assessment 0-10  -AC      Pain Score 2  -AC      Post Pain Score 2  -AC      Pain Type Chronic pain  -AC      Pain Location Generalized  -AC      Pain Intervention(s) Repositioned  -AC      Recorded by [AC] Chanel Wheeler OT      Cognitive Assessment/Intervention    Current Cognitive/Communication Assessment functional  -AC      Orientation Status oriented x 4  -AC      Follows Commands/Answers Questions 100% of the time  -AC      Personal Safety WNL/WFL  -AC      Personal Safety Interventions fall prevention program maintained;gait belt;nonskid shoes/slippers when out of bed  -AC      Recorded by [AC] Chanel Wheeler OT      Bed Mobility, Assessment/Treatment    Bed Mob, Supine to Sit, [place] verbal cues required;contact guard assist  -AC      Bed Mobility, Comment required extended time and 1 restbreak  -AC      Recorded by [AC] Chanel Wheeler OT      Transfer Assessment/Treatment    Transfers, Sit-Stand [place] verbal cues required;minimum assist (75% patient effort);2 person assist required  -AC      Transfers, Stand-Sit [place] verbal  cues required;minimum assist (75% patient effort);2 person assist required  -AC      Transfers, Sit-Stand-Sit, Assist Device rolling walker  -AC      Transfer, Impairments sensation decreased;strength decreased  -AC      Transfer, Comment Vcs for hand pacement  -AC      Recorded by [AC] Chanel Wheeler OT      Functional Mobility    Functional Mobility- Ind. Level verbal cues required;maximum assist (25% patient effort);2 person assist required  -AC      Functional Mobility- Device rolling walker  -AC      Functional Mobility-Distance (Feet) 12  -AC      Functional Mobility- Comment follwed with chair; slightly agitated when encouraged to walk further  -AC      Recorded by [AC] Chanel Wheeler OT      Balance Skills Training    Sitting-Level of Assistance Close supervision  -AC      Sitting-Balance Support Feet supported  -AC      Standing-Level of Assistance Minimum assistance;x2  -AC      Static Standing Balance Support assistive device  -AC      Gait Balance-Level of Assistance Minimum assistance;x2  -AC      Gait Balance Support assistive device  -AC      Recorded by [AC] Chanel Wheeler OT      Therapy Exercises    Bilateral Upper Extremity AROM:;15 reps;elbow flexion/extension;shoulder extension/flexion;shoulder horizontal abd/add  -AC      Recorded by [AC] Chanel Wheeler OT      Positioning and Restraints    Pre-Treatment Position in bed  -AC      Post Treatment Position chair  -AC      In Chair reclined;call light within reach;encouraged to call for assist;exit alarm on;with PT  -AC      Recorded by [AC] Chanel Wheeler OT        User Key  (r) = Recorded By, (t) = Taken By, (c) = Cosigned By    Initials Name Effective Dates     Chanel Wheeler OT 06/23/15 -                 OT Goals       10/02/17 1128 09/29/17 0936 09/26/17 1418    Transfer Training OT LTG    Transfer Training OT LTG, Date Goal Reviewed  09/29/17   goal still appropriate.  -KALIN     Transfer Training OT LTG, Outcome goal ongoing  -AC goal ongoing    Pt. min of 2 standing from chair.  -KALIN goal ongoing  -AC    Patient Education OT LTG    Patient Education OT LTG, Date Goal Reviewed  09/29/17   goal still appropriate.  -KALIN     Patient Education OT LTG Outcome goal ongoing  -AC goal ongoing   initiated educ.  -KALIN goal ongoing  -AC    Grooming OT LTG    Grooming Goal OT LTG, Date Goal Reviewed  09/29/17   goal still appropriate.  -KALIN     Grooming Goal OT LTG, Outcome goal ongoing  -AC goal partially met   met combing hair  -KALIN goal ongoing  -AC    Toileting OT LTG    Toileting Goal OT LTG, Date Goal Reviewed  09/29/17   goal still appropriate  -KALIN     Toileting Goal OT LTG, Outcome goal ongoing  -AC goal ongoing  -KALIN goal ongoing  -AC      09/24/17 1129 09/22/17 1148 09/20/17 1149    Transfer Training OT LTG    Transfer Training OT LTG, Date Established   09/20/17  -AC    Transfer Training OT LTG, Time to Achieve   by discharge  -AC    Transfer Training OT LTG, Activity Type   bed to chair /chair to bed;toilet  -AC    Transfer Training OT LTG, [place] Level   contact guard assist  -AC    Transfer Training OT LTG, Assist Device   walker, rolling  -AC    Transfer Training OT LTG, Outcome goal ongoing  -AR goal ongoing  -AC     Patient Education OT LTG    Patient Education OT LTG, Date Established   09/20/17  -AC    Patient Education OT LTG, Education Type   written program;HEP;work simplification;adaptive breathing;energy conservation  -AC    Patient Education OT LTG, Education Understanding   verbalizes understanding;demonstrates adequately  -AC    Patient Education OT LTG Outcome goal ongoing  -AR goal ongoing  -AC     Grooming OT LTG    Grooming Goal OT LTG, Date Established   09/20/17  -AC    Grooming Goal OT LTG, Time to Achieve   by discharge  -AC    Grooming Goal OT LTG, [place] Level   set up required  -AC    Grooming Goal OT LTG, Outcome goal ongoing  -AR goal ongoing  -AC     Toileting OT LTG    Toileting Goal OT LTG, Date Established   09/20/17   -AC    Toileting Goal OT LTG, Time to Achieve   by discharge  -AC    Toileting Goal OT LTG, [place] Level   minimum assist (75% patient effort)  -AC    Toileting Goal OT LTG, Outcome goal ongoing  -AR goal ongoing  -AC       User Key  (r) = Recorded By, (t) = Taken By, (c) = Cosigned By    Initials Name Provider Type    KALIN Bebe Barr, OT Occupational Therapist    AC Chanel Wheeler, OT Occupational Therapist    AR Suzie Wade, OT Occupational Therapist          Occupational Therapy Education     Title: PT OT SLP Therapies (Active)     Topic: Occupational Therapy (Done)     Point: ADL training (Done)    Description: Instruct learner(s) on proper safety adaptation and remediation techniques during self care or transfers.   Instruct in proper use of assistive devices.    Learning Progress Summary    Learner Readiness Method Response Comment Documented by Status   Patient Acceptance E VU,NR B UE HEP, safety with use of walker AC 10/02/17 1127 Done    Acceptance E,D VU,NR adaptive breathing, pacing self, benefits activity, UE ROM exer.bed mobility, transfer tech. KALIN 09/29/17 0935 Done    Acceptance E VU,NR beenfits of atiivty, adaptive breathing AC 09/26/17 1418 Done    Acceptance E,TB,D VU,NR Reviewed ADL retraining, bed mobility, transfer training, EC/WS AR 09/24/17 1128 Done    Acceptance E VU benefits of activity, adaptive breathing AC 09/22/17 1147 Done    Acceptance E VU benefits of activity, role of OT AC 09/20/17 1147 Done               Point: Home exercise program (Done)    Description: Instruct learner(s) on appropriate technique for monitoring, assisting and/or progressing therapeutic exercises/activities.    Learning Progress Summary    Learner Readiness Method Response Comment Documented by Status   Patient Acceptance E,D VU,NR adaptive breathing, pacing self, benefits activity, UE ROM exer.bed mobility, transfer tech. KALIN 09/29/17 0935 Done               Point: Precautions (Done)     Description: Instruct learner(s) on prescribed precautions during self-care and functional transfers.    Learning Progress Summary    Learner Readiness Method Response Comment Documented by Status   Patient Acceptance E,BRADEN PANTOJA,NR adaptive breathing, pacing self, benefits activity, UE ROM exer.bed mobility, transfer tech. KALIN 09/29/17 0935 Done    Acceptance E,TB,BRADEN PANTOJA,NR Reviewed ADL retraining, bed mobility, transfer training, EC/WS AR 09/24/17 1128 Done               Point: Body mechanics (Done)    Description: Instruct learner(s) on proper positioning and spine alignment during self-care, functional mobility activities and/or exercises.    Learning Progress Summary    Learner Readiness Method Response Comment Documented by Status   Patient Acceptance E,D VU,NR adaptive breathing, pacing self, benefits activity, UE ROM exer.bed mobility, transfer tech.  09/29/17 0935 Done    Acceptance E,BRADEN PEREZ,NR Reviewed ADL retraining, bed mobility, transfer training, EC/WS AR 09/24/17 1128 Done                      User Key     Initials Effective Dates Name Provider Type Discipline     06/22/15 -  Bebe Barr, OT Occupational Therapist OT    AC 06/23/15 -  Chanel Wheeler, OT Occupational Therapist OT    AR 06/22/15 -  Suzie Wade, OT Occupational Therapist OT                  OT Recommendation and Plan  Anticipated Discharge Disposition: inpatient rehabilitation facility (pulmonary rehab)  Planned Therapy Interventions: ADL retraining, balance training, bed mobility training, energy conservation, home exercise program, strengthening, transfer training  Therapy Frequency: daily  Plan of Care Review  Plan Of Care Reviewed With: patient  Progress: improving  Outcome Summary/Follow up Plan: Pt CGA bed mobility, min a x 2 STS and to ambulate 12 ft with RW.  Pt with good effort with BUE ther ex.   Continues to be limited by decreased strength, fatigue and anxiety         Outcome Measures       10/02/17 0901 09/30/17 2587        How much help from another person do you currently need...    Turning from your back to your side while in flat bed without using bedrails?  3  -KALIN     Moving from lying on back to sitting on the side of a flat bed without bedrails?  2  -KALIN     Moving to and from a bed to a chair (including a wheelchair)?  3  -KALIN     Standing up from a chair using your arms (e.g., wheelchair, bedside chair)?  3  -KALIN     Climbing 3-5 steps with a railing?  1  -KALIN     To walk in hospital room?  2  -KALIN     AM-PAC 6 Clicks Score  14  -KALIN     How much help from another is currently needed...    Putting on and taking off regular lower body clothing? 1  -AC      Bathing (including washing, rinsing, and drying) 2  -AC      Toileting (which includes using toilet bed pan or urinal) 2  -AC      Putting on and taking off regular upper body clothing 2  -AC      Taking care of personal grooming (such as brushing teeth) 3  -AC      Eating meals 3  -AC      Score 13  -AC      Functional Assessment    Outcome Measure Options AM-PAC 6 Clicks Daily Activity (OT)  -        User Key  (r) = Recorded By, (t) = Taken By, (c) = Cosigned By    Initials Name Provider Type    KALIN Bishop, PT Physical Therapist    AC Chanel Wheeler OT Occupational Therapist           Time Calculation:         Time Calculation- OT       10/02/17 1131          Time Calculation- OT    OT Start Time 0901  -      Total Timed Code Minutes- OT 23 minute(s)  -      OT Received On 10/02/17  -      OT Goal Re-Cert Due Date 10/06/17  -        User Key  (r) = Recorded By, (t) = Taken By, (c) = Cosigned By    Initials Name Provider Type     Chanel Wheeler OT Occupational Therapist           Therapy Charges for Today     Code Description Service Date Service Provider Modifiers Qty    [id number]  OT THERAPEUTIC ACT EA 15 MIN 10/2/2017 Chanel Wheeler OT GO 2    [id number] HC OT THER SUPP EA 15 MIN 10/2/2017 Chanel Wheeler OT GO 2          OT G-codes  Functional  Assessment Tool Used: impact 6 clicks  Score: 14  Functional Limitation: Self care  Self Care Current Status (): At least 40 percent but less than 60 percent impaired, limited or restricted  Self Care Goal Status (): At least 20 percent but less than 40 percent impaired, limited or restricted    Chanel Wheeler, OT  10/2/2017

## 2017-10-02 NOTE — DISCHARGE SUMMARY
[facility] Medicine Services  TRANSFER SUMMARY    Patient Name: Bjorn Pollock  : 1934  MRN: [id number]    Date of Admission: 2017  Date of Discharge:    Length of Stay: 14  Primary Care Physician: Vincent Mccullough MD    Consults     Date and Time Order Name Status Description    2017 1608 Inpatient Consult to Pulmonology      2017 1723 Inpatient Consult to Cardiology Completed         Hospital Course     Presenting Problem:   Acute exacerbation of CHF (congestive heart failure) [I50.9]  Hypervolemia, unspecified hypervolemia type [E87.70]    Active Hospital Problems (** Indicates Principal Problem)    Diagnosis Date Noted   • **Acute on chronic respiratory failure with hypoxemia [J96.21] 2017   • Hypervolemia [E87.70] 2017   • Acute exacerbation of CHF (congestive heart failure) [I50.9] 2017   • Systolic and diastolic CHF, acute on chronic [I50.43] 2017   • Chronic anticoagulation [Z79.01] 2017   • DM type 2 (diabetes mellitus, type 2) [E11.9] 2016   • COPD (chronic obstructive pulmonary disease) [J44.9] 2016   • NAYLA (obstructive sleep apnea) [G47.33] 2016   • Atrial fibrillation [I48.91] 2016   • CKD (chronic kidney disease) stage 2, GFR 60-89 ml/min [N18.2] 2016   • Anemia [D64.9] 2016   • Chronic thrombocytopenic purpura [D69.49] 2016   • Supratherapeutic INR [R79.1] 2016      Resolved Hospital Problems    Diagnosis Date Noted Date Resolved   No resolved problems to display.      Hospital Course:  Mr. Bjorn Pollock is an 84yo male with a history of anemia, anxiety/depression, atrial fibrillation (Coumadin), CAD with prior CABG, diastolic CHF, CKD II, COPD, DM II, GERD, HTN, hyperlipidemia, NAYLA and degenerative disc disease who presented to [facility] ED on 17 with complaints of dyspnea.  Patient reports he normally wears 2 L of oxygen at night but had to  increase oxygen to 4 L to feel some improvement in his symptoms.  His wife noted increased swelling to his legs.  Daughter reports that he has been forgetting to take his Lasix recently and has had similar symptoms to these when he has not been taking Lasix. Patient has also been having nocturnal hypoglycemia.     Patient was felt to have acute on chronic respiratory failure with hypoxia likely secondary to congestive heart failure exacerbation.  Chest x-ray was unrevealing.  BNP moderately elevated at 345.  He was given Lasix for diuresis and dependence to the hospital medicine service for further evaluation and treatment.    INR was supratherapeutic on admission. Held until INR therapeutic. INR has remained stable this hospitalization.  Echocardiogram on 9/28/17 showed EF 35-40%. He was started on PO Lasix 40mg daily (later transitioned to BID), Metoprolol 12.5mg PO and Lisinopril 2.5 mg daily. Home diltiazem was discontinued this hospitalization.     Due to persistent hypoxia, cardiology was consulted to evaluate the patient. Dr. Vivas saw Mr. Pollock on 9/27/17. Dr. Vivas felt that patient likely had pneumonia superimposed on CHF. He was diuresed and treated with IV Zosyn and Vancomycin to cover for HAP on 9/28. Antibiotics were transitioned to PO Augmentin BID on 10/1. Will treat for total 10 days.     He was treated for Enterococcus UTI with IV Vancomycin. He completed treatment UTI prior to discharge.     Mr. Pollock has been admitted to [facility] multiple times in 2017. Unfortunately, he and his wife are both chronically debilitated and still live at home (with daughter). His daughter works as a nurse but is very busy and not always available to take care of them. Patient was ultimately agreeable to transfer to [facility] for acute rehab but may benefit from a higher level of care at some point.     He is stable for discharge and will transfer to [facility] on 10/3/2017.     Day of Discharge     HPI:   He feels dyspneic  today. Saturating >90% on room air. No chest pain, denies nausea or vomiting. Bowels are moving. He's very anxious to leave the hospital today.     Review of Systems   Gen- No fevers, chills  CV- No chest pain, palpitations  Resp- No cough, dyspnea  GI- No N/V/D, abd pain    Otherwise complete 10-system ROS is negative except as mentioned in the HPI.    Temp:  [96.8 °F (36 °C)-98.5 °F (36.9 °C)] 98.3 °F (36.8 °C)  Heart Rate:  [61-93] 61  Resp:  [16-18] 16  BP: (111-120)/(58-73) 115/65     Physical Exam:  Constitutional: Sitting upright in bed, shallow respirations. Chronically-ill appearing. NAD.   HENT: NCAT, mucous membranes moist  Respiratory: Trace bibasilar rales. Normal respiratory effort. No wheezes or rhonchi.    Cardiovascular: RRR, no murmurs, rubs, or gallops, palpable pedal pulses bilaterally  Gastrointestinal: Positive bowel sounds, soft, nontender, nondistended  Musculoskeletal: No bilateral ankle edema. Shins are tender to palpation bilaterally. Both are dressed and not removed for exam.   Psychiatric: Oriented x 3, appropriate affect, cooperative  Neurologic: Strength symmetric in all extremities, CN grossly intact to confrontation, speech is clear  Skin: No rashes    Pertinent Results     Lab Results (last 7 days)     Procedure Component Value Units Date/Time    POC Glucose Fingerstick [[id number]]  (Normal) Collected:  09/26/17 1145    Specimen:  Blood Updated:  09/26/17 1148     Glucose 117 mg/dL     Narrative:       Meter: [id number] : [id number] Surya Primekss    POC Glucose Fingerstick [[id number]]  (Abnormal) Collected:  09/26/17 1655    Specimen:  Blood Updated:  09/26/17 1656     Glucose 156 (H) mg/dL     Narrative:       Meter: [id number] : [id number] Surya Jennifer    POC Glucose Fingerstick [[id number]]  (Normal) Collected:  09/1934    Specimen:  Blood Updated:  09/26/17 1936     Glucose 128 mg/dL     Narrative:       Meter: [id number] : [id number] Villa Still    Protime-INR  [[id number]]  (Abnormal) Collected:  09/27/17 0728    Specimen:  Blood Updated:  09/27/17 0800     Protime 14.6 (H) Seconds      INR 1.33    Narrative:       Therapeutic Ranges for INR: 2.0-3.0 (PT 20-30)     2.5-3.5 (PT 25-34)    POC Glucose Fingerstick [[id number]]  (Normal) Collected:  09/27/17 0803    Specimen:  Blood Updated:  09/27/17 0805     Glucose 107 mg/dL     Narrative:       Meter: [id number] : [id number] [employer]    POC Glucose Fingerstick [[id number]]  (Abnormal) Collected:  09/27/17 1236    Specimen:  Blood Updated:  09/27/17 1257     Glucose 176 (H) mg/dL     Narrative:       Meter: [id number] : [id number] [employer]    POC Glucose Fingerstick [[id number]]  (Abnormal) Collected:  09/27/17 1611    Specimen:  Blood Updated:  09/27/17 1615     Glucose 178 (H) mg/dL     Narrative:       Meter: [id number] : [id number] [employer]    POC Glucose Fingerstick [[id number]]  (Normal) Collected:  09/27/17 2237    Specimen:  Blood Updated:  09/27/17 2239     Glucose 97 mg/dL     Narrative:       Meter: [id number] : [id number] Gregory Gordon    Protime-INR [[id number]]  (Abnormal) Collected:  09/28/17 0556    Specimen:  Blood Updated:  09/28/17 0720     Protime 16.2 (H) Seconds      INR 1.47    Narrative:       Therapeutic Ranges for INR: 2.0-3.0 (PT 20-30)                              2.5-3.5 (PT 25-34)    Lipid Panel [[id number]]  (Abnormal) Collected:  09/28/17 0556    Specimen:  Blood Updated:  09/28/17 0723     Total Cholesterol 102 mg/dL      Triglycerides 89 mg/dL      HDL Cholesterol 30 (L) mg/dL      LDL Cholesterol  58 mg/dL     Narrative:       Cholesterol Reference Ranges:   Desirable       < 200 mg/dL   Borderline    200-239 mg/dL   High Risk       > 239 mg/dL    Triglyceride Reference Ranges:   Normal          < 150 mg/dL   Borderline    150-199 mg/dL   High          200-499 mg/dL   Very High       > 499 mg/dL    HDL Reference Ranges:   Low              < 40 mg/dL   High              > 59 mg/dL    LDL Reference Ranges:   Optimal         < 100 mg/dL   Near Optimal  100-129 mg/dL   Borderline    130-159 mg/dL   High          160-189 mg/dL   Very High       > 189 mg/dL    POC Glucose Fingerstick [[id number]]  (Normal) Collected:  09/28/17 0745    Specimen:  Blood Updated:  09/28/17 0748     Glucose 99 mg/dL     Narrative:       Meter: [id number] : [id number] Surya Hightoweri    Renal Function Panel [[id number]]  (Normal) Collected:  09/28/17 0556    Specimen:  Blood Updated:  09/28/17 0908     Glucose 79 mg/dL      BUN 15 mg/dL      Creatinine 1.10 mg/dL      Sodium 139 mmol/L      Potassium 3.7 mmol/L      Chloride 104 mmol/L      CO2 31.0 mmol/L      Calcium 8.8 mg/dL      Albumin 3.60 g/dL      Phosphorus 3.3 mg/dL      Anion Gap 4.0 mmol/L      BUN/Creatinine Ratio 13.6     eGFR Non African Amer 64 mL/min/1.73     Narrative:       National Kidney Foundation Guidelines    Stage     Description        GFR  1         Normal or High     90+  2         Mild decrease      60-89  3         Moderate decrease  30-59  4         Severe decrease    15-29  5         Kidney failure     <15    CBC & Differential [[id number]] Collected:  09/28/17 0905    Specimen:  Blood Updated:  09/28/17 0926    Narrative:       The following orders were created for panel order CBC & Differential.  Procedure                               Abnormality         Status                     ---------                               -----------         ------                     CBC Auto Differential[[id number]]        Abnormal            Final result                 Please view results for these tests on the individual orders.    CBC Auto Differential [[id number]]  (Abnormal) Collected:  09/28/17 0905    Specimen:  Blood Updated:  09/28/17 0926     WBC 4.06 10*3/mm3      RBC 3.77 (L) 10*6/mm3      Hemoglobin 10.3 (L) g/dL      Hematocrit 33.2 (L) %      MCV 88.1 fL      MCH 27.3 pg      MCHC 31.0 (L) g/dL      RDW 16.7 (H) %      RDW-SD  53.8 fl      MPV 9.6 fL      Platelets 95 (L) 10*3/mm3      Neutrophil % 72.6 (H) %      Lymphocyte % 14.3 (L) %      Monocyte % 9.4 %      Eosinophil % 3.0 %      Basophil % 0.5 %      Immature Grans % 0.2 %      Neutrophils, Absolute 2.95 10*3/mm3      Lymphocytes, Absolute 0.58 (L) 10*3/mm3      Monocytes, Absolute 0.38 10*3/mm3      Eosinophils, Absolute 0.12 10*3/mm3      Basophils, Absolute 0.02 10*3/mm3      Immature Grans, Absolute 0.01 10*3/mm3     BNP [[id number]]  (Abnormal) Collected:  09/28/17 0905    Specimen:  Blood Updated:  09/28/17 0933     .0 (H) pg/mL     POC Glucose Fingerstick [[id number]]  (Abnormal) Collected:  09/28/17 1214    Specimen:  Blood Updated:  09/28/17 1231     Glucose 218 (H) mg/dL     Narrative:       Meter: [id number] : [id number] Hola Josue    POC Glucose Fingerstick [[id number]]  (Normal) Collected:  09/28/17 1625    Specimen:  Blood Updated:  09/28/17 1626     Glucose 96 mg/dL     Narrative:       Meter: [id number] : [id number] Surya Rodriguez    POC Glucose Fingerstick [[id number]]  (Abnormal) Collected:  09/28/17 1930    Specimen:  Blood Updated:  09/28/17 1932     Glucose 146 (H) mg/dL     Narrative:       Meter: [id number] : [id number] Gregory Gordon    CBC (No Diff) [[id number]]  (Abnormal) Collected:  09/29/17 0559    Specimen:  Blood Updated:  09/29/17 0654     WBC 3.64 10*3/mm3      RBC 3.84 (L) 10*6/mm3      Hemoglobin 10.4 (L) g/dL      Hematocrit 34.0 (L) %      MCV 88.5 fL      MCH 27.1 pg      MCHC 30.6 (L) g/dL      RDW 17.1 (H) %      RDW-SD 55.4 (H) fl      MPV 10.6 fL      Platelets 99 (L) 10*3/mm3     POC Glucose Fingerstick [[id number]]  (Normal) Collected:  09/29/17 0720    Specimen:  Blood Updated:  09/29/17 0721     Glucose 116 mg/dL     Narrative:       Meter: [id number] : [id number] Allen Joshi-INR [[id number]]  (Abnormal) Collected:  09/29/17 0559    Specimen:  Blood Updated:  09/29/17 0722     Protime 22.1 (H) Seconds       INR 1.98    Narrative:       Therapeutic Ranges for INR: 2.0-3.0 (PT 20-30)                              2.5-3.5 (PT 25-34)    Renal Function Panel [[id number]]  (Abnormal) Collected:  09/29/17 0559    Specimen:  Blood Updated:  09/29/17 0804     Glucose 87 mg/dL      BUN 16 mg/dL      Creatinine 1.20 mg/dL      Sodium 137 mmol/L      Potassium 4.0 mmol/L      Chloride 98 (L) mmol/L      CO2 32.0 (H) mmol/L      Calcium 9.1 mg/dL      Albumin 3.60 g/dL      Phosphorus 3.8 mg/dL      Anion Gap 7.0 mmol/L      BUN/Creatinine Ratio 13.3     eGFR Non African Amer 58 (L) mL/min/1.73     Narrative:       National Kidney Foundation Guidelines    Stage     Description        GFR  1         Normal or High     90+  2         Mild decrease      60-89  3         Moderate decrease  30-59  4         Severe decrease    15-29  5         Kidney failure     <15    Peripheral Blood Smear - Blood, [[id number]] Collected:  09/28/17 0556    Specimen:  Blood Updated:  09/29/17 0935     Performed by: NOE London M.D.     Pathologist Interpretation Reviewed 09/29/17:  Anemia with moderate RBC anisocytosis; Thrombocytopenia.    POC Glucose Fingerstick [[id number]]  (Abnormal) Collected:  09/29/17 1152    Specimen:  Blood Updated:  09/29/17 1155     Glucose 186 (H) mg/dL     Narrative:       Meter: [id number] : [id number] Nixon Bell    Vancomycin, Trough [[id number]]  (Normal) Collected:  09/29/17 1326    Specimen:  Blood Updated:  09/29/17 1425     Vancomycin Trough 15.20 mcg/mL     POC Glucose Fingerstick [[id number]]  (Abnormal) Collected:  09/29/17 1657    Specimen:  Blood Updated:  09/29/17 1702     Glucose 217 (H) mg/dL     Narrative:       Meter: [id number] : [id number] Nixon Bell    POC Glucose Fingerstick [[id number]]  (Normal) Collected:  09/29/17 2027    Specimen:  Blood Updated:  09/29/17 2030     Glucose 76 mg/dL     Narrative:       Meter: [id number] : [id number] Marcie Ana    CBC (No Diff) [[id number]]  (Abnormal)  Collected:  09/30/17 0657    Specimen:  Blood Updated:  09/30/17 0732     WBC 3.86 10*3/mm3      RBC 3.87 (L) 10*6/mm3      Hemoglobin 10.3 (L) g/dL      Hematocrit 34.2 (L) %      MCV 88.4 fL      MCH 26.6 (L) pg      MCHC 30.1 (L) g/dL      RDW 16.9 (H) %      RDW-SD 54.9 (H) fl      MPV 10.4 fL      Platelets 98 (L) 10*3/mm3     Renal Function Panel [[id number]]  (Abnormal) Collected:  09/30/17 0657    Specimen:  Blood Updated:  09/30/17 0739     Glucose 88 mg/dL      BUN 16 mg/dL      Creatinine 1.20 mg/dL      Sodium 141 mmol/L      Potassium 4.2 mmol/L      Chloride 104 mmol/L      CO2 28.0 mmol/L      Calcium 8.9 mg/dL      Albumin 3.60 g/dL      Phosphorus 3.8 mg/dL      Anion Gap 9.0 mmol/L      BUN/Creatinine Ratio 13.3     eGFR Non African Amer 58 (L) mL/min/1.73     Narrative:       National Kidney Foundation Guidelines    Stage     Description        GFR  1         Normal or High     90+  2         Mild decrease      60-89  3         Moderate decrease  30-59  4         Severe decrease    15-29  5         Kidney failure     <15    POC Glucose Fingerstick [[id number]]  (Normal) Collected:  09/30/17 0751    Specimen:  Blood Updated:  09/30/17 0752     Glucose 98 mg/dL     Narrative:       Meter: [id number] : [id number] Allen Ledesma    Protime-INR [[id number]]  (Abnormal) Collected:  09/30/17 0818    Specimen:  Blood Updated:  09/30/17 0856     Protime 23.4 (H) Seconds      INR 2.10    Narrative:       Therapeutic Ranges for INR: 2.0-3.0 (PT 20-30)                              2.5-3.5 (PT 25-34)    POC Glucose Fingerstick [[id number]]  (Abnormal) Collected:  09/30/17 1128    Specimen:  Blood Updated:  09/30/17 1143     Glucose 216 (H) mg/dL     Narrative:       Meter: [id number] : [id number] Dharmesh Cisneros    POC Glucose Fingerstick [[id number]]  (Normal) Collected:  09/30/17 1656    Specimen:  Blood Updated:  09/30/17 1705     Glucose 127 mg/dL     Narrative:       Meter: [id number] : [id number]  Madhu Tolentino    S. Pneumo Ag Urine or CSF - Urine, Urine, Clean Catch [[id number]] Collected:  09/28/17 1108    Specimen:  Urine from Urine, Clean Catch Updated:  09/30/17 1707     Specimen Source Urine     STREP PNEUMONIAE ANTIGEN Negative     Body Fluid Culture, Sterile Not Indicated     Organism ID Not indicated.     Please note Comment      College [employer] standards require a culture to be  performed on CSF specimens submitted for bacterial antigen testing.  (CAP [id number].60588) Urine specimens will not be cultured.       Narrative:       Performed at:  [address] [place] [address]  [id number]  : Lawrence Lindsay MD, Phone:  [phone]    Legionella Antigen, Urine - Urine, Clean Catch [[id number]] Collected:  09/28/17 1108    Specimen:  Urine from Urine, Clean Catch Updated:  09/30/17 1707     L. pneumophila Serogp 1 Ur Ag Negative      Presumptive negative for L. pneumophila serogroup 1 antigen in urine,  suggesting no recent or current infection. Legionnaires' disease  cannot be ruled out since other serogroups and species may also cause  disease.       Narrative:       Performed at:  [address] [place] [address]  [id number]  : Lawrence Lindsay MD, Phone:  [phone]    POC Glucose Fingerstick [[id number]]  (Normal) Collected:  09/30/17 2011    Specimen:  Blood Updated:  09/30/17 2014     Glucose 124 mg/dL     Narrative:       Meter: [id number] : [id number] Marcie Ana    POC Glucose Fingerstick [[id number]]  (Normal) Collected:  10/01/17 0719    Specimen:  Blood Updated:  10/01/17 0722     Glucose 92 mg/dL     Narrative:       Meter: [id number] : [id number] Allen Ledesma    Renal Function Panel [[id number]]  (Abnormal) Collected:  10/01/17 0645    Specimen:  Blood Updated:  10/01/17 0748     Glucose 71 mg/dL      BUN 20 mg/dL      Creatinine 1.20 mg/dL      Sodium 139 mmol/L      Potassium 4.3 mmol/L       Chloride 102 mmol/L      CO2 31.0 mmol/L      Calcium 8.8 mg/dL      Albumin 3.40 g/dL      Phosphorus 4.0 mg/dL      Anion Gap 6.0 mmol/L      BUN/Creatinine Ratio 16.7     eGFR Non African Amer 58 (L) mL/min/1.73     Narrative:       National Kidney Foundation Guidelines    Stage     Description        GFR  1         Normal or High     90+  2         Mild decrease      60-89  3         Moderate decrease  30-59  4         Severe decrease    15-29  5         Kidney failure     <15    CBC (No Diff) [[id number]]  (Abnormal) Collected:  10/01/17 0645    Specimen:  Blood Updated:  10/01/17 0824     WBC 4.27 10*3/mm3      RBC 3.96 (L) 10*6/mm3      Hemoglobin 10.7 (L) g/dL      Hematocrit 35.5 (L) %      MCV 89.6 fL      MCH 27.0 pg      MCHC 30.1 (L) g/dL      RDW 17.1 (H) %      RDW-SD 56.2 (H) fl      MPV 11.0 fL      Platelets 95 (L) 10*3/mm3     Protime-INR [[id number]]  (Abnormal) Collected:  10/01/17 0938    Specimen:  Blood Updated:  10/01/17 1020     Protime 27.4 (H) Seconds      INR 2.45    Narrative:       Therapeutic Ranges for INR: 2.0-3.0 (PT 20-30)                              2.5-3.5 (PT 25-34)    POC Glucose Fingerstick [[id number]]  (Abnormal) Collected:  10/01/17 1145    Specimen:  Blood Updated:  10/01/17 1149     Glucose 146 (H) mg/dL     Narrative:       Meter: [id number] : [id number] [place] Callie    POC Glucose Fingerstick [[id number]]  (Abnormal) Collected:  10/01/17 1714    Specimen:  Blood Updated:  10/01/17 1715     Glucose 132 (H) mg/dL     Narrative:       Meter: [id number] : [id number] Allen Callie    Protime-INR [[id number]]  (Abnormal) Collected:  10/02/17 0515    Specimen:  Blood Updated:  10/02/17 0616     Protime 26.6 (H) Seconds      INR 2.38    Narrative:       Therapeutic Ranges for INR: 2.0-3.0 (PT 20-30)                              2.5-3.5 (PT 25-34)    POC Glucose Fingerstick [[id number]]  (Normal) Collected:  10/02/17 0755    Specimen:  Blood Updated:  10/02/17 0759      Glucose 81 mg/dL     Narrative:       Meter: [id number] : [id number] [employer]    POC Glucose Fingerstick [[id number]]  (Abnormal) Collected:  10/02/17 1130    Specimen:  Blood Updated:  10/02/17 1133     Glucose 136 (H) mg/dL     Narrative:       Meter: [id number] : [id number] [employer]    POC Glucose Fingerstick [[id number]]  (Abnormal) Collected:  10/02/17 1618    Specimen:  Blood Updated:  10/02/17 1620     Glucose 220 (H) mg/dL     Narrative:       Meter: [id number] : [id number] [employer]    POC Glucose Fingerstick [[id number]]  (Abnormal) Collected:  10/02/17 2047    Specimen:  Blood Updated:  10/02/17 2049     Glucose 149 (H) mg/dL     Narrative:       Meter: [id number] : [id number] Abner Pavon    POC Glucose Fingerstick [[id number]]  (Normal) Collected:  10/03/17 0737    Specimen:  Blood Updated:  10/03/17 0740     Glucose 85 mg/dL     Narrative:       Meter: [id number] : [id number] [employer]    Protime-INR [[id number]]  (Abnormal) Collected:  10/03/17 0852    Specimen:  Blood Updated:  10/03/17 0951     Protime 21.2 (H) Seconds      INR 1.91    Narrative:       Therapeutic Ranges for INR: 2.0-3.0 (PT 20-30)                              2.5-3.5 (PT 25-34)    POC Glucose Fingerstick [[id number]]  (Abnormal) Collected:  10/03/17 1110    Specimen:  Blood Updated:  10/03/17 1121     Glucose 140 (H) mg/dL     Narrative:       Meter: [id number] : [id number] Koko Costa        Imaging Results (all)     Procedure Component Value Units Date/Time    XR Chest 1 View [[id number]] Collected:  09/19/17 1427     Updated:  09/19/17 1658    Narrative:       EXAMINATION: XR CHEST 1 VW- 09/19/2017     INDICATION: SOA triage protocol      COMPARISON: 08/06/2017     FINDINGS:   1. Mild cardiomegaly is noted. There is mild mid and lower lung vascular  congestion.  2. Mild airspace opacities are seen at the bases, worse left lower lobe.            Impression:       1. Chronic cardiomegaly and chronic  congestion in the chest.  2. Compared to studies of 08/06/2017, the right lung base has  substantially improved. There is trace opacity in the left lower lobe.  3. High-grade acute abnormalities are not identified elsewhere.     D:  09/19/2017  E:  09/19/2017     This report was finalized on 9/19/2017 4:56 PM by Dr. Mitch Mascorro MD.       XR Chest 1 View [[id number]]  (Abnormal) Collected:  09/23/17 2312     Updated:  09/23/17 2356    Narrative:       EXAM:    XR Chest, 1 View    CLINICAL HISTORY:    83 years old, male; Fluid overload, unspecified; Acute on chronic systolic   (congestive) heart failure; Hypoxemia; Edema, unspecified; Abnormal coagulation   profile; Other reduced mobility; Other reduced mobility; Signs and symptoms;   Cough and dyspnea and shortness of breath; Symptoms not specified; Prior   surgery; Surgery date: 6+ months; Surgery type: Cabg; Patient HX: Increasing   cough and SOA. Cabg in 1989. A-fib; Additional info: Increasing cough, bedbound    TECHNIQUE:    Frontal view of the chest.    COMPARISON:    CR - XR CHEST 1 VW 9/19/2017 2:05:20 PM    FINDINGS:    Lungs:  Increased opacity in the left lung base is grossly unchanged.  There   is patchy opacity in the right mid to lower lung field which appears somewhat   increased.    Pleural space: Possible small right pleural effusion.  No pneumothorax.    Heart:  Enlarged.    Mediastinum:  Unremarkable.    Bones/joints:  Unremarkable.      Impression:         Bibasilar opacities may represent pneumonia, slightly more prominent on the   right.    THIS DOCUMENT HAS BEEN ELECTRONICALLY SIGNED BY RODRÍGUEZ PATTEN MD    XR Chest 1 View [[id number]] Collected:  09/26/17 0946     Updated:  09/26/17 1009    Narrative:       EXAMINATION: XR CHEST 1 VW-09/26/2017:      INDICATION: Hypoxia; E87.70-Fluid overload, unspecified; I50.23-Acute on  chronic systolic (congestive) heart failure; R79.1-Abnormal coagulation  profile; R60.9-Edema, unspecified;  R09.02-Hypoxemia; Z74.09-Other  reduced mobility; Z74.09-Other reduced mobility.      COMPARISON: 09/23/2017.     FINDINGS: Persistent cardiomegaly with increased central pulmonary  vascularity. Bibasilar opacifications again noted with increase  bilaterally remain greatest on the right. Probable small right pleural  effusion persists. No pneumothorax.       Impression:       Increased bibasilar opacifications greatest on the right  which may represent worsening pneumonia and/or atelectasis.     D:  09/26/2017  E:  09/26/2017     This report was finalized on 9/26/2017 10:07 AM by Dr. Luis M Leija.       CT Chest Without Contrast [[id number]] Collected:  09/27/17 1111     Updated:  09/27/17 1350    Narrative:       EXAMINATION: CT CHEST WO CONTRAST-09/26/2017:      INDICATION: R/O pneumonia; hypoxia; E87.70-Fluid overload, unspecified;  I50.23-Acute on chronic systolic (congestive) heart failure;  R79.1-Abnormal coagulation profile; R60.9-Edema, unspecified;  R09.02-Hypoxemia; Z74.09-Other reduced mobility; Z74.09-Other reduced  mobility.         TECHNIQUE:  CT data set of the chest and mediastinum was performed  without intravenous contrast.     The radiation dose reduction device was turned on for each scan per the  ALARA (As Low as Reasonably Achievable) protocol.     COMPARISON: Compared to previous and most recent CT data sets of the  chest and mediastinum of 07/21/2017.     FINDINGS:   1. Pathologic adenopathy is identified at the thoracic inlet, superior  mediastinum and in the aortopulmonary window. These multiple lymph nodes  may be reactive. Coalescent mediastinal mass is not identified. The  axillae are clear.     2. There is groundglass airspace opacity with nodularity at the right  lung apex which is new from 2 months ago. The patient has developed  increasing pleural effusions over and above those identified on previous  studies. The amount of fluid in the chest is bilaterally increased.  There are dense  consolidative airspace opacities in the mid and lower  lung zones, also slightly increased from previous studies. Linear  nodular opacities are identified in the mid lung zones peripherally.     3. Cardiac chambers are borderline for size. Previous median sternotomy  has been performed.     Images into the upper abdomen demonstrate vasculopathic atherosclerotic  calcifications. The liver and spleen are otherwise unremarkable. The  adrenal glands are normal. The pancreas is negative for mass, cyst or  stranding.       Impression:       1.  Compared to previous studies of 2 months ago, the findings in the  chest have progressed. There is new consolidative nodular airspace  groundglass disease in the right upper lobe and right lung apex  extensively.  2.  There are increasing pleural effusions in both of the hemithoraces.  3.  There is consolidative airspace opacity in the mid and lower lung  zones with air bronchograms.  4.  There is a slight increased linear nodular pattern in the mid lung  zones.  5.  Mediastinal lymph nodes are identified which are multiple and likely  reactive but which have slightly increased over the interval.     D:  09/27/2017  E:  09/27/2017           This report was finalized on 9/27/2017 1:47 PM by Dr. Mitch Mascorro MD.       FL Video Swallow With Speech [[id number]] Collected:  09/28/17 1352     Updated:  09/28/17 1616    Narrative:       EXAMINATION: FL LIMITED UGI FOR MBS REFLUX-, FL VIDEO SWALLOW W SPEECH-     INDICATION: dysphagia; E87.70-Fluid overload, unspecified; I50.23-Acute  on chronic systolic (congestive) heart failure; R79.1-Abnormal  coagulation profile; R60.9-Edema, unspecified; R09.02-Hypoxemia;  Z74.09-Other reduced mobility; Z74.09-Other reduced mobility         TECHNIQUE: 1 minute and 11 seconds of fluoroscopic time was used for  this exam. 2 associated images were saved. The patient was evaluated in  the seated lateral position while taking a variety of consistencies  of  barium by mouth under the direction of speech pathology.     COMPARISON: NONE     FINDINGS: There was no penetration and no aspiration with any of the  media ingested. A limited view of the esophagus with the patient in the  seated lateral position demonstrated no signs of a focal esophageal  stricture. There was mild gastroesophageal reflux to the level of the  midesophagus.          Impression:       Fluoroscopy provided for a modified barium swallow. Please  see speech therapy report for full details and recommendations. There  was mild gastroesophageal reflux to the level of the midesophagus.         This report was finalized on 9/28/2017 4:14 PM by Dr. Surekha Meza MD.       FL Limited Ugi For Mbs Reflux [[id number]] Collected:  09/28/17 1352     Updated:  09/28/17 1616    Narrative:       EXAMINATION: FL LIMITED UGI FOR MBS REFLUX-, FL VIDEO SWALLOW W SPEECH-     INDICATION: dysphagia; E87.70-Fluid overload, unspecified; I50.23-Acute  on chronic systolic (congestive) heart failure; R79.1-Abnormal  coagulation profile; R60.9-Edema, unspecified; R09.02-Hypoxemia;  Z74.09-Other reduced mobility; Z74.09-Other reduced mobility         TECHNIQUE: 1 minute and 11 seconds of fluoroscopic time was used for  this exam. 2 associated images were saved. The patient was evaluated in  the seated lateral position while taking a variety of consistencies of  barium by mouth under the direction of speech pathology.     COMPARISON: NONE     FINDINGS: There was no penetration and no aspiration with any of the  media ingested. A limited view of the esophagus with the patient in the  seated lateral position demonstrated no signs of a focal esophageal  stricture. There was mild gastroesophageal reflux to the level of the  midesophagus.          Impression:       Fluoroscopy provided for a modified barium swallow. Please  see speech therapy report for full details and recommendations. There  was mild gastroesophageal reflux  to the level of the midesophagus.         This report was finalized on 9/28/2017 4:14 PM by Dr. Surekha Meza MD.       XR Chest 1 View [[id number]] Updated:  10/03/17 1023        Results for orders placed during the hospital encounter of 09/19/17   Adult Transthoracic Echo Complete W/ Cont if Necessary Per Protocol    Narrative · There is four chamber cardiac enlargement.  · Global and segmental LV wall motion abnormalities are seen with an   estimated EF=36%-40%.  · Left ventricular wall thickness is consistent with concentric   hypertrophy, mild.  · Mitral annular calcification with mild-moderate MR is appreciated.  · Aortic valve sclerosis with moderate aortic insufficiency is seen.  · The LV examination is suboptimal as the patient refused Lumason.        Discharge Details      Bjorn Pollock   Home Medication Instructions FERNANDO:[id number]    Printed on:10/03/17 1133   Medication Information                      acetaminophen (TYLENOL) 325 MG tablet  Take 2 tablets by mouth Every 4 (Four) Hours As Needed for Mild Pain .             amoxicillin-clavulanate (AUGMENTIN) 875-125 MG per tablet  Take 1 tablet by mouth Every 12 (Twelve) Hours for 8 doses. Indications: Pneumonia             aspirin 81 MG EC tablet  Take 1 tablet by mouth Daily.             bisacodyl (DULCOLAX) 5 MG EC tablet  Take 2 tablets by mouth Daily As Needed for Constipation.             budesonide (PULMICORT) 0.5 MG/2ML nebulizer solution  Take 2 mL by nebulization 2 (Two) Times a Day for 5 days.             calcium carbonate (TUMS) 500 MG chewable tablet  Chew 1,000 mg 2 (Two) Times a Day As Needed for Heartburn.             docusate sodium 100 MG capsule  Take 100 mg by mouth 2 (Two) Times a Day.             fluvastatin XL (LESCOL XL) 80 MG 24 hr tablet  Take 80 mg by mouth Every Night.             furosemide (LASIX) 40 MG tablet  Take 1 tablet by mouth 2 (Two) Times a Day. Hold if SBP < 100             guaiFENesin (MUCINEX) 600 MG 12  hr tablet  Take 1 tablet by mouth Every 12 (Twelve) Hours for 5 days.             insulin detemir (LEVEMIR) 100 UNIT/ML injection  Inject 6 Units under the skin Every Morning.             insulin glargine (LANTUS) 100 UNIT/ML injection  Inject 11 Units under the skin Daily.             insulin lispro (HUMALOG) 100 UNIT/ML injection  Inject 0-7 units under the skin three times per day with meals             ipratropium-albuterol (DUO-NEB) 0.5-2.5 mg/mL nebulizer  Take 3 mL by nebulization Every 6 (Six) Hours As Needed for Shortness of Air for up to 5 days.             lisinopril (PRINIVIL,ZESTRIL) 2.5 MG tablet  Take 1 tablet by mouth Daily.             melatonin 5 MG sublingual tablet sublingual tablet  Place 1 tablet under the tongue At Night As Needed (SLEEP).             metoprolol succinate XL (TOPROL-XL) 25 MG 24 hr tablet  Take 0.5 tablets by mouth Daily.             nitroglycerin (NITROSTAT) 0.4 MG SL tablet  Place 1 tablet under the tongue Every 5 (Five) Minutes As Needed for Chest Pain. Take no more than 3 doses in 15 minutes.             nystatin (MYCOSTATIN) 540757 UNIT/GM cream  Apply  topically Every 12 (Twelve) Hours.             nystatin (MYCOSTATIN) 642829 UNIT/GM powder  Apply  topically Every 12 (Twelve) Hours.             ondansetron (ZOFRAN) 4 MG tablet  Take 1 tablet by mouth Every 6 (Six) Hours As Needed for Nausea or Vomiting.             pantoprazole (PROTONIX) 40 MG EC tablet  Take 40 mg by mouth Daily.             polyethylene glycol (MIRALAX) packet  Take 17 g by mouth Daily.             potassium chloride (MICRO-K) 10 MEQ CR capsule  Take 2 capsules by mouth 2 (Two) Times a Day With Meals.             saccharomyces boulardii (FLORASTOR) 250 MG capsule  Take 1 capsule by mouth 2 (Two) Times a Day for 14 days.             sertraline (ZOLOFT) 100 MG tablet  Take 100 mg by mouth Daily.             tamsulosin (FLOMAX) 0.4 MG capsule 24 hr capsule  Take 2 capsules by mouth Every Night.              warfarin (COUMADIN) 4 MG tablet  Take 4mg PO QHS               Discharge Disposition:  Rehab Facility or Unit (DC - External)    Discharge Diet:  Diet Instructions     Diet: Regular, Consistent Carbohydrate, Cardiac; Thin       Discharge Diet:   Regular  Consistent Carbohydrate  Cardiac      Fluid Consistency:  Thin               Discharge Activity:  Activity Instructions     Activity as Tolerated                   No future appointments.    Additional Instructions for the Follow-ups that You Need to Schedule     Discharge Follow-Up With Specified Provider    As directed    To:  Follow up with Dr. Vivas in 1 month - please schedule this appointment for patient       Discharge Follow-up with PCP    As directed    Follow Up Details:  PCP follow up one week after d/c from [facility]               Time Spent on Discharge: Discharge 50 min    Deepa Ortiz PA-C  10/03/17  11:33 AM

## 2017-10-02 NOTE — PLAN OF CARE
Problem: Patient Care Overview (Adult)  Goal: Plan of Care Review  Outcome: Ongoing (interventions implemented as appropriate)  Goal: Adult Individualization and Mutuality  Outcome: Ongoing (interventions implemented as appropriate)  Goal: Discharge Needs Assessment  Outcome: Ongoing (interventions implemented as appropriate)    Problem: Fall Risk (Adult)  Goal: Identify Related Risk Factors and Signs and Symptoms  Outcome: Ongoing (interventions implemented as appropriate)  Goal: Absence of Falls  Outcome: Outcome(s) achieved Date Met:  10/02/17    Problem: Pressure Ulcer Risk (Abelardo Scale) (Adult,Obstetrics,Pediatric)  Goal: Identify Related Risk Factors and Signs and Symptoms  Outcome: Outcome(s) achieved Date Met:  10/02/17  Goal: Skin Integrity  Outcome: Outcome(s) achieved Date Met:  10/02/17    Problem: Respiratory Insufficiency (Adult)  Goal: Identify Related Risk Factors and Signs and Symptoms  Outcome: Ongoing (interventions implemented as appropriate)  Goal: Acid/Base Balance  Outcome: Outcome(s) achieved Date Met:  10/02/17  Goal: Effective Ventilation  Outcome: Ongoing (interventions implemented as appropriate)

## 2017-10-02 NOTE — PLAN OF CARE
Problem: Patient Care Overview (Adult)  Goal: Plan of Care Review  Outcome: Ongoing (interventions implemented as appropriate)    10/02/17 1128   Coping/Psychosocial Response Interventions   Plan Of Care Reviewed With patient   Patient Care Overview   Progress improving   Outcome Evaluation   Outcome Summary/Follow up Plan Pt CGA bed mobility, min a x 2 STS and to ambulate 12 ft with RW. Pt with good effort with BUE ther ex. Continues to be limited by decreased strength, fatigue and anxiety          Problem: Inpatient Occupational Therapy  Goal: Transfer Training Goal 1 LTG- OT  Outcome: Ongoing (interventions implemented as appropriate)    09/20/17 1149 09/29/17 0936 10/02/17 1128   Transfer Training OT LTG   Transfer Training OT LTG, Date Established 09/20/17 --  --    Transfer Training OT LTG, Time to Achieve by discharge --  --    Transfer Training OT LTG, Activity Type bed to chair /chair to bed;toilet --  --    Transfer Training OT LTG, [place] Level contact guard assist --  --    Transfer Training OT LTG, Assist Device walker, rolling --  --    Transfer Training OT LTG, Date Goal Reviewed --  09/29/17  (goal still appropriate.) --    Transfer Training OT LTG, Outcome --  --  goal ongoing       Goal: Patient Education Goal LTG- OT  Outcome: Ongoing (interventions implemented as appropriate)    09/20/17 1149 09/29/17 0936 10/02/17 1128   Patient Education OT LTG   Patient Education OT LTG, Date Established 09/20/17 --  --    Patient Education OT LTG, Education Type written program;HEP;work simplification;adaptive breathing;energy conservation --  --    Patient Education OT LTG, Education Understanding verbalizes understanding;demonstrates adequately --  --    Patient Education OT LTG, Date Goal Reviewed --  09/29/17  (goal still appropriate.) --    Patient Education OT LTG Outcome --  --  goal ongoing       Goal: Grooming Goal LTG- OT  Outcome: Ongoing (interventions implemented as appropriate)    09/20/17  1149 09/29/17 0936 10/02/17 1128   Grooming OT LTG   Grooming Goal OT LTG, Date Established 09/20/17 --  --    Grooming Goal OT LTG, Time to Achieve by discharge --  --    Grooming Goal OT LTG, [place] set up required --  --    Grooming Goal OT LTG, Date Goal Reviewed --  09/29/17  (goal still appropriate.) --    Grooming Goal OT LTG, Outcome --  --  goal ongoing       Goal: Toileting Goal LTG- OT  Outcome: Ongoing (interventions implemented as appropriate)    09/20/17 1149 09/29/17 0936 10/02/17 1128   Toileting OT LTG   Toileting Goal OT LTG, Date Established 09/20/17 --  --    Toileting Goal OT LTG, Time to Achieve by discharge --  --    Toileting Goal OT LTG, [place] minimum assist (75% patient effort) --  --    Toileting Goal OT LTG, Date Goal Reviewed --  09/29/17  (goal still appropriate) --    Toileting Goal OT LTG, Outcome --  --  goal ongoing

## 2017-10-02 NOTE — PROGRESS NOTES
"Pharmacy Consult  -  Warfarin    Bjorn Pollock is an 84 yo man with a hx of CHF admitted 9/19/17 for with acute on chronic respiratory failure with hypoxia likely secondary to CHF.      PMH includes chronic afib on warfarin, Crawley's esophagus, BPH, CAD (s/p CABG), CKD stage 2-3, COPD, DDD of lumbar and cervical spine, DM, depression, GERD, HLD, HTN, obesity, NAYLA on CPAP, CVA, thrombocytopenia, urinary incontinence.  Warfarin has been held since admission.    Pharmacy has been consulted to dose and monitor warfarin during this hospitalization.    Height - 74\" (188 cm)  Weight - 235 lb (107 kg)    Consulting Provider: - MARLENE Villeda  Indication: - atrial fibrillation  Goal INR: -  2-3  Home Regimen:   - 5 mg on Sunday, Tuesday, Wednesday, Friday and Saturday   - 7.5 mg on Monday and Thursday    Bridge Therapy: no    Drug-Drug Interactions with current regimen:  Aspirin - increases bleeding risk  Augmentin, Vancomycin - may result increased risk of bleed  Pantoprazole - intermediate impact  Sertraline - intermediate impact  Patient is using prn medications containing acetaminophen    Warfarin Dosing During Admission:    Date  9/20 9/22 9/26 9/27  9/28 9/29 9/30 10/1 10/2   INR  4.08 2.70 1.49 1.33  1.47 1.98 2.1 2.45 2.38   Dose  hold hold 7.5 mg 5 mg  5 mg 4mg 4mg 4mg (4mg)     Labs:  Results from last 7 days   Lab Units 10/02/17  0515 10/01/17  0938 09/30/17  0818   INR  2.38 2.45 2.10   Results from last 7 days   Lab Units 10/01/17  0645 09/30/17  0657 09/29/17  0559   WBC 10*3/mm3 4.27 3.86 3.64   HEMOGLOBIN g/dL 10.7* 10.3* 10.4*   HEMATOCRIT % 35.5* 34.2* 34.0*   PLATELETS 10*3/mm3 95* 98* 99*   ALT/AST/T. Bili   16/20/0.9     Albumin = 3.6 gm/dL  Results from last 7 days   Lab Units 10/01/17  0645 09/30/17  0657 09/29/17  0559   SODIUM mmol/L 139 141 137   POTASSIUM mmol/L 4.3 4.2 4.0   CHLORIDE mmol/L 102 104 98*   CO2 mmol/L 31.0 28.0 32.0*   BUN mg/dL 20 16 16   CREATININE mg/dL 1.20 1.20 1.20 "   CALCIUM mg/dL 8.8 8.9 9.1   GLUCOSE mg/dL 71 88 87     Current dietary intake: Diet Regular, cardiac, consistent carbohydrate, ~75%-100% of meals    Assessment/Plan:   Recommend continue 4mg dose of warfarin daily (INR is therapeutic)    Will monitor for s/s of bleeding and toxicity; changes in diet.      Pharmacy will follow the patient's clinical progress and monitor for evidence of adverse effects of warfarin therapy.     Aly Donato RPH  10/2/2017  12:01 PM

## 2017-10-02 NOTE — THERAPY TREATMENT NOTE
Acute Care - Physical Therapy Treatment Note  [facility]     Patient Name: Bjorn Pollock  : 1934  MRN: [id number]  Today's Date: 10/2/2017  Onset of Illness/Injury or Date of Surgery Date: 17  Date of Referral to PT: 17  Referring Physician: MARLENE Boykin    Admit Date: 2017    Visit Dx:    ICD-10-CM ICD-9-CM   1. Hypervolemia, unspecified hypervolemia type E87.70 276.69   2. Acute on chronic systolic congestive heart failure I50.23 428.23     428.0   3. Supratherapeutic INR R79.1 790.92   4. Peripheral edema R60.9 782.3   5. Hypoxia R09.02 799.02   6. Impaired mobility and ADLs Z74.09 799.89   7. Impaired functional mobility, balance, gait, and endurance Z74.09 V49.89     Patient Active Problem List   Diagnosis   • Intractable low back pain   • HTN (hypertension)   • DM type 2 (diabetes mellitus, type 2)   • Intertriginous candidiasis   • COPD (chronic obstructive pulmonary disease)   • NAYLA (obstructive sleep apnea)   • Atrial fibrillation   • Supratherapeutic INR   • CKD (chronic kidney disease) stage 2, GFR 60-89 ml/min   • Diastolic heart failure secondary to hypertension   • Anemia   • DJD (degenerative joint disease)   • CAD (coronary artery disease) s/p CABG history    • Crawley's esophagus   • Chronic thrombocytopenic purpura   • Cerebrovascular accident   • NAYLA on CPAP   • Obesity (BMI 30-39.9)   • Dyslipidemia   • BPH (benign prostatic hyperplasia)   • Lower extremity edema   • Falls   • Sepsis due to urinary tract infection   • Reactive airway disease with wheezing   • Constipation   • Pneumonia due to infectious organism   • Urinary retention   • Olecranon bursitis of right elbow   • Acute exacerbation of CHF (congestive heart failure)   • Systolic and diastolic CHF, acute on chronic   • Acute on chronic respiratory failure with hypoxemia   • Chronic anticoagulation   • Hypervolemia               Adult Rehabilitation Note       10/02/17 0915 10/02/17 0901       Rehab  Assessment/Intervention    Discipline physical therapist  -MB occupational therapist  -AC     Document Type therapy note (daily note)  -MB therapy note (daily note)  -AC     Subjective Information agree to therapy;complains of;weakness;fatigue;dyspnea  -MB agree to therapy;complains of;weakness;fatigue;dyspnea  -AC     Patient Effort, Rehab Treatment adequate  -MB adequate  -AC     Symptoms Noted During/After Treatment increased pain;shortness of breath  -MB fatigue;shortness of breath  -AC     Precautions/Limitations fall precautions;oxygen therapy device and L/min  -MB fall precautions;oxygen therapy device and L/min  -AC     Specific Treatment Considerations SOA, anxiety  -MB SOA, anxiety  -AC     Recorded by [MB] Nicole Camejo, PT [AC] Chanel Wheeler, OT     Vital Signs    Pre Systolic BP Rehab --   VSS.  Nsg cleared for tx.  -MB      Intra SpO2 (%) 96  -MB 96  -AC     O2 Delivery Intra Treatment supplemental O2  -MB supplemental O2  -AC     Post SpO2 (%) 97  -MB 97  -AC     O2 Delivery Post Treatment supplemental O2  -MB supplemental O2  -AC     Pre Patient Position Supine  -MB Supine  -AC     Intra Patient Position Standing  -MB Standing  -AC     Post Patient Position Sitting  -MB Sitting  -AC     Recorded by [MB] Nicole Camejo, PT [AC] Chanel Wheeler, OT     Pain Assessment    Pain Assessment 0-10  -MB 0-10  -AC     Pain Score 2  -MB 2  -AC     Post Pain Score 2  -MB 2  -AC     Pain Type Chronic pain  -MB Chronic pain  -AC     Pain Location Generalized  -MB Generalized  -AC     Pain Intervention(s) Repositioned;Ambulation/increased activity  -MB Repositioned  -AC     Recorded by [MB] Nicole Camejo, PT [AC] Chanel Wheeler OT     Cognitive Assessment/Intervention    Current Cognitive/Communication Assessment functional  -MB functional  -AC     Orientation Status oriented x 4  -MB oriented x 4  -AC     Follows Commands/Answers Questions 100% of the time;able to follow single-step instructions;needs  cueing;needs increased time;needs repetition  -% of the time  -AC     Personal Safety WNL/WFL  -MB WNL/WFL  -AC     Personal Safety Interventions fall prevention program maintained;gait belt;muscle strengthening facilitated;nonskid shoes/slippers when out of bed  -MB fall prevention program maintained;gait belt;nonskid shoes/slippers when out of bed  -AC     Recorded by [MB] Nicole Camejo, PT [AC] Chanel Wheeler OT     Bed Mobility, Assessment/Treatment    Bed Mob, Supine to Sit, [place]  verbal cues required;contact guard assist  -AC     Bed Mobility, Comment Pt. received sitting EOB w/ OT.  -MB required extended time and 1 restbreak  -AC     Recorded by [MB] Nicole Camejo, PT [AC] Chanel Wheeler OT     Transfer Assessment/Treatment    Transfers, Sit-Stand [place] minimum assist (75% patient effort);2 person assist required;verbal cues required  -MB verbal cues required;minimum assist (75% patient effort);2 person assist required  -AC     Transfers, Stand-Sit [place] minimum assist (75% patient effort);2 person assist required;verbal cues required  -MB verbal cues required;minimum assist (75% patient effort);2 person assist required  -AC     Transfers, Sit-Stand-Sit, Assist Device rolling walker;elevated surface  -MB rolling walker  -AC     Transfer, Safety Issues balance decreased during turns;step length decreased;weight-shifting ability decreased  -MB      Transfer, Impairments sensation decreased;strength decreased;impaired balance;pain  -MB sensation decreased;strength decreased  -AC     Transfer, Comment VCs for forward weight-shift and safe hand placement prior to t/f.   -MB Vcs for hand pacement  -AC     Recorded by [MB] Nicole Camejo, PT [AC] Chanel Wheeler OT     Gait Assessment/Treatment    Gait, [place] Level minimum assist (75% patient effort);2 person assist required;verbal cues required  -MB      Gait, Assistive Device rolling walker  -MB      Gait, Distance  (Feet) 12  -MB      Gait, Gait Pattern Analysis swing-to gait  -MB      Gait, Gait Deviations forward flexed posture;decreased heel strike;bilateral:;step length decreased;toe-to-floor clearance decreased;weight-shifting ability decreased  -MB      Gait, Safety Issues balance decreased during turns;step length decreased;weight-shifting ability decreased;supplemental O2  -MB      Gait, Impairments strength decreased;impaired balance;postural control impaired;pain  -MB      Gait, Comment Pt. amb w/ slow nagi and VCs for upright posture, forward gaze, and inc B step length.  Pt. required encouragement to continue ambulating.  Followed closely w/ chair.  -MB      Recorded by [MB] Nicole Camejo, PT      Functional Mobility    Functional Mobility- Ind. Level  verbal cues required;maximum assist (25% patient effort);2 person assist required  -     Functional Mobility- Device  rolling walker  -     Functional Mobility-Distance (Feet)  12  -AC     Functional Mobility- Comment  follwed with chair; slightly agitated when encouraged to walk further  -AC     Recorded by  [AC] Chanel Wheeler, OT     Balance Skills Training    Sitting-Level of Assistance Close supervision  -MB Close supervision  -     Sitting-Balance Support Feet supported  -MB Feet supported  -     Sitting-Balance Activities Trunk control activities;Head control activities (Comment);Forward lean  -MB      Standing-Level of Assistance Minimum assistance;x2  -MB Minimum assistance;x2  -AC     Static Standing Balance Support assistive device  -MB assistive device  -AC     Standing-Balance Activities Weight Shift R-L;Weight Shift A-P  -MB      Gait Balance-Level of Assistance Minimum assistance;x2  -MB Minimum assistance;x2  -AC     Gait Balance Support assistive device  -MB assistive device  -AC     Gait Balance Activities scanning environment R/L;side-stepping  -MB      Recorded by [MB] Nicole Camejo, PT [AC] Chanel Wheeler, OT     Therapy  Exercises    Bilateral Lower Extremities AROM:;AAROM:;10 reps;sitting;ankle pumps/circles;heel raises;hip abduction/adduction;hip flexion;LAQ  -MB      Bilateral Upper Extremity  AROM:;15 reps;elbow flexion/extension;shoulder extension/flexion;shoulder horizontal abd/add  -AC     Recorded by [MB] Nicole Camejo, PT [AC] Chanel Wheeler, OT     Positioning and Restraints    Pre-Treatment Position in bed  -MB in bed  -AC     Post Treatment Position chair  -MB chair  -AC     In Chair notified nsg;reclined;call light within reach;encouraged to call for assist;exit alarm on;legs elevated  -MB reclined;call light within reach;encouraged to call for assist;exit alarm on;with PT  -AC     Recorded by [MB] Nicole Camejo, PT [AC] Chanel Wheeler OT       User Key  (r) = Recorded By, (t) = Taken By, (c) = Cosigned By    Initials Name Effective Dates    AC Chanel Wheeler, OT 06/23/15 -     BREANA Camejo, PT 03/14/16 -                 IP PT Goals       10/02/17 1150 09/29/17 0923 09/27/17 1029    Bed Mobility PT LTG    Bed Mobility PT LTG, Outcome goal ongoing  -MB goal ongoing  -UD goal ongoing  -UD    Transfer Training PT LTG    Transfer Training PT LTG, Outcome goal ongoing  -MB goal ongoing  -UD goal ongoing  -UD    Gait Training PT LTG    Gait Training Goal PT LTG, Outcome goal ongoing  -MB goal ongoing  -UD goal ongoing  -UD      09/26/17 1035 09/26/17 1030 09/24/17 1101    Bed Mobility PT LTG    Bed Mobility PT LTG, Date Goal Reviewed 09/26/17  -AS      Bed Mobility PT LTG, Outcome goal ongoing  -AS  goal ongoing  -KM    Transfer Training PT LTG    Transfer Training PT  LTG, Date Goal Reviewed 09/26/17  -AS      Transfer Training PT LTG, Outcome goal ongoing  -AS  goal ongoing  -KM    Gait Training PT LTG    Gait Training Goal PT LTG, Date Goal Reviewed 09/26/17  -AS      Gait Training Goal PT LTG, Outcome goal ongoing  -AS  goal ongoing  -KM    Wound Care PT LTG    Wound Care PT LTG 1, Outcome  goal ongoing   -       09/22/17 1200 09/22/17 0815 09/21/17 1040    Bed Mobility PT LTG    Bed Mobility PT LTG, Outcome goal ongoing  -KM      Transfer Training PT LTG    Transfer Training PT LTG, Outcome goal ongoing  -KM      Gait Training PT LTG    Gait Training Goal PT LTG, Outcome goal ongoing  -KM      Wound Care PT LTG    Wound Care PT LTG 1, Date Established   09/21/17  -    Wound Care PT LTG 1, Time to Achieve   5 - 7 days  -    Wound Care PT LTG 1, Location   BLE  -    Wound Care PT LTG 1, No S&S of Infection   yes  -    Wound Care PT LTG 1, No Skin Break Down   yes  -    Wound Care PT LTG 1, Education   wound care;edema management  -    Wound Care PT LTG 1, Education Understanding   verbalize understanding  -    Wound Care PT LTG 1, Additional Goal   Decrease BLE edema to trace/none to allow for compression stocking usage.  -    Wound Care PT LTG 1, Outcome  goal ongoing  -       09/20/17 1100          Bed Mobility PT LTG    Bed Mobility PT LTG, Date Established 09/20/17  -DM      Bed Mobility PT LTG, Time to Achieve 1 wk  -DM      Bed Mobility PT LTG, Activity Type all bed mobility  -DM      Bed Mobility PT LTG, [place] Level independent  -DM      Transfer Training PT LTG    Transfer Training PT LTG, Date Established 09/20/17  -DM      Transfer Training PT LTG, Time to Achieve 1 wk  -DM      Transfer Training PT LTG, Activity Type bed to chair /chair to bed;sit to stand/stand to sit  -DM      Transfer Training PT LTG, [place] Level independent  -DM      Transfer Training PT LTG, Assist Device walker, rolling  -DM      Gait Training PT LTG    Gait Training Goal PT LTG, Date Established 09/20/17  -DM      Gait Training Goal PT LTG, Time to Achieve 1 wk  -DM      Gait Training Goal PT LTG, [place] Level contact guard assist   stable VS  -DM      Gait Training Goal PT LTG, Assist Device walker, rolling  -DM      Gait Training Goal PT LTG, Distance to Achieve 50  -DM        User Key  (r) =  Recorded By, (t) = Taken By, (c) = Cosigned By    Initials Name Provider Type    UD Mary Kay Alcocer, PTA Physical Therapy Assistant    KM Pooja Vegas, PT Physical Therapist    SHADY Pope, PT Physical Therapist    AS Heidi Talbot, PTA Physical Therapy Assistant    MB Nicole Camejo, PT Physical Therapist    CHRISTIANNE Gamble, PT Physical Therapist          Physical Therapy Education     Title: PT OT SLP Therapies (Active)     Topic: Physical Therapy (Active)     Point: Mobility training (Active)    Learning Progress Summary    Learner Readiness Method Response Comment Documented by Status   Patient Acceptance E,D NR fall precautions, gait mechanics, benefits of therapy/activity MB 10/02/17 1149 Active    Acceptance E NR   09/30/17 1520 Active    Acceptance E,D DU,NR   09/29/17 0922 Done    Acceptance E,D DU,NR   09/27/17 1029 Done    Acceptance E NR  AS 09/26/17 1035 Active    Acceptance E VU   09/24/17 1100 Done    Acceptance E VU instructed on pursed lip breathing  09/22/17 1203 Done    Acceptance E,D NR   09/20/17 1100 Active   Family Acceptance E NR   09/30/17 1520 Active               Point: Home exercise program (Active)    Learning Progress Summary    Learner Readiness Method Response Comment Documented by Status   Patient Acceptance E,D NR fall precautions, gait mechanics, benefits of therapy/activity MB 10/02/17 1149 Active    Acceptance E NR   09/30/17 1520 Active    Acceptance E,D DU,NR   09/29/17 0922 Done    Acceptance E,D DU,NR   09/27/17 1029 Done    Acceptance E NR  AS 09/26/17 1035 Active    Acceptance E VU   09/24/17 1100 Done    Acceptance E VU instructed on pursed lip breathing  09/22/17 1203 Done    Acceptance E,D NR   09/20/17 1100 Active   Family Acceptance E NR   09/30/17 1520 Active               Point: Body mechanics (Active)    Learning Progress Summary    Learner Readiness Method Response Comment Documented by Status   Patient  Acceptance E,D NR fall precautions, gait mechanics, benefits of therapy/activity MB 10/02/17 1149 Active    Acceptance E NR   09/30/17 1520 Active    Acceptance E,D DU,NR   09/29/17 0922 Done    Acceptance E,D DU,NR   09/27/17 1029 Done    Acceptance E NR  AS 09/26/17 1035 Active    Acceptance E VU   09/24/17 1100 Done    Acceptance E VU instructed on pursed lip breathing  09/22/17 1203 Done    Acceptance E,D NR   09/20/17 1100 Active   Family Acceptance E NR   09/30/17 1520 Active               Point: Precautions (Active)    Learning Progress Summary    Learner Readiness Method Response Comment Documented by Status   Patient Acceptance E,D NR fall precautions, gait mechanics, benefits of therapy/activity MB 10/02/17 1149 Active    Acceptance E NR   09/30/17 1520 Active    Acceptance E,D DU,NR   09/29/17 0922 Done    Acceptance E,D DU,NR   09/27/17 1029 Done    Acceptance E NR  AS 09/26/17 1035 Active    Acceptance E VU   09/24/17 1100 Done    Acceptance E VU instructed on pursed lip breathing  09/22/17 1203 Done    Acceptance E,D NR   09/20/17 1100 Active   Family Acceptance E NR   09/30/17 1520 Active                      User Key     Initials Effective Dates Name Provider Type Discipline     06/19/15 -  Juanita Bishop, PT Physical Therapist PT     06/22/15 -  Mary Kay Alcocer, PTA Physical Therapy Assistant PT     06/19/15 -  Pooja Vegas, PT Physical Therapist PT     06/19/15 -  Noa Pope, PT Physical Therapist PT    AS 06/22/15 -  Heidi Talbot, PTA Physical Therapy Assistant PT    MB 03/14/16 -  Nicole Camejo, PT Physical Therapist PT                    PT Recommendation and Plan  Anticipated Discharge Disposition: skilled nursing facility  PT Frequency: daily, per priority policy  Plan of Care Review  Plan Of Care Reviewed With: patient  Progress: improving  Outcome Summary/Follow up Plan: Pt. participated in bed mob, transfers, household gait, and  BLE TherEx.  Pt. amb 12 ft w/ RW and min A x 2.  Pt. requires encouragement to participate and progress limited by fatigue, weakness, and anxiety w/ dyspnea despite VSS.  Cont PT per POC.          Outcome Measures       10/02/17 0915 10/02/17 0901 09/30/17 1330    How much help from another person do you currently need...    Turning from your back to your side while in flat bed without using bedrails? 3  -MB  3  -KALIN    Moving from lying on back to sitting on the side of a flat bed without bedrails? 3  -MB  2  -KALIN    Moving to and from a bed to a chair (including a wheelchair)? 3  -MB  3  -KALIN    Standing up from a chair using your arms (e.g., wheelchair, bedside chair)? 3  -MB  3  -KALIN    Climbing 3-5 steps with a railing? 1  -MB  1  -KALIN    To walk in hospital room? 2  -MB  2  -KALIN    AM-PAC 6 Clicks Score 15  -MB  14  -KALIN    How much help from another is currently needed...    Putting on and taking off regular lower body clothing?  1  -AC     Bathing (including washing, rinsing, and drying)  2  -AC     Toileting (which includes using toilet bed pan or urinal)  2  -AC     Putting on and taking off regular upper body clothing  2  -AC     Taking care of personal grooming (such as brushing teeth)  3  -AC     Eating meals  3  -AC     Score  13  -AC     Functional Assessment    Outcome Measure Options AM-PAC 6 Clicks Basic Mobility (PT)  -MB AM-PAC 6 Clicks Daily Activity (OT)  -AC       User Key  (r) = Recorded By, (t) = Taken By, (c) = Cosigned By    Initials Name Provider Type    KALIN Bishop, PT Physical Therapist    AC Chanel Wheeler, OT Occupational Therapist    MB Nicole Camejo, PT Physical Therapist           Time Calculation:         PT Charges       10/02/17 1154          Time Calculation    Start Time 0915  -MB      PT Received On 10/02/17  -MB      PT Goal Re-Cert Due Date 10/10/17  -MB      Time Calculation- PT    Total Timed Code Minutes- PT 23 minute(s)  -MB        User Key  (r) = Recorded By, (t)  = Taken By, (c) = Cosigned By    Initials Name Provider Type    BREANA Camejo, PT Physical Therapist          Therapy Charges for Today     Code Description Service Date Service Provider Modifiers Qty    43835983111 HC GAIT TRAINING EA 15 MIN 10/2/2017 Nicole Camejo, PT GP 1    71170458404 HC PT THER PROC EA 15 MIN 10/2/2017 Nicole Camejo PT GP 1          PT G-Codes  PT Professional Judgement Used?: Yes  Outcome Measure Options: AM-PAC 6 Clicks Basic Mobility (PT)  Score: 16  Functional Limitation: Mobility: Walking and moving around  Mobility: Walking and Moving Around Current Status (): At least 40 percent but less than 60 percent impaired, limited or restricted  Mobility: Walking and Moving Around Goal Status (): At least 20 percent but less than 40 percent impaired, limited or restricted    Nicole Camejo, PT  10/2/2017

## 2017-10-02 NOTE — THERAPY WOUND CARE TREATMENT
Acute Care - Physical Therapy Lymphedema Treatment Note  [facility]     Patient Name: Bjorn Pollock  : 1934  MRN: [id number]  Today's Date: 10/2/2017  Onset of Illness/Injury or Date of Surgery Date: 17   Date of Referral to PT: 17   Referring Physician: MARLENE Boykin        Admit Date: 2017    Visit Dx:    ICD-10-CM ICD-9-CM   1. Hypervolemia, unspecified hypervolemia type E87.70 276.69   2. Acute on chronic systolic congestive heart failure I50.23 428.23     428.0   3. Supratherapeutic INR R79.1 790.92   4. Peripheral edema R60.9 782.3   5. Hypoxia R09.02 799.02   6. Impaired mobility and ADLs Z74.09 799.89   7. Impaired functional mobility, balance, gait, and endurance Z74.09 V49.89       Patient Active Problem List   Diagnosis   • Intractable low back pain   • HTN (hypertension)   • DM type 2 (diabetes mellitus, type 2)   • Intertriginous candidiasis   • COPD (chronic obstructive pulmonary disease)   • NAYLA (obstructive sleep apnea)   • Atrial fibrillation   • Supratherapeutic INR   • CKD (chronic kidney disease) stage 2, GFR 60-89 ml/min   • Diastolic heart failure secondary to hypertension   • Anemia   • DJD (degenerative joint disease)   • CAD (coronary artery disease) s/p CABG history    • Crawley's esophagus   • Chronic thrombocytopenic purpura   • Cerebrovascular accident   • NAYLA on CPAP   • Obesity (BMI 30-39.9)   • Dyslipidemia   • BPH (benign prostatic hyperplasia)   • Lower extremity edema   • Falls   • Sepsis due to urinary tract infection   • Reactive airway disease with wheezing   • Constipation   • Pneumonia due to infectious organism   • Urinary retention   • Olecranon bursitis of right elbow   • Acute exacerbation of CHF (congestive heart failure)   • Systolic and diastolic CHF, acute on chronic   • Acute on chronic respiratory failure with hypoxemia   • Chronic anticoagulation   • Hypervolemia              Lymphedema       10/02/17 0945          Lymphedema Edema  Assessment    Ptting Edema Category By severity  -      Pitting Edema Mild  -      Recorded by [] Aly Mckeon, PT      Compression/Skin Care    Compression/Skin Care skin care;wrapping location;bandaging  -      Skin Care washed/dried  -      Wrapping Location lower extremity  -      Wrapping Location LE bilateral:;foot to knee  -      Wrapping Comments unna boots with size 4 coban and spandage to secure  -      Recorded by [] Aly Mckeon PT        User Key  (r) = Recorded By, (t) = Taken By, (c) = Cosigned By    Initials Name Effective Dates     Aly Mckeon PT 06/19/15 -                 Adult Rehabilitation Note       10/02/17 0945 10/02/17 0915 10/02/17 0901    Rehab Assessment/Intervention    Discipline physical therapist  - physical therapist  -MB occupational therapist  -AC    Document Type therapy note (daily note)  - therapy note (daily note)  -MB therapy note (daily note)  -AC    Subjective Information agree to therapy;complains of;pain  - agree to therapy;complains of;weakness;fatigue;dyspnea  -MB agree to therapy;complains of;weakness;fatigue;dyspnea  -AC    Patient Effort, Rehab Treatment  adequate  -MB adequate  -AC    Symptoms Noted During/After Treatment  increased pain;shortness of breath  -MB fatigue;shortness of breath  -AC    Precautions/Limitations  fall precautions;oxygen therapy device and L/min  -MB fall precautions;oxygen therapy device and L/min  -AC    Specific Treatment Considerations  SOA, anxiety  -MB SOA, anxiety  -AC    Recorded by [] Aly Mckeon, PT [MB] Nicole Camejo, PT [AC] Chanel Wheeler OT    Vital Signs    Pre Systolic BP Rehab  --   VSS.  Nsg cleared for tx.  -MB     Intra SpO2 (%)  96  -MB 96  -AC    O2 Delivery Intra Treatment  supplemental O2  -MB supplemental O2  -AC    Post SpO2 (%)  97  -MB 97  -AC    O2 Delivery Post Treatment  supplemental O2  -MB supplemental O2  -AC    Pre Patient Position  Supine  -MB Supine   -AC    Intra Patient Position  Standing  -MB Standing  -AC    Post Patient Position  Sitting  -MB Sitting  -AC    Recorded by  [MB] Nicole Camejo, PT [AC] Chanel Wheeler, OT    Pain Assessment    Pain Assessment Arrington-Moon FACES  -MF 0-10  -MB 0-10  -AC    Arrington-Moon FACES Pain Rating 2  -MF      Pain Score  2  -MB 2  -AC    Post Pain Score  2  -MB 2  -AC    Pain Type Chronic pain  -MF Chronic pain  -MB Chronic pain  -AC    Pain Location Leg  -MF Generalized  -MB Generalized  -AC    Pain Orientation Left  -MF      Pain Intervention(s) Repositioned  -MF Repositioned;Ambulation/increased activity  -MB Repositioned  -AC    Recorded by [MF] Aly Mckeon, PT [MB] Nicole Camejo, PT [AC] Chanel Wheeler, OT    Cognitive Assessment/Intervention    Current Cognitive/Communication Assessment  functional  -MB functional  -AC    Orientation Status  oriented x 4  -MB oriented x 4  -AC    Follows Commands/Answers Questions  100% of the time;able to follow single-step instructions;needs cueing;needs increased time;needs repetition  -% of the time  -AC    Personal Safety  WNL/WFL  -MB WNL/WFL  -AC    Personal Safety Interventions  fall prevention program maintained;gait belt;muscle strengthening facilitated;nonskid shoes/slippers when out of bed  -MB fall prevention program maintained;gait belt;nonskid shoes/slippers when out of bed  -AC    Recorded by  [MB] Nicole Camejo, PT [AC] Chanel Wheeler OT    Bed Mobility, Assessment/Treatment    Bed Mob, Supine to Sit, [place]   verbal cues required;contact guard assist  -AC    Bed Mobility, Comment  Pt. received sitting EOB w/ OT.  -MB required extended time and 1 restbreak  -AC    Recorded by  [MB] Nicole Camejo, PT [AC] Chanel Wheeler OT    Transfer Assessment/Treatment    Transfers, Sit-Stand [place]  minimum assist (75% patient effort);2 person assist required;verbal cues required  -MB verbal cues required;minimum assist (75% patient effort);2  person assist required  -AC    Transfers, Stand-Sit [place]  minimum assist (75% patient effort);2 person assist required;verbal cues required  -MB verbal cues required;minimum assist (75% patient effort);2 person assist required  -AC    Transfers, Sit-Stand-Sit, Assist Device  rolling walker;elevated surface  -MB rolling walker  -AC    Transfer, Safety Issues  balance decreased during turns;step length decreased;weight-shifting ability decreased  -MB     Transfer, Impairments  sensation decreased;strength decreased;impaired balance;pain  -MB sensation decreased;strength decreased  -AC    Transfer, Comment  VCs for forward weight-shift and safe hand placement prior to t/f.   -MB Vcs for hand pacement  -AC    Recorded by  [MB] Nicole Camejo, PT [AC] Chanel Wheeler OT    Gait Assessment/Treatment    Gait, [place] Level  minimum assist (75% patient effort);2 person assist required;verbal cues required  -MB     Gait, Assistive Device  rolling walker  -MB     Gait, Distance (Feet)  12  -MB     Gait, Gait Pattern Analysis  swing-to gait  -MB     Gait, Gait Deviations  forward flexed posture;decreased heel strike;bilateral:;step length decreased;toe-to-floor clearance decreased;weight-shifting ability decreased  -MB     Gait, Safety Issues  balance decreased during turns;step length decreased;weight-shifting ability decreased;supplemental O2  -MB     Gait, Impairments  strength decreased;impaired balance;postural control impaired;pain  -MB     Gait, Comment  Pt. amb w/ slow nagi and VCs for upright posture, forward gaze, and inc B step length.  Pt. required encouragement to continue ambulating.  Followed closely w/ chair.  -MB     Recorded by  [MB] Nicole Camejo, PT     Functional Mobility    Functional Mobility- Ind. Level   verbal cues required;maximum assist (25% patient effort);2 person assist required  -AC    Functional Mobility- Device   rolling walker  -AC    Functional Mobility-Distance  (Feet)   12  -AC    Functional Mobility- Comment   follwed with chair; slightly agitated when encouraged to walk further  -AC    Recorded by   [AC] Chanel Wheeler, OT    Balance Skills Training    Sitting-Level of Assistance  Close supervision  -MB Close supervision  -    Sitting-Balance Support  Feet supported  -MB Feet supported  -    Sitting-Balance Activities  Trunk control activities;Head control activities (Comment);Forward lean  -MB     Standing-Level of Assistance  Minimum assistance;x2  -MB Minimum assistance;x2  -AC    Static Standing Balance Support  assistive device  -MB assistive device  -AC    Standing-Balance Activities  Weight Shift R-L;Weight Shift A-P  -MB     Gait Balance-Level of Assistance  Minimum assistance;x2  -MB Minimum assistance;x2  -AC    Gait Balance Support  assistive device  -MB assistive device  -AC    Gait Balance Activities  scanning environment R/L;side-stepping  -MB     Recorded by  [MB] Nicole Camejo, PT [AC] Chanel Wheeler OT    Therapy Exercises    Bilateral Lower Extremities  AROM:;AAROM:;10 reps;sitting;ankle pumps/circles;heel raises;hip abduction/adduction;hip flexion;LAQ  -MB     Bilateral Upper Extremity   AROM:;15 reps;elbow flexion/extension;shoulder extension/flexion;shoulder horizontal abd/add  -AC    Recorded by  [MB] Nicole Camejo, PT [AC] Chanel Wheeler OT    Positioning and Restraints    Pre-Treatment Position sitting in chair/recliner  - in bed  -MB in bed  -    Post Treatment Position chair  - chair  -MB chair  -    In Chair reclined;call light within reach  - notified nsg;reclined;call light within reach;encouraged to call for assist;exit alarm on;legs elevated  -MB reclined;call light within reach;encouraged to call for assist;exit alarm on;with PT  -AC    Recorded by [MF] Aly Mckeon, PT [MB] Nicole Camejo, PT [AC] Chanel Wheeler, OT      User Key  (r) = Recorded By, (t) = Taken By, (c) = Cosigned By    Initials Name  Effective Dates    AC Chanel Wheeler, OT 06/23/15 -     MF Aly Mckeon, PT 06/19/15 -     MB Nicole Camejo, PT 03/14/16 -                 IP PT Goals       10/02/17 1150 09/29/17 0923 09/27/17 1029    Bed Mobility PT LTG    Bed Mobility PT LTG, Outcome goal ongoing  -MB goal ongoing  -UD goal ongoing  -UD    Transfer Training PT LTG    Transfer Training PT LTG, Outcome goal ongoing  -MB goal ongoing  -UD goal ongoing  -UD    Gait Training PT LTG    Gait Training Goal PT LTG, Outcome goal ongoing  -MB goal ongoing  -UD goal ongoing  -UD      09/26/17 1035 09/26/17 1030 09/24/17 1101    Bed Mobility PT LTG    Bed Mobility PT LTG, Date Goal Reviewed 09/26/17  -AS      Bed Mobility PT LTG, Outcome goal ongoing  -AS  goal ongoing  -KM    Transfer Training PT LTG    Transfer Training PT  LTG, Date Goal Reviewed 09/26/17  -AS      Transfer Training PT LTG, Outcome goal ongoing  -AS  goal ongoing  -KM    Gait Training PT LTG    Gait Training Goal PT LTG, Date Goal Reviewed 09/26/17  -AS      Gait Training Goal PT LTG, Outcome goal ongoing  -AS  goal ongoing  -KM    Wound Care PT LTG    Wound Care PT LTG 1, Outcome  goal ongoing  -MC       09/22/17 1200 09/22/17 0815 09/21/17 1040    Bed Mobility PT LTG    Bed Mobility PT LTG, Outcome goal ongoing  -KM      Transfer Training PT LTG    Transfer Training PT LTG, Outcome goal ongoing  -KM      Gait Training PT LTG    Gait Training Goal PT LTG, Outcome goal ongoing  -KM      Wound Care PT LTG    Wound Care PT LTG 1, Date Established   09/21/17  -MC    Wound Care PT LTG 1, Time to Achieve   5 - 7 days  -MC    Wound Care PT LTG 1, Location   BLE  -MC    Wound Care PT LTG 1, No S&S of Infection   yes  -MC    Wound Care PT LTG 1, No Skin Break Down   yes  -MC    Wound Care PT LTG 1, Education   wound care;edema management  -MC    Wound Care PT LTG 1, Education Understanding   verbalize understanding  -MC    Wound Care PT LTG 1, Additional Goal   Decrease BLE edema to  trace/none to allow for compression stocking usage.  -    Wound Care PT LTG 1, Outcome  goal ongoing  -MC       09/20/17 1100          Bed Mobility PT LTG    Bed Mobility PT LTG, Date Established 09/20/17  -DM      Bed Mobility PT LTG, Time to Achieve 1 wk  -DM      Bed Mobility PT LTG, Activity Type all bed mobility  -DM      Bed Mobility PT LTG, [place] Level independent  -DM      Transfer Training PT LTG    Transfer Training PT LTG, Date Established 09/20/17  -DM      Transfer Training PT LTG, Time to Achieve 1 wk  -DM      Transfer Training PT LTG, Activity Type bed to chair /chair to bed;sit to stand/stand to sit  -DM      Transfer Training PT LTG, [place] Level independent  -DM      Transfer Training PT LTG, Assist Device walker, rolling  -DM      Gait Training PT LTG    Gait Training Goal PT LTG, Date Established 09/20/17  -DM      Gait Training Goal PT LTG, Time to Achieve 1 wk  -DM      Gait Training Goal PT LTG, [place] Level contact guard assist   stable VS  -DM      Gait Training Goal PT LTG, Assist Device walker, rolling  -DM      Gait Training Goal PT LTG, Distance to Achieve 50  -DM        User Key  (r) = Recorded By, (t) = Taken By, (c) = Cosigned By    Initials Name Provider Type    LOTUS Alcocer, PTA Physical Therapy Assistant    FERN Vegas, PT Physical Therapist    SHADY Pope, PT Physical Therapist    AS Heidi Talbot, PTA Physical Therapy Assistant    BREANA Camejo, PT Physical Therapist     Maddison Gamble, PT Physical Therapist          Physical Therapy Education     Title: PT OT SLP Therapies (Active)     Topic: Physical Therapy (Active)     Point: Mobility training (Active)    Learning Progress Summary    Learner Readiness Method Response Comment Documented by Status   Patient Acceptance E,D NR fall precautions, gait mechanics, benefits of therapy/activity MB 10/02/17 1149 Active    Acceptance E NR  KALIN 09/30/17 1520 Active     Acceptance E,D DU,NR   09/29/17 0922 Done    Acceptance E,D DU,NR   09/27/17 1029 Done    Acceptance E NR  AS 09/26/17 1035 Active    Acceptance E VU   09/24/17 1100 Done    Acceptance E VU instructed on pursed lip breathing  09/22/17 1203 Done    Acceptance E,D NR   09/20/17 1100 Active   Family Acceptance E NR   09/30/17 1520 Active               Point: Home exercise program (Active)    Learning Progress Summary    Learner Readiness Method Response Comment Documented by Status   Patient Acceptance E,D NR fall precautions, gait mechanics, benefits of therapy/activity MB 10/02/17 1149 Active    Acceptance E NR   09/30/17 1520 Active    Acceptance E,D DU,NR   09/29/17 0922 Done    Acceptance E,D DU,NR   09/27/17 1029 Done    Acceptance E NR  AS 09/26/17 1035 Active    Acceptance E VU   09/24/17 1100 Done    Acceptance E VU instructed on pursed lip breathing  09/22/17 1203 Done    Acceptance E,D NR   09/20/17 1100 Active   Family Acceptance E NR   09/30/17 1520 Active               Point: Body mechanics (Active)    Learning Progress Summary    Learner Readiness Method Response Comment Documented by Status   Patient Acceptance E,D NR fall precautions, gait mechanics, benefits of therapy/activity MB 10/02/17 1149 Active    Acceptance E NR   09/30/17 1520 Active    Acceptance E,D DU,NR   09/29/17 0922 Done    Acceptance E,D DU,NR   09/27/17 1029 Done    Acceptance E NR  AS 09/26/17 1035 Active    Acceptance E VU   09/24/17 1100 Done    Acceptance E VU instructed on pursed lip breathing  09/22/17 1203 Done    Acceptance E,D NR   09/20/17 1100 Active   Family Acceptance E NR   09/30/17 1520 Active               Point: Precautions (Active)    Learning Progress Summary    Learner Readiness Method Response Comment Documented by Status   Patient Acceptance E,D NR fall precautions, gait mechanics, benefits of therapy/activity MB 10/02/17 1149 Active    Acceptance E NR   09/30/17 1520  Active    Acceptance E,D DU,NR  UD 09/29/17 0922 Done    Acceptance E,D DU,NR  UD 09/27/17 1029 Done    Acceptance E NR  AS 09/26/17 1035 Active    Acceptance E VU   09/24/17 1100 Done    Acceptance E VU instructed on pursed lip breathing  09/22/17 1203 Done    Acceptance E,D NR  DM 09/20/17 1100 Active   Family Acceptance E NR   09/30/17 1520 Active                      User Key     Initials Effective Dates Name Provider Type Discipline     06/19/15 -  Juanita Bishop, PT Physical Therapist PT     06/22/15 -  Mary Kay Alcocer, PTA Physical Therapy Assistant PT     06/19/15 -  Pooja Vegas, PT Physical Therapist PT     06/19/15 -  Noa Pope, PT Physical Therapist PT    AS 06/22/15 -  Heidi Talbot, PTA Physical Therapy Assistant PT    MB 03/14/16 -  Nicole Camejo, PT Physical Therapist PT                  PT Recommendation and Plan  Anticipated Discharge Disposition: skilled nursing facility  PT Frequency: daily, per priority policy  Plan of Care Review  Plan Of Care Reviewed With: patient  Outcome Summary/Follow up Plan: PT added foam to ant shins to help limit any pressure to ant shin with compression from unna boots.             Outcome Measures       10/02/17 0915 10/02/17 0901 09/30/17 1330    How much help from another person do you currently need...    Turning from your back to your side while in flat bed without using bedrails? 3  -MB  3  -KALIN    Moving from lying on back to sitting on the side of a flat bed without bedrails? 3  -MB  2  -KALIN    Moving to and from a bed to a chair (including a wheelchair)? 3  -MB  3  -KALIN    Standing up from a chair using your arms (e.g., wheelchair, bedside chair)? 3  -MB  3  -KALIN    Climbing 3-5 steps with a railing? 1  -MB  1  -KALIN    To walk in hospital room? 2  -MB  2  -KALIN    AM-PAC 6 Clicks Score 15  -MB  14  -KALIN    How much help from another is currently needed...    Putting on and taking off regular lower body clothing?  1  -AC      Bathing (including washing, rinsing, and drying)  2  -AC     Toileting (which includes using toilet bed pan or urinal)  2  -AC     Putting on and taking off regular upper body clothing  2  -AC     Taking care of personal grooming (such as brushing teeth)  3  -AC     Eating meals  3  -AC     Score  13  -AC     Functional Assessment    Outcome Measure Options AM-PAC 6 Clicks Basic Mobility (PT)  -MB AM-PAC 6 Clicks Daily Activity (OT)  -AC       User Key  (r) = Recorded By, (t) = Taken By, (c) = Cosigned By    Initials Name Provider Type    KALIN Bishop, PT Physical Therapist    AC Chanel Wheeler, OT Occupational Therapist    BREANA Camejo, PT Physical Therapist            Time Calculation        PT Charges       10/02/17 1154 10/02/17 0945       Time Calculation    Start Time 0915  -MB 0945  -     PT Received On 10/02/17  -MB      PT Goal Re-Cert Due Date 10/10/17  -MB 10/10/17  -     Time Calculation- PT    Total Timed Code Minutes- PT 23 minute(s)  -MB 45 minute(s)  -       User Key  (r) = Recorded By, (t) = Taken By, (c) = Cosigned By    Initials Name Provider Type     Aly Mckeon, PT Physical Therapist    BREANA Camejo, PT Physical Therapist            Therapy Charges for Today     Code Description Service Date Service Provider Modifiers Qty    50810416302 HC PT STAPPING UNNA BOOT 10/2/2017 Aly Mckeon, PT GP 1    46296244068 HC PT THER PROC EA 15 MIN 10/2/2017 Aly Mckeon, PT GP 1            PT G-Codes  PT Professional Judgement Used?: Yes  Outcome Measure Options: AM-PAC 6 Clicks Basic Mobility (PT)  Score: 16  Functional Limitation: Mobility: Walking and moving around  Mobility: Walking and Moving Around Current Status (): At least 40 percent but less than 60 percent impaired, limited or restricted  Mobility: Walking and Moving Around Goal Status (): At least 20 percent but less than 40 percent impaired, limited or restricted      Aly Mckeon,  PT  10/2/2017

## 2017-10-02 NOTE — PLAN OF CARE
Problem: Patient Care Overview (Adult)  Goal: Plan of Care Review  Outcome: Ongoing (interventions implemented as appropriate)    10/02/17 0921   Coping/Psychosocial Response Interventions   Plan Of Care Reviewed With patient   Outcome Evaluation   Outcome Summary/Follow up Plan Pt cont to have pain to ant shin of LLE. PT held compression wrapping once removed in AM and returned in PM, but pt noted to have increased edema and only minimal improvement in pain. PT will cont with unna boots to help decrease edema and improve skin integrity.          Problem: Inpatient Physical Therapy  Goal: Wound Care Goal 1 LTG- PT  Outcome: Ongoing (interventions implemented as appropriate)

## 2017-10-02 NOTE — PAYOR COMM NOTE
"Holger Pollock (83 y.o. Male)     Date of Birth Social Security Number Address Home Phone MRN    [id number]  [id number] [facility] [address] [phone] [id number]    [place]vent Marital Status          Gnosticist        Admission Date Admission Type Admitting Provider Attending Provider Department, Room/Bed    9/19/17 Emergency Villa Key MD [place], Villa Shelton MD [facility] 3E, S342/1    Discharge Date Discharge Disposition Discharge Destination                      Attending Provider: Villa Key MD     Allergies:  Phenergan [Promethazine Hcl], Promethazine    Isolation:  None   Infection:  None   Code Status:  FULL    Ht:  74\" (188 cm)   Wt:  237 lb 6 oz (108 kg)    Admission Cmt:  None   Principal Problem:  Acute on chronic respiratory failure with hypoxemia [J96.21]                 Active Insurance as of 9/19/2017     Primary Coverage     Payor Plan Insurance Group Employer/Plan Group    ANTHEM BLUE CROSS [facility] [facility] [id number]     Payor Plan Address Payor Plan Phone Number Effective From Effective To    [address] [phone] 1/1/2015     [place] [address]       Subscriber Name Subscriber Birth Date Member ID       HOLGER POLLOCK [id number]                 Emergency Contacts      (Rel.) Home Phone Work Phone Mobile Phone    Ade Pollock (Spouse) [phone] -- --    Ayah Godinez (Daughter) -- -- [phone]        Maddison Blevins  Signed Case Management Progress Notes Date of Service: 10/2/2017 10:11 AM         []Hide copied text  []Hover for attribution information  Continued Stay Note  [facility]     Patient Name: Holger Pollock                                       MRN: [id number]  Today's Date: 10/2/2017                                    Admit Date: 9/19/2017            Discharge Plan        10/02/17 1009          Case Management/Social Work Plan     Plan [facility]     Patient/Family In Agreement With Plan yes     Additional Comments Per TARIQ Arenas, patient is medically ready for transfer. Notified Marie with [facility], and she will submit clinical updates to patient's insurance. Plan is for patient to transfer to [facility], PENDING insurance approval and bed availability. CM will continue to follow.                      Discharge Codes      None       Expected Discharge Date and Time     Expected Discharge Date Expected Discharge Time     Oct 4, 2017                 Maddison Blevins                                                                      Physician Progress Notes (last 24 hours) (Notes from 10/1/2017 10:12 AM through 10/2/2017 10:12 AM)      Villa Key MD at 10/1/2017 11:25 AM  Version 1 of 1               HOSPITALIST DAILY PROGRESS NOTE    Chief Complaint: dyspnea    Subjective   SUBJECTIVE/OVERNIGHT EVENTS   The patient continues to make progress.  Denies dyspnea, orthopnea or paroxysmal nocturnal dyspnea.  Denies fever, cough or hemoptysis.  Improved appetite.  Resolved obstructive urinary symptoms    Review of Systems:  General - No fevers, no chills  CVS - No chest pain, no palpitations  Resp - +Cough, no dyspnea  GI - No N/V/D, no abd pain    Objective   OBJECTIVE   I have reviewed the vital signs.  /62 (BP Location: Right arm, Patient Position: Lying)  Pulse 78  Temp 98.6 °F (37 °C) (Oral)   Resp 16  Ht 74\" (188 cm)  Wt 239 lb 14.4 oz (109 kg)  SpO2 92%  BMI 30.8 kg/m2    Physical Exam:  General - NAD, pt was sitting in chair comfortably on room air  HEENT - EOMI, PERRL, oropharynx clear, hearing intact  CVS - RRR, S1 S2, no rubs, gallops or murmurs, 2s cap refill, 1+ peripheral edema, 2+ pulses  Resp - CTAB, no crackles and wheezes   GI - +BS, soft, ND/NT  MSK - No joint pain or joint edema  Neuro - Awake and oriented, follows commands, interactive, moves all extremities equally    Results:  I have reviewed the " labs, culture data, radiology results, and diagnostic studies.      Results from last 7 days  Lab Units 10/01/17  0645 09/30/17  0657 09/29/17  0559   WBC 10*3/mm3 4.27 3.86 3.64   HEMOGLOBIN g/dL 10.7* 10.3* 10.4*   HEMATOCRIT % 35.5* 34.2* 34.0*   PLATELETS 10*3/mm3 95* 98* 99*       Results from last 7 days  Lab Units 10/01/17  0645   SODIUM mmol/L 139   POTASSIUM mmol/L 4.3   CHLORIDE mmol/L 102   CO2 mmol/L 31.0   BUN mg/dL 20   CREATININE mg/dL 1.20   GLUCOSE mg/dL 71   CALCIUM mg/dL 8.8       Culture Data:  Cultures:         I have reviewed the medications.    aspirin 81 mg Oral Daily   atorvastatin 40 mg Oral Nightly   budesonide 0.5 mg Nebulization BID - RT   docusate sodium 100 mg Oral BID   furosemide 40 mg Oral Daily   guaiFENesin 600 mg Oral Q12H   insulin detemir 8 Units Subcutaneous QAM   insulin lispro 0-7 Units Subcutaneous TID With Meals   insulin lispro 3 Units Subcutaneous TID With Meals   lisinopril 2.5 mg Oral Q24H   metoprolol succinate XL 12.5 mg Oral Q24H   nystatin  Topical Q12H   nystatin  Topical Q12H   pantoprazole 40 mg Oral BID   pharmacy consult - MTM  Does not apply Daily   piperacillin-tazobactam 4.5 g Intravenous Q8H   polyethylene glycol 17 g Oral Daily   potassium chloride 10 mEq Oral BID With Meals   saccharomyces boulardii 250 mg Oral BID   tamsulosin 0.8 mg Oral Nightly   warfarin 4 mg Oral Daily       Assessment/Plan   ASSESSMENT/PLAN      This is an 83-year-old  male with a past medical history significant for essential hypertension, hyperlipidemia, type 2 diabetes mellitus insulin-dependent, obstructive sleep apnea on CPAP, coronary artery disease with history of coronary artery bypass grafting, ischemic systolic and diastolic heart failure, paroxysmal atrial fibrillation on chronic anticoagulation, remote history of ischemic stroke possibly cardioembolic in association with her atrial fibrillation, chronic venous stasis dermatitis who was admitted on September 19,  2017 for increased dyspnea, hypoxia and found to have healthcare associated pneumonia as well as acute on chronic decompensated systolic heart failure     Acute hypoxic respiratory failure due to healthcare associated pneumonia of right and left lung with superimposed acute on chronic decompensated systolic heart failure and volume overload are now resolved.  The patient is afebrile.  An is euvolemic.  Transition to by mouth Augmentin    E. Fecalis UTI  - Sensitive to ampicillin      #Acute on chronic Chronic biventricular CHF exacerbation now euvolemic  - ECHO 9/28: EF 35-40%, LVGH, LVH, mild-to-moderate aortic regurgitation and mitral regurgitation  - ECHO 02/2017: EF 45%  - Started PO lasix 40mg daily for maintenance  - Continue metoprolol succinate 12.5mg and lisinopril 2.5mg. Uptitrate as tolerated  - NYHA Class IV.    # Coronary artery disease with history of coronary artery bypass grafting ×3 in December 1989.  Subsequent left heart catheterization showing complete total occlusion of RCA with collaterals and a patent LAD graft in 2005.    # Paroxysmal Atrial fibrillation. [id number] score of 5.  On chronic anticoagulation with warfarin      # CKD: Stable  - Baseline creatinine 1.0-1.1    # NAYLA  - BiPAP at night    # Chronic thrombocytopenia: Stable  - No schistocytes on peripheral smear    DVT PPx: Warfarin  I expect patient to be discharged in:  2-3 days to [facility]    Villa Key MD  10/01/17  11:25 AM                 Electronically signed by Villa Key MD at 10/1/2017 11:39 AM

## 2017-10-02 NOTE — PROGRESS NOTES
"  [facility]  PROGRESS NOTE    Date of Admission: 9/19/2017  Length of Stay: 13  Primary Care Physician: Vincent Mccullough MD    Chief Complaint: f/u dyspnea and hypoxia   Problem List:   1. Admission for acute on chronic respiratory failure 09/2017 and recurrent pneumonia   2. Coronary artery disease:  a. CABG x3 in December 1989.  b. Normal left ventricular systolic function.  c. New York Heart Association class I heart failure symptoms.  d.  of collateralized RCA with patent LAD graft in August 2005.  e. MUGA, ejection fraction 63%, 05/09/2015.  f. Echocardiogram December 2014, showed normal left ventricular ejection function of 55% to 60%.   g. Echo 2/23/17: EF 45%, LA is mild-mod dilated, mild AI, RV is mild-mod dilated, at least moderate MR  3. Atrial fibrillation:  a. Chronic CHADS-VASc of 5.  b. Chronic anticoagulation.  4. Cerebrovascular accident:  a. Remote CVA and TIA. Questionable relationship to atrial fibrillation.  5. Obstructive sleep apnea on CPAP.   6. Obesity.   7. Dyslipidemia.   8. Diabetes mellitus type 2.  9. Anemia.  10. Degenerative joint disease.  11. BPH.  12. Chronic lower extremity edema due to venous stasis.  13. Admission for pneumonia July 2017    Subjective      He feels \"the same.\" Breathing status is stable, remains on supplemental O2.       Objective   Vitals: BP (P) 111/69  Pulse (P) 78  Temp 98.3 °F (36.8 °C) (Oral)   Resp 18  Ht 74\" (188 cm)  Wt 237 lb 6 oz (108 kg)  SpO2 98%  BMI 30.48 kg/m2    Physical Exam:  GENERAL: Alert, cooperative, in no acute distress.   HEENT: Fundoscopic deferred, otherwise unremarkable.  NECK: No Jugular venous distention, adenopathy, or thyromegaly noted.   HEART: No discrete PMI is noted. Regular rhythm, normal rate, and no murmur   LUNGS: Minimal rales in RLL. On 2L NC, no wheezing or ronchi  ABDOMEN: Flat without evidence of organomegaly, masses, or tenderness.  NEUROLOGIC: No focal " abnormalities involving strength or sensation are noted.   EXTREMITIES: No clubbing, cyanosis, or edema noted.     Results:    Results from last 7 days  Lab Units 10/01/17  0645 09/30/17  0657 09/29/17  0559   WBC 10*3/mm3 4.27 3.86 3.64   HEMOGLOBIN g/dL 10.7* 10.3* 10.4*   HEMATOCRIT % 35.5* 34.2* 34.0*   PLATELETS 10*3/mm3 95* 98* 99*       Results from last 7 days  Lab Units 10/01/17  0645 09/30/17  0657 09/29/17  0559   SODIUM mmol/L 139 141 137   POTASSIUM mmol/L 4.3 4.2 4.0   CHLORIDE mmol/L 102 104 98*   CO2 mmol/L 31.0 28.0 32.0*   BUN mg/dL 20 16 16   CREATININE mg/dL 1.20 1.20 1.20   GLUCOSE mg/dL 71 88 87      Lab Results   Component Value Date    CHOL 102 09/28/2017    TRIG 89 09/28/2017    HDL 30 (L) 09/28/2017    LDLDIRECT 58 09/28/2017    AST 20 09/19/2017    ALT 16 09/19/2017           Results from last 7 days  Lab Units 09/28/17  0556   CHOLESTEROL mg/dL 102   TRIGLYCERIDES mg/dL 89   HDL CHOL mg/dL 30*   LDL CHOL mg/dL 58       Results from last 7 days  Lab Units 09/28/17  0905   BNP pg/mL 394.0*       Results from last 7 days  Lab Units 10/02/17  0515 10/01/17  0938 09/30/17  0818   PROTIME Seconds 26.6* 27.4* 23.4*   INR  2.38 2.45 2.10       Intake/Output Summary (Last 24 hours) at 10/02/17 0904  Last data filed at 10/01/17 2300   Gross per 24 hour   Intake              990 ml   Output              600 ml   Net              390 ml     I personally reviewed the patient's EKG/Telemetry data    Radiology Data:  Echo 9/28/17:  Interpretation Summary      · There is four chamber cardiac enlargement.  · Global and segmental LV wall motion abnormalities are seen with an estimated EF=36%-40%.  · Left ventricular wall thickness is consistent with concentric hypertrophy, mild.  · Mitral annular calcification with mild-moderate MR is appreciated.  · Aortic valve sclerosis with moderate aortic insufficiency is seen.  · The LV examination is suboptimal as the patient refused Lumason.       Current  Medications:    amoxicillin-clavulanate 1 tablet Oral Q12H   aspirin 81 mg Oral Daily   atorvastatin 40 mg Oral Nightly   budesonide 0.5 mg Nebulization BID - RT   docusate sodium 100 mg Oral BID   furosemide 40 mg Oral Daily   guaiFENesin 600 mg Oral Q12H   insulin detemir 8 Units Subcutaneous QAM   insulin lispro 0-7 Units Subcutaneous TID With Meals   insulin lispro 3 Units Subcutaneous TID With Meals   lisinopril 2.5 mg Oral Q24H   metoprolol succinate XL 12.5 mg Oral Q24H   nystatin  Topical Q12H   nystatin  Topical Q12H   pantoprazole 40 mg Oral BID   pharmacy consult - MTM  Does not apply Daily   polyethylene glycol 17 g Oral Daily   potassium chloride 10 mEq Oral BID With Meals   saccharomyces boulardii 250 mg Oral BID   tamsulosin 0.8 mg Oral Nightly   warfarin 4 mg Oral Daily       Pharmacy Consult - Pharmacy to dose    Pharmacy to dose warfarin        Assessment and Plan:   1. Acute on chronic respiratory failure, multifactorial.   - secondary to recurrent pneumonia and acute/chronic SHF   - ABx per hospitalists       2. Acute on chronic SHF  - EF 36-40% by echo, with mod AI, and mild-mod MR  - on Lasix 40mg daily, as well as Metoprolol XL 12.5, and Lisinopril 2.5mg daily    3. Afib  - on Warfarin for AC with INR therapeutic       4. UTI   - per hospitalists       5. CAD, asymptomatic  - on ASA and statin  - LDL to target     I, Aly Vivas MD, personally performed the services described as documented by the above named individual. I have made any necessary edits and it is both accurate and complete 10/2/2017  10:13 AM      Scribed for Aly Vivas MD by Damari Webb PA-C.

## 2017-10-02 NOTE — PLAN OF CARE
Problem: Patient Care Overview (Adult)  Goal: Plan of Care Review  Outcome: Ongoing (interventions implemented as appropriate)    10/02/17 0531   Coping/Psychosocial Response Interventions   Plan Of Care Reviewed With patient   Outcome Evaluation   Outcome Summary/Follow up Plan VSS, A&O. Pt sat up in chair for a while.. Wore his bipap all night. slept comfortably.        Goal: Adult Individualization and Mutuality  Outcome: Ongoing (interventions implemented as appropriate)  Goal: Discharge Needs Assessment  Outcome: Ongoing (interventions implemented as appropriate)    Problem: Fall Risk (Adult)  Goal: Identify Related Risk Factors and Signs and Symptoms  Outcome: Ongoing (interventions implemented as appropriate)  Goal: Absence of Falls  Outcome: Ongoing (interventions implemented as appropriate)    Problem: Pressure Ulcer Risk (Abelardo Scale) (Adult,Obstetrics,Pediatric)  Goal: Identify Related Risk Factors and Signs and Symptoms  Outcome: Ongoing (interventions implemented as appropriate)  Goal: Skin Integrity  Outcome: Ongoing (interventions implemented as appropriate)

## 2017-10-02 NOTE — PROGRESS NOTES
"      HOSPITALIST DAILY PROGRESS NOTE    Chief Complaint: dyspnea    SUBJECTIVE/OVERNIGHT EVENTS   Continues to improve. He denies chest pain, dyspnea, N/V/D or constipation. Appetite improving. Urinating well. BLEs remain tender.     Review of Systems:  General - No fevers, no chills  CVS - No chest pain, no palpitations  Resp - +Cough, no dyspnea  GI - No N/V/D, no abd pain    OBJECTIVE   I have reviewed the vital signs.    /69 (BP Location: Left arm, Patient Position: Lying)  Pulse 93  Temp 98 °F (36.7 °C) (Oral)   Resp 18  Ht 74\" (188 cm)  Wt 237 lb 6 oz (108 kg)  SpO2 95%  BMI 30.48 kg/m2    Physical Exam:  General - NAD, pt was sitting in chair comfortably on room air  HEENT - EOMI, PERRL, oropharynx clear, hearing intact  CVS - RRR, S1 S2, no rubs, gallops or murmurs, 2s cap refill, 1+ peripheral edema, 2+ pulses  Resp - CTAB, no crackles and wheezes   GI - +BS, soft, ND/NT  MSK - No joint pain or joint edema. Shins of BLEs are tender to palpation  Neuro - Awake and oriented, follows commands, interactive, moves all extremities equally    Results:  I have reviewed the labs, culture data, radiology results, and diagnostic studies.    Results from last 7 days  Lab Units 10/01/17  0645 09/30/17  0657 09/29/17  0559   WBC 10*3/mm3 4.27 3.86 3.64   HEMOGLOBIN g/dL 10.7* 10.3* 10.4*   HEMATOCRIT % 35.5* 34.2* 34.0*   PLATELETS 10*3/mm3 95* 98* 99*     Results from last 7 days  Lab Units 10/01/17  0645   SODIUM mmol/L 139   POTASSIUM mmol/L 4.3   CHLORIDE mmol/L 102   CO2 mmol/L 31.0   BUN mg/dL 20   CREATININE mg/dL 1.20   GLUCOSE mg/dL 71   CALCIUM mg/dL 8.8     I have reviewed the medications.    amoxicillin-clavulanate 1 tablet Oral Q12H   aspirin 81 mg Oral Daily   atorvastatin 40 mg Oral Nightly   budesonide 0.5 mg Nebulization BID - RT   docusate sodium 100 mg Oral BID   furosemide 40 mg Oral Daily   guaiFENesin 600 mg Oral Q12H   insulin detemir 8 Units Subcutaneous QAM   insulin lispro 0-7 " Units Subcutaneous TID With Meals   insulin lispro 3 Units Subcutaneous TID With Meals   lisinopril 2.5 mg Oral Q24H   metoprolol succinate XL 12.5 mg Oral Q24H   nystatin  Topical Q12H   nystatin  Topical Q12H   pantoprazole 40 mg Oral BID   pharmacy consult - MTM  Does not apply Daily   polyethylene glycol 17 g Oral Daily   potassium chloride 10 mEq Oral BID With Meals   saccharomyces boulardii 250 mg Oral BID   tamsulosin 0.8 mg Oral Nightly   warfarin 4 mg Oral Daily     ASSESSMENT/PLAN      This is an 83-year-old  male with a past medical history significant for essential hypertension, hyperlipidemia, type 2 diabetes mellitus insulin-dependent, obstructive sleep apnea on CPAP, coronary artery disease with history of coronary artery bypass grafting, ischemic systolic and diastolic heart failure, paroxysmal atrial fibrillation on chronic anticoagulation, remote history of ischemic stroke possibly cardioembolic in association with her atrial fibrillation, chronic venous stasis dermatitis who was admitted on September 19, 2017 for increased dyspnea, hypoxia and found to have healthcare associated pneumonia as well as acute on chronic decompensated systolic heart failure     Acute hypoxic respiratory failure due to healthcare associated pneumonia of right and left lung with superimposed acute on chronic decompensated systolic heart failure and volume overload are now resolved.  The patient is afebrile and euvolemic. Continue PO Augmentin.     E. Fecalis UTI  - Sensitive to ampicillin. Treatment completed.     #Acute on chronic Chronic biventricular CHF exacerbation now euvolemic  - ECHO 9/28: EF 35-40%, LVGH, LVH, mild-to-moderate aortic regurgitation and mitral regurgitation  - ECHO 02/2017: EF 45%  - Started PO lasix 40mg daily for maintenance  - Continue metoprolol succinate 12.5mg and lisinopril 2.5mg. Uptitrate as tolerated  - NYHA Class IV.    # Coronary artery disease with history of coronary artery  bypass grafting ×3 in December 1989.  Subsequent left heart catheterization showing complete total occlusion of RCA with collaterals and a patent LAD graft in 2005.    # Paroxysmal Atrial fibrillation. [id number] score of 5.  On chronic anticoagulation with warfarin. INR therapeutic.     # CKD: Stable  - Baseline creatinine 1.0-1.1    # NAYLA  - BiPAP at night. Needs outpatient sleep study.     # Chronic thrombocytopenia: Stable  - No schistocytes on peripheral smear    DVT PPx: Warfarin  I expect patient to be discharged to [facility] when bed available.     Deepa Ortiz PA-C  10/02/17  2:54 PM

## 2017-10-02 NOTE — PLAN OF CARE
Problem: Patient Care Overview (Adult)  Goal: Plan of Care Review  Outcome: Ongoing (interventions implemented as appropriate)    10/02/17 1150   Coping/Psychosocial Response Interventions   Plan Of Care Reviewed With patient   Patient Care Overview   Progress improving   Outcome Evaluation   Outcome Summary/Follow up Plan Pt. participated in bed mob, transfers, household gait, and BLE TherEx. Pt. amb 12 ft w/ RW and min A x 2. Pt. requires encouragement to participate and progress limited by fatigue, weakness, and anxiety w/ dyspnea despite VSS. Cont PT per POC.         Problem: Inpatient Physical Therapy  Goal: Bed Mobility Goal LTG- PT  Outcome: Ongoing (interventions implemented as appropriate)    09/20/17 1100 09/26/17 1035 10/02/17 1150   Bed Mobility PT LTG   Bed Mobility PT LTG, Date Established 09/20/17 --  --    Bed Mobility PT LTG, Time to Achieve 1 wk --  --    Bed Mobility PT LTG, Activity Type all bed mobility --  --    Bed Mobility PT LTG, [place] Level independent --  --    Bed Mobility PT LTG, Date Goal Reviewed --  09/26/17 --    Bed Mobility PT LTG, Outcome --  --  goal ongoing       Goal: Transfer Training Goal 1 LTG- PT  Outcome: Ongoing (interventions implemented as appropriate)    09/20/17 1100 09/26/17 1035 10/02/17 1150   Transfer Training PT LTG   Transfer Training PT LTG, Date Established 09/20/17 --  --    Transfer Training PT LTG, Time to Achieve 1 wk --  --    Transfer Training PT LTG, Activity Type bed to chair /chair to bed;sit to stand/stand to sit --  --    Transfer Training PT LTG, [place] Level independent --  --    Transfer Training PT LTG, Assist Device walker, rolling --  --    Transfer Training PT LTG, Date Goal Reviewed --  09/26/17 --    Transfer Training PT LTG, Outcome --  --  goal ongoing       Goal: Gait Training Goal LTG- PT  Outcome: Ongoing (interventions implemented as appropriate)    09/20/17 1100 09/26/17 1035 10/02/17 1150   Gait Training PT LTG   Gait  Training Goal PT LTG, Date Established 09/20/17 --  --    Gait Training Goal PT LTG, Time to Achieve 1 wk --  --    Gait Training Goal PT LTG, [place] Level contact guard assist  (stable VS) --  --    Gait Training Goal PT LTG, Assist Device walker, rolling --  --    Gait Training Goal PT LTG, Distance to Achieve 50 --  --    Gait Training Goal PT LTG, Date Goal Reviewed --  09/26/17 --    Gait Training Goal PT LTG, Outcome --  --  goal ongoing

## 2017-10-03 NOTE — PROGRESS NOTES
Continued Stay Note  [facility]     Patient Name: Bjorn Pollock  MRN: [id number]  Today's Date: 10/3/2017    Admit Date: 9/19/2017          Discharge Plan       10/03/17 1129    Case Management/Social Work Plan    Plan [facility]    Patient/Family In Agreement With Plan yes    Additional Comments Per Marie with [facility], patient has a bed today on the Pulmonary Unit. Notified patient in the room and his wife by phone, and she will transport him to [facility]. CM offered to arrange medical transport with [facility], but they have declined. CM will fax the discharge summary when availabe. RN, please call report to [phone], and please send a hard copy of the DC summary/AVS in the discharge packet. CM will continue to follow.       10/03/17 7736    Case Management/Social Work Plan    Plan [facility]    Patient/Family In Agreement With Plan yes    Additional Comments Per Marie with [facility], patient's insurance has approved his admission. Marie will know around noon whether a bed will be available today. Spoke with patient's wife and daugther by phone, and they state that family will transport him to [facility] at discharge. CM will continue to follow.               Discharge Codes     None        Expected Discharge Date and Time     Expected Discharge Date Expected Discharge Time    Oct 3, 2017             Maddison Blevins

## 2017-10-03 NOTE — PROGRESS NOTES
"Pharmacy Consult  -  Warfarin    Bjorn Pollock is an 84 yo man with a hx of CHF admitted 9/19/17 for with acute on chronic respiratory failure with hypoxia likely secondary to CHF.      PMH includes chronic afib on warfarin, Crawley's esophagus, BPH, CAD (s/p CABG), CKD stage 2-3, COPD, DDD of lumbar and cervical spine, DM, depression, GERD, HLD, HTN, obesity, NAYLA on CPAP, CVA, thrombocytopenia, urinary incontinence.  Warfarin has been held since admission.    Pharmacy has been consulted to dose and monitor warfarin during this hospitalization.    Height - 74\" (188 cm)  Weight - 235 lb (107 kg)    Consulting Provider: - MARLENE Villeda  Indication: - atrial fibrillation  Goal INR: -  2-3  Home Regimen:   - 5 mg on Sun/Tues/Wed/Fri/Sat    - 7.5 mg on Mon and Thurs  **INR on admission was 3.93    Bridge Therapy: no    Drug-Drug Interactions with current regimen:  Aspirin - increases bleeding risk  Augmentin, Vancomycin - may result increased risk of bleed  Pantoprazole - intermediate impact  Sertraline - intermediate impact  Patient is using prn medications containing acetaminophen    Warfarin Dosing During Admission:    Date  9/20 9/22 9/26 9/27  9/28 9/29 9/30 10/1 10/2   INR  4.08 2.70 1.49 1.33  1.47 1.98 2.1 2.45 2.38   Dose  hold hold 7.5 mg 5 mg  5 mg 4mg 4mg 4mg 4mg     Date  10/3           INR  1.91           Dose  (5mg)               Labs:  Results from last 7 days   Lab Units 10/03/17  0852 10/02/17  0515 10/01/17  0938   INR  1.91 2.38 2.45     Results from last 7 days   Lab Units 10/01/17  0645 09/30/17  0657 09/29/17  0559   WBC 10*3/mm3 4.27 3.86 3.64   HEMOGLOBIN g/dL 10.7* 10.3* 10.4*   HEMATOCRIT % 35.5* 34.2* 34.0*   PLATELETS 10*3/mm3 95* 98* 99*     ALT/AST/T. Bili   16/20/0.9     Albumin = 3.6 gm/dL    Results from last 7 days   Lab Units 10/01/17  0645 09/30/17  0657 09/29/17  0559   SODIUM mmol/L 139 141 137   POTASSIUM mmol/L 4.3 4.2 4.0   CHLORIDE mmol/L 102 104 98*   CO2 mmol/L 31.0 " 28.0 32.0*   BUN mg/dL 20 16 16   CREATININE mg/dL 1.20 1.20 1.20   CALCIUM mg/dL 8.8 8.9 9.1   GLUCOSE mg/dL 71 88 87     Current dietary intake: Diet Regular, cardiac, consistent carbohydrate, ~75% -100% of meals    Assessment/Plan:     1. INR 1.91 slightly below goal of 2-3. Anticipate it will decline further over the next day or so. Given patient's CV history, will increase current warfarin regimen: INR was supratherapeutic on admission with home regimen of 40 mg/week (~5.7 mg/d). Will increase dose to 5mg daily (12.5% decrease from home regimen).    Will monitor for s/s of bleeding and toxicity; changes in diet.      Pharmacy will follow the patient's clinical progress, monitor for evidence of adverse effects of therapy and adjust warfarin as needed.     Fátima Cortes, PharmD  Pharmacy Resident  10/3/2017  12:35 PM

## 2017-10-03 NOTE — PLAN OF CARE
Problem: Patient Care Overview (Adult)  Goal: Plan of Care Review  Outcome: Ongoing (interventions implemented as appropriate)    10/03/17 0415   Coping/Psychosocial Response Interventions   Plan Of Care Reviewed With patient   Patient Care Overview   Progress improving   Outcome Evaluation   Outcome Summary/Follow up Plan Pt states ready for D/C. Room air during day, CPAP at hs.         Problem: Fall Risk (Adult)  Goal: Identify Related Risk Factors and Signs and Symptoms  Outcome: Ongoing (interventions implemented as appropriate)    Problem: Respiratory Insufficiency (Adult)  Goal: Identify Related Risk Factors and Signs and Symptoms  Outcome: Ongoing (interventions implemented as appropriate)

## 2017-10-03 NOTE — PROGRESS NOTES
"  [facility]  PROGRESS NOTE    Date of Admission: 9/19/2017  Length of Stay: 14  Primary Care Physician: Vincent Mccullough MD    Chief Complaint: f/u dyspnea and hypoxia   Problem List:   1. Admission for acute on chronic respiratory failure 09/2017 and recurrent pneumonia   2. Coronary artery disease:  a. CABG x3 in December 1989.  b. Normal left ventricular systolic function.  c. New York Heart Association class I heart failure symptoms.  d.  of collateralized RCA with patent LAD graft in August 2005.  e. MUGA, ejection fraction 63%, 05/09/2015.  f. Echocardiogram December 2014, showed normal left ventricular ejection function of 55% to 60%.   g. Echo 2/23/17: EF 45%, LA is mild-mod dilated, mild AI, RV is mild-mod dilated, at least moderate MR  3. Atrial fibrillation:  a. Chronic CHADS-VASc of 5.  b. Chronic anticoagulation.  4. Cerebrovascular accident:  a. Remote CVA and TIA. Questionable relationship to atrial fibrillation.  5. Obstructive sleep apnea on CPAP.   6. Obesity.   7. Dyslipidemia.   8. Diabetes mellitus type 2.  9. Anemia.  10. Degenerative joint disease.  11. BPH.  12. Chronic lower extremity edema due to venous stasis.  13. Admission for pneumonia July 2017    Subjective      Patient ambulated about the room with PT yesterday, oxygenation is improved. Denies shortness of breath at rest. Awaiting rehab placement       Objective   Vitals: /65  Pulse 68  Temp 96.8 °F (36 °C) (Axillary)   Resp 18  Ht 74\" (188 cm)  Wt 237 lb 11.2 oz (108 kg)  SpO2 97%  BMI 30.52 kg/m2    Physical Exam:  GENERAL: Alert, cooperative, in no acute distress.   HEENT: Fundoscopic deferred, otherwise unremarkable.  NECK: No Jugular venous distention, adenopathy, or thyromegaly noted.   HEART: No discrete PMI is noted. Regular rhythm, normal rate, and no murmur  LUNGS: Clear to auscultation bilaterally. No wheezing, rales or ronchi. 98% on room air   ABDOMEN: Flat " without evidence of organomegaly, masses, or tenderness.  NEUROLOGIC: No focal abnormalities involving strength or sensation are noted.   EXTREMITIES: No clubbing, cyanosis, or edema noted. Bilateral unna boots present     Results:    Results from last 7 days  Lab Units 10/01/17  0645 09/30/17  0657 09/29/17  0559   WBC 10*3/mm3 4.27 3.86 3.64   HEMOGLOBIN g/dL 10.7* 10.3* 10.4*   HEMATOCRIT % 35.5* 34.2* 34.0*   PLATELETS 10*3/mm3 95* 98* 99*       Results from last 7 days  Lab Units 10/01/17  0645 09/30/17  0657 09/29/17  0559   SODIUM mmol/L 139 141 137   POTASSIUM mmol/L 4.3 4.2 4.0   CHLORIDE mmol/L 102 104 98*   CO2 mmol/L 31.0 28.0 32.0*   BUN mg/dL 20 16 16   CREATININE mg/dL 1.20 1.20 1.20   GLUCOSE mg/dL 71 88 87      Lab Results   Component Value Date    CHOL 102 09/28/2017    TRIG 89 09/28/2017    HDL 30 (L) 09/28/2017    LDLDIRECT 58 09/28/2017    AST 20 09/19/2017    ALT 16 09/19/2017           Results from last 7 days  Lab Units 09/28/17  0556   CHOLESTEROL mg/dL 102   TRIGLYCERIDES mg/dL 89   HDL CHOL mg/dL 30*   LDL CHOL mg/dL 58           Results from last 7 days  Lab Units 09/28/17  0905   BNP pg/mL 394.0*       Results from last 7 days  Lab Units 10/02/17  0515 10/01/17  0938 09/30/17  0818   PROTIME Seconds 26.6* 27.4* 23.4*   INR  2.38 2.45 2.10           Intake/Output Summary (Last 24 hours) at 10/03/17 0838  Last data filed at 10/02/17 2148   Gross per 24 hour   Intake              240 ml   Output              950 ml   Net             -710 ml     I personally reviewed the patient's EKG/Telemetry data    Current Medications:    amoxicillin-clavulanate 1 tablet Oral Q12H   aspirin 81 mg Oral Daily   atorvastatin 40 mg Oral Nightly   budesonide 0.5 mg Nebulization BID - RT   docusate sodium 100 mg Oral BID   furosemide 40 mg Oral Daily   guaiFENesin 600 mg Oral Q12H   insulin detemir 8 Units Subcutaneous QAM   insulin lispro 0-7 Units Subcutaneous TID With Meals   insulin lispro 3 Units  Subcutaneous TID With Meals   lisinopril 2.5 mg Oral Q24H   metoprolol succinate XL 12.5 mg Oral Q24H   nystatin  Topical Q12H   nystatin  Topical Q12H   pantoprazole 40 mg Oral BID   pharmacy consult - MTM  Does not apply Daily   polyethylene glycol 17 g Oral Daily   potassium chloride 10 mEq Oral BID With Meals   saccharomyces boulardii 250 mg Oral BID   tamsulosin 0.8 mg Oral Nightly   warfarin 4 mg Oral Daily       Pharmacy to dose warfarin        Assessment and Plan:   1. Acute on chronic respiratory failure, multifactorial.   - secondary to recurrent pneumonia and acute/chronic SHF   - ABx per hospitalists       2. Acute on chronic SHF  - EF 36-40% by echo, with mod AI, and mild-mod MR  - on Lasix 40mg daily, as well as Metoprolol XL 12.5, and Lisinopril 2.5mg daily     3. Afib  - on Warfarin for AC with INR therapeutic       4. UTI   - per hospitalists       5. CAD, asymptomatic  - on ASA and statin  - LDL to target     Dispo: Patient stable for discharge to rehab from cardiac perspective on current medicines. Follow up with Dr. Vivas in 6 months.     Scribed for Aly Vivas MD by Damari Webb PA-C.

## 2017-10-03 NOTE — PROGRESS NOTES
Continued Stay Note   [facility]     Patient Name: Bjorn Pollock  MRN: [id number]  Today's Date: 10/3/2017    Admit Date: 9/19/2017          Discharge Plan       10/03/17 0940    Case Management/Social Work Plan    Plan [facility]    Patient/Family In Agreement With Plan yes    Additional Comments Per Marie with [facility], patient's insurance has approved his admission. Marie will know around noon whether a bed will be available today. Spoke with patient's wife and daugther by phone, and they state that family will transport him to [facility] at discharge. CM will continue to follow.               Discharge Codes     None        Expected Discharge Date and Time     Expected Discharge Date Expected Discharge Time    Oct 4, 2017             Maddison Blevins

## 2017-10-03 NOTE — DISCHARGE PLACEMENT REQUEST
"Holger Pollock (83 y.o. Male)     From [place],   [phone]      Date of Birth Social Security Number Address Home Phone MRN    1934  [address] [facility] [phone] [id number]    Confucianist Marital Status          [employer]        Admission Date Admission Type Admitting Provider Attending Provider Department, Room/Bed    17 Emergency Villa Key MD [place], Villa Shelton MD [facility] 3E, S342/1    Discharge Date Discharge Disposition Discharge Destination         Rehab Facility or Unit (DC - External)             Attending Provider: Villa Key MD     Allergies:  Phenergan [Promethazine Hcl], Promethazine    Isolation:  None   Infection:  None   Code Status:  FULL    Ht:  74\" (188 cm)   Wt:  237 lb 11.2 oz (108 kg)    Admission Cmt:  None   Principal Problem:  Acute on chronic respiratory failure with hypoxemia [J96.21]                 Active Insurance as of 2017     Primary Coverage     Payor Plan Insurance Group Employer/Plan Group    [employer] [id number]     Payor Plan Address Payor Plan Phone Number Effective From Effective To    [phone] 2015     [place] [address]       Subscriber Name Subscriber Birth Date Member ID       HOLGER POLLOCK 1934 [id number]                 Emergency Contacts      (Rel.) Home Phone Work Phone Mobile Phone    Ade Pollock (Spouse) [phone] -- --    Ayah Godinez (Daughter) -- -- [phone]                 Discharge Summary      Deepa Ortiz PA-C at 10/3/2017 11:40 AM              [facility] Medicine Services  TRANSFER SUMMARY    Patient Name: Holger Pollock  : 1934  MRN: [id number]    Date of Admission: 2017  Date of Discharge:    Length of Stay: 14  Primary Care Physician: Vincent Mccullough MD    Consults     Date and Time Order Name " Status Description    9/27/2017 1608 Inpatient Consult to Pulmonology      9/26/2017 1723 Inpatient Consult to Cardiology Completed         Hospital Course     Presenting Problem:   Acute exacerbation of CHF (congestive heart failure) [I50.9]  Hypervolemia, unspecified hypervolemia type [E87.70]    Active Hospital Problems (** Indicates Principal Problem)    Diagnosis Date Noted   • **Acute on chronic respiratory failure with hypoxemia [J96.21] 09/19/2017   • Hypervolemia [E87.70] 09/22/2017   • Acute exacerbation of CHF (congestive heart failure) [I50.9] 09/19/2017   • Systolic and diastolic CHF, acute on chronic [I50.43] 09/19/2017   • Chronic anticoagulation [Z79.01] 09/19/2017   • DM type 2 (diabetes mellitus, type 2) [E11.9] 09/04/2016   • COPD (chronic obstructive pulmonary disease) [J44.9] 09/04/2016   • NAYLA (obstructive sleep apnea) [G47.33] 09/04/2016   • Atrial fibrillation [I48.91] 09/04/2016   • CKD (chronic kidney disease) stage 2, GFR 60-89 ml/min [N18.2] 09/04/2016   • Anemia [D64.9] 09/04/2016   • Chronic thrombocytopenic purpura [D69.49] 09/04/2016   • Supratherapeutic INR [R79.1] 09/04/2016      Resolved Hospital Problems    Diagnosis Date Noted Date Resolved   No resolved problems to display.      Hospital Course:  Mr. Bjorn Pollock is an 82yo male with a history of anemia, anxiety/depression, atrial fibrillation (Coumadin), CAD with prior CABG, diastolic CHF, CKD II, COPD, DM II, GERD, HTN, hyperlipidemia, NYALA and degenerative disc disease who presented to [facility] ED on 9/19/17 with complaints of dyspnea.  Patient reports he normally wears 2 L of oxygen at night but had to increase oxygen to 4 L to feel some improvement in his symptoms.  His wife noted increased swelling to his legs.  Daughter reports that he has been forgetting to take his Lasix recently and has had similar symptoms to these when he has not been taking Lasix. Patient has also been having nocturnal hypoglycemia.      Patient was felt to have acute on chronic respiratory failure with hypoxia likely secondary to congestive heart failure exacerbation.  Chest x-ray was unrevealing.  BNP moderately elevated at 345.  He was given Lasix for diuresis and dependence to the hospital medicine service for further evaluation and treatment.    INR was supratherapeutic on admission. Held until INR therapeutic. INR has remained stable this hospitalization.  Echocardiogram on 9/28/17 showed EF 35-40%. He was started on PO Lasix 40mg daily (later transitioned to BID), Metoprolol 12.5mg PO and Lisinopril 2.5 mg daily. Home diltiazem was discontinued this hospitalization.     Due to persistent hypoxia, cardiology was consulted to evaluate the patient. Dr. Vivas saw Mr. Pollock on 9/27/17. Dr. Vivas felt that patient likely had pneumonia superimposed on CHF. He was diuresed and treated with IV Zosyn and Vancomycin to cover for HAP on 9/28. Antibiotics were transitioned to PO Augmentin BID on 10/1. Will treat for total 10 days.     He was treated for Enterococcus UTI with IV Vancomycin. He completed treatment UTI prior to discharge.     Mr. Pollock has been admitted to [facility] multiple times in 2017. Unfortunately, he and his wife are both chronically debilitated and still live at home (with daughter). His daughter works as a nurse but is very busy and not always available to take care of them. Patient was ultimately agreeable to transfer to [facility] for acute rehab but may benefit from a higher level of care at some point.     He is stable for discharge and will transfer to [facility] on 10/3/2017.     Day of Discharge     HPI:   He feels dyspneic today. Saturating >90% on room air. No chest pain, denies nausea or vomiting. Bowels are moving. He's very anxious to leave the hospital today.     Review of Systems   Gen- No fevers, chills  CV- No chest pain, palpitations  Resp- No cough, dyspnea  GI- No N/V/D, abd pain    Otherwise complete 10-system ROS is  negative except as mentioned in the HPI.    Temp:  [96.8 °F (36 °C)-98.5 °F (36.9 °C)] 98.3 °F (36.8 °C)  Heart Rate:  [61-93] 61  Resp:  [16-18] 16  BP: (111-120)/(58-73) 115/65     Physical Exam:  Constitutional: Sitting upright in bed, shallow respirations. Chronically-ill appearing. NAD.   HENT: NCAT, mucous membranes moist  Respiratory: Trace bibasilar rales. Normal respiratory effort. No wheezes or rhonchi.    Cardiovascular: RRR, no murmurs, rubs, or gallops, palpable pedal pulses bilaterally  Gastrointestinal: Positive bowel sounds, soft, nontender, nondistended  Musculoskeletal: No bilateral ankle edema. Shins are tender to palpation bilaterally. Both are dressed and not removed for exam.   Psychiatric: Oriented x 3, appropriate affect, cooperative  Neurologic: Strength symmetric in all extremities, CN grossly intact to confrontation, speech is clear  Skin: No rashes    Pertinent Results     Lab Results (last 7 days)     Procedure Component Value Units Date/Time    POC Glucose Fingerstick [[id number]]  (Normal) Collected:  09/26/17 1145    Specimen:  Blood Updated:  09/26/17 1148     Glucose 117 mg/dL     Narrative:       Meter: [id number] : [id number] Surya [employer]    POC Glucose Fingerstick [[id number]]  (Abnormal) Collected:  09/26/17 1655    Specimen:  Blood Updated:  09/26/17 1656     Glucose 156 (H) mg/dL     Narrative:       Meter: [id number] : [id number] [employer]    POC Glucose Fingerstick [[id number]]  (Normal) Collected:  09/1934    Specimen:  Blood Updated:  09/26/17 1936     Glucose 128 mg/dL     Narrative:       Meter: [id number] : [id number] Villa Still    Protime-INR [[id number]]  (Abnormal) Collected:  09/27/17 0728    Specimen:  Blood Updated:  09/27/17 0800     Protime 14.6 (H) Seconds      INR 1.33    Narrative:       Therapeutic Ranges for INR: 2.0-3.0 (PT 20-30)     2.5-3.5 (PT 25-34)    POC Glucose Fingerstick [[id number]]  (Normal) Collected:  09/27/17 0803     Specimen:  Blood Updated:  09/27/17 0805     Glucose 107 mg/dL     Narrative:       Meter: [id number] : [id number] Jarad Shelby    POC Glucose Fingerstick [[id number]]  (Abnormal) Collected:  09/27/17 1236    Specimen:  Blood Updated:  09/27/17 1257     Glucose 176 (H) mg/dL     Narrative:       Meter: [id number] : [id number] Abraham Shelby    POC Glucose Fingerstick [[id number]]  (Abnormal) Collected:  09/27/17 1611    Specimen:  Blood Updated:  09/27/17 1615     Glucose 178 (H) mg/dL     Narrative:       Meter: [id number] : [id number] Abraham Shelby    POC Glucose Fingerstick [[id number]]  (Normal) Collected:  09/27/17 2237    Specimen:  Blood Updated:  09/27/17 2239     Glucose 97 mg/dL     Narrative:       Meter: [id number] : [id number] Gregory Gordon    Protime-INR [[id number]]  (Abnormal) Collected:  09/28/17 0556    Specimen:  Blood Updated:  09/28/17 0720     Protime 16.2 (H) Seconds      INR 1.47    Narrative:       Therapeutic Ranges for INR: 2.0-3.0 (PT 20-30)                              2.5-3.5 (PT 25-34)    Lipid Panel [[id number]]  (Abnormal) Collected:  09/28/17 0556    Specimen:  Blood Updated:  09/28/17 0723     Total Cholesterol 102 mg/dL      Triglycerides 89 mg/dL      HDL Cholesterol 30 (L) mg/dL      LDL Cholesterol  58 mg/dL     Narrative:       Cholesterol Reference Ranges:   Desirable       < 200 mg/dL   Borderline    200-239 mg/dL   High Risk       > 239 mg/dL    Triglyceride Reference Ranges:   Normal          < 150 mg/dL   Borderline    150-199 mg/dL   High          200-499 mg/dL   Very High       > 499 mg/dL    HDL Reference Ranges:   Low              < 40 mg/dL   High             > 59 mg/dL    LDL Reference Ranges:   Optimal         < 100 mg/dL   Near Optimal  100-129 mg/dL   Borderline    130-159 mg/dL   High          160-189 mg/dL   Very High       > 189 mg/dL    POC Glucose Fingerstick [[id number]]  (Normal) Collected:  09/28/17 0745    Specimen:  Blood Updated:  09/28/17  0748     Glucose 99 mg/dL     Narrative:       Meter: [id number] : [id number] Surya Rodriguez    Renal Function Panel [[id number]]  (Normal) Collected:  09/28/17 0556    Specimen:  Blood Updated:  09/28/17 0908     Glucose 79 mg/dL      BUN 15 mg/dL      Creatinine 1.10 mg/dL      Sodium 139 mmol/L      Potassium 3.7 mmol/L      Chloride 104 mmol/L      CO2 31.0 mmol/L      Calcium 8.8 mg/dL      Albumin 3.60 g/dL      Phosphorus 3.3 mg/dL      Anion Gap 4.0 mmol/L      BUN/Creatinine Ratio 13.6     eGFR Non African Amer 64 mL/min/1.73     Narrative:       National Kidney Foundation Guidelines    Stage     Description        GFR  1         Normal or High     90+  2         Mild decrease      60-89  3         Moderate decrease  30-59  4         Severe decrease    15-29  5         Kidney failure     <15    CBC & Differential [[id number]] Collected:  09/28/17 0905    Specimen:  Blood Updated:  09/28/17 0926    Narrative:       The following orders were created for panel order CBC & Differential.  Procedure                               Abnormality         Status                     ---------                               -----------         ------                     CBC Auto Differential[[id number]]        Abnormal            Final result                 Please view results for these tests on the individual orders.    CBC Auto Differential [[id number]]  (Abnormal) Collected:  09/28/17 0905    Specimen:  Blood Updated:  09/28/17 0926     WBC 4.06 10*3/mm3      RBC 3.77 (L) 10*6/mm3      Hemoglobin 10.3 (L) g/dL      Hematocrit 33.2 (L) %      MCV 88.1 fL      MCH 27.3 pg      MCHC 31.0 (L) g/dL      RDW 16.7 (H) %      RDW-SD 53.8 fl      MPV 9.6 fL      Platelets 95 (L) 10*3/mm3      Neutrophil % 72.6 (H) %      Lymphocyte % 14.3 (L) %      Monocyte % 9.4 %      Eosinophil % 3.0 %      Basophil % 0.5 %      Immature Grans % 0.2 %      Neutrophils, Absolute 2.95 10*3/mm3      Lymphocytes, Absolute 0.58 (L) 10*3/mm3       Monocytes, Absolute 0.38 10*3/mm3      Eosinophils, Absolute 0.12 10*3/mm3      Basophils, Absolute 0.02 10*3/mm3      Immature Grans, Absolute 0.01 10*3/mm3     BNP [[id number]]  (Abnormal) Collected:  09/28/17 0905    Specimen:  Blood Updated:  09/28/17 0933     .0 (H) pg/mL     POC Glucose Fingerstick [[id number]]  (Abnormal) Collected:  09/28/17 1214    Specimen:  Blood Updated:  09/28/17 1231     Glucose 218 (H) mg/dL     Narrative:       Meter: [id number] : [id number] Simental Liat    POC Glucose Fingerstick [[id number]]  (Normal) Collected:  09/28/17 1625    Specimen:  Blood Updated:  09/28/17 1626     Glucose 96 mg/dL     Narrative:       Meter: [id number] : [id number] Surya Rodriguez    POC Glucose Fingerstick [[id number]]  (Abnormal) Collected:  09/28/17 1930    Specimen:  Blood Updated:  09/28/17 1932     Glucose 146 (H) mg/dL     Narrative:       Meter: [id number] : [id number] Gregory Gordon    CBC (No Diff) [[id number]]  (Abnormal) Collected:  09/29/17 0559    Specimen:  Blood Updated:  09/29/17 0654     WBC 3.64 10*3/mm3      RBC 3.84 (L) 10*6/mm3      Hemoglobin 10.4 (L) g/dL      Hematocrit 34.0 (L) %      MCV 88.5 fL      MCH 27.1 pg      MCHC 30.6 (L) g/dL      RDW 17.1 (H) %      RDW-SD 55.4 (H) fl      MPV 10.6 fL      Platelets 99 (L) 10*3/mm3     POC Glucose Fingerstick [[id number]]  (Normal) Collected:  09/29/17 0720    Specimen:  Blood Updated:  09/29/17 0721     Glucose 116 mg/dL     Narrative:       Meter: [id number] : [id number] Allen Ledesma    Protime-INR [[id number]]  (Abnormal) Collected:  09/29/17 0559    Specimen:  Blood Updated:  09/29/17 0722     Protime 22.1 (H) Seconds      INR 1.98    Narrative:       Therapeutic Ranges for INR: 2.0-3.0 (PT 20-30)                              2.5-3.5 (PT 25-34)    Renal Function Panel [[id number]]  (Abnormal) Collected:  09/29/17 0559    Specimen:  Blood Updated:  09/29/17 0804     Glucose 87 mg/dL      BUN 16 mg/dL      Creatinine  1.20 mg/dL      Sodium 137 mmol/L      Potassium 4.0 mmol/L      Chloride 98 (L) mmol/L      CO2 32.0 (H) mmol/L      Calcium 9.1 mg/dL      Albumin 3.60 g/dL      Phosphorus 3.8 mg/dL      Anion Gap 7.0 mmol/L      BUN/Creatinine Ratio 13.3     eGFR Non African Amer 58 (L) mL/min/1.73     Narrative:       National Kidney Foundation Guidelines    Stage     Description        GFR  1         Normal or High     90+  2         Mild decrease      60-89  3         Moderate decrease  30-59  4         Severe decrease    15-29  5         Kidney failure     <15    Peripheral Blood Smear - Blood, [[id number]] Collected:  09/28/17 0556    Specimen:  Blood Updated:  09/29/17 0935     Performed by: NOE London M.D.     Pathologist Interpretation Reviewed 09/29/17:  Anemia with moderate RBC anisocytosis; Thrombocytopenia.    POC Glucose Fingerstick [[id number]]  (Abnormal) Collected:  09/29/17 1152    Specimen:  Blood Updated:  09/29/17 1155     Glucose 186 (H) mg/dL     Narrative:       Meter: [id number] : [id number] Nixon Bell    Vancomycin, Trough [[id number]]  (Normal) Collected:  09/29/17 1326    Specimen:  Blood Updated:  09/29/17 1425     Vancomycin Trough 15.20 mcg/mL     POC Glucose Fingerstick [[id number]]  (Abnormal) Collected:  09/29/17 1657    Specimen:  Blood Updated:  09/29/17 1702     Glucose 217 (H) mg/dL     Narrative:       Meter: [id number] : [id number] Nixon Bell    POC Glucose Fingerstick [[id number]]  (Normal) Collected:  09/29/17 2027    Specimen:  Blood Updated:  09/29/17 2030     Glucose 76 mg/dL     Narrative:       Meter: [id number] : [id number] Marcie Ana    CBC (No Diff) [[id number]]  (Abnormal) Collected:  09/30/17 0657    Specimen:  Blood Updated:  09/30/17 0732     WBC 3.86 10*3/mm3      RBC 3.87 (L) 10*6/mm3      Hemoglobin 10.3 (L) g/dL      Hematocrit 34.2 (L) %      MCV 88.4 fL      MCH 26.6 (L) pg      MCHC 30.1 (L) g/dL      RDW 16.9 (H) %      RDW-SD 54.9 (H) fl      MPV 10.4 fL       Platelets 98 (L) 10*3/mm3     Renal Function Panel [[id number]]  (Abnormal) Collected:  09/30/17 0657    Specimen:  Blood Updated:  09/30/17 0739     Glucose 88 mg/dL      BUN 16 mg/dL      Creatinine 1.20 mg/dL      Sodium 141 mmol/L      Potassium 4.2 mmol/L      Chloride 104 mmol/L      CO2 28.0 mmol/L      Calcium 8.9 mg/dL      Albumin 3.60 g/dL      Phosphorus 3.8 mg/dL      Anion Gap 9.0 mmol/L      BUN/Creatinine Ratio 13.3     eGFR Non African Amer 58 (L) mL/min/1.73     Narrative:       National Kidney Foundation Guidelines    Stage     Description        GFR  1         Normal or High     90+  2         Mild decrease      60-89  3         Moderate decrease  30-59  4         Severe decrease    15-29  5         Kidney failure     <15    POC Glucose Fingerstick [[id number]]  (Normal) Collected:  09/30/17 0751    Specimen:  Blood Updated:  09/30/17 0752     Glucose 98 mg/dL     Narrative:       Meter: [id number] : [id number] Allen Ledesma    Protime-INR [[id number]]  (Abnormal) Collected:  09/30/17 0818    Specimen:  Blood Updated:  09/30/17 0856     Protime 23.4 (H) Seconds      INR 2.10    Narrative:       Therapeutic Ranges for INR: 2.0-3.0 (PT 20-30)                              2.5-3.5 (PT 25-34)    POC Glucose Fingerstick [[id number]]  (Abnormal) Collected:  09/30/17 1128    Specimen:  Blood Updated:  09/30/17 1143     Glucose 216 (H) mg/dL     Narrative:       Meter: [id number] : [id number] Dharmesh Cisneros    POC Glucose Fingerstick [[id number]]  (Normal) Collected:  09/30/17 1656    Specimen:  Blood Updated:  09/30/17 1705     Glucose 127 mg/dL     Narrative:       Meter: [id number] : [id number] Madhu FELICIANO Pneumo Ag Urine or CSF - Urine, Urine, Clean Catch [[id number]] Collected:  09/28/17 1108    Specimen:  Urine from Urine, Clean Catch Updated:  09/30/17 1707     Specimen Source Urine     STREP PNEUMONIAE ANTIGEN Negative     Body Fluid Culture, Sterile Not Indicated      Organism ID Not indicated.     Please note Comment      College of American [employer]ologists standards require a culture to be  performed on CSF specimens submitted for bacterial antigen testing.  (CAP DALE.70604) Urine specimens will not be cultured.       Narrative:       Performed at:  [address] [place] [address]  [id number]  : Lawrence Lindsay MD, Phone:  [phone]    Legionella Antigen, Urine - Urine, Clean Catch [[id number]] Collected:  09/28/17 1108    Specimen:  Urine from Urine, Clean Catch Updated:  09/30/17 1707     L. pneumophila Serogp 1 Ur Ag Negative      Presumptive negative for L. pneumophila serogroup 1 antigen in urine,  suggesting no recent or current infection. Legionnaires' disease  cannot be ruled out since other serogroups and species may also cause  disease.       Narrative:       Performed at:  [address] [place] [address]  [id number]  : Lawrence Lindsay MD, Phone:  [phone]    POC Glucose Fingerstick [[id number]]  (Normal) Collected:  09/30/17 2011    Specimen:  Blood Updated:  09/30/17 2014     Glucose 124 mg/dL     Narrative:       Meter: [id number] : [id number] Marcie Ana    POC Glucose Fingerstick [[id number]]  (Normal) Collected:  10/01/17 0719    Specimen:  Blood Updated:  10/01/17 0722     Glucose 92 mg/dL     Narrative:       Meter: [id number] : [id number] Allen Ledesma    Renal Function Panel [[id number]]  (Abnormal) Collected:  10/01/17 0645    Specimen:  Blood Updated:  10/01/17 0748     Glucose 71 mg/dL      BUN 20 mg/dL      Creatinine 1.20 mg/dL      Sodium 139 mmol/L      Potassium 4.3 mmol/L      Chloride 102 mmol/L      CO2 31.0 mmol/L      Calcium 8.8 mg/dL      Albumin 3.40 g/dL      Phosphorus 4.0 mg/dL      Anion Gap 6.0 mmol/L      BUN/Creatinine Ratio 16.7     eGFR Non African Amer 58 (L) mL/min/1.73     Narrative:       National Kidney Foundation Guidelines    Stage      Description        GFR  1         Normal or High     90+  2         Mild decrease      60-89  3         Moderate decrease  30-59  4         Severe decrease    15-29  5         Kidney failure     <15    CBC (No Diff) [[id number]]  (Abnormal) Collected:  10/01/17 0645    Specimen:  Blood Updated:  10/01/17 0824     WBC 4.27 10*3/mm3      RBC 3.96 (L) 10*6/mm3      Hemoglobin 10.7 (L) g/dL      Hematocrit 35.5 (L) %      MCV 89.6 fL      MCH 27.0 pg      MCHC 30.1 (L) g/dL      RDW 17.1 (H) %      RDW-SD 56.2 (H) fl      MPV 11.0 fL      Platelets 95 (L) 10*3/mm3     Protime-INR [[id number]]  (Abnormal) Collected:  10/01/17 0938    Specimen:  Blood Updated:  10/01/17 1020     Protime 27.4 (H) Seconds      INR 2.45    Narrative:       Therapeutic Ranges for INR: 2.0-3.0 (PT 20-30)                              2.5-3.5 (PT 25-34)    POC Glucose Fingerstick [[id number]]  (Abnormal) Collected:  10/01/17 1145    Specimen:  Blood Updated:  10/01/17 1149     Glucose 146 (H) mg/dL     Narrative:       Meter: [id number] : [id number] Allen Callie    POC Glucose Fingerstick [[id number]]  (Abnormal) Collected:  10/01/17 1714    Specimen:  Blood Updated:  10/01/17 1715     Glucose 132 (H) mg/dL     Narrative:       Meter: [id number] : [id number] dondeEstaâ„¢ Callie    Protime-INR [[id number]]  (Abnormal) Collected:  10/02/17 0515    Specimen:  Blood Updated:  10/02/17 0616     Protime 26.6 (H) Seconds      INR 2.38    Narrative:       Therapeutic Ranges for INR: 2.0-3.0 (PT 20-30)                              2.5-3.5 (PT 25-34)    POC Glucose Fingerstick [[id number]]  (Normal) Collected:  10/02/17 0755    Specimen:  Blood Updated:  10/02/17 0759     Glucose 81 mg/dL     Narrative:       Meter: [id number] : [id number] Surya Rodriguez    POC Glucose Fingerstick [[id number]]  (Abnormal) Collected:  10/02/17 1130    Specimen:  Blood Updated:  10/02/17 1133     Glucose 136 (H) mg/dL     Narrative:       Meter: [id number] : [id number]  Surya Rodriguez    POC Glucose Fingerstick [[id number]]  (Abnormal) Collected:  10/02/17 1618    Specimen:  Blood Updated:  10/02/17 1620     Glucose 220 (H) mg/dL     Narrative:       Meter: [id number] : [id number] Surya Rodriguez    POC Glucose Fingerstick [[id number]]  (Abnormal) Collected:  10/02/17 2047    Specimen:  Blood Updated:  10/02/17 2049     Glucose 149 (H) mg/dL     Narrative:       Meter: [id number] : [id number] Abner Pavon    POC Glucose Fingerstick [[id number]]  (Normal) Collected:  10/03/17 0737    Specimen:  Blood Updated:  10/03/17 0740     Glucose 85 mg/dL     Narrative:       Meter: [id number] : [id number] Surya Hightoweri    Protime-INR [[id number]]  (Abnormal) Collected:  10/03/17 0852    Specimen:  Blood Updated:  10/03/17 0951     Protime 21.2 (H) Seconds      INR 1.91    Narrative:       Therapeutic Ranges for INR: 2.0-3.0 (PT 20-30)                              2.5-3.5 (PT 25-34)    POC Glucose Fingerstick [[id number]]  (Abnormal) Collected:  10/03/17 1110    Specimen:  Blood Updated:  10/03/17 1121     Glucose 140 (H) mg/dL     Narrative:       Meter: [id number] : [id number] Koko Costa        Imaging Results (all)     Procedure Component Value Units Date/Time    XR Chest 1 View [[id number]] Collected:  09/19/17 1427     Updated:  09/19/17 1658    Narrative:       EXAMINATION: XR CHEST 1 VW- 09/19/2017     INDICATION: SOA triage protocol      COMPARISON: 08/06/2017     FINDINGS:   1. Mild cardiomegaly is noted. There is mild mid and lower lung vascular  congestion.  2. Mild airspace opacities are seen at the bases, worse left lower lobe.            Impression:       1. Chronic cardiomegaly and chronic congestion in the chest.  2. Compared to studies of 08/06/2017, the right lung base has  substantially improved. There is trace opacity in the left lower lobe.  3. High-grade acute abnormalities are not identified elsewhere.     D:  09/19/2017  E:  09/19/2017     This report was finalized  on 9/19/2017 4:56 PM by Dr. Mitch Mascorro MD.       XR Chest 1 View [[id number]]  (Abnormal) Collected:  09/23/17 2312     Updated:  09/23/17 2356    Narrative:       EXAM:    XR Chest, 1 View    CLINICAL HISTORY:    83 years old, male; Fluid overload, unspecified; Acute on chronic systolic   (congestive) heart failure; Hypoxemia; Edema, unspecified; Abnormal coagulation   profile; Other reduced mobility; Other reduced mobility; Signs and symptoms;   Cough and dyspnea and shortness of breath; Symptoms not specified; Prior   surgery; Surgery date: 6+ months; Surgery type: Cabg; Patient HX: Increasing   cough and SOA. Cabg in 1989. A-fib; Additional info: Increasing cough, bedbound    TECHNIQUE:    Frontal view of the chest.    COMPARISON:    CR - XR CHEST 1 VW 9/19/2017 2:05:20 PM    FINDINGS:    Lungs:  Increased opacity in the left lung base is grossly unchanged.  There   is patchy opacity in the right mid to lower lung field which appears somewhat   increased.    Pleural space: Possible small right pleural effusion.  No pneumothorax.    Heart:  Enlarged.    Mediastinum:  Unremarkable.    Bones/joints:  Unremarkable.      Impression:         Bibasilar opacities may represent pneumonia, slightly more prominent on the   right.    THIS DOCUMENT HAS BEEN ELECTRONICALLY SIGNED BY RODRÍGUEZ PATTEN MD    XR Chest 1 View [[id number]] Collected:  09/26/17 0946     Updated:  09/26/17 1009    Narrative:       EXAMINATION: XR CHEST 1 VW-09/26/2017:      INDICATION: Hypoxia; E87.70-Fluid overload, unspecified; I50.23-Acute on  chronic systolic (congestive) heart failure; R79.1-Abnormal coagulation  profile; R60.9-Edema, unspecified; R09.02-Hypoxemia; Z74.09-Other  reduced mobility; Z74.09-Other reduced mobility.      COMPARISON: 09/23/2017.     FINDINGS: Persistent cardiomegaly with increased central pulmonary  vascularity. Bibasilar opacifications again noted with increase  bilaterally remain greatest on the right. Probable small  right pleural  effusion persists. No pneumothorax.       Impression:       Increased bibasilar opacifications greatest on the right  which may represent worsening pneumonia and/or atelectasis.     D:  09/26/2017  E:  09/26/2017     This report was finalized on 9/26/2017 10:07 AM by Dr. Luis M Leija.       CT Chest Without Contrast [[id number]] Collected:  09/27/17 1111     Updated:  09/27/17 1350    Narrative:       EXAMINATION: CT CHEST WO CONTRAST-09/26/2017:      INDICATION: R/O pneumonia; hypoxia; E87.70-Fluid overload, unspecified;  I50.23-Acute on chronic systolic (congestive) heart failure;  R79.1-Abnormal coagulation profile; R60.9-Edema, unspecified;  R09.02-Hypoxemia; Z74.09-Other reduced mobility; Z74.09-Other reduced  mobility.         TECHNIQUE:  CT data set of the chest and mediastinum was performed  without intravenous contrast.     The radiation dose reduction device was turned on for each scan per the  ALARA (As Low as Reasonably Achievable) protocol.     COMPARISON: Compared to previous and most recent CT data sets of the  chest and mediastinum of 07/21/2017.     FINDINGS:   1. Pathologic adenopathy is identified at the thoracic inlet, superior  mediastinum and in the aortopulmonary window. These multiple lymph nodes  may be reactive. Coalescent mediastinal mass is not identified. The  axillae are clear.     2. There is groundglass airspace opacity with nodularity at the right  lung apex which is new from 2 months ago. The patient has developed  increasing pleural effusions over and above those identified on previous  studies. The amount of fluid in the chest is bilaterally increased.  There are dense consolidative airspace opacities in the mid and lower  lung zones, also slightly increased from previous studies. Linear  nodular opacities are identified in the mid lung zones peripherally.     3. Cardiac chambers are borderline for size. Previous median sternotomy  has been performed.     Images into  the upper abdomen demonstrate vasculopathic atherosclerotic  calcifications. The liver and spleen are otherwise unremarkable. The  adrenal glands are normal. The pancreas is negative for mass, cyst or  stranding.       Impression:       1.  Compared to previous studies of 2 months ago, the findings in the  chest have progressed. There is new consolidative nodular airspace  groundglass disease in the right upper lobe and right lung apex  extensively.  2.  There are increasing pleural effusions in both of the hemithoraces.  3.  There is consolidative airspace opacity in the mid and lower lung  zones with air bronchograms.  4.  There is a slight increased linear nodular pattern in the mid lung  zones.  5.  Mediastinal lymph nodes are identified which are multiple and likely  reactive but which have slightly increased over the interval.     D:  09/27/2017  E:  09/27/2017           This report was finalized on 9/27/2017 1:47 PM by Dr. Mitch Mascorro MD.       FL Video Swallow With Speech [[id number]] Collected:  09/28/17 1352     Updated:  09/28/17 1616    Narrative:       EXAMINATION: FL LIMITED UGI FOR MBS REFLUX-, FL VIDEO SWALLOW W SPEECH-     INDICATION: dysphagia; E87.70-Fluid overload, unspecified; I50.23-Acute  on chronic systolic (congestive) heart failure; R79.1-Abnormal  coagulation profile; R60.9-Edema, unspecified; R09.02-Hypoxemia;  Z74.09-Other reduced mobility; Z74.09-Other reduced mobility         TECHNIQUE: 1 minute and 11 seconds of fluoroscopic time was used for  this exam. 2 associated images were saved. The patient was evaluated in  the seated lateral position while taking a variety of consistencies of  barium by mouth under the direction of speech pathology.     COMPARISON: NONE     FINDINGS: There was no penetration and no aspiration with any of the  media ingested. A limited view of the esophagus with the patient in the  seated lateral position demonstrated no signs of a focal  esophageal  stricture. There was mild gastroesophageal reflux to the level of the  midesophagus.          Impression:       Fluoroscopy provided for a modified barium swallow. Please  see speech therapy report for full details and recommendations. There  was mild gastroesophageal reflux to the level of the midesophagus.         This report was finalized on 9/28/2017 4:14 PM by Dr. Surekha Meza MD.       FL Limited Ugi For Mbs Reflux [[id number]] Collected:  09/28/17 1352     Updated:  09/28/17 1616    Narrative:       EXAMINATION: FL LIMITED UGI FOR MBS REFLUX-, FL VIDEO SWALLOW W SPEECH-     INDICATION: dysphagia; E87.70-Fluid overload, unspecified; I50.23-Acute  on chronic systolic (congestive) heart failure; R79.1-Abnormal  coagulation profile; R60.9-Edema, unspecified; R09.02-Hypoxemia;  Z74.09-Other reduced mobility; Z74.09-Other reduced mobility         TECHNIQUE: 1 minute and 11 seconds of fluoroscopic time was used for  this exam. 2 associated images were saved. The patient was evaluated in  the seated lateral position while taking a variety of consistencies of  barium by mouth under the direction of speech pathology.     COMPARISON: NONE     FINDINGS: There was no penetration and no aspiration with any of the  media ingested. A limited view of the esophagus with the patient in the  seated lateral position demonstrated no signs of a focal esophageal  stricture. There was mild gastroesophageal reflux to the level of the  midesophagus.          Impression:       Fluoroscopy provided for a modified barium swallow. Please  see speech therapy report for full details and recommendations. There  was mild gastroesophageal reflux to the level of the midesophagus.         This report was finalized on 9/28/2017 4:14 PM by Dr. Surekha Meza MD.       XR Chest 1 View [[id number]] Updated:  10/03/17 1023        Results for orders placed during the hospital encounter of 09/19/17   Adult Transthoracic Echo  Complete W/ Cont if Necessary Per Protocol    Narrative · There is four chamber cardiac enlargement.  · Global and segmental LV wall motion abnormalities are seen with an   estimated EF=36%-40%.  · Left ventricular wall thickness is consistent with concentric   hypertrophy, mild.  · Mitral annular calcification with mild-moderate MR is appreciated.  · Aortic valve sclerosis with moderate aortic insufficiency is seen.  · The LV examination is suboptimal as the patient refused Lumason.        Discharge Details      Bjorn Pollock   Home Medication Instructions FERNANDO:[id number]    Printed on:10/03/17 1131   Medication Information                      acetaminophen (TYLENOL) 325 MG tablet  Take 2 tablets by mouth Every 4 (Four) Hours As Needed for Mild Pain .             amoxicillin-clavulanate (AUGMENTIN) 875-125 MG per tablet  Take 1 tablet by mouth Every 12 (Twelve) Hours for 8 doses. Indications: Pneumonia             aspirin 81 MG EC tablet  Take 1 tablet by mouth Daily.             bisacodyl (DULCOLAX) 5 MG EC tablet  Take 2 tablets by mouth Daily As Needed for Constipation.             budesonide (PULMICORT) 0.5 MG/2ML nebulizer solution  Take 2 mL by nebulization 2 (Two) Times a Day for 5 days.             calcium carbonate (TUMS) 500 MG chewable tablet  Chew 1,000 mg 2 (Two) Times a Day As Needed for Heartburn.             docusate sodium 100 MG capsule  Take 100 mg by mouth 2 (Two) Times a Day.             fluvastatin XL (LESCOL XL) 80 MG 24 hr tablet  Take 80 mg by mouth Every Night.             furosemide (LASIX) 40 MG tablet  Take 1 tablet by mouth 2 (Two) Times a Day. Hold if SBP < 100             guaiFENesin (MUCINEX) 600 MG 12 hr tablet  Take 1 tablet by mouth Every 12 (Twelve) Hours for 5 days.             insulin detemir (LEVEMIR) 100 UNIT/ML injection  Inject 6 Units under the skin Every Morning.             insulin glargine (LANTUS) 100 UNIT/ML injection  Inject 11 Units under the skin Daily.              insulin lispro (HUMALOG) 100 UNIT/ML injection  Inject 0-7 units under the skin three times per day with meals             ipratropium-albuterol (DUO-NEB) 0.5-2.5 mg/mL nebulizer  Take 3 mL by nebulization Every 6 (Six) Hours As Needed for Shortness of Air for up to 5 days.             lisinopril (PRINIVIL,ZESTRIL) 2.5 MG tablet  Take 1 tablet by mouth Daily.             melatonin 5 MG sublingual tablet sublingual tablet  Place 1 tablet under the tongue At Night As Needed (SLEEP).             metoprolol succinate XL (TOPROL-XL) 25 MG 24 hr tablet  Take 0.5 tablets by mouth Daily.             nitroglycerin (NITROSTAT) 0.4 MG SL tablet  Place 1 tablet under the tongue Every 5 (Five) Minutes As Needed for Chest Pain. Take no more than 3 doses in 15 minutes.             nystatin (MYCOSTATIN) 929350 UNIT/GM cream  Apply  topically Every 12 (Twelve) Hours.             nystatin (MYCOSTATIN) 426404 UNIT/GM powder  Apply  topically Every 12 (Twelve) Hours.             ondansetron (ZOFRAN) 4 MG tablet  Take 1 tablet by mouth Every 6 (Six) Hours As Needed for Nausea or Vomiting.             pantoprazole (PROTONIX) 40 MG EC tablet  Take 40 mg by mouth Daily.             polyethylene glycol (MIRALAX) packet  Take 17 g by mouth Daily.             potassium chloride (MICRO-K) 10 MEQ CR capsule  Take 2 capsules by mouth 2 (Two) Times a Day With Meals.             saccharomyces boulardii (FLORASTOR) 250 MG capsule  Take 1 capsule by mouth 2 (Two) Times a Day for 14 days.             sertraline (ZOLOFT) 100 MG tablet  Take 100 mg by mouth Daily.             tamsulosin (FLOMAX) 0.4 MG capsule 24 hr capsule  Take 2 capsules by mouth Every Night.             warfarin (COUMADIN) 4 MG tablet  Take 4mg PO QHS               Discharge Disposition:  Rehab Facility or Unit (DC - External)    Discharge Diet:  Diet Instructions     Diet: Regular, Consistent Carbohydrate, Cardiac; Thin       Discharge Diet:   Regular  Consistent  Carbohydrate  Cardiac      Fluid Consistency:  Thin               Discharge Activity:  Activity Instructions     Activity as Tolerated                   No future appointments.    Additional Instructions for the Follow-ups that You Need to Schedule     Discharge Follow-Up With Specified Provider    As directed    To:  Follow up with Dr. Vivas in 1 month - please schedule this appointment for patient       Discharge Follow-up with PCP    As directed    Follow Up Details:  PCP follow up one week after d/c from [facility]               Time Spent on Discharge: Discharge 50 min    Deepa Ortiz PA-C  10/03/17  11:33 AM           Electronically signed by Deepa Ortiz PA-C at 10/3/2017 11:40 AM        Discharge Order     Start     Ordered    10/03/17 1133  Discharge patient  Once     Expected Discharge Date:  10/03/17    Expected Discharge Time:  Midday    Discharge Disposition:  Rehab Facility or Unit (DC - External)    Please choose which facility the patient is currently admitted if they are being discharged to another facility or unit.:  [facility]       Question Answer Comment   Please choose which facility the patient is currently admitted if they are being discharged to another facility or unit[facility]        10/03/17 1132

## 2017-11-21 PROBLEM — R41.89 SPELL OF ALTERED COGNITION: Status: ACTIVE | Noted: 2017-01-01

## 2017-11-21 PROBLEM — N17.9 ACUTE RENAL FAILURE SUPERIMPOSED ON STAGE 2 CHRONIC KIDNEY DISEASE (HCC): Status: ACTIVE | Noted: 2017-01-01

## 2017-11-21 PROBLEM — N18.2 ACUTE RENAL FAILURE SUPERIMPOSED ON STAGE 2 CHRONIC KIDNEY DISEASE (HCC): Status: ACTIVE | Noted: 2017-01-01

## 2017-11-21 NOTE — PLAN OF CARE
Problem: Patient Care Overview (Adult)  Goal: Plan of Care Review  Outcome: Ongoing (interventions implemented as appropriate)  Goal: Adult Individualization and Mutuality  Outcome: Ongoing (interventions implemented as appropriate)  Goal: Discharge Needs Assessment  Outcome: Ongoing (interventions implemented as appropriate)    Problem: Skin Integrity Impairment, Risk/Actual (Adult)  Goal: Identify Related Risk Factors and Signs and Symptoms  Outcome: Ongoing (interventions implemented as appropriate)  Goal: Skin Integrity/Wound Healing  Outcome: Ongoing (interventions implemented as appropriate)

## 2017-11-21 NOTE — ED PROVIDER NOTES
Subjective   HPI Comments: Bjorn Pollock is an 83 y.o.male with history of CHF, atrial fibrillation on coumadin, type II diabetes mellitus, peripheral neuropathy, CKD, and COPD. He is currently living at home with his wife and daughter. He usually ambulates using a walker or wheelchair. He uses a CPAP at night and has home oxygen available for when he needs it. He has a history of recurrent falls, however, he has not fallen within the last 2-3 weeks. He was recently admitted to [facility] from 9/19/17 to 10/3/17 for an acute CHF exacerbation and hypervolemia. He was sent to [facility] upon discharge. He has since returned home to his family.     He presents to the ED today with c/o tremors. Over the course of the last 3 weeks the patient has experienced a few intermittent episodes of tremulousness in his upper extremities. The episodes have occurred while the patient is reaching for something and at times when he is sitting at rest. The patient does not lose consciousness during the episodes, although he has appeared somewhat less responsive during one or two of them per his daughter. Because of this, his daughter has been assessing his level of consciousness by pinching him during the episodes. She states he has remained responsive to this at all times. She denies any overt seizure-like activity, incontinence, or post-ictal periods. However, she states he appears somewhat fatigued following the episodes. He was diagnosed with a UTI after leaving the hospital on 10/3/17. He was started on Bactrim and Macrobid for treatment of this. His family has been concerned that his newly developed tremors may be a result of taking these medications.     In addition to this, he has progressively declined over the last few weeks with progressively worsening generalized weakness and decreased appetite. His family reports he has not been eating and as a result has not been needing to take his regular insulin medications. His family  denies cough, nausea, vomiting, diarrhea, or rhinorrhea. They note he has been experiencing tailbone and right shoulder pain which they attribute to previous falls. He took Tylenol this morning for the pain with minimal relief.     All other systems were reviewed and are negative.       Patient is a 83 y.o. male presenting with general illness.   History provided by:  Patient, relative and spouse  Illness   Location:  General  Quality:  Tremors  Severity:  Moderate  Onset quality:  Sudden  Duration:  3 weeks  Timing:  Intermittent  Progression:  Unchanged  Chronicity:  New  Context:  Over the course of the last 3 weeks the patient has experienced a few intermittent episodes of tremulousness in his upper extremities. The episodes have occurred while the patient is reaching for something and at times when he is sitting at rest.    Relieved by:  None tried  Worsened by:  Nothing  Ineffective treatments:  None tried  Associated symptoms: fatigue    Associated symptoms: no cough, no diarrhea, no loss of consciousness, no nausea, no rhinorrhea and no vomiting        Review of Systems   Constitutional: Positive for appetite change (decreased) and fatigue.   HENT: Negative for rhinorrhea.    Respiratory: Negative for cough.    Gastrointestinal: Negative for diarrhea, nausea and vomiting.   Genitourinary:        No incontinence.    Musculoskeletal:        Right shoulder pain. Tailbone pain.    Neurological: Positive for tremors and weakness. Negative for loss of consciousness.        No overt seizure-like activity or post-ictal periods.    All other systems reviewed and are negative.      Past Medical History:   Diagnosis Date   • Anemia    • Anxiety    • Anxiety    • Atrial fibrillation, chronic    • Crawley's esophagus    • BPH (benign prostatic hyperplasia)    • CAD and hx of CABG    • Cardiomyopathy    • Cellulitis    • CHF (congestive heart failure)     diastolic heart failure    • Chronic kidney disease     Stage 2-3    • COPD (chronic obstructive pulmonary disease)    • DDD (degenerative disc disease), lumbar and cervical spine    • Depression    • Diabetes mellitus    • Diabetic retinopathy    • DJD (degenerative joint disease)    • GERD (gastroesophageal reflux disease)    • HLD (hyperlipidemia)    • Hypertension    • Lower extremity edema     Chronic lower extremity edema due to venous stasis.   • Necrotizing fasciitis     R arm; s/p skin graft   • Obesity    • NAYLA on CPAP    • Pneumothorax    • Stroke     x3   • Thrombocytopenia    • Thrombocytopenia    • Urinary incontinence    • Vitamin B12 deficiency    9:54 AM  Old records reviewed. Discharge summary from 10/3/17:  Date of Admission: 9/19/2017  Date of Discharge:    Length of Stay: 14  Primary Care Physician: Vincent Mccullough MD     Consults     Date and Time Order Name Status Description     9/27/2017 1608 Inpatient Consult to Pulmonology         9/26/2017 1723 Inpatient Consult to Cardiology Completed           Hospital Course      Presenting Problem:   Acute exacerbation of CHF (congestive heart failure) [I50.9]  Hypervolemia, unspecified hypervolemia type [E87.70]           Active Hospital Problems (** Indicates Principal Problem)     Diagnosis Date Noted   • **Acute on chronic respiratory failure with hypoxemia [J96.21] 09/19/2017   • Hypervolemia [E87.70] 09/22/2017   • Acute exacerbation of CHF (congestive heart failure) [I50.9] 09/19/2017   • Systolic and diastolic CHF, acute on chronic [I50.43] 09/19/2017   • Chronic anticoagulation [Z79.01] 09/19/2017   • DM type 2 (diabetes mellitus, type 2) [E11.9] 09/04/2016   • COPD (chronic obstructive pulmonary disease) [J44.9] 09/04/2016   • NAYLA (obstructive sleep apnea) [G47.33] 09/04/2016   • Atrial fibrillation [I48.91] 09/04/2016   • CKD (chronic kidney disease) stage 2, GFR 60-89 ml/min [N18.2] 09/04/2016   • Anemia [D64.9] 09/04/2016   • Chronic thrombocytopenic purpura [D69.49] 09/04/2016   • Supratherapeutic  INR [R79.1] 09/04/2016       Resolved Hospital Problems     Diagnosis Date Noted Date Resolved   No resolved problems to display.      Hospital Course:  Mr. Bjorn Pollock is an 82yo male with a history of anemia, anxiety/depression, atrial fibrillation (Coumadin), CAD with prior CABG, diastolic CHF, CKD II, COPD, DM II, GERD, HTN, hyperlipidemia, NAYLA and degenerative disc disease who presented to [facility] ED on 9/19/17 with complaints of dyspnea.  Patient reports he normally wears 2 L of oxygen at night but had to increase oxygen to 4 L to feel some improvement in his symptoms.  His wife noted increased swelling to his legs.  Daughter reports that he has been forgetting to take his Lasix recently and has had similar symptoms to these when he has not been taking Lasix. Patient has also been having nocturnal hypoglycemia.      Patient was felt to have acute on chronic respiratory failure with hypoxia likely secondary to congestive heart failure exacerbation.  Chest x-ray was unrevealing.  BNP moderately elevated at 345.  He was given Lasix for diuresis and dependence to the hospital medicine service for further evaluation and treatment.     INR was supratherapeutic on admission. Held until INR therapeutic. INR has remained stable this hospitalization.  Echocardiogram on 9/28/17 showed EF 35-40%. He was started on PO Lasix 40mg daily (later transitioned to BID), Metoprolol 12.5mg PO and Lisinopril 2.5 mg daily. Home diltiazem was discontinued this hospitalization.      Due to persistent hypoxia, cardiology was consulted to evaluate the patient. Dr. Vivas saw Mr. Pollock on 9/27/17. Dr. Vivas felt that patient likely had pneumonia superimposed on CHF. He was diuresed and treated with IV Zosyn and Vancomycin to cover for HAP on 9/28. Antibiotics were transitioned to PO Augmentin BID on 10/1. Will treat for total 10 days.      He was treated for Enterococcus UTI with IV Vancomycin. He completed treatment  UTI prior to discharge.      Mr. Pollock has been admitted to [facility] multiple times in 2017. Unfortunately, he and his wife are both chronically debilitated and still live at home (with daughter). His daughter works as a nurse but is very busy and not always available to take care of them. Patient was ultimately agreeable to transfer to [facility] for acute rehab but may benefit from a higher level of care at some point.      He is stable for discharge and will transfer to [facility] on 10/3/2017.       Allergies   Allergen Reactions   • Phenergan [Promethazine Hcl]    • Promethazine        Past Surgical History:   Procedure Laterality Date   • ANKLE SURGERY     • BACK SURGERY     • CARDIAC CATHETERIZATION     • CATARACT EXTRACTION      cataracts   • CHOLECYSTECTOMY     • CORONARY ARTERY BYPASS GRAFT  12/1989    x3   • CORONARY ARTERY BYPASS GRAFT  08/2005     of collateralized RCA with patent LAD graft in August 2005.   • KNEE SURGERY     • TONSILLECTOMY     • TOTAL HIP ARTHROPLASTY Bilateral    • TURP / TRANSURETHRAL INCISION / DRAINAGE PROSTATE         History reviewed. No pertinent family history.    Social History     Social History   • Marital status:      Spouse name: N/A   • Number of children: N/A   • Years of education: N/A     Social History Main Topics   • Smoking status: Former Smoker     Types: Cigarettes   • Smokeless tobacco: None   • Alcohol use No   • Drug use: No   • Sexual activity: Defer     Other Topics Concern   • None     Social History Narrative         Objective   Physical Exam   Constitutional: He appears well-developed and well-nourished. No distress.   He is alert but drifts off to sleep frequently. He eemed to have limited ability to answer questions. Answered simple questions simply.   HENT:   Head: Normocephalic and atraumatic.   Nose: Nose normal.   Mouth/Throat: Oropharynx is clear and moist.   Eyes: Conjunctivae and EOM are normal. Pupils are equal, round, and reactive to light. No  scleral icterus.   Neck: Normal range of motion. Neck supple.   Cardiovascular: Normal rate.  An irregular rhythm present. Exam reveals distant heart sounds. Exam reveals no gallop and no friction rub.    No murmur heard.  Pulmonary/Chest: Effort normal. No respiratory distress. He has decreased breath sounds (decreased bilaterally ). He has no wheezes. He has no rhonchi. He has no rales.   Abdominal: Soft. Bowel sounds are normal. He exhibits no mass. There is no tenderness. There is no guarding.   No organomegaly, masses, or gaurding.   Musculoskeletal: Normal range of motion. He exhibits tenderness.   His right shoulder is tender to palpation without bruising or inflammation. Pelvis stable. Equal pulses in the BUE. Fingertips are slightly cyanotic but his O2 saturations were 97% on room air. Chronic venous stasis changes in the bilateral lower extremities. His feet are cool to touch with 1/4 pulses. No tissue loss. Left second toe has an area of trauma with bleeding under the toenail, which is not deformed. He has 3-4 second capillary refill in his feet.      Neurological: He is alert.   No cog wheeling. He had no tremors when I saw him. He had severe peripheral neuropathy in his feet. Moderate to severe generalized weakness.    Skin: Skin is dry.   Psychiatric:   Alert but drifts off to sleep frequently.    Nursing note and vitals reviewed.      Procedures         ED Course  ED Course     Recent Results (from the past 24 hour(s))   Protime-INR    Collection Time: 11/21/17 10:14 AM   Result Value Ref Range    Protime 23.9 (H) 9.6 - 11.5 Seconds    INR 2.14    Comprehensive Metabolic Panel    Collection Time: 11/21/17 10:14 AM   Result Value Ref Range    Glucose 81 70 - 100 mg/dL    BUN 39 (H) 9 - 23 mg/dL    Creatinine 1.60 (H) 0.60 - 1.30 mg/dL    Sodium 137 132 - 146 mmol/L    Potassium 4.2 3.5 - 5.5 mmol/L    Chloride 103 99 - 109 mmol/L    CO2 29.0 20.0 - 31.0 mmol/L    Calcium 9.2 8.7 - 10.4 mg/dL    Total  Protein 7.1 5.7 - 8.2 g/dL    Albumin 3.90 3.20 - 4.80 g/dL    ALT (SGPT) 20 7 - 40 U/L    AST (SGOT) 24 0 - 33 U/L    Alkaline Phosphatase 83 25 - 100 U/L    Total Bilirubin 0.4 0.3 - 1.2 mg/dL    eGFR Non African Amer 41 (L) >60 mL/min/1.73    Globulin 3.2 gm/dL    A/G Ratio 1.2 (L) 1.5 - 2.5 g/dL    BUN/Creatinine Ratio 24.4 7.0 - 25.0    Anion Gap 5.0 3.0 - 11.0 mmol/L   TSH    Collection Time: 11/21/17 10:14 AM   Result Value Ref Range    TSH 2.147 0.350 - 5.350 mIU/mL   CBC Auto Differential    Collection Time: 11/21/17 10:14 AM   Result Value Ref Range    WBC 3.60 3.50 - 10.80 10*3/mm3    RBC 4.37 4.20 - 5.76 10*6/mm3    Hemoglobin 11.4 (L) 13.1 - 17.5 g/dL    Hematocrit 37.1 (L) 38.9 - 50.9 %    MCV 84.9 80.0 - 99.0 fL    MCH 26.1 (L) 27.0 - 31.0 pg    MCHC 30.7 (L) 32.0 - 36.0 g/dL    RDW 17.7 (H) 11.3 - 14.5 %    RDW-SD 54.6 (H) 37.0 - 54.0 fl    MPV 9.5 6.0 - 12.0 fL    Platelets 95 (L) 150 - 450 10*3/mm3    Neutrophil % 72.0 (H) 41.0 - 71.0 %    Lymphocyte % 17.5 (L) 24.0 - 44.0 %    Monocyte % 8.3 0.0 - 12.0 %    Eosinophil % 1.9 0.0 - 3.0 %    Basophil % 0.3 0.0 - 1.0 %    Immature Grans % 0.0 0.0 - 0.6 %    Neutrophils, Absolute 2.59 1.50 - 8.30 10*3/mm3    Lymphocytes, Absolute 0.63 0.60 - 4.80 10*3/mm3    Monocytes, Absolute 0.30 0.00 - 1.00 10*3/mm3    Eosinophils, Absolute 0.07 0.00 - 0.30 10*3/mm3    Basophils, Absolute 0.01 0.00 - 0.20 10*3/mm3    Immature Grans, Absolute 0.00 0.00 - 0.03 10*3/mm3   Magnesium    Collection Time: 11/21/17 10:14 AM   Result Value Ref Range    Magnesium 2.4 1.3 - 2.7 mg/dL   Lactic Acid, Plasma    Collection Time: 11/21/17 10:14 AM   Result Value Ref Range    Lactate 1.1 0.5 - 2.0 mmol/L   Procalcitonin    Collection Time: 11/21/17 10:14 AM   Result Value Ref Range    Procalcitonin 0.08 ng/mL   Sedimentation Rate    Collection Time: 11/21/17 10:14 AM   Result Value Ref Range    Sed Rate 33 (H) 0 - 20 mm/hr   C-reactive Protein    Collection Time: 11/21/17 10:14 AM    Result Value Ref Range    C-Reactive Protein 0.23 0.00 - 1.00 mg/dL   BNP    Collection Time: 11/21/17 10:17 AM   Result Value Ref Range    .0 (H) 0.0 - 100.0 pg/mL   POC Troponin, Rapid    Collection Time: 11/21/17 10:22 AM   Result Value Ref Range    Troponin I 0.01 0.00 - 0.07 ng/mL   Urinalysis With / Culture If Indicated - Urine, Clean Catch    Collection Time: 11/21/17 12:33 PM   Result Value Ref Range    Color, UA Yellow Yellow, Straw    Appearance, UA Clear Clear    pH, UA 5.5 5.0 - 8.0    Specific Gravity, UA 1.015 1.001 - 1.030    Glucose, UA Negative Negative    Ketones, UA Negative Negative    Bilirubin, UA Negative Negative    Blood, UA Negative Negative    Protein, UA Negative Negative    Leuk Esterase, UA Small (1+) (A) Negative    Nitrite, UA Negative Negative    Urobilinogen, UA 0.2 E.U./dL 0.2 - 1.0 E.U./dL   Urinalysis, Microscopic Only - Urine, Clean Catch    Collection Time: 11/21/17 12:33 PM   Result Value Ref Range    RBC, UA 0-2 None Seen, 0-2 /HPF    WBC, UA 13-20 (A) None Seen /HPF    Bacteria, UA None Seen None Seen, Trace /HPF    Squamous Epithelial Cells, UA None Seen None Seen, 0-2 /HPF    Hyaline Casts, UA None Seen 0 - 6 /LPF    Methodology Automated Microscopy      Note: In addition to lab results from this visit, the labs listed above may include labs taken at another facility or during a different encounter within the last 24 hours. Please correlate lab times with ED admission and discharge times for further clarification of the services performed during this visit.    XR Foot 3+ View Left   Preliminary Result   Thin radiopaque density identified within the soft tissues   between the heads of the first and second metatarsals suggesting   possibly a foreign body. No acute bony abnormality present.       D:  11/21/2017   E:  11/21/2017                  CT Chest Without Contrast   Final Result   Minimal chronic changes seen within the lung fields with   atelectatic changes at  the left lung base and no acute intrathoracic   abnormality is present. Extensive coronary artery calcification and   atherosclerotic disease within the thoracic and abdominal aorta.       D:  11/21/2017   E:  11/21/2017               This report was finalized on 11/21/2017 2:10 PM by Dr. Surekha Meza MD.          CT Head Without Contrast   Final Result   Fluid in the right mastoid air cells. No acute intracranial   abnormality is identified.       D:  11/21/2017   E:  11/21/2017               This report was finalized on 11/21/2017 2:10 PM by Dr. Surekha Meza MD.            Vitals:    11/21/17 1330 11/21/17 1345 11/21/17 1431 11/21/17 1435   BP: 101/58 94/53 115/60 110/63   BP Location:   Left arm Right arm   Patient Position:   Lying Lying   Pulse: 58 68 79    Resp:   20    Temp:   97.4 °F (36.3 °C)    TempSrc:   Axillary    SpO2: 99% 99%     Weight:       Height:         Medications   sodium chloride 0.9 % bolus 3,030 mL (3,030 mL Intravenous New Bag 11/21/17 0957)   sodium chloride 0.9 % infusion (not administered)     ECG/EMG Results (last 24 hours)     Procedure Component Value Units Date/Time    ECG 12 Lead [[id number]] Collected:  11/21/17 0958     Updated:  11/21/17 1013    Narrative:       Test Reason : temors  Blood Pressure : **/** mmHG  Vent. Rate : 077 BPM     Atrial Rate : 083 BPM     P-R Int : 000 ms          QRS Dur : 102 ms      QT Int : 390 ms       P-R-T Axes : 000 -41 -04 degrees     QTc Int : 441 ms    Atrial fibrillation  Left axis deviation  Inferior infarct , age undetermined  Anterolateral infarct , age undetermined  Abnormal ECG  When compared with ECG of 19-SEP-2017 15:57,  nsc  Confirmed by SETH PITTMAN MD ([id number]) on 11/21/2017 10:12:58 AM    Referred By:  TOYA           Confirmed By:SETH PITTMAN MD                        MDM  Number of Diagnoses or Management Options  Acute renal failure, unspecified acute renal failure type:   Episode of shaking:   Spell of altered  cognition:   Diagnosis management comments:       I reviewed all available studies at the bedside with the patient and his family.  His head CT is unremarkable. I see nothing acute and neither does radiology. He may have an old lacunar right basal ganglia infarct.  CT scan of the chest is also unrevealing.    EEG per Dr. Zane Poon was bland without evidence of seizure-like activity.    He does have acute renal failure of unclear etiology.    These episodes that his daughter, who is a neuro ICU nurse, describes includes some seizure-like tonic-clonic movement.  He has no cogwheeling here to this suggest parkinsonian issues.  Also could be related to one of his medications that he is on.    Given that these are getting more frequent and more severe and that he is becoming weaker, I think at this point he needs to be admitted to the hospital for serial exams and perhaps split screen EEG monitoring and telemetry to make sure this is not an arrhythmia.  He very well may need some sort of rehabilitation when all is said and done.  His daughter still feels that she'll have the ability to care for her parents at home.  The patient also may be dehydrated and I will slowly hydrate him with a saline.    I spoke with Dr. Robbins, on-call hospital medicine, and she'll admit the patient.    All are agreeable with plan       Amount and/or Complexity of Data Reviewed  Clinical lab tests: reviewed  Tests in the radiology section of CPT®: reviewed  Tests in the medicine section of CPT®: reviewed  Decide to obtain previous medical records or to obtain history from someone other than the patient: yes        Final diagnoses:   Spell of altered cognition   Episode of shaking   Acute renal failure, unspecified acute renal failure type   EMR Dragon/Transcription disclaimer:   Much of this encounter note is an electronic transcription/translation of spoken language to printed text. The electronic translation of spoken language may permit  erroneous, or at times, nonsensical words or phrases to be inadvertently transcribed; Although I have reviewed the note for such errors, some may still exist.       Documentation assistance provided by demetri Mathur.  Information recorded by the demetri was done at my direction and has been verified and validated by me.     Leatha Mathur  11/21/17 1035       Leatha Mathur  11/21/17 1113       Leatha Mathur  11/21/17 1452       Abdiel Bowen MD  11/22/17 1320

## 2017-11-21 NOTE — H&P
[facility] Medicine Services  HISTORY AND PHYSICAL    Patient Name: Bjorn Pollock  : 1934  MRN: [id number]  Primary Care Physician: Vincent Mccullough MD    Subjective   Subjective     Chief Complaint:  Spells of altered cognition    HPI:  Bjorn Pollock is a 83 y.o. male with a history of anemia, anxiety/depression, atrial fibrillation (Coumadin), CAD with prior CABG, diastolic CHF (EF 35-40%), CKD II, COPD, DM II, GERD, HTN, hyperlipidemia, NAYLA and degenerative disc disease who It to [facility] he was admitted to [facility] from 2017 to 10/3/2017 for acute hypoxic respiratory failure with acute exacerbation of congestive heart failure.  He was seen by primary cardiologist, Dr. Vivas and diuretics were adjusted.  He was also treated for pneumonia at that time.  He was discharged to [facility] for rehabilitation after previous hospitalization.  Shouldn't has been home approximately one month and has had 1 fall with increasing back pain.  He was treated for enterococcus UTI prior to previous discharge and on follow-up with Dr. Castellanos, he had a TURP and was treated again with Bactrim antibiotic.  He was changed to Macrobid due to intolerance of Bactrim.  She has completed UTI treatment but still complains of dysuria/burning with urination.  Patient was also seen in follow-up by primary care couple of weeks ago and placed on metolazone or Dr. Palmer.  He presents to ED today with complaints of spells of altered cognition.  Per wife and patient these spells started approximately 3 days ago and has had 3-5 episodes each day.  These typically last 10-15 seconds he has tremors all over the patient states he aware of when they are coming on and happening however is unable to do anything about it.  After, patient is having some lightheadedness and drowsiness.  Nothing seems to precipitate these events are patient.  She denies fever, chills, upper respiratory symptoms,  nausea or vomiting.  Patient having regular bowel movements and denies diarrhea.  When patient questioned about blood sugars he states he's no longer taking any diabetic medication and has not been checking blood sugars since being home from [facility].  ED workup reveals dehydration with elevated creatinine and BUN-1.6/39.  EEG negative in ED, daily unremarkable, chest x-ray with no acute findings, CT head no acute findings, troponin negative and TSH within normal limits.  Patient to be admitted to hospital medicine service for further management    Review of Systems   Constitutional: Negative for activity change, fatigue and fever.   HENT: Positive for ear pain.    Eyes: Negative.    Cardiovascular: Negative.    Gastrointestinal: Negative.    Genitourinary: Positive for dysuria.   Musculoskeletal: Positive for back pain.   Neurological: Positive for weakness.   Psychiatric/Behavioral: Negative.             Otherwise 10-system ROS reviewed and is negative except as mentioned in the HPI.    Personal History     Past Medical History:   Diagnosis Date   • Anemia    • Anxiety    • Anxiety    • Atrial fibrillation, chronic    • Crawley's esophagus    • BPH (benign prostatic hyperplasia)    • CAD and hx of CABG    • Cardiomyopathy    • Cellulitis    • CHF (congestive heart failure)     diastolic heart failure    • Chronic kidney disease     Stage 2-3   • COPD (chronic obstructive pulmonary disease)    • DDD (degenerative disc disease), lumbar and cervical spine    • Depression    • Diabetes mellitus    • Diabetic retinopathy    • DJD (degenerative joint disease)    • GERD (gastroesophageal reflux disease)    • HLD (hyperlipidemia)    • Hypertension    • Lower extremity edema     Chronic lower extremity edema due to venous stasis.   • Necrotizing fasciitis     R arm; s/p skin graft   • Obesity    • NAYLA on CPAP    • Pneumothorax    • Stroke     x3   • Thrombocytopenia    • Thrombocytopenia    • Urinary incontinence    •  Vitamin B12 deficiency        Past Surgical History:   Procedure Laterality Date   • ANKLE SURGERY     • BACK SURGERY     • CARDIAC CATHETERIZATION     • CATARACT EXTRACTION      cataracts   • CHOLECYSTECTOMY     • CORONARY ARTERY BYPASS GRAFT  12/1989    x3   • CORONARY ARTERY BYPASS GRAFT  08/2005     of collateralized RCA with patent LAD graft in August 2005.   • KNEE SURGERY     • TONSILLECTOMY     • TOTAL HIP ARTHROPLASTY Bilateral    • TURP / TRANSURETHRAL INCISION / DRAINAGE PROSTATE         Family History: family history is not on file.     Social History:  reports that he has quit smoking. His smoking use included Cigarettes. He does not have any smokeless tobacco history on file. He reports that he does not drink alcohol or use illicit drugs.    Medications:  Prescriptions Prior to Admission   Medication Sig Dispense Refill Last Dose   • acetaminophen (TYLENOL) 325 MG tablet Take 2 tablets by mouth Every 4 (Four) Hours As Needed for Mild Pain .      • atorvastatin (LIPITOR) 20 MG tablet Take 20 mg by mouth Every Night.      • cyclobenzaprine (FLEXERIL) 10 MG tablet Take 10 mg by mouth 3 (Three) Times a Day As Needed for Muscle Spasms.      • docusate sodium 100 MG capsule Take 100 mg by mouth 2 (Two) Times a Day.      • furosemide (LASIX) 40 MG tablet Take 1 tablet by mouth 2 (Two) Times a Day. Hold if SBP < 100 (Patient taking differently: Take 40 mg by mouth Daily. Hold if SBP < 100)      • metOLazone (ZAROXOLYN) 2.5 MG tablet Take 2.5 mg by mouth 2 (Two) Times a Day.      • metoprolol succinate XL (TOPROL-XL) 25 MG 24 hr tablet Take 0.5 tablets by mouth Daily.      • nystatin (MYCOSTATIN) 611152 UNIT/GM cream Apply  topically Every 12 (Twelve) Hours.      • nystatin (MYCOSTATIN) 818874 UNIT/GM powder Apply  topically Every 12 (Twelve) Hours.      • potassium chloride (MICRO-K) 10 MEQ CR capsule Take 2 capsules by mouth 2 (Two) Times a Day With Meals.      • sertraline (ZOLOFT) 100 MG tablet Take 100  mg by mouth Daily.   Taking   • tamsulosin (FLOMAX) 0.4 MG capsule 24 hr capsule Take 2 capsules by mouth Every Night.      • warfarin (COUMADIN) 1 MG tablet Take 1 mg by mouth Take As Directed. Take 1 tablet in addition to a 6mg tablet every Monday and Friday for a total daily dose of 7mg on those days      • warfarin (COUMADIN) 6 MG tablet Take 6 mg by mouth Daily.      • aspirin 81 MG EC tablet Take 1 tablet by mouth Daily. 30 tablet 11 Not Taking   • bisacodyl (DULCOLAX) 5 MG EC tablet Take 2 tablets by mouth Daily As Needed for Constipation.      • calcium carbonate (TUMS) 500 MG chewable tablet Chew 1,000 mg 2 (Two) Times a Day As Needed for Heartburn.      • fluvastatin XL (LESCOL XL) 80 MG 24 hr tablet Take 80 mg by mouth Every Night.      • insulin detemir (LEVEMIR) 100 UNIT/ML injection Inject 6 Units under the skin Every Morning.  12    • insulin glargine (LANTUS) 100 UNIT/ML injection Inject 11 Units under the skin Daily.   Unknown   • insulin lispro (HUMALOG) 100 UNIT/ML injection Inject 0-7 units under the skin three times per day with meals  12    • lisinopril (PRINIVIL,ZESTRIL) 2.5 MG tablet Take 1 tablet by mouth Daily.      • melatonin 5 MG sublingual tablet sublingual tablet Place 1 tablet under the tongue At Night As Needed (SLEEP).      • nitroglycerin (NITROSTAT) 0.4 MG SL tablet Place 1 tablet under the tongue Every 5 (Five) Minutes As Needed for Chest Pain. Take no more than 3 doses in 15 minutes.  12    • ondansetron (ZOFRAN) 4 MG tablet Take 1 tablet by mouth Every 6 (Six) Hours As Needed for Nausea or Vomiting.      • pantoprazole (PROTONIX) 40 MG EC tablet Take 40 mg by mouth Daily.   Taking   • polyethylene glycol (MIRALAX) packet Take 17 g by mouth Daily.          Allergies   Allergen Reactions   • Phenergan [Promethazine Hcl]    • Promethazine        Objective   Objective     Vital Signs:   Temp:  [97.4 °F (36.3 °C)] 97.4 °F (36.3 °C)  Heart Rate:  [58-82] 79  Resp:  [18-20] 20  BP:  ()/(52-66) 110/63        Physical Exam   Constitutional: No acute distress, awake, alert  Eyes: PERRLA, sclerae anicteric, no conjunctival injection  HENT: NCAT, mucous membranes moist  Neck: Supple, no thyromegaly, no lymphadenopathy, trachea midline  Respiratory: Clear to auscultation bilaterally, nonlabored respirations   Cardiovascular: RRR, no murmurs, rubs, or gallops, palpable pedal pulses bilaterally  Gastrointestinal: Positive bowel sounds, soft, nontender, nondistended  Musculoskeletal: No bilateral ankle edema, no clubbing or cyanosis to extremities  Psychiatric: Appropriate affect, cooperative  Neurologic: Oriented x 3, strength symmetric in all extremities, Cranial Nerves grossly intact to confrontation, speech clear  Skin: bilateral lower ext venous stasis      Results Reviewed:  I have personally reviewed current lab, radiology, and data and agree.      Results from last 7 days  Lab Units 11/21/17  1014   WBC 10*3/mm3 3.60   HEMOGLOBIN g/dL 11.4*   HEMATOCRIT % 37.1*   PLATELETS 10*3/mm3 95*   INR  2.14       Results from last 7 days  Lab Units 11/21/17  1014   SODIUM mmol/L 137   POTASSIUM mmol/L 4.2   CHLORIDE mmol/L 103   CO2 mmol/L 29.0   BUN mg/dL 39*   CREATININE mg/dL 1.60*   GLUCOSE mg/dL 81   CALCIUM mg/dL 9.2   ALT (SGPT) U/L 20   AST (SGOT) U/L 24     BNP   Date Value Ref Range Status   11/21/2017 188.0 (H) 0.0 - 100.0 pg/mL Final     No results found for: PHART  Imaging Results (last 24 hours)     Procedure Component Value Units Date/Time    CT Head Without Contrast [[id number]] Collected:  11/21/17 1307     Updated:  11/21/17 1412    Narrative:       EXAMINATION: CT HEAD WO CONTRAST-11/21/2017:      INDICATION: AMS, mental status change, weakness.     TECHNIQUE: Multiple axial CT imaging was obtained of the head from the  skull base to the skull vertex without the administration of intravenous  contrast.     The radiation dose reduction device was turned on for each scan per  the  ALARA (As Low as Reasonably Achievable) protocol.     COMPARISON: 05/03/2016.     FINDINGS: There is some atrophy identified of the brain. No intracranial  hemorrhage or hydrocephalus. No mass, mass effect, or midline shift. No  abnormal extra-axial fluid collection is identified. The bony structures  reveal no evidence of osseous abnormality. The visualized paranasal  sinuses are clear. The mastoid air cells reveal fluid identified on the  right.       Impression:       Fluid in the right mastoid air cells. No acute intracranial  abnormality is identified.     D:  11/21/2017  E:  11/21/2017           This report was finalized on 11/21/2017 2:10 PM by Dr. Surekha Meza MD.       CT Chest Without Contrast [[id number]] Collected:  11/21/17 1310     Updated:  11/21/17 1413    Narrative:       EXAMINATION: CT CHEST WO CONTRAST-11/21/2017:      INDICATION: Cough, weight loss, chest discomfort.     TECHNIQUE: Multiple axial CT imaging was obtained of the chest without  the administration of intravenous contrast.     The radiation dose reduction device was turned on for each scan per the  ALARA (As Low as Reasonably Achievable) protocol.     COMPARISON: 09/26/2017.     FINDINGS: Thyroid is heterogeneous. Kyphotic curvature identified of the  upper thoracic spine. Vascular calcification seen within the thoracic  aorta. Coronary artery calcification identified. No mediastinal mass. No  pericardial effusion. No bulky hilar or axillary lymphadenopathy.  Degenerative changes seen within the spine. Upper abdomen reveals  extensive atherosclerotic disease within the abdominal aorta and  mesenteric vessels. The lung parenchyma reveals some pleural thickening  identified at the lung bases bilaterally. No pleural effusion or  pneumothorax. Minimal groundglass opacity seen at the left lung base  suggesting atelectatic change. No focal  parenchymal consolidation, no pulmonary mass or nodule.       Impression:       Minimal  chronic changes seen within the lung fields with  atelectatic changes at the left lung base and no acute intrathoracic  abnormality is present. Extensive coronary artery calcification and  atherosclerotic disease within the thoracic and abdominal aorta.     D:  11/21/2017  E:  11/21/2017           This report was finalized on 11/21/2017 2:10 PM by Dr. Surekha Meza MD.       XR Foot 3+ View Left [[id number]] Collected:  11/21/17 1414     Updated:  11/21/17 1414    Narrative:       EXAMINATION: XR FOOT 3+ VW, LEFT-11/21/2017:      INDICATION: Trauma.      COMPARISON: NONE.     FINDINGS: Three views of the left foot reveal osteopenia identified of  the bony structures. There is a thin radiopaque density identified  overlying the soft tissues between the first and second heads of the  metatarsals in which a foreign body cannot be excluded. The bony  structures are unremarkable. Hardware identified within the ankle.  Minimal degenerative changes seen within the midfoot.           Impression:       Thin radiopaque density identified within the soft tissues  between the heads of the first and second metatarsals suggesting  possibly a foreign body. No acute bony abnormality present.     D:  11/21/2017  E:  11/21/2017                 Results for orders placed during the hospital encounter of 09/19/17   Adult Transthoracic Echo Complete W/ Cont if Necessary Per Protocol    Narrative · There is four chamber cardiac enlargement.  · Global and segmental LV wall motion abnormalities are seen with an   estimated EF=36%-40%.  · Left ventricular wall thickness is consistent with concentric   hypertrophy, mild.  · Mitral annular calcification with mild-moderate MR is appreciated.  · Aortic valve sclerosis with moderate aortic insufficiency is seen.  · The LV examination is suboptimal as the patient refused Lumason.          Assessment/Plan   Assessment / Plan     Hospital Problem List     * (Principal)Spell of altered cognition     Acute renal failure superimposed on stage 2 chronic kidney disease    Atrial fibrillation (Chronic)    Overview Addendum 9/19/2016 10:59 AM by Keyona Link     a. Chronic CHADS-VASc of 5.  b. Chronic anticoagulation.  Atrial fibrillation, rates well-controlled.          HTN (hypertension) (Chronic)    DM type 2 (diabetes mellitus, type 2) (Chronic)    COPD (chronic obstructive pulmonary disease) (Chronic)    Anemia (Chronic)    CAD (coronary artery disease) s/p CABG history  (Chronic)    Overview Addendum 9/19/2016 10:59 AM by Keyona Lnik     c. CABG x3 in December 1989.  d. Normal left ventricular systolic function.  e. New York Heart Association class I heart failure symptoms.  f.  of collateralized RCA with patent LAD graft in August 2005.  g. MUGA, ejection fraction 63%, 05/09/2015.  h. Echocardiogram December 2014, showed normal left ventricular ejection function of 55% to 60%.   Coronary artery disease, stable.  Will not pursue any further ischemic studies at this time as the patient only had one episode of pain.          Chronic thrombocytopenic purpura (Chronic)    NAYLA on CPAP (Chronic)    Dyslipidemia (Chronic)    Overview Signed 9/19/2016 10:59 AM by Keyona Link     1. Dyslipidemia. The patient has a history of myalgias with multiple statins, including Lipitor, Zocor, and Crestor. We will obtain a lipid panel today and start Lescol XL 80 mg at bedtime. If the patient is unable to tolerate Lescol, we will start a PCSK9.           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 ChadsVasc= 5                 Assessment & Plan:    -- Patient had witnessed episode of tremors/ brief altered cognition while standing up to be weighed. Lasted appx 15 seconds per RN. Did not get BP, but no change in HR during event.    -- check orthostats    --  hydrate- NS + 20K @ 75 overnight and recheck labs    -- EEG negative. Rehydrate and monitor for further events. Hold on neuro consult for now as highly suspect variation of presyncope related to orthostasis/dehydration.     -- hold diuretics/ ACEI    -- patient with chronic pain, recent UTI after TURP per Dr. Castellanos. S/p Bactrim course. BONILLA could be Bactrim related +/- related to recent new addition of metalozone by Dr. Palmer earlier this month.  Attempted to review home medications and neither patient or wife had list or could tell me medications, but could tell me he was no longer taking several medications on the current med rec. RN called [place] to reconcile. Many different provider prescriptions per pharmacy.  [facility] and poor patient understanding of med regimen changes may be contributing... At d/c from this hospital stay need to be clear with family that d/c med list should be reconciled with his [place] pharmacist so that there is no further confusion or accidentally refilling meds he should not be taking. ?Phamacist consult at d/c to help with this and  on meds?    -- hypoglycemia noted, could be contributing.  Need to know what insulin he is supposed to be on at home once home meds reconciled by pharmacy (both Levemir AND Lantus listed???) and address at d/c with clear med instructions based on insulin needs here.     -- Patient taking increased doses of tylenol 3 due to increase in chronic back pain. Perhaps having decreased renal function causing decreased metabolization of medication/ orthostasis.  No narcotic meds at this time, if needed can trial Lidoderm patch, Aspercreme, heating pad or other nonsystemic pharmacologic treatment    -- PT/OT       DVT prophylaxis:  coumadin    CODE STATUS:  Full Code    Admission Status:  I believe this patient meets OBSERVATION status, however if further evaluation or treatment plans warrant, status may change.  Based upon current information, I  "predict patient's care encounter to be less than or equal to 2 midnights.    Ana Nazario, APRN   11/21/17   3:51 PM      Brief Attending Admission Attestation     I have seen and examined the patient, performing an independent face-to-face diagnostic evaluation with plan of care reviewed and developed with the advanced practice clinician (APC).      Brief Summary Statement/HPI:   Bjorn Pollock is a 83 y.o. male with PMHx anemia, anxiety/depression, atrial fibrillation (Coumadin), CAD with prior CABG, diastolic CHF (EF 35-40%), CKD II, COPD, DM II, GERD, HTN, hyperlipidemia, NAYLA and degenerative disc disease, recurring UTI's from BPH for which he recently underwent TURP.  Presents to [facility] ED due to family concern of \"spells\" noted at home over past ~3 days, described as ~15 sec episodes of vague tremor with altered sensorium and \"dizzy feeling\", pt can feel these episodes coming on, seems to correlate with positional changes.  Has had recent Bactrim for UTI after his TURP, also in addition to his diuretic titration by Dr. Vivas last month his PCP also added metolazone early this month for some residual edema issues.  In ED pt has BONILLA and elevated BUN:creat ration consistent with dehydration, EEG was negative, CT head negative. After arriving up to floor from ED his FSBS noted hypoglycemia and pt had a witnessed \"spell\" by nursing when stood to be weighed which resolved within seconds.     Attending Physical Exam:  Constitutional: No acute distress, awake, alert, very [place]. No family at bedside at time of eval.   Eyes: PERRLA, sclerae anicteric, no conjunctival injection  HENT: NCAT, mucous membranes dry  Neck: Supple, no thyromegaly, no lymphadenopathy, trachea midline  Respiratory: Clear to auscultation bilaterally, nonlabored respirations   Cardiovascular: IRRR, Afib on tele  Gastrointestinal: Positive bowel sounds, soft, nontender, nondistended  Musculoskeletal: No bilateral ankle edema, no clubbing or " cyanosis to extremities  Psychiatric: Appropriate affect, cooperative  Neurologic: Oriented x 3, strength symmetric in all extremities, Cranial Nerves grossly intact to confrontation, speech clear  Skin: No rashes        Brief Assessment/Plan :  See above for further detailed assessment and plan developed with APC which I have reviewed and/or edited.    America Robbins MD  11/22/17  1:09 AM

## 2017-11-22 PROBLEM — D72.819 LEUKOPENIA: Status: ACTIVE | Noted: 2017-01-01

## 2017-11-22 NOTE — PLAN OF CARE
Problem: Patient Care Overview (Adult)  Goal: Plan of Care Review  Outcome: Ongoing (interventions implemented as appropriate)    11/22/17 1031   Outcome Evaluation   Outcome Summary/Follow up Plan Pt presents with decreased functional mobility from baseline with instability with standing. Anticipate return home with HHPT with symptom resolution and treatment of dehydration.Recommend monitor BP with position changes   Coping/Psychosocial Response Interventions   Plan Of Care Reviewed With patient         Problem: Inpatient Physical Therapy  Goal: Bed Mobility Goal LTG- PT  Outcome: Ongoing (interventions implemented as appropriate)    11/22/17 1031   Bed Mobility PT LTG   Bed Mobility PT LTG, Date Established 11/22/17   Bed Mobility PT LTG, Time to Achieve 2 wks   Bed Mobility PT LTG, Activity Type all bed mobility   Bed Mobility PT LTG, [place] Level independent       Goal: Transfer Training Goal 1 LTG- PT  Outcome: Ongoing (interventions implemented as appropriate)    11/22/17 1031   Transfer Training PT LTG   Transfer Training PT LTG, Date Established 11/22/17   Transfer Training PT LTG, Time to Achieve 2 wks   Transfer Training PT LTG, Activity Type bed to chair /chair to bed;sit to stand/stand to sit   Transfer Training PT LTG, [place] Level independent   Transfer Training PT LTG, Assist Device walker, rolling       Goal: Gait Training Goal LTG- PT  Outcome: Ongoing (interventions implemented as appropriate)    11/22/17 1031   Gait Training PT LTG   Gait Training Goal PT LTG, Date Established 11/22/17   Gait Training Goal PT LTG, Time to Achieve 2 wks   Gait Training Goal PT LTG, [place] Level independent   Gait Training Goal PT LTG, Assist Device walker, rolling   Gait Training Goal PT LTG, Distance to Achieve 75

## 2017-11-22 NOTE — THERAPY EVALUATION
Acute Care - Physical Therapy Initial Evaluation  [facility]     Patient Name: Bjorn Pollock  : 1934  MRN: [id number]  Today's Date: 2017   Onset of Illness/Injury or Date of Surgery Date: 17  Date of Referral to PT: 17  Referring Physician: MD Steve      Admit Date: 2017     Visit Dx:    ICD-10-CM ICD-9-CM   1. Spell of altered cognition R41.89 799.59   2. Episode of shaking R25.1 781.0   3. Acute renal failure, unspecified acute renal failure type N17.9 584.9   4. Impaired functional mobility, balance, gait, and endurance Z74.09 V49.89   5. Impaired mobility and ADLs Z74.09 799.89     Patient Active Problem List   Diagnosis   • Intractable low back pain   • HTN (hypertension)   • DM type 2 (diabetes mellitus, type 2)   • Intertriginous candidiasis   • COPD (chronic obstructive pulmonary disease)   • NAYLA (obstructive sleep apnea)   • Atrial fibrillation   • Supratherapeutic INR   • CKD (chronic kidney disease) stage 2, GFR 60-89 ml/min   • Diastolic heart failure secondary to hypertension   • Anemia   • DJD (degenerative joint disease)   • CAD (coronary artery disease) s/p CABG history    • Crawley's esophagus   • Chronic thrombocytopenic purpura   • Cerebrovascular accident   • NAYLA on CPAP   • Obesity (BMI 30-39.9)   • Dyslipidemia   • BPH (benign prostatic hyperplasia)   • Lower extremity edema   • Falls   • Sepsis due to urinary tract infection   • Reactive airway disease with wheezing   • Constipation   • Pneumonia due to infectious organism   • Urinary retention   • Olecranon bursitis of right elbow   • Acute exacerbation of CHF (congestive heart failure)   • Systolic and diastolic CHF, acute on chronic   • Acute on chronic respiratory failure with hypoxemia   • Chronic anticoagulation   • Hypervolemia   • Spell of altered cognition   • Acute renal failure superimposed on stage 2 chronic kidney disease     Past Medical History:   Diagnosis Date   • Anemia    • Anxiety    •  Anxiety    • Atrial fibrillation, chronic    • Crawley's esophagus    • BPH (benign prostatic hyperplasia)    • CAD and hx of CABG    • Cardiomyopathy    • Cellulitis    • CHF (congestive heart failure)     diastolic heart failure    • Chronic kidney disease     Stage 2-3   • COPD (chronic obstructive pulmonary disease)    • DDD (degenerative disc disease), lumbar and cervical spine    • Depression    • Diabetes mellitus    • Diabetic retinopathy    • DJD (degenerative joint disease)    • GERD (gastroesophageal reflux disease)    • HLD (hyperlipidemia)    • Hypertension    • Lower extremity edema     Chronic lower extremity edema due to venous stasis.   • Necrotizing fasciitis     R arm; s/p skin graft   • Obesity    • NAYLA on CPAP    • Pneumothorax    • Stroke     x3   • Thrombocytopenia    • Thrombocytopenia    • Urinary incontinence    • Vitamin B12 deficiency      Past Surgical History:   Procedure Laterality Date   • ANKLE SURGERY     • BACK SURGERY     • CARDIAC CATHETERIZATION     • CATARACT EXTRACTION      cataracts   • CHOLECYSTECTOMY     • CORONARY ARTERY BYPASS GRAFT  12/1989    x3   • CORONARY ARTERY BYPASS GRAFT  08/2005     of collateralized RCA with patent LAD graft in August 2005.   • KNEE SURGERY     • TONSILLECTOMY     • TOTAL HIP ARTHROPLASTY Bilateral    • TURP / TRANSURETHRAL INCISION / DRAINAGE PROSTATE            PT ASSESSMENT (last 72 hours)      PT Evaluation       11/22/17 0925 11/22/17 0917    Rehab Evaluation    Document Type evaluation  -HK evaluation  -KM    Subjective Information agree to therapy;complains of;pain;weakness  -HK agree to therapy;complains of   wants to go home  -KM    Patient Effort, Rehab Treatment good  -HK adequate  -KM    Symptoms Noted During/After Treatment fatigue;significant change in vital signs  -HK     General Information    Patient Profile Review yes  -HK yes  -KM    Onset of Illness/Injury or Date of Surgery Date  11/21/17  -KM    Referring Physician  MD Steve  -HK MARLENE Nazario  -KM    General Observations Pt supine in bed with IV and tele intact  -HK     Pertinent History Of Current Problem Pt is a 83 YOM admitted with onset of tremors and c/o periods of altered mental status, diagnosed with dehydrations and concern sof polypharmacy.   -HK Pt admitted with onset of tremors and altered mental status, diagnosed with dehydration and concerns for polypharmacy  -KM    Precautions/Limitations fall precautions  -HK fall precautions  -KM    Prior Level of Function independent:;transfer;w/c or scooter;gait;min assist:;ADL's  -HK independent:;transfer;w/c or scooter;gait;min assist:;ADL's   walked short distances, used w/c bedroom to kitchen  -KM    Equipment Currently Used at Home bipap/ cpap;wheelchair;walker, rolling  -HK bipap/ cpap  -KM    Plans/Goals Discussed With patient;agreed upon  -HK patient;agreed upon  -KM    Risks Reviewed patient:;LOB  -HK patient:;LOB  -KM    Benefits Reviewed patient:;improve function  -HK patient:;improve function  -KM    Barriers to Rehab previous functional deficit  -HK previous functional deficit  -KM    Living Environment    Lives With spouse  -HK spouse  -KM    Living Arrangements house  -HK house  -KM    Home Accessibility no concerns  -HK no concerns  -KM    Clinical Impression    Date of Referral to PT  11/21/17  -KM    PT Diagnosis  impaired mobility  -KM    Patient/Family Goals Statement  return home at OF and resume HHPT  -KM    Criteria for Skilled Therapeutic Interventions Met  yes  -KM    Rehab Potential  fair, will monitor progress closely  -KM    Vital Signs    Pre Systolic BP Rehab  99   initially upon sitting  -KM    Pre Treatment Diastolic BP  62  -KM    Intra Systolic BP Rehab  114   3 mins after sitting  -KM    Intra Treatment Diastolic BP  78  -KM    Post Systolic BP Rehab  94   following brief standing at eob  -KM    Post Treatment Diastolic BP  68  -KM    Pretreatment Heart Rate (beats/min)  112  -KM     Intratreatment Heart Rate (beats/min)  100  -KM    Posttreatment Heart Rate (beats/min)  95  -KM    Pain Assessment    Pain Assessment  Arrington-Moon FACES  -KM    Arrington-Moon FACES Pain Rating  4  -KM    Pain Type  Chronic pain  -KM    Pain Location  Generalized  -KM    Cognitive Assessment/Intervention    Current Cognitive/Communication Assessment  functional  -KM    Orientation Status  oriented x 4  -KM    Follows Commands/Answers Questions  able to follow single-step instructions;100% of the time  -KM    Personal Safety  mild impairment  -KM    Personal Safety Interventions  fall prevention program maintained  -KM    ROM (Range of Motion)    General ROM  no range of motion deficits identified   both l/es  -KM    MMT (Manual Muscle Testing)    General MMT Assessment  lower extremity strength deficits identified   bilateral l/es grossly 4/5 except L hip flex 3/5  -KM    Bed Mobility, Assessment/Treatment    Bed Mobility, Assistive Device  bed rails;head of bed elevated;draw sheet  -KM    Bed Mobility, Roll Left, [place]  minimum assist (75% patient effort)  -KM    Bed Mobility, Roll Right, [place]  minimum assist (75% patient effort)  -KM    Bed Mobility, Scoot/Bridge, [place]  moderate assist (50% patient effort)  -KM    Bed Mob, Supine to Sit, [place]  minimum assist (75% patient effort)  -KM    Bed Mob, Sit to Supine, [place]  minimum assist (75% patient effort)  -KM    Transfer Assessment/Treatment    Transfers, Sit-Stand [place]  minimum assist (75% patient effort)  -KM    Transfers, Stand-Sit [place]  contact guard assist  -KM    Transfers, Sit-Stand-Sit, Assist Device  elevated surface;rolling walker  -KM    Transfer, Comment  --   stood at bed side, generalized body tremors  -KM    Gait Assessment/Treatment    Gait, [place] Level  not appropriate to assess  -KM    Therapy Exercises    Bilateral Lower Extremities  AROM:;10 reps;sitting;ankle pumps/circles;LAQ  -KM     Positioning and Restraints    Pre-Treatment Position  in bed  -KM    Post Treatment Position  bed  -KM    In Bed  side lying left;call light within reach;encouraged to call for assist;exit alarm on  -KM      11/21/17 7532       General Information    Equipment Currently Used at Home bipap/ cpap  -RUTH ANN     Living Environment    Lives With child(jonathan), adult  -RUTH ANN     Living Arrangements house  -RUTH ANN     Home Accessibility bed and bath on same level  -RUTH ANN     Stair Railings at Home none  -RUTH ANN     Type of Financial/Environmental Concern none  -RUTH ANN     Transportation Available car  -RUTH ANN       User Key  (r) = Recorded By, (t) = Taken By, (c) = Cosigned By    Initials Name Provider Type     Pooja Vegas, PT Physical Therapist    RUTH ANN Gray, RN Registered Nurse    MINNA Christopher, OT Occupational Therapist          Physical Therapy Education     Title: PT OT SLP Therapies (Active)     Topic: Physical Therapy (Done)     Point: Mobility training (Done)    Learning Progress Summary    Learner Readiness Method Response Comment Documented by Status   Patient Acceptance E VU discussed plan of care  11/22/17 1034 Done               Point: Home exercise program (Done)    Learning Progress Summary    Learner Readiness Method Response Comment Documented by Status   Patient Acceptance E VU discussed plan of care  11/22/17 1034 Done               Point: Body mechanics (Done)    Learning Progress Summary    Learner Readiness Method Response Comment Documented by Status   Patient Acceptance E VU discussed plan of care  11/22/17 1034 Done               Point: Precautions (Done)    Learning Progress Summary    Learner Readiness Method Response Comment Documented by Status   Patient Acceptance E VU discussed plan of care  11/22/17 1034 Done                      User Key     Initials Effective Dates Name Provider Type Discipline     06/19/15 -  Pooja Vegas, PT Physical Therapist PT                PT  Recommendation and Plan  Anticipated Discharge Disposition: home with assist, home with home health  Plan of Care Review  Plan Of Care Reviewed With: patient  Outcome Summary/Follow up Plan: Pt presents with decreased functional mobility from baseline with instability with standing. Anticipate return home with HHPT with symptom resolution and treatment of dehydration.Recommend monitor BP with position changes          IP PT Goals       11/22/17 1031          Bed Mobility PT LTG    Bed Mobility PT LTG, Date Established 11/22/17  -KM      Bed Mobility PT LTG, Time to Achieve 2 wks  -KM      Bed Mobility PT LTG, Activity Type all bed mobility  -KM      Bed Mobility PT LTG, Cole Level independent  -KM      Transfer Training PT LTG    Transfer Training PT LTG, Date Established 11/22/17  -KM      Transfer Training PT LTG, Time to Achieve 2 wks  -KM      Transfer Training PT LTG, Activity Type bed to chair /chair to bed;sit to stand/stand to sit  -KM      Transfer Training PT LTG, Cole Level independent  -KM      Transfer Training PT LTG, Assist Device walker, rolling  -KM      Gait Training PT LTG    Gait Training Goal PT LTG, Date Established 11/22/17  -KM      Gait Training Goal PT LTG, Time to Achieve 2 wks  -KM      Gait Training Goal PT LTG, Cole Level independent  -KM      Gait Training Goal PT LTG, Assist Device walker, rolling  -KM      Gait Training Goal PT LTG, Distance to Achieve 75  -KM        User Key  (r) = Recorded By, (t) = Taken By, (c) = Cosigned By    Initials Name Provider Type    FERN Vegas, PT Physical Therapist                Outcome Measures       11/22/17 0917          How much help from another person do you currently need...    Turning from your back to your side while in flat bed without using bedrails? 3  -KM      Moving from lying on back to sitting on the side of a flat bed without bedrails? 3  -KM      Moving to and from a bed to a chair (including a  wheelchair)? 2  -KM      Standing up from a chair using your arms (e.g., wheelchair, bedside chair)? 3  -KM      Climbing 3-5 steps with a railing? 2  -KM      To walk in hospital room? 2  -KM      AM-PAC 6 Clicks Score 15  -KM      Functional Assessment    Outcome Measure Options AM-PAC 6 Clicks Basic Mobility (PT)  -KM        User Key  (r) = Recorded By, (t) = Taken By, (c) = Cosigned By    Initials Name Provider Type    FERN Vegas PT Physical Therapist           Time Calculation:         PT Charges       11/22/17 1029          Time Calculation    Start Time 0917  -KM      PT Received On 11/22/17  -KM      PT Goal Re-Cert Due Date 12/02/17  -KM        User Key  (r) = Recorded By, (t) = Taken By, (c) = Cosigned By    Initials Name Provider Type    FERN Vegas PT Physical Therapist          Therapy Charges for Today     Code Description Service Date Service Provider Modifiers Qty    11388388998 HC PT EVAL MOD COMPLEXITY 4 11/22/2017 Pooja Vegas, PT GP 1    90889070021 HC PT MOBILITY CURRENT 11/22/2017 Pooja Vegas, PT GP, CK 1    35102214216 HC PT MOBILITY PROJECTED 11/22/2017 Pooja Vegas, PT GP,  1          PT G-Codes  Outcome Measure Options: AM-PAC 6 Clicks Basic Mobility (PT)  Score: 15  Functional Limitation: Mobility: Walking and moving around  Mobility: Walking and Moving Around Current Status (): At least 40 percent but less than 60 percent impaired, limited or restricted  Mobility: Walking and Moving Around Goal Status (): 0 percent impaired, limited or restricted      Pooja Vegas, PT  11/22/2017

## 2017-11-22 NOTE — PLAN OF CARE
Problem: Patient Care Overview (Adult)  Goal: Plan of Care Review  Outcome: Ongoing (interventions implemented as appropriate)    11/22/17 0822   Patient Care Overview   Progress no change   Outcome Evaluation   Outcome Summary/Follow up Plan WOC nurse consulted for POA PI on coccyx. Assessment performed. Coccyx presents with a stage 2 pressure injury. Perirectal region reddened and slow to brii at this time. See LDA for wound details. Ordered Venelex ointment for coccyx. See skin care orders for care needs. WOC nurse will continue to follow. Please contact WOC nurse if needs arise. Thanks    Coping/Psychosocial Response Interventions   Plan Of Care Reviewed With patient

## 2017-11-22 NOTE — DISCHARGE PLACEMENT REQUEST
"Bjorn Pollock (83 y.o. Male)     To [facility]  From [facility]() [phone]        [address] [address]  Phone:  [phone]  Fax:          Patient:     Bjorn Pollock MRN:  [id number]   [address] [facility] [address] :  1934  SSN:    Phone: [phone] Sex:  M      INSURANCE PAYOR PLAN GROUP # SUBSCRIBER ID   Primary: JUVE ALLEN CROSS [id number]   Admitting Diagnosis: Spell of altered cognition [R41.89]  Order Date:  2017               Inpatient Consult to Case Management        (Order ID: [id number])     Diagnosis:         Priority:  Routine Expected Date:   Expiration Date:        Interval:   Count:    Reason for Consult? Resume HH( add medication management)     Specimen Type:   Specimen Source:   Specimen Taken Date:   Specimen Taken Time:                         Verbal Order Mode: Telephone with readback   Authorizing Provider: Ulises Wilson MD  Authorizing Provider's NPI: [id number]     Order Entered By: Chela Ledezma RN 2017 11:27 AM     Electronically signed by:               Date of Birth Social Security Number Address Home Phone MRN    1934  [address] [id number] [phone] [id number]    Christian Marital Status          Uatsdin        Admission Date Admission Type Admitting Provider Attending Provider Department, Room/Bed    17 Emergency Ulises Wilson MD Opii, [place], MD [facility] 5G, S561/1    Discharge Date Discharge Disposition Discharge Destination                      Attending Provider: Ulises Wilson MD     Allergies:  Phenergan [Promethazine Hcl], Promethazine    Isolation:  None   Infection:  None   Code Status:  FULL    Ht:  72\" (182.9 cm)   Wt:  229 lb 9.6 oz (104 kg)    Admission Cmt:  None   Principal Problem:  Spell of altered cognition [R41.89]           "       Active Insurance as of 2017     Primary Coverage     Payor Plan Insurance Group Employer/Plan Group    ANTHEM BLUE CROSS [facility] BLUE CROSS BLUE [facility] PPO [id number]     Payor Plan Address Payor Plan Phone Number Effective From Effective To    [address] [phone] 2015     [place] [address]       Subscriber Name Subscriber Birth Date Member ID       HOLGER JOHNSON 1934 [id number]                 Emergency Contacts      (Rel.) Home Phone Work Phone Mobile Phone    Ade Johnson (Spouse) [phone] -- --    Ayah Godinez (Daughter) -- -- [phone]               History & Physical      America Robbins MD at 2017  3:39 PM              [facility] Medicine Services  HISTORY AND PHYSICAL    Patient Name: Holger Johnson  : 1934  MRN: [id number]  Primary Care Physician: Vincent Mccullough MD    Subjective   Subjective     Chief Complaint:  Spells of altered cognition    HPI:  Holger Johnson is a 83 y.o. male with a history of anemia, anxiety/depression, atrial fibrillation (Coumadin), CAD with prior CABG, diastolic CHF (EF 35-40%), CKD II, COPD, DM II, GERD, HTN, hyperlipidemia, NAYLA and degenerative disc disease who It to [facility] he was admitted to [facility] from 2017 to 10/3/2017 for acute hypoxic respiratory failure with acute exacerbation of congestive heart failure.  He was seen by primary cardiologist, Dr. Vivas and diuretics were adjusted.  He was also treated for pneumonia at that time.  He was discharged to [facility] for rehabilitation after previous hospitalization.  Shouldn't has been home approximately one month and has had 1 fall with increasing back pain.  He was treated for enterococcus UTI prior to previous discharge and on follow-up with Dr. Castellanos, he had a TURP and was treated again with Bactrim antibiotic.  He was changed to Macrobid due to intolerance of Bactrim.  She has completed UTI treatment  but still complains of dysuria/burning with urination.  Patient was also seen in follow-up by primary care couple of weeks ago and placed on metolazone or Dr. Palmer.  He presents to ED today with complaints of spells of altered cognition.  Per wife and patient these spells started approximately 3 days ago and has had 3-5 episodes each day.  These typically last 10-15 seconds he has tremors all over the patient states he aware of when they are coming on and happening however is unable to do anything about it.  After, patient is having some lightheadedness and drowsiness.  Nothing seems to precipitate these events are patient.  She denies fever, chills, upper respiratory symptoms, nausea or vomiting.  Patient having regular bowel movements and denies diarrhea.  When patient questioned about blood sugars he states he's no longer taking any diabetic medication and has not been checking blood sugars since being home from [facility].  ED workup reveals dehydration with elevated creatinine and BUN-1.6/39.  EEG negative in ED, daily unremarkable, chest x-ray with no acute findings, CT head no acute findings, troponin negative and TSH within normal limits.  Patient to be admitted to hospital medicine service for further management    Review of Systems   Constitutional: Negative for activity change, fatigue and fever.   HENT: Positive for ear pain.    Eyes: Negative.    Cardiovascular: Negative.    Gastrointestinal: Negative.    Genitourinary: Positive for dysuria.   Musculoskeletal: Positive for back pain.   Neurological: Positive for weakness.   Psychiatric/Behavioral: Negative.             Otherwise 10-system ROS reviewed and is negative except as mentioned in the HPI.    Personal History     Past Medical History:   Diagnosis Date   • Anemia    • Anxiety    • Anxiety    • Atrial fibrillation, chronic    • Crawley's esophagus    • BPH (benign prostatic hyperplasia)    • CAD and hx of CABG    • Cardiomyopathy    •  Cellulitis    • CHF (congestive heart failure)     diastolic heart failure    • Chronic kidney disease     Stage 2-3   • COPD (chronic obstructive pulmonary disease)    • DDD (degenerative disc disease), lumbar and cervical spine    • Depression    • Diabetes mellitus    • Diabetic retinopathy    • DJD (degenerative joint disease)    • GERD (gastroesophageal reflux disease)    • HLD (hyperlipidemia)    • Hypertension    • Lower extremity edema     Chronic lower extremity edema due to venous stasis.   • Necrotizing fasciitis     R arm; s/p skin graft   • Obesity    • NAYLA on CPAP    • Pneumothorax    • Stroke     x3   • Thrombocytopenia    • Thrombocytopenia    • Urinary incontinence    • Vitamin B12 deficiency        Past Surgical History:   Procedure Laterality Date   • ANKLE SURGERY     • BACK SURGERY     • CARDIAC CATHETERIZATION     • CATARACT EXTRACTION      cataracts   • CHOLECYSTECTOMY     • CORONARY ARTERY BYPASS GRAFT  12/1989    x3   • CORONARY ARTERY BYPASS GRAFT  08/2005     of collateralized RCA with patent LAD graft in August 2005.   • KNEE SURGERY     • TONSILLECTOMY     • TOTAL HIP ARTHROPLASTY Bilateral    • TURP / TRANSURETHRAL INCISION / DRAINAGE PROSTATE         Family History: family history is not on file.     Social History:  reports that he has quit smoking. His smoking use included Cigarettes. He does not have any smokeless tobacco history on file. He reports that he does not drink alcohol or use illicit drugs.    Medications:  Prescriptions Prior to Admission   Medication Sig Dispense Refill Last Dose   • acetaminophen (TYLENOL) 325 MG tablet Take 2 tablets by mouth Every 4 (Four) Hours As Needed for Mild Pain .      • atorvastatin (LIPITOR) 20 MG tablet Take 20 mg by mouth Every Night.      • cyclobenzaprine (FLEXERIL) 10 MG tablet Take 10 mg by mouth 3 (Three) Times a Day As Needed for Muscle Spasms.      • docusate sodium 100 MG capsule Take 100 mg by mouth 2 (Two) Times a Day.      •  furosemide (LASIX) 40 MG tablet Take 1 tablet by mouth 2 (Two) Times a Day. Hold if SBP < 100 (Patient taking differently: Take 40 mg by mouth Daily. Hold if SBP < 100)      • metOLazone (ZAROXOLYN) 2.5 MG tablet Take 2.5 mg by mouth 2 (Two) Times a Day.      • metoprolol succinate XL (TOPROL-XL) 25 MG 24 hr tablet Take 0.5 tablets by mouth Daily.      • nystatin (MYCOSTATIN) 198650 UNIT/GM cream Apply  topically Every 12 (Twelve) Hours.      • nystatin (MYCOSTATIN) 737856 UNIT/GM powder Apply  topically Every 12 (Twelve) Hours.      • potassium chloride (MICRO-K) 10 MEQ CR capsule Take 2 capsules by mouth 2 (Two) Times a Day With Meals.      • sertraline (ZOLOFT) 100 MG tablet Take 100 mg by mouth Daily.   Taking   • tamsulosin (FLOMAX) 0.4 MG capsule 24 hr capsule Take 2 capsules by mouth Every Night.      • warfarin (COUMADIN) 1 MG tablet Take 1 mg by mouth Take As Directed. Take 1 tablet in addition to a 6mg tablet every Monday and Friday for a total daily dose of 7mg on those days      • warfarin (COUMADIN) 6 MG tablet Take 6 mg by mouth Daily.      • aspirin 81 MG EC tablet Take 1 tablet by mouth Daily. 30 tablet 11 Not Taking   • bisacodyl (DULCOLAX) 5 MG EC tablet Take 2 tablets by mouth Daily As Needed for Constipation.      • calcium carbonate (TUMS) 500 MG chewable tablet Chew 1,000 mg 2 (Two) Times a Day As Needed for Heartburn.      • fluvastatin XL (LESCOL XL) 80 MG 24 hr tablet Take 80 mg by mouth Every Night.      • insulin detemir (LEVEMIR) 100 UNIT/ML injection Inject 6 Units under the skin Every Morning.  12    • insulin glargine (LANTUS) 100 UNIT/ML injection Inject 11 Units under the skin Daily.   Unknown   • insulin lispro (HUMALOG) 100 UNIT/ML injection Inject 0-7 units under the skin three times per day with meals  12    • lisinopril (PRINIVIL,ZESTRIL) 2.5 MG tablet Take 1 tablet by mouth Daily.      • melatonin 5 MG sublingual tablet sublingual tablet Place 1 tablet under the tongue At Night  As Needed (SLEEP).      • nitroglycerin (NITROSTAT) 0.4 MG SL tablet Place 1 tablet under the tongue Every 5 (Five) Minutes As Needed for Chest Pain. Take no more than 3 doses in 15 minutes.  12    • ondansetron (ZOFRAN) 4 MG tablet Take 1 tablet by mouth Every 6 (Six) Hours As Needed for Nausea or Vomiting.      • pantoprazole (PROTONIX) 40 MG EC tablet Take 40 mg by mouth Daily.   Taking   • polyethylene glycol (MIRALAX) packet Take 17 g by mouth Daily.          Allergies   Allergen Reactions   • Phenergan [Promethazine Hcl]    • Promethazine        Objective   Objective     Vital Signs:   Temp:  [97.4 °F (36.3 °C)] 97.4 °F (36.3 °C)  Heart Rate:  [58-82] 79  Resp:  [18-20] 20  BP: ()/(52-66) 110/63        Physical Exam   Constitutional: No acute distress, awake, alert  Eyes: PERRLA, sclerae anicteric, no conjunctival injection  HENT: NCAT, mucous membranes moist  Neck: Supple, no thyromegaly, no lymphadenopathy, trachea midline  Respiratory: Clear to auscultation bilaterally, nonlabored respirations   Cardiovascular: RRR, no murmurs, rubs, or gallops, palpable pedal pulses bilaterally  Gastrointestinal: Positive bowel sounds, soft, nontender, nondistended  Musculoskeletal: No bilateral ankle edema, no clubbing or cyanosis to extremities  Psychiatric: Appropriate affect, cooperative  Neurologic: Oriented x 3, strength symmetric in all extremities, Cranial Nerves grossly intact to confrontation, speech clear  Skin: bilateral lower ext venous stasis      Results Reviewed:  I have personally reviewed current lab, radiology, and data and agree.      Results from last 7 days  Lab Units 11/21/17  1014   WBC 10*3/mm3 3.60   HEMOGLOBIN g/dL 11.4*   HEMATOCRIT % 37.1*   PLATELETS 10*3/mm3 95*   INR  2.14       Results from last 7 days  Lab Units 11/21/17  1014   SODIUM mmol/L 137   POTASSIUM mmol/L 4.2   CHLORIDE mmol/L 103   CO2 mmol/L 29.0   BUN mg/dL 39*   CREATININE mg/dL 1.60*   GLUCOSE mg/dL 81   CALCIUM mg/dL  9.2   ALT (SGPT) U/L 20   AST (SGOT) U/L 24     BNP   Date Value Ref Range Status   11/21/2017 188.0 (H) 0.0 - 100.0 pg/mL Final     No results found for: PHART  Imaging Results (last 24 hours)     Procedure Component Value Units Date/Time    CT Head Without Contrast [[id number]] Collected:  11/21/17 1307     Updated:  11/21/17 1412    Narrative:       EXAMINATION: CT HEAD WO CONTRAST-11/21/2017:      INDICATION: AMS, mental status change, weakness.     TECHNIQUE: Multiple axial CT imaging was obtained of the head from the  skull base to the skull vertex without the administration of intravenous  contrast.     The radiation dose reduction device was turned on for each scan per the  ALARA (As Low as Reasonably Achievable) protocol.     COMPARISON: 05/03/2016.     FINDINGS: There is some atrophy identified of the brain. No intracranial  hemorrhage or hydrocephalus. No mass, mass effect, or midline shift. No  abnormal extra-axial fluid collection is identified. The bony structures  reveal no evidence of osseous abnormality. The visualized paranasal  sinuses are clear. The mastoid air cells reveal fluid identified on the  right.       Impression:       Fluid in the right mastoid air cells. No acute intracranial  abnormality is identified.     D:  11/21/2017  E:  11/21/2017           This report was finalized on 11/21/2017 2:10 PM by Dr. Surekha Meza MD.       CT Chest Without Contrast [[id number]] Collected:  11/21/17 1310     Updated:  11/21/17 1413    Narrative:       EXAMINATION: CT CHEST WO CONTRAST-11/21/2017:      INDICATION: Cough, weight loss, chest discomfort.     TECHNIQUE: Multiple axial CT imaging was obtained of the chest without  the administration of intravenous contrast.     The radiation dose reduction device was turned on for each scan per the  ALARA (As Low as Reasonably Achievable) protocol.     COMPARISON: 09/26/2017.     FINDINGS: Thyroid is heterogeneous. Kyphotic curvature identified of  the  upper thoracic spine. Vascular calcification seen within the thoracic  aorta. Coronary artery calcification identified. No mediastinal mass. No  pericardial effusion. No bulky hilar or axillary lymphadenopathy.  Degenerative changes seen within the spine. Upper abdomen reveals  extensive atherosclerotic disease within the abdominal aorta and  mesenteric vessels. The lung parenchyma reveals some pleural thickening  identified at the lung bases bilaterally. No pleural effusion or  pneumothorax. Minimal groundglass opacity seen at the left lung base  suggesting atelectatic change. No focal  parenchymal consolidation, no pulmonary mass or nodule.       Impression:       Minimal chronic changes seen within the lung fields with  atelectatic changes at the left lung base and no acute intrathoracic  abnormality is present. Extensive coronary artery calcification and  atherosclerotic disease within the thoracic and abdominal aorta.     D:  11/21/2017  E:  11/21/2017           This report was finalized on 11/21/2017 2:10 PM by Dr. Surekha Meza MD.       XR Foot 3+ View Left [[id number]] Collected:  11/21/17 1414     Updated:  11/21/17 1414    Narrative:       EXAMINATION: XR FOOT 3+ VW, LEFT-11/21/2017:      INDICATION: Trauma.      COMPARISON: NONE.     FINDINGS: Three views of the left foot reveal osteopenia identified of  the bony structures. There is a thin radiopaque density identified  overlying the soft tissues between the first and second heads of the  metatarsals in which a foreign body cannot be excluded. The bony  structures are unremarkable. Hardware identified within the ankle.  Minimal degenerative changes seen within the midfoot.           Impression:       Thin radiopaque density identified within the soft tissues  between the heads of the first and second metatarsals suggesting  possibly a foreign body. No acute bony abnormality present.     D:  11/21/2017  E:  11/21/2017                 Results  for orders placed during the hospital encounter of 09/19/17   Adult Transthoracic Echo Complete W/ Cont if Necessary Per Protocol    Narrative · There is four chamber cardiac enlargement.  · Global and segmental LV wall motion abnormalities are seen with an   estimated EF=36%-40%.  · Left ventricular wall thickness is consistent with concentric   hypertrophy, mild.  · Mitral annular calcification with mild-moderate MR is appreciated.  · Aortic valve sclerosis with moderate aortic insufficiency is seen.  · The LV examination is suboptimal as the patient refused Lumason.          Assessment/Plan   Assessment / Plan     Hospital Problem List     * (Principal)Spell of altered cognition    Acute renal failure superimposed on stage 2 chronic kidney disease    Atrial fibrillation (Chronic)    Overview Addendum 9/19/2016 10:59 AM by Keyona Link     a. Chronic CHADS-VASc of 5.  b. Chronic anticoagulation.  Atrial fibrillation, rates well-controlled.          HTN (hypertension) (Chronic)    DM type 2 (diabetes mellitus, type 2) (Chronic)    COPD (chronic obstructive pulmonary disease) (Chronic)    Anemia (Chronic)    CAD (coronary artery disease) s/p CABG history  (Chronic)    Overview Addendum 9/19/2016 10:59 AM by Keyona Link     c. CABG x3 in December 1989.  d. Normal left ventricular systolic function.  e. New York Heart Association class I heart failure symptoms.  f.  of collateralized RCA with patent LAD graft in August 2005.  g. MUGA, ejection fraction 63%, 05/09/2015.  h. Echocardiogram December 2014, showed normal left ventricular ejection function of 55% to 60%.   Coronary artery disease, stable.  Will not pursue any further ischemic studies at this time as the patient only had one episode of pain.          Chronic thrombocytopenic purpura (Chronic)    NAYLA on CPAP (Chronic)    Dyslipidemia (Chronic)    Overview Signed 9/19/2016 10:59 AM by Keyona Link     1. Dyslipidemia. The patient has a  history of myalgias with multiple statins, including Lipitor, Zocor, and Crestor. We will obtain a lipid panel today and start Lescol XL 80 mg at bedtime. If the patient is unable to tolerate Lescol, we will start a PCSK9.           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 ChadsVasc= 5                 Assessment & Plan:    -- Patient had witnessed episode of tremors/ brief altered cognition while standing up to be weighed. Lasted appx 15 seconds per RN. Did not get BP, but no change in HR during event.    -- check orthostats    -- hydrate- NS + 20K @ 75 overnight and recheck labs    -- EEG negative. Rehydrate and monitor for further events. Hold on neuro consult for now as highly suspect variation of presyncope related to orthostasis/dehydration.     -- hold diuretics/ ACEI    -- patient with chronic pain, recent UTI after TURP per Dr. Castellanos. S/p Bactrim course. BONILLA could be Bactrim related +/- related to recent new addition of metalozone by Dr. Palmer earlier this month.  Attempted to review home medications and neither patient or wife had list or could tell me medications, but could tell me he was no longer taking several medications on the current med rec. RN called [facility] to reconcile. Many different provider prescriptions per pharmacy.  [facility] and poor patient understanding of med regimen changes may be contributing... At d/c from this hospital stay need to be clear with family that d/c med list should be reconciled with his [facility] pharmacist so that there is no further confusion or accidentally refilling meds he should not be taking. ?Phamacist consult at d/c to help with this and  on meds?    -- hypoglycemia noted, could be contributing.  Need to know what insulin he is supposed to be on at home once home meds reconciled by pharmacy (both  "Levemir AND Lantus listed???) and address at d/c with clear med instructions based on insulin needs here.     -- Patient taking increased doses of tylenol 3 due to increase in chronic back pain. Perhaps having decreased renal function causing decreased metabolization of medication/ orthostasis.  No narcotic meds at this time, if needed can trial Lidoderm patch, Aspercreme, heating pad or other nonsystemic pharmacologic treatment    -- PT/OT       DVT prophylaxis:  coumadin    CODE STATUS:  Full Code    Admission Status:  I believe this patient meets OBSERVATION status, however if further evaluation or treatment plans warrant, status may change.  Based upon current information, I predict patient's care encounter to be less than or equal to 2 midnights.    Ana Nazario, APRN   11/21/17   3:51 PM      Brief Attending Admission Attestation     I have seen and examined the patient, performing an independent face-to-face diagnostic evaluation with plan of care reviewed and developed with the advanced practice clinician (APC).      Brief Summary Statement/HPI:   Bjorn Pollock is a 83 y.o. male with PMHx anemia, anxiety/depression, atrial fibrillation (Coumadin), CAD with prior CABG, diastolic CHF (EF 35-40%), CKD II, COPD, DM II, GERD, HTN, hyperlipidemia, NAYLA and degenerative disc disease, recurring UTI's from BPH for which he recently underwent TURP.  Presents to [facility] ED due to family concern of \"spells\" noted at home over past ~3 days, described as ~15 sec episodes of vague tremor with altered sensorium and \"dizzy feeling\", pt can feel these episodes coming on, seems to correlate with positional changes.  Has had recent Bactrim for UTI after his TURP, also in addition to his diuretic titration by Dr. Vivas last month his PCP also added metolazone early this month for some residual edema issues.  In ED pt has BOINLLA and elevated BUN:creat ration consistent with dehydration, EEG was negative, CT head negative. After " "arriving up to floor from ED his FSBS noted hypoglycemia and pt had a witnessed \"spell\" by nursing when stood to be weighed which resolved within seconds.     Attending Physical Exam:  Constitutional: No acute distress, awake, alert, very [place]. No family at bedside at time of eval.   Eyes: PERRLA, sclerae anicteric, no conjunctival injection  HENT: NCAT, mucous membranes dry  Neck: Supple, no thyromegaly, no lymphadenopathy, trachea midline  Respiratory: Clear to auscultation bilaterally, nonlabored respirations   Cardiovascular: IRRR, Afib on tele  Gastrointestinal: Positive bowel sounds, soft, nontender, nondistended  Musculoskeletal: No bilateral ankle edema, no clubbing or cyanosis to extremities  Psychiatric: Appropriate affect, cooperative  Neurologic: Oriented x 3, strength symmetric in all extremities, Cranial Nerves grossly intact to confrontation, speech clear  Skin: No rashes        Brief Assessment/Plan :  See above for further detailed assessment and plan developed with APC which I have reviewed and/or edited.    America Robbins MD  11/22/17  1:09 AM                  Electronically signed by America Robbins MD at 11/22/2017  1:29 AM        Hospital Medications (active)       Dose Frequency Start End    acetaminophen (TYLENOL) tablet 650 mg 650 mg Every 4 Hours PRN 11/21/2017     Sig - Route: Take 2 tablets by mouth Every 4 (Four) Hours As Needed for Mild Pain . - Oral    aspirin EC tablet 81 mg 81 mg Daily 11/21/2017     Sig - Route: Take 1 tablet by mouth Daily. - Oral    atorvastatin (LIPITOR) tablet 20 mg 20 mg Nightly 11/21/2017     Sig - Route: Take 1 tablet by mouth Every Night. - Oral    bisacodyl (DULCOLAX) EC tablet 5 mg 5 mg Daily PRN 11/21/2017     Sig - Route: Take 1 tablet by mouth Daily As Needed for Constipation. - Oral    castor oil-balsam peru (VENELEX) ointment  Every 12 Hours Scheduled 11/22/2017     Sig - Route: Apply  topically Every 12 (Twelve) Hours. - Topical    Cosign for Ordering: " Accepted by Ulises Wilson MD on 11/22/2017 11:16 AM    cyclobenzaprine (FLEXERIL) tablet 10 mg 10 mg 3 Times Daily PRN 11/21/2017     Sig - Route: Take 1 tablet by mouth 3 (Three) Times a Day As Needed for Muscle Spasms. - Oral    dextrose (D50W) solution 25 g 25 g Every 15 Minutes PRN 11/21/2017     Sig - Route: Infuse 50 mL into a venous catheter Every 15 (Fifteen) Minutes As Needed for Low Blood Sugar (Blood Sugar Less Than 70, Patient Has IV Access - Unresponsive, NPO or Unable To Safely Swallow). - Intravenous    dextrose (GLUTOSE) oral gel 15 g 15 g Every 15 Minutes PRN 11/21/2017     Sig - Route: Take 15 g by mouth Every 15 (Fifteen) Minutes As Needed for Low Blood Sugar (Blood Sugar Less Than 70, Patient Alert, Is Not NPO & Can Safely Swallow). - Oral    glucagon (GLUCAGEN) injection 1 mg 1 mg Every 15 Minutes PRN 11/21/2017     Sig - Route: Inject 1 mg under the skin Every 15 (Fifteen) Minutes As Needed (Blood Glucose Less Than 70 - Patient Without IV Access - Unresponsive, NPO or Unable To Safely Swallow). - Subcutaneous    insulin lispro (humaLOG) injection 0-7 Units 0-7 Units 3 Times Daily With Meals 11/21/2017     Sig - Route: Inject 0-7 Units under the skin 3 (Three) Times a Day With Meals. - Subcutaneous    melatonin sublingual tablet 5 mg 5 mg Nightly PRN 11/21/2017     Sig - Route: Place 1 tablet under the tongue At Night As Needed (SLEEP). - Sublingual    metoprolol succinate XL (TOPROL-XL) 24 hr tablet 12.5 mg 12.5 mg Every 24 Hours Scheduled 11/21/2017     Sig - Route: Take 0.5 tablets by mouth Daily. - Oral    nystatin (MYCOSTATIN) powder  Every 12 Hours Scheduled 11/22/2017     Sig - Route: Apply  topically Every 12 (Twelve) Hours. - Topical    Cosign for Ordering: Accepted by Ulises Wilson MD on 11/22/2017 11:16 AM    ondansetron (ZOFRAN) injection 4 mg 4 mg Every 6 Hours PRN 11/21/2017     Sig - Route: Infuse 2 mL into a venous catheter Every 6 (Six) Hours As Needed for Nausea or Vomiting.  "- Intravenous    Linked Group 1:  \"Or\" Linked Group Details        ondansetron (ZOFRAN) tablet 4 mg 4 mg Every 6 Hours PRN 11/21/2017     Sig - Route: Take 1 tablet by mouth Every 6 (Six) Hours As Needed for Nausea or Vomiting. - Oral    Linked Group 1:  \"Or\" Linked Group Details        pantoprazole (PROTONIX) EC tablet 40 mg 40 mg Daily 11/21/2017     Sig - Route: Take 1 tablet by mouth Daily. - Oral    Pharmacy to dose warfarin  Continuous PRN 11/21/2017     Sig - Route: Continuous As Needed for Consult. - Does not apply    polyethylene glycol (MIRALAX) powder 17 g 17 g Daily 11/21/2017     Sig - Route: Take 17 g by mouth Daily. - Oral    sertraline (ZOLOFT) tablet 100 mg 100 mg Daily 11/21/2017     Sig - Route: Take 1 tablet by mouth Daily. - Oral    sodium chloride 0.9 % flush 1-10 mL 1-10 mL As Needed 11/21/2017     Sig - Route: Infuse 1-10 mL into a venous catheter As Needed for Line Care. - Intravenous    sodium chloride 0.9 % with KCl 20 mEq/L infusion 100 mL/hr Continuous 11/21/2017 11/22/2017    Sig - Route: Infuse 100 mL/hr into a venous catheter Continuous. - Intravenous    tamsulosin (FLOMAX) 24 hr capsule 0.8 mg 0.8 mg Nightly 11/21/2017     Sig - Route: Take 2 capsules by mouth Every Night. - Oral    warfarin (COUMADIN) tablet 1 mg 1 mg User Specified 11/24/2017     Sig - Route: Take 1 tablet by mouth Once per day on Mon Fri. - Oral    warfarin (COUMADIN) tablet 6 mg 6 mg Daily Warfarin 11/21/2017     Sig - Route: Take 2 tablets by mouth Daily. - Oral    sodium chloride 0.9 % bolus 3,030 mL (Discontinued) 30 mL/kg × 101 kg Continuous 11/21/2017 11/21/2017    Sig - Route: Infuse 3,030 mL into a venous catheter Continuous. - Intravenous    sodium chloride 0.9 % infusion (Discontinued) 125 mL/hr Continuous 11/21/2017 11/21/2017    Sig - Route: Infuse 125 mL/hr into a venous catheter Continuous. - Intravenous    warfarin (COUMADIN) tablet 4 mg (Discontinued) 4 mg Daily Warfarin 11/21/2017 11/21/2017    " Sig - Route: Take 1 tablet by mouth Daily. - Oral    Reason for Discontinue: Reorder

## 2017-11-22 NOTE — PROGRESS NOTES
"Pharmacy Consult  -  Warfarin    Bjorn Pollock is a  83 y.o. male   Height - 72\" (182.9 cm)  Weight - 229 lb 9.6 oz (104 kg)    Consulting Provider: - for Dr Robbins  Indication: - atrial fibrillation  Goal INR: - 2-3  Home Regimen:   -6 mg daily except 7 mg on Mon and Fri    Drug-Drug Interactions with current regimen:   Aspirin - increases bleeding risk    Warfarin Dosing During Admission:    Date  11/21 11/22          INR  2.14 2.24          Dose  6 mg 6 mg               Education Provided:      Labs:      Results from last 7 days  Lab Units 11/22/17  0453 11/21/17  1014   INR  2.24 2.14   HEMOGLOBIN g/dL 11.2* 11.4*   HEMATOCRIT % 36.2* 37.1*   PLATELETS 10*3/mm3 85* 95*       Results from last 7 days  Lab Units 11/22/17  0453 11/21/17  1014   SODIUM mmol/L 136 137   POTASSIUM mmol/L 4.6 4.2   CHLORIDE mmol/L 105 103   CO2 mmol/L 27.0 29.0   BUN mg/dL 30* 39*   CREATININE mg/dL 1.20 1.60*   CALCIUM mg/dL 8.6* 9.2   BILIRUBIN mg/dL  --  0.4   ALK PHOS U/L  --  83   ALT (SGPT) U/L  --  20   AST (SGOT) U/L  --  24   GLUCOSE mg/dL 71 81     Diet: ~75% meals       Dietary Orders            Start     Ordered    11/21/17 1531  Diet Regular; Thin; Cardiac, Consistent Carbohydrate  Diet Effective Now     Question Answer Comment   Diet Texture / Consistency Regular    Fluid Consistency Thin    Common Modifiers Cardiac    Common Modifiers Consistent Carbohydrate        11/21/17 1536          Assessment/Plan:   Patient arrived with therapeutic INR and pharmacy was consulted to manage warfarin while inpatient.     Patient has been restarted on home warfarin regimen.  Will plan to continue this dose and follow daily INR trends.    Thank you for this consult,  Claudia Ulrich, PharmD  11/22/17  9:09 AM          "

## 2017-11-22 NOTE — PLAN OF CARE
Problem: Patient Care Overview (Adult)  Goal: Plan of Care Review  Outcome: Ongoing (interventions implemented as appropriate)    11/21/17 2036 11/22/17 0506   Patient Care Overview   Progress --  progress toward functional goals as expected   Outcome Evaluation   Outcome Summary/Follow up Plan --  VSS, pt c/o back and leg pain, discussed with APRN and MD about pain control. Has rested well during the night. No further issues or concerns.   Coping/Psychosocial Response Interventions   Plan Of Care Reviewed With patient --          Problem: Skin Integrity Impairment, Risk/Actual (Adult)  Goal: Identify Related Risk Factors and Signs and Symptoms  Outcome: Outcome(s) achieved Date Met:  11/22/17  Goal: Skin Integrity/Wound Healing  Outcome: Ongoing (interventions implemented as appropriate)    Problem: Pressure Ulcer Risk (Abelardo Scale) (Adult,Obstetrics,Pediatric)  Goal: Identify Related Risk Factors and Signs and Symptoms  Outcome: Outcome(s) achieved Date Met:  11/22/17  Goal: Skin Integrity  Outcome: Ongoing (interventions implemented as appropriate)    Problem: Fall Risk (Adult)  Goal: Identify Related Risk Factors and Signs and Symptoms  Outcome: Outcome(s) achieved Date Met:  11/22/17  Goal: Absence of Falls  Outcome: Ongoing (interventions implemented as appropriate)

## 2017-11-22 NOTE — PROGRESS NOTES
Spoke to another pharmacy staff member at [facility] and received more information. Pt also fills the following prescriptions. This was updated in the admission medication reconciliation.      - Docusate 100 mg PO BID   - Codeine/APAP 30/300 mg PO Q6H PRN (#60 last filled 11/15/17)   - Budesonide 0.5 mg/2 ml BID (pt appears to use PRN)   - Furosemide 40 mg PO daily   - KCl 20 mEq PO daily   - Sertraline 100 mg PO daily   - Warfarin 4 mg PO daily as directed    *Best guess for warfarin regimen is 6 mg po daily except 7 mg on Mon, Fri per RN report when patient was admitted and per pharmacy fills.    Thank you for this consult.    Delisa Sims  PharmD Candidate 2018  11/22/2017  2:48 PM    Alyssa Norman, PharmD  11/22/2017  2:54 PM

## 2017-11-22 NOTE — PROGRESS NOTES
Nurse called because pt is requesting his Tylenol #3 that he takes x4 daily. Jordin requested and reviewed. Discussed with nurse that pt needs to maximize nonpharm approaches at this time and available prns. She will relay to pt that his pain mgmt is important part of his care and balanced with his admitting presentation today of AMS and dehydration.

## 2017-11-22 NOTE — PROGRESS NOTES
Discharge Planning Assessment  [facility]     Patient Name: Bjorn Pollock  MRN: [id number]  Today's Date: 11/22/2017    Admit Date: 11/21/2017          Discharge Needs Assessment       11/22/17 1140    Living Environment    Lives With spouse;child(jonathan), adult    Living Arrangements house    Home Accessibility bed and bath are not on the first floor;bed and bath on same level    Type of Financial/Environmental Concern none    Transportation Available car    Living Environment Comment CM spoke with pt in room with permission in regards to discharge planning. Pt resides in a house in [place] with spouse, Ade, and adult daughter, Ayah.     Living Environment    Provides Primary Care For no one, unable/limited ability to care for self    Quality Of Family Relationships supportive    Able to Return to Prior Living Arrangements yes    Living Arrangement Comments Plan is home with family when medically ready for discharge.     Discharge Needs Assessment    Concerns To Be Addressed denies needs/concerns at this time    Readmission Within The Last 30 Days no previous admission in last 30 days    Outpatient/Agency/Support Group Needs homecare agency (specify level of care)   Currently using [facility] and confirmed with [facility]([phone]) Per Latoya at [facility] pt is seen for skilled nursing, PT, and OT with diagnosis of Type 2 DM, chronic kidney disease, and heart failure.     Anticipated Changes Related to Illness inability to care for self    Equipment Currently Used at Home bipap/ cpap;bath bench;commode;glucometer;walker, rolling;wheelchair    Equipment Needed After Discharge bath bench;bipap/ cpap;glucometer;walker, rolling;wheelchair    Discharge Disposition still a patient    Discharge Contact Information if Applicable Ade Pollock(spouse):  [phone] or Ayah Mckeon(daughter): [phone].    Discharge Planning Comments Pt has [employer] insurance and denies recent changes in  insurance. Pt states his prescriptions are covered and uses [facility] in [place]. Pt reports plan is to go home with family and resume HH with [facility] . Resume order (adding medication management to skilled nursing) faxed to [facility] at [phone].  CM will need to notify [facility] at [phone] when medically ready for discharge.  CM will cont to follow.            Discharge Plan       11/22/17 1149    Case Management/Social Work Plan    Plan dishcarge plan    Patient/Family In Agreement With Plan yes    Additional Comments Plan is home with family and resume HH with [facility] . Family will transport when medically ready for discharge. CM will cont to follow.        Discharge Placement     No information found        Expected Discharge Date and Time     Expected Discharge Date Expected Discharge Time    Nov 24, 2017               Demographic Summary       11/22/17 1130    Primary Care Physician Information    Name Vincent Mccullough            Functional Status       11/22/17 1136    Functional Status Prior    Ambulation 1-->assistive equipment    Transferring 1-->assistive equipment    Toileting 1-->assistive equipment    Bathing 2-->assistive person    Dressing 0-->independent    Eating 0-->independent    Communication 0-->understands/communicates without difficulty    Swallowing 0-->swallows foods/liquids without difficulty            Psychosocial     None            Abuse/Neglect     None            Legal     None            Substance Abuse     None            Patient Forms     None          Chela Ledezma RN

## 2017-11-22 NOTE — PROGRESS NOTES
Psychiatric Medicine Services  PROGRESS NOTE    Patient Name: Bjorn Pollock  : 1934  MRN: [id number]    Date of Admission: 2017  Length of Stay: 0  Primary Care Physician: Vincent Mccullough MD    Subjective   Subjective     CC: f/u for confusion    HPI: No acute events overnight. Patient states that he had an ok night    Review of Systems  Gen- No fevers, chills  CV- No chest pain, palpitations  Resp- No cough, dyspnea  GI- No N/V/D, abd pain    Otherwise ROS is negative except as mentioned in the HPI.    Objective   Objective     Vital Signs:   Temp:  [97.4 °F (36.3 °C)-97.9 °F (36.6 °C)] 97.9 °F (36.6 °C)  Heart Rate:  [] 77  Resp:  [16-20] 16  BP: ()/(43-71) 103/60      Physical Exam:  Constitutional: No acute distress, awake, alert,laying in bed  HENT: NCAT, mucous membranes moist  Respiratory: Clear to auscultation bilaterally, respiratory effort normal   Cardiovascular: RRR, no murmurs, rubs, or gallops, palpable pedal pulses bilaterally  Gastrointestinal: Positive bowel sounds, soft, nontender, nondistended  Musculoskeletal: No bilateral ankle edema  Psychiatric: Appropriate affect, cooperative  Neurologic: Oriented x 3, no focal deficits  Skin: No rashes    Results Reviewed:  I have personally reviewed current lab, radiology, and data and agree.      Results from last 7 days  Lab Units 17  0453 17  1014   WBC 10*3/mm3 2.86* 3.60   HEMOGLOBIN g/dL 11.2* 11.4*   HEMATOCRIT % 36.2* 37.1*   PLATELETS 10*3/mm3 85* 95*   INR  2.24 2.14       Results from last 7 days  Lab Units 17  0453 17  1014   SODIUM mmol/L 136 137   POTASSIUM mmol/L 4.6 4.2   CHLORIDE mmol/L 105 103   CO2 mmol/L 27.0 29.0   BUN mg/dL 30* 39*   CREATININE mg/dL 1.20 1.60*   GLUCOSE mg/dL 71 81   CALCIUM mg/dL 8.6* 9.2   ALT (SGPT) U/L  --  20   AST (SGOT) U/L  --  24     BNP   Date Value Ref Range Status   2017 188.0 (H) 0.0 - 100.0 pg/mL Final     No results  found for: PHART    Microbiology Results Abnormal     Procedure Component Value - Date/Time    Urine Culture - Urine, Urine, Clean Catch [[id number]]  (Normal) Collected:  11/21/17 1233    Lab Status:  Preliminary result Specimen:  Urine from Urine, Clean Catch Updated:  11/22/17 0809     Urine Culture No growth          Imaging Results (last 24 hours)     Procedure Component Value Units Date/Time    CT Head Without Contrast [[id number]] Collected:  11/21/17 1307     Updated:  11/21/17 1412    Narrative:       EXAMINATION: CT HEAD WO CONTRAST-11/21/2017:      INDICATION: AMS, mental status change, weakness.     TECHNIQUE: Multiple axial CT imaging was obtained of the head from the  skull base to the skull vertex without the administration of intravenous  contrast.     The radiation dose reduction device was turned on for each scan per the  ALARA (As Low as Reasonably Achievable) protocol.     COMPARISON: 05/03/2016.     FINDINGS: There is some atrophy identified of the brain. No intracranial  hemorrhage or hydrocephalus. No mass, mass effect, or midline shift. No  abnormal extra-axial fluid collection is identified. The bony structures  reveal no evidence of osseous abnormality. The visualized paranasal  sinuses are clear. The mastoid air cells reveal fluid identified on the  right.       Impression:       Fluid in the right mastoid air cells. No acute intracranial  abnormality is identified.     D:  11/21/2017  E:  11/21/2017           This report was finalized on 11/21/2017 2:10 PM by Dr. Surekha Meza MD.       CT Chest Without Contrast [[id number]] Collected:  11/21/17 1310     Updated:  11/21/17 1413    Narrative:       EXAMINATION: CT CHEST WO CONTRAST-11/21/2017:      INDICATION: Cough, weight loss, chest discomfort.     TECHNIQUE: Multiple axial CT imaging was obtained of the chest without  the administration of intravenous contrast.     The radiation dose reduction device was turned on for each scan per  the  ALARA (As Low as Reasonably Achievable) protocol.     COMPARISON: 09/26/2017.     FINDINGS: Thyroid is heterogeneous. Kyphotic curvature identified of the  upper thoracic spine. Vascular calcification seen within the thoracic  aorta. Coronary artery calcification identified. No mediastinal mass. No  pericardial effusion. No bulky hilar or axillary lymphadenopathy.  Degenerative changes seen within the spine. Upper abdomen reveals  extensive atherosclerotic disease within the abdominal aorta and  mesenteric vessels. The lung parenchyma reveals some pleural thickening  identified at the lung bases bilaterally. No pleural effusion or  pneumothorax. Minimal groundglass opacity seen at the left lung base  suggesting atelectatic change. No focal  parenchymal consolidation, no pulmonary mass or nodule.       Impression:       Minimal chronic changes seen within the lung fields with  atelectatic changes at the left lung base and no acute intrathoracic  abnormality is present. Extensive coronary artery calcification and  atherosclerotic disease within the thoracic and abdominal aorta.     D:  11/21/2017  E:  11/21/2017           This report was finalized on 11/21/2017 2:10 PM by Dr. Surekha Meza MD.       XR Foot 3+ View Left [[id number]] Collected:  11/21/17 1414     Updated:  11/21/17 1414    Narrative:       EXAMINATION: XR FOOT 3+ VW, LEFT-11/21/2017:      INDICATION: Trauma.      COMPARISON: NONE.     FINDINGS: Three views of the left foot reveal osteopenia identified of  the bony structures. There is a thin radiopaque density identified  overlying the soft tissues between the first and second heads of the  metatarsals in which a foreign body cannot be excluded. The bony  structures are unremarkable. Hardware identified within the ankle.  Minimal degenerative changes seen within the midfoot.           Impression:       Thin radiopaque density identified within the soft tissues  between the heads of the first  and second metatarsals suggesting  possibly a foreign body. No acute bony abnormality present.     D:  11/21/2017  E:  11/21/2017                 Results for orders placed during the hospital encounter of 09/19/17   Adult Transthoracic Echo Complete W/ Cont if Necessary Per Protocol    Narrative · There is four chamber cardiac enlargement.  · Global and segmental LV wall motion abnormalities are seen with an   estimated EF=36%-40%.  · Left ventricular wall thickness is consistent with concentric   hypertrophy, mild.  · Mitral annular calcification with mild-moderate MR is appreciated.  · Aortic valve sclerosis with moderate aortic insufficiency is seen.  · The LV examination is suboptimal as the patient refused Lumason.          I have reviewed the medications.    Assessment/Plan   Assessment / Plan     Hospital Problem List     * (Principal)Spell of altered cognition    HTN (hypertension) (Chronic)    DM type 2 (diabetes mellitus, type 2) (Chronic)    COPD (chronic obstructive pulmonary disease) (Chronic)    Atrial fibrillation (Chronic)    Overview Addendum 9/19/2016 10:59 AM by Keyona Link     a. Chronic CHADS-VASc of 5.  b. Chronic anticoagulation.  Atrial fibrillation, rates well-controlled.          Anemia (Chronic)    CAD (coronary artery disease) s/p CABG history  (Chronic)    Overview Addendum 9/19/2016 10:59 AM by Keyona arango. CABG x3 in December 1989.  d. Normal left ventricular systolic function.  e. New York Heart Association class I heart failure symptoms.  f.  of collateralized RCA with patent LAD graft in August 2005.  g. MUGA, ejection fraction 63%, 05/09/2015.  h. Echocardiogram December 2014, showed normal left ventricular ejection function of 55% to 60%.   Coronary artery disease, stable.  Will not pursue any further ischemic studies at this time as the patient only had one episode of pain.          Chronic thrombocytopenic purpura (Chronic)    NAYLA on CPAP (Chronic)     "Dyslipidemia (Chronic)    Overview Signed 9/19/2016 10:59 AM by Keyona Link     1. Dyslipidemia. The patient has a history of myalgias with multiple statins, including Lipitor, Zocor, and Crestor. We will obtain a lipid panel today and start Lescol XL 80 mg at bedtime. If the patient is unable to tolerate Lescol, we will start a PCSK9.           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 ChadsVasc= 5         Acute renal failure superimposed on stage 2 chronic kidney disease    Leukopenia           Brief Hospital Course to date:  Bjorn Pollock is a 83 y.o. male with PMHx anemia, anxiety/depression, atrial fibrillation (Coumadin), CAD with prior CABG, diastolic CHF (EF 35-40%), CKD II, COPD, DM II, GERD, HTN, hyperlipidemia, NAYLA and degenerative disc disease, recurring UTI's from BPH for which he recently underwent TURP.  Presents to [facility] ED due to family concern of \"spells\" noted at home over past ~3 days, described as ~15 sec episodes of vague tremor with altered sensorium and \"dizzy feeling\", pt can feel these episodes coming on, seems to correlate with positional changes.  Has had recent Bactrim for UTI after his TURP, also in addition to his diuretic titration by Dr. Vivas last month his PCP also added metolazone early this month for some residual edema issues.  In ED pt has BONILLA and elevated BUN:creat ration consistent with dehydration, EEG was negative, CT head negative. After arriving up to floor from ED his FSBS noted hypoglycemia and pt had a witnessed \"spell\" by nursing when stood to be weighed which resolved within seconds.     Assessment & Plan:   - Continue IVF repeat labs look better   - orthostatics negative, though patient has some hypotension, continue to hold all BP rx  - Patient has hx of chronic pain after TURP, recent UTI rx with bactrim, " "review of his meds is concerning for possible polypharmacy and poor understanding of his rx are playing a role in these \"spells\"  - Hx of diabetes on insulin with recent hypoglycemic episodes, patient listed to be on both Lantus and levemir, will hold these and continue SSI for now, I doubt patient needs any more insulin A1C is 5.6%  - Hold on using Tylenol 3 as this could also be contributing to these spells, attempt nonpharmacologic rx for now    DVT Prophylaxis: coumadin    CODE STATUS: Full Code    Disposition:TBBRADEN Wilson MD  11/22/17  11:39 AM      "

## 2017-11-22 NOTE — CONSULTS
"Adult Nutrition  Assessment/PES    Patient Name:  Bjorn Pollock  YOB: 1934  MRN: [id number]  Admit Date:  11/21/2017    Assessment Date:  11/22/2017    Comments:  Will initiate boost glucose control per meal.Continue to follow per protocol.          Reason for Assessment       11/22/17 1452    Reason for Assessment    Reason For Assessment/Visit identified at risk by screening criteria    Identified At Risk By Screening Criteria MST SCORE 2+    Time Spent (min) 30    Cardiac CAD    Endocrine DM Type 2    Neurological --   Admitted r/t to spells noted at home over past 3 days about 15 second episodes of vague tremor with altered sensorium and dizzy feeling.    Pulmonary/Critical Care COPD              Nutrition/Diet History       11/22/17 1458    Nutrition/Diet History    Typical Food/Fluid Intake Patient stated he has not been hungry at home and typically eating 50% of what he normally would eat.    Supplemental Drinks/Foods/Additives Pt. requests boost with meals.            Anthropometrics       11/22/17 1454    Anthropometrics    Height Method Stated    Height 182.9 cm (72\")    Weight Method Standing scale    Weight 104 kg (229 lb 9.6 oz)    Ideal Body Weight (IBW)    Ideal Body Weight (IBW), Male (kg) 82.07    % Ideal Body Weight 127.17    Usual Body Weight (UBW)    Usual Body Weight 101 kg (223 lb)   Stated per pt., he stated one year ago he weighed 300 lbs.    % Usual Body Weight 102.96    Body Mass Index (BMI)    BMI (kg/m2) 31.2            Labs/Tests/Procedures/Meds       11/22/17 1500    Labs/Tests/Procedures/Meds    Labs/Tests Review BUN;Creat                Nutrition Prescription Ordered       11/22/17 1500    Nutrition Prescription PO    Current PO Diet Regular    Fluid Consistency Thin    Common Modifiers Cardiac;Consistent Carbohydrate            Evaluation of Received Nutrient/Fluid Intake       11/22/17 1500    PO Evaluation    Number of Meals 1    % PO Intake 75    "         Problem/Interventions:        Problem 1       11/22/17 1501    Nutrition Diagnoses Problem 1    Problem 1 No Nutrition Diagnosis at this Time                    Intervention Goal       11/22/17 1501    Intervention Goal    General Nutrition support treatment    PO Establish PO            Nutrition Intervention       11/22/17 1501    Nutrition Intervention    RD/Tech Action Advise alternate selection;Interview for preference;Menu provided;Recommend/ordered;Follow Tx progress;Menu adjusted    Recommended/Ordered Supplement            Nutrition Prescription       11/22/17 1501    Nutrition Prescription PO    PO Prescription Begin/change supplement    Supplement Boost Glucose Control   vary flavors    Supplement Frequency 3 times a day            Education/Evaluation       11/22/17 1501    Education    Education --   MNT discussed with pt.    Monitor/Evaluation    Monitor Per protocol        Electronically signed by:  Noa Simons RD  11/22/17 3:02 PM

## 2017-11-22 NOTE — PLAN OF CARE
Problem: Patient Care Overview (Adult)  Goal: Plan of Care Review  Outcome: Ongoing (interventions implemented as appropriate)    11/22/17 1042   Patient Care Overview   Progress progress towards functional goals is fair   Outcome Evaluation   Outcome Summary/Follow up Plan Pt presents with decreased mobility and independence with ADLS. Pts states his baseline is using a wheelchair for mobility. Pt has decreased BUE strength and ROM. Anticipate discharge home with assist and HH.   Coping/Psychosocial Response Interventions   Plan Of Care Reviewed With patient         Problem: Inpatient Occupational Therapy  Goal: Bed Mobility Goal LTG- OT  Outcome: Ongoing (interventions implemented as appropriate)    11/22/17 1042   Bed Mobility OT LTG   Bed Mobility OT LTG, Date Established 11/22/17   Bed Mobility OT LTG, Time to Achieve by discharge   Bed Mobility OT LTG, Activity Type supine to sit/sit to supine;scoot/bridge   Bed Mobility OT LTG, [place] Level minimum assist (75% patient effort);verbal cues required   Bed Mobility OT LTG, Outcome goal ongoing       Goal: Transfer Training Goal 1 LTG- OT  Outcome: Ongoing (interventions implemented as appropriate)    11/22/17 1042   Transfer Training OT LTG   Transfer Training OT LTG, Date Established 11/22/17   Transfer Training OT LTG, Time to Achieve by discharge   Transfer Training OT LTG, Activity Type toilet;sit to stand/stand to sit   Transfer Training OT LTG, [place] Level minimum assist (75% patient effort);verbal cues required   Transfer Training OT LTG, Outcome goal ongoing       Goal: Strength Goal LTG- OT  Outcome: Ongoing (interventions implemented as appropriate)    11/22/17 1042   Strength OT LTG   Strength Goal OT LTG, Date Established 11/22/17   Strength Goal OT LTG, Time to Achieve by discharge   Strength Goal OT LTG, Measure to Achieve Pt will complete BUE AROM x10 to increase independence with ADLS.    Strength Goal OT LTG, Outcome goal ongoing        Goal: LB Dressing Goal LTG- OT  Outcome: Ongoing (interventions implemented as appropriate)    11/22/17 1042   LB Dressing OT LTG   LB Dressing Goal OT LTG, Date Established 11/22/17   LB Dressing Goal OT LTG, Time to Achieve by discharge   LB Dressing Goal OT LTG, Activity Type don/doff socks   LB Dressing Goal OT LTG, [place] Level minimum assist (75% patient effort);verbal cues required   LB Dressing Goal OT LTG, Adaptive Equipment (prn)   LB Dressing Goal OT LTG, Outcome goal ongoing

## 2017-11-23 PROBLEM — D72.819 LEUKOPENIA: Status: RESOLVED | Noted: 2017-01-01 | Resolved: 2017-01-01

## 2017-11-23 PROBLEM — N17.9 ACUTE RENAL FAILURE SUPERIMPOSED ON STAGE 2 CHRONIC KIDNEY DISEASE (HCC): Status: RESOLVED | Noted: 2017-01-01 | Resolved: 2017-01-01

## 2017-11-23 PROBLEM — N18.2 ACUTE RENAL FAILURE SUPERIMPOSED ON STAGE 2 CHRONIC KIDNEY DISEASE (HCC): Status: RESOLVED | Noted: 2017-01-01 | Resolved: 2017-01-01

## 2017-11-23 PROBLEM — R41.89 SPELL OF ALTERED COGNITION: Status: RESOLVED | Noted: 2017-01-01 | Resolved: 2017-01-01

## 2017-11-23 NOTE — PROGRESS NOTES
"Pharmacy Consult  -  Warfarin    Bjorn Pollock is a  83 y.o. male   Height - 72\" (182.9 cm)  Weight - 229 lb 9.6 oz (104 kg)    Consulting Provider: - for Dr Robbins  Indication: - atrial fibrillation  Goal INR: - 2-3  Home Regimen:   -6 mg daily except 7 mg on Mon and Fri    Drug-Drug Interactions with current regimen:   Aspirin - increases bleeding risk    Warfarin Dosing During Admission:    Date  11/21 11/22 11/23         INR  2.14 2.24 2.68         Dose  6 mg 6 mg 5mg              Education Provided:      Labs:       PT/INR:    Protime   Date Value Ref Range Status   11/23/2017 30.1 (H) 9.6 - 11.5 Seconds Final   11/22/2017 25.0 (H) 9.6 - 11.5 Seconds Final   11/21/2017 23.9 (H) 9.6 - 11.5 Seconds Final   /  INR   Date Value Ref Range Status   11/23/2017 2.68  Final   11/22/2017 2.24  Final   11/21/2017 2.14  Final          Results from last 7 days     Lab Units 11/22/17  0453 11/21/17  1014   SODIUM mmol/L 136 137   POTASSIUM mmol/L 4.6 4.2   CHLORIDE mmol/L 105 103   CO2 mmol/L 27.0 29.0   BUN mg/dL 30* 39*   CREATININE mg/dL 1.20 1.60*   CALCIUM mg/dL 8.6* 9.2   BILIRUBIN mg/dL --  0.4   ALK PHOS U/L --  83   ALT (SGPT) U/L --  20   AST (SGOT) U/L --  24   GLUCOSE mg/dL 71 81      Diet: ~100% meals  Diet Order   Procedures   • Diet Regular; Thin; Cardiac, Consistent Carbohydrate       Assessment/Plan:     11/23 INR - 2.68, increased from 2.24  Patient arrived with therapeutic INR and pharmacy was consulted to manage warfarin while inpatient.     Will give warfarin 5mg 11/23, then resume admission regimen  Monitor s/s bleeding and drug-drug interactions  follow daily INR and adjust dose accordingly    ThanksJuventino [facility]  11/23/2017  8:16 AM          "

## 2017-11-23 NOTE — PLAN OF CARE
Problem: Patient Care Overview (Adult)  Goal: Plan of Care Review  Outcome: Ongoing (interventions implemented as appropriate)    11/23/17 0420   Patient Care Overview   Progress progress toward functional goals as expected   Outcome Evaluation   Outcome Summary/Follow up Plan VSS, pt c/o increased pain, lidocane patch ordered. Has rested well most of shift. Possible d/c tomorrow. No further issues or concerns.   Coping/Psychosocial Response Interventions   Plan Of Care Reviewed With patient         Problem: Skin Integrity Impairment, Risk/Actual (Adult)  Goal: Skin Integrity/Wound Healing  Outcome: Ongoing (interventions implemented as appropriate)    Problem: Pressure Ulcer Risk (Abelardo Scale) (Adult,Obstetrics,Pediatric)  Goal: Skin Integrity  Outcome: Ongoing (interventions implemented as appropriate)    Problem: Fall Risk (Adult)  Goal: Absence of Falls  Outcome: Ongoing (interventions implemented as appropriate)

## 2017-11-23 NOTE — PROGRESS NOTES
Continued Stay Note  [facility]     Patient Name: Bjorn Pollock  MRN: [id number]  Today's Date: 11/23/2017    Admit Date: 11/21/2017          Discharge Plan       11/23/17 1428    Case Management/Social Work Plan    Plan     Patient/Family In Agreement With Plan yes    Additional Comments CM spoke with Margot at [facility] ([phone]) and she is aware pt medically ready for discharge today. [facility] will see pt on Friday. Spoke with pt and his agreeable to [facility] seeing Friday. Family will transport home.              Discharge Codes     None        Expected Discharge Date and Time     Expected Discharge Date Expected Discharge Time    Nov 24, 2017             Chela Ledezma RN

## 2017-11-23 NOTE — DISCHARGE SUMMARY
"    [facility] Medicine Services  DISCHARGE SUMMARY    Patient Name: Bjorn Pollock  : 1934  MRN: [id number]    Date of Admission: 2017  Date of Discharge:    Length of Stay: 0  Primary Care Physician: Vincent Mccullough MD    Consults     No orders found from 10/23/2017 to 2017.        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] 2017   • Chronic anticoagulation [Z79.01] 2017   • Falls [W19.XXXA] 2017   • NAYLA on CPAP [G47.33, Z99.89]    • Dyslipidemia [E78.5]    • HTN (hypertension) [I10] 2016   • DM type 2 (diabetes mellitus, type 2) [E11.9] 2016   • Atrial fibrillation [I48.91] 2016   • COPD (chronic obstructive pulmonary disease) [J44.9] 2016   • Chronic thrombocytopenic purpura [D69.49] 2016   • Anemia [D64.9] 2016   • CAD (coronary artery disease) s/p CABG history  [I25.10] 2016      Resolved Hospital Problems    Diagnosis Date Noted Date Resolved   • **Spell of altered cognition [R41.89] 2017   • Leukopenia [D72.819] 2017   • Acute renal failure superimposed on stage 2 chronic kidney disease [N17.9, N18.2] 2017          Hospital Course:  Bjorn Pollock is a 83 y.o. male who presented to the ED with episodes of altered cognition and tremors.  He was felt to be dehydrated, EEG was negative and he was admitted to hospitalist service for further treatment.  His BP meds were held due to low bp's.  There was significant confusion with patients home medications with concerns for polypharmacy and contributing to his\"spells\".  He is to follow up with his PCP on Monday for INR and further review of his home meds to see if lasix/ace-I needs to be restarted.  He is felt to be stable and ready " for dc home with HH resuming at dc.             Day of Discharge     HPI:   Hospital follow up for confusion  No issues overnight  Wants to go home    Review of Systems  Gen- No fevers, chills  CV- No chest pain, palpitations  Resp- No cough, dyspnea  GI- No N/V/D, abd pain    Otherwise ROS is negative except as mentioned in the HPI.    Vital Signs:   Temp:  [97.5 °F (36.4 °C)-98.4 °F (36.9 °C)] 98.4 °F (36.9 °C)  Heart Rate:  [78-82] 82  Resp:  [16-21] 18  BP: ()/(57-67) 106/57     Physical Exam:  Constitutional: No acute distress, awake, alert  HENT: NCAT, mucous membranes moist  Respiratory: Clear to auscultation bilaterally, respiratory effort normal   Cardiovascular: RRR, no murmurs, rubs, or gallops, palpable pedal pulses bilaterally  Gastrointestinal: Positive bowel sounds, soft, nontender, nondistended  Musculoskeletal: No bilateral ankle edema  Psychiatric: Appropriate affect, cooperative  Neurologic: Oriented x 3, no deficits noted    Pertinent  and/or Most Recent Results         Results from last 7 days  Lab Units 11/22/17  0453 11/21/17  1014   WBC 10*3/mm3 2.86* 3.60   HEMOGLOBIN g/dL 11.2* 11.4*   HEMATOCRIT % 36.2* 37.1*   PLATELETS 10*3/mm3 85* 95*   SODIUM mmol/L 136 137   POTASSIUM mmol/L 4.6 4.2   CHLORIDE mmol/L 105 103   CO2 mmol/L 27.0 29.0   BUN mg/dL 30* 39*   CREATININE mg/dL 1.20 1.60*   GLUCOSE mg/dL 71 81   CALCIUM mg/dL 8.6* 9.2       Results from last 7 days  Lab Units 11/23/17  0540 11/22/17  0453 11/21/17  1014   BILIRUBIN mg/dL  --   --  0.4   ALK PHOS U/L  --   --  83   ALT (SGPT) U/L  --   --  20   AST (SGOT) U/L  --   --  24   PROTIME Seconds 30.1* 25.0* 23.9*   INR  2.68 2.24 2.14       Results from last 7 days  Lab Units 11/22/17  0453 11/21/17  1017 11/21/17  1014   TSH mIU/mL  --   --  2.147   HEMOGLOBIN A1C % 5.60  --   --    BNP pg/mL  --  188.0*  --      Brief Urine Lab Results  (Last result in the past 365 days)      Color   Clarity   Blood   Leuk Est   Nitrite    Protein   CREAT   Urine HCG        11/21/17 1233 Yellow Clear Negative Small (1+)(A) Negative Negative               Microbiology Results Abnormal     Procedure Component Value - Date/Time    Urine Culture - Urine, Urine, Clean Catch [[id number]] Collected:  11/21/17 1233    Lab Status:  Final result Specimen:  Urine from Urine, Clean Catch Updated:  11/23/17 0746     Urine Culture --      10,000-20,000 CFU/mL Normal Urogenital Viola          Imaging Results (all)     Procedure Component Value Units Date/Time    CT Head Without Contrast [[id number]] Collected:  11/21/17 1307     Updated:  11/21/17 1412    Narrative:       EXAMINATION: CT HEAD WO CONTRAST-11/21/2017:      INDICATION: AMS, mental status change, weakness.     TECHNIQUE: Multiple axial CT imaging was obtained of the head from the  skull base to the skull vertex without the administration of intravenous  contrast.     The radiation dose reduction device was turned on for each scan per the  ALARA (As Low as Reasonably Achievable) protocol.     COMPARISON: 05/03/2016.     FINDINGS: There is some atrophy identified of the brain. No intracranial  hemorrhage or hydrocephalus. No mass, mass effect, or midline shift. No  abnormal extra-axial fluid collection is identified. The bony structures  reveal no evidence of osseous abnormality. The visualized paranasal  sinuses are clear. The mastoid air cells reveal fluid identified on the  right.       Impression:       Fluid in the right mastoid air cells. No acute intracranial  abnormality is identified.     D:  11/21/2017  E:  11/21/2017           This report was finalized on 11/21/2017 2:10 PM by Dr. Surekha Meza MD.       CT Chest Without Contrast [[id number]] Collected:  11/21/17 1310     Updated:  11/21/17 1413    Narrative:       EXAMINATION: CT CHEST WO CONTRAST-11/21/2017:      INDICATION: Cough, weight loss, chest discomfort.     TECHNIQUE: Multiple axial CT imaging was obtained of the chest  without  the administration of intravenous contrast.     The radiation dose reduction device was turned on for each scan per the  ALARA (As Low as Reasonably Achievable) protocol.     COMPARISON: 09/26/2017.     FINDINGS: Thyroid is heterogeneous. Kyphotic curvature identified of the  upper thoracic spine. Vascular calcification seen within the thoracic  aorta. Coronary artery calcification identified. No mediastinal mass. No  pericardial effusion. No bulky hilar or axillary lymphadenopathy.  Degenerative changes seen within the spine. Upper abdomen reveals  extensive atherosclerotic disease within the abdominal aorta and  mesenteric vessels. The lung parenchyma reveals some pleural thickening  identified at the lung bases bilaterally. No pleural effusion or  pneumothorax. Minimal groundglass opacity seen at the left lung base  suggesting atelectatic change. No focal  parenchymal consolidation, no pulmonary mass or nodule.       Impression:       Minimal chronic changes seen within the lung fields with  atelectatic changes at the left lung base and no acute intrathoracic  abnormality is present. Extensive coronary artery calcification and  atherosclerotic disease within the thoracic and abdominal aorta.     D:  11/21/2017  E:  11/21/2017           This report was finalized on 11/21/2017 2:10 PM by Dr. Surekha Meza MD.       XR Foot 3+ View Left [[id number]] Collected:  11/21/17 1414     Updated:  11/21/17 1414    Narrative:       EXAMINATION: XR FOOT 3+ VW, LEFT-11/21/2017:      INDICATION: Trauma.      COMPARISON: NONE.     FINDINGS: Three views of the left foot reveal osteopenia identified of  the bony structures. There is a thin radiopaque density identified  overlying the soft tissues between the first and second heads of the  metatarsals in which a foreign body cannot be excluded. The bony  structures are unremarkable. Hardware identified within the ankle.  Minimal degenerative changes seen within the  midfoot.           Impression:       Thin radiopaque density identified within the soft tissues  between the heads of the first and second metatarsals suggesting  possibly a foreign body. No acute bony abnormality present.     D:  11/21/2017  E:  11/21/2017                   Results for orders placed during the hospital encounter of 09/19/17   Adult Transthoracic Echo Complete W/ Cont if Necessary Per Protocol    Narrative · There is four chamber cardiac enlargement.  · Global and segmental LV wall motion abnormalities are seen with an   estimated EF=36%-40%.  · Left ventricular wall thickness is consistent with concentric   hypertrophy, mild.  · Mitral annular calcification with mild-moderate MR is appreciated.  · Aortic valve sclerosis with moderate aortic insufficiency is seen.  · The LV examination is suboptimal as the patient refused Lumason.            Discharge Details      Bjorn Pollock   Home Medication Instructions FERNANDO:[id number]    Printed on:11/23/17 0163   Medication Information                      acetaminophen (TYLENOL) 325 MG tablet  Take 2 tablets by mouth Every 4 (Four) Hours As Needed for Mild Pain .             aspirin 81 MG EC tablet  Take 1 tablet by mouth Daily.             atorvastatin (LIPITOR) 20 MG tablet  Take 20 mg by mouth Every Night.             bisacodyl (DULCOLAX) 5 MG EC tablet  Take 1 tablet by mouth Daily As Needed for Constipation.             budesonide (PULMICORT) 0.5 MG/2ML nebulizer solution  Take 0.5 mg by nebulization 2 (Two) Times a Day. Pt appears to use PRN             calcium carbonate (TUMS) 500 MG chewable tablet  Chew 1,000 mg 2 (Two) Times a Day As Needed for Heartburn.             castor oil-balsam peru (VENELEX) ointment  Apply 1 application topically Every 12 (Twelve) Hours.             cyclobenzaprine (FLEXERIL) 10 MG tablet  Take 5 mg by mouth 3 (Three) Times a Day As Needed for Muscle Spasms. 30 day supply filled 11/15/17 at [facility]              lidocaine (LIDODERM) 5 %  Place 1 patch on the skin Daily. Remove & Discard patch within 12 hours or as directed by MD             melatonin 5 MG sublingual tablet sublingual tablet  Place 1 tablet under the tongue At Night As Needed (SLEEP).             metoprolol succinate XL (TOPROL-XL) 25 MG 24 hr tablet  Take 0.5 tablets by mouth Daily.             nitroglycerin (NITROSTAT) 0.4 MG SL tablet  Place 1 tablet under the tongue Every 5 (Five) Minutes As Needed for Chest Pain. Take no more than 3 doses in 15 minutes.             nystatin (MYCOSTATIN) 239611 UNIT/GM powder  Apply  topically Every 12 (Twelve) Hours.             polyethylene glycol (MIRALAX) packet  Take 17 g by mouth Daily.             sertraline (ZOLOFT) 100 MG tablet  Take 100 mg by mouth Daily.             warfarin (COUMADIN) 3 MG tablet  2 tabs daily, except Monday and Friday; 1 tab daily, plus a 4mg tab on Monday and Friday             warfarin (COUMADIN) 4 MG tablet  Take 4 mg by mouth 2 (Two) Times a Week. Monday, Friday. Provider: GLENDA Anguiano PA-C [phone]                   Discharge Disposition:  Home or Self Care    Discharge Diet:  Diet Instructions     Diet: Regular, Cardiac, Consistent Carbohydrate; Thin       Discharge Diet:   Regular  Cardiac  Consistent Carbohydrate      Fluid Consistency:  Thin                 Discharge Activity:  Activity Instructions     Activity as Tolerated                       Future Appointments  Date Time Provider Department Center   11/28/2017 2:15 PM Aly Vivas MD [facility] None       Additional Instructions for the Follow-ups that You Need to Schedule     Discharge Follow-up with PCP    As directed    Follow Up Details:  Monday       Protime-INR    Nov 27, 2017 (Approximate)    Is the Patient on Coumadin (Warfarin) therapy?:  Yes                 Time Spent on Discharge:  45 minutes    Nan Campbell, MARLENE  11/23/17  2:49 PM

## 2017-12-29 PROBLEM — N18.9 CHRONIC KIDNEY DISEASE: Chronic | Status: ACTIVE | Noted: 2017-01-01

## 2017-12-29 PROBLEM — K59.00 CONSTIPATION: Status: ACTIVE | Noted: 2017-01-01

## 2017-12-30 PROBLEM — R32 URINARY INCONTINENCE: Status: ACTIVE | Noted: 2017-01-01

## 2018-01-01 ENCOUNTER — APPOINTMENT (OUTPATIENT)
Dept: GENERAL RADIOLOGY | Facility: HOSPITAL | Age: 83
End: 2018-01-01

## 2018-01-01 VITALS
SYSTOLIC BLOOD PRESSURE: 101 MMHG | OXYGEN SATURATION: 50 % | WEIGHT: 236.2 LBS | BODY MASS INDEX: 31.99 KG/M2 | HEIGHT: 72 IN | DIASTOLIC BLOOD PRESSURE: 57 MMHG | TEMPERATURE: 98.6 F

## 2018-01-01 LAB
ALBUMIN SERPL-MCNC: 3.2 G/DL (ref 3.2–4.8)
ALBUMIN/GLOB SERPL: 1.2 G/DL (ref 1.5–2.5)
ALP SERPL-CCNC: 74 U/L (ref 25–100)
ALT SERPL W P-5'-P-CCNC: 15 U/L (ref 7–40)
ANION GAP SERPL CALCULATED.3IONS-SCNC: 10 MMOL/L (ref 3–11)
ANION GAP SERPL CALCULATED.3IONS-SCNC: 10 MMOL/L (ref 3–11)
ANION GAP SERPL CALCULATED.3IONS-SCNC: 12 MMOL/L (ref 3–11)
ANION GAP SERPL CALCULATED.3IONS-SCNC: 12 MMOL/L (ref 3–11)
ANION GAP SERPL CALCULATED.3IONS-SCNC: 13 MMOL/L (ref 3–11)
ANION GAP SERPL CALCULATED.3IONS-SCNC: 13 MMOL/L (ref 3–11)
ANION GAP SERPL CALCULATED.3IONS-SCNC: 14 MMOL/L (ref 3–11)
ARTERIAL PATENCY WRIST A: ABNORMAL
AST SERPL-CCNC: 19 U/L (ref 0–33)
ATMOSPHERIC PRESS: ABNORMAL MMHG
BACTERIA SPEC AEROBE CULT: NORMAL
BASE EXCESS BLDA CALC-SCNC: 0.3 MMOL/L (ref 0–2)
BASE EXCESS BLDA CALC-SCNC: 1.4 MMOL/L (ref 0–2)
BASE EXCESS BLDA CALC-SCNC: 2.7 MMOL/L
BASE EXCESS BLDA CALC-SCNC: 4.4 MMOL/L (ref 0–2)
BASE EXCESS BLDA CALC-SCNC: 5.2 MMOL/L (ref 0–2)
BASE EXCESS BLDA CALC-SCNC: 6 MMOL/L (ref 0–2)
BASOPHILS # BLD AUTO: 0 10*3/MM3 (ref 0–0.2)
BASOPHILS # BLD AUTO: 0.02 10*3/MM3 (ref 0–0.2)
BASOPHILS # BLD AUTO: 0.11 10*3/MM3 (ref 0–0.2)
BASOPHILS # BLD MANUAL: 0 10*3/MM3 (ref 0–0.2)
BASOPHILS NFR BLD AUTO: 0 % (ref 0–1)
BASOPHILS NFR BLD AUTO: 0 % (ref 0–1)
BASOPHILS NFR BLD AUTO: 0.2 % (ref 0–1)
BASOPHILS NFR BLD AUTO: 0.8 % (ref 0–1)
BDY SITE: ABNORMAL
BILIRUB SERPL-MCNC: 1.2 MG/DL (ref 0.3–1.2)
BILIRUB UR QL STRIP: NEGATIVE
BNP SERPL-MCNC: 1013 PG/ML (ref 0–100)
BNP SERPL-MCNC: 1023 PG/ML (ref 0–100)
BNP SERPL-MCNC: 554 PG/ML (ref 0–100)
BNP SERPL-MCNC: 791 PG/ML (ref 0–100)
BNP SERPL-MCNC: 833 PG/ML (ref 0–100)
BUN BLD-MCNC: 41 MG/DL (ref 9–23)
BUN BLD-MCNC: 50 MG/DL (ref 9–23)
BUN BLD-MCNC: 50 MG/DL (ref 9–23)
BUN BLD-MCNC: 56 MG/DL (ref 9–23)
BUN BLD-MCNC: 56 MG/DL (ref 9–23)
BUN BLD-MCNC: 65 MG/DL (ref 9–23)
BUN BLD-MCNC: 88 MG/DL (ref 9–23)
BUN/CREAT SERPL: 34.2 (ref 7–25)
BUN/CREAT SERPL: 34.2 (ref 7–25)
BUN/CREAT SERPL: 35 (ref 7–25)
BUN/CREAT SERPL: 35 (ref 7–25)
BUN/CREAT SERPL: 35.7 (ref 7–25)
BUN/CREAT SERPL: 38.3 (ref 7–25)
BUN/CREAT SERPL: 38.5 (ref 7–25)
CALCIUM SPEC-SCNC: 8.8 MG/DL (ref 8.7–10.4)
CALCIUM SPEC-SCNC: 9.1 MG/DL (ref 8.7–10.4)
CALCIUM SPEC-SCNC: 9.2 MG/DL (ref 8.7–10.4)
CALCIUM SPEC-SCNC: 9.5 MG/DL (ref 8.7–10.4)
CALCIUM SPEC-SCNC: 9.6 MG/DL (ref 8.7–10.4)
CALCIUM SPEC-SCNC: 9.6 MG/DL (ref 8.7–10.4)
CALCIUM SPEC-SCNC: 9.7 MG/DL (ref 8.7–10.4)
CHLORIDE SERPL-SCNC: 101 MMOL/L (ref 99–109)
CHLORIDE SERPL-SCNC: 104 MMOL/L (ref 99–109)
CHLORIDE SERPL-SCNC: 105 MMOL/L (ref 99–109)
CHLORIDE SERPL-SCNC: 111 MMOL/L (ref 99–109)
CHLORIDE SERPL-SCNC: 99 MMOL/L (ref 99–109)
CLARITY UR: ABNORMAL
CO2 BLDA-SCNC: 29.5 MMOL/L (ref 22–33)
CO2 BLDA-SCNC: 30.5 MMOL/L (ref 22–33)
CO2 BLDA-SCNC: 32.1 MMOL/L (ref 22–33)
CO2 BLDA-SCNC: 36.1 MMOL/L (ref 22–33)
CO2 BLDA-SCNC: 36.2 MMOL/L (ref 22–33)
CO2 SERPL-SCNC: 26 MMOL/L (ref 20–31)
CO2 SERPL-SCNC: 27 MMOL/L (ref 20–31)
CO2 SERPL-SCNC: 28 MMOL/L (ref 20–31)
CO2 SERPL-SCNC: 32 MMOL/L (ref 20–31)
CO2 SERPL-SCNC: 33 MMOL/L (ref 20–31)
CO2 SERPL-SCNC: 35 MMOL/L (ref 20–31)
CO2 SERPL-SCNC: 39 MMOL/L (ref 20–31)
COHGB MFR BLD: 1.7 % (ref 0–2)
COHGB MFR BLD: 1.8 % (ref 0–2)
COHGB MFR BLD: 1.9 %
COHGB MFR BLD: 2.5 % (ref 0–2)
COLOR UR: YELLOW
CREAT BLD-MCNC: 1.2 MG/DL (ref 0.6–1.3)
CREAT BLD-MCNC: 1.3 MG/DL (ref 0.6–1.3)
CREAT BLD-MCNC: 1.4 MG/DL (ref 0.6–1.3)
CREAT BLD-MCNC: 1.6 MG/DL (ref 0.6–1.3)
CREAT BLD-MCNC: 1.6 MG/DL (ref 0.6–1.3)
CREAT BLD-MCNC: 1.9 MG/DL (ref 0.6–1.3)
CREAT BLD-MCNC: 2.3 MG/DL (ref 0.6–1.3)
CRP SERPL-MCNC: 20.92 MG/DL (ref 0–1)
D-LACTATE SERPL-SCNC: 1.4 MMOL/L (ref 0.5–2)
DEPRECATED RDW RBC AUTO: 64.1 FL (ref 37–54)
DEPRECATED RDW RBC AUTO: 64.3 FL (ref 37–54)
DEPRECATED RDW RBC AUTO: 65 FL (ref 37–54)
DEPRECATED RDW RBC AUTO: 66.8 FL (ref 37–54)
DEPRECATED RDW RBC AUTO: 67.1 FL (ref 37–54)
EOSINOPHIL # BLD AUTO: 0 10*3/MM3 (ref 0–0.3)
EOSINOPHIL # BLD MANUAL: 0 10*3/MM3 (ref 0.1–0.3)
EOSINOPHIL NFR BLD AUTO: 0 % (ref 0–3)
EOSINOPHIL NFR BLD MANUAL: 0 % (ref 0–3)
ERYTHROCYTE [DISTWIDTH] IN BLOOD BY AUTOMATED COUNT: 19.5 % (ref 11.3–14.5)
ERYTHROCYTE [DISTWIDTH] IN BLOOD BY AUTOMATED COUNT: 19.5 % (ref 11.3–14.5)
ERYTHROCYTE [DISTWIDTH] IN BLOOD BY AUTOMATED COUNT: 19.7 % (ref 11.3–14.5)
ERYTHROCYTE [DISTWIDTH] IN BLOOD BY AUTOMATED COUNT: 19.8 % (ref 11.3–14.5)
ERYTHROCYTE [DISTWIDTH] IN BLOOD BY AUTOMATED COUNT: 19.9 % (ref 11.3–14.5)
FLUAV SUBTYP SPEC NAA+PROBE: NOT DETECTED
FLUBV RNA ISLT QL NAA+PROBE: NOT DETECTED
GFR SERPL CREATININE-BSD FRML MDRD: 27 ML/MIN/1.73
GFR SERPL CREATININE-BSD FRML MDRD: 34 ML/MIN/1.73
GFR SERPL CREATININE-BSD FRML MDRD: 41 ML/MIN/1.73
GFR SERPL CREATININE-BSD FRML MDRD: 41 ML/MIN/1.73
GFR SERPL CREATININE-BSD FRML MDRD: 48 ML/MIN/1.73
GFR SERPL CREATININE-BSD FRML MDRD: 53 ML/MIN/1.73
GFR SERPL CREATININE-BSD FRML MDRD: 58 ML/MIN/1.73
GLOBULIN UR ELPH-MCNC: 2.6 GM/DL
GLUCOSE BLD-MCNC: 120 MG/DL (ref 70–100)
GLUCOSE BLD-MCNC: 125 MG/DL (ref 70–100)
GLUCOSE BLD-MCNC: 137 MG/DL (ref 70–100)
GLUCOSE BLD-MCNC: 139 MG/DL (ref 70–100)
GLUCOSE BLD-MCNC: 145 MG/DL (ref 70–100)
GLUCOSE BLD-MCNC: 145 MG/DL (ref 70–100)
GLUCOSE BLD-MCNC: 147 MG/DL (ref 70–100)
GLUCOSE BLDC GLUCOMTR-MCNC: 105 MG/DL (ref 70–130)
GLUCOSE BLDC GLUCOMTR-MCNC: 112 MG/DL (ref 70–130)
GLUCOSE BLDC GLUCOMTR-MCNC: 113 MG/DL (ref 70–130)
GLUCOSE BLDC GLUCOMTR-MCNC: 115 MG/DL (ref 70–130)
GLUCOSE BLDC GLUCOMTR-MCNC: 116 MG/DL (ref 70–130)
GLUCOSE BLDC GLUCOMTR-MCNC: 119 MG/DL (ref 70–130)
GLUCOSE BLDC GLUCOMTR-MCNC: 120 MG/DL (ref 70–130)
GLUCOSE BLDC GLUCOMTR-MCNC: 121 MG/DL (ref 70–130)
GLUCOSE BLDC GLUCOMTR-MCNC: 126 MG/DL (ref 70–130)
GLUCOSE BLDC GLUCOMTR-MCNC: 128 MG/DL (ref 70–130)
GLUCOSE BLDC GLUCOMTR-MCNC: 128 MG/DL (ref 70–130)
GLUCOSE BLDC GLUCOMTR-MCNC: 130 MG/DL (ref 70–130)
GLUCOSE BLDC GLUCOMTR-MCNC: 132 MG/DL (ref 70–130)
GLUCOSE BLDC GLUCOMTR-MCNC: 134 MG/DL (ref 70–130)
GLUCOSE BLDC GLUCOMTR-MCNC: 138 MG/DL (ref 70–130)
GLUCOSE BLDC GLUCOMTR-MCNC: 138 MG/DL (ref 70–130)
GLUCOSE BLDC GLUCOMTR-MCNC: 139 MG/DL (ref 70–130)
GLUCOSE BLDC GLUCOMTR-MCNC: 139 MG/DL (ref 70–130)
GLUCOSE BLDC GLUCOMTR-MCNC: 140 MG/DL (ref 70–130)
GLUCOSE BLDC GLUCOMTR-MCNC: 141 MG/DL (ref 70–130)
GLUCOSE BLDC GLUCOMTR-MCNC: 144 MG/DL (ref 70–130)
GLUCOSE BLDC GLUCOMTR-MCNC: 146 MG/DL (ref 70–130)
GLUCOSE BLDC GLUCOMTR-MCNC: 147 MG/DL (ref 70–130)
GLUCOSE BLDC GLUCOMTR-MCNC: 148 MG/DL (ref 70–130)
GLUCOSE BLDC GLUCOMTR-MCNC: 164 MG/DL (ref 70–130)
GLUCOSE BLDC GLUCOMTR-MCNC: 165 MG/DL (ref 70–130)
GLUCOSE BLDC GLUCOMTR-MCNC: 92 MG/DL (ref 70–130)
GLUCOSE UR STRIP-MCNC: NEGATIVE MG/DL
HCO3 BLDA-SCNC: 27.9 MMOL/L (ref 20–26)
HCO3 BLDA-SCNC: 28.6 MMOL/L (ref 20–26)
HCO3 BLDA-SCNC: 29.7 MMOL/L
HCO3 BLDA-SCNC: 30.5 MMOL/L (ref 20–26)
HCO3 BLDA-SCNC: 34 MMOL/L (ref 20–26)
HCO3 BLDA-SCNC: 34.1 MMOL/L (ref 20–26)
HCT VFR BLD AUTO: 38.1 % (ref 38.9–50.9)
HCT VFR BLD AUTO: 39.7 % (ref 38.9–50.9)
HCT VFR BLD AUTO: 39.9 % (ref 38.9–50.9)
HCT VFR BLD AUTO: 39.9 % (ref 38.9–50.9)
HCT VFR BLD AUTO: 42.6 % (ref 38.9–50.9)
HCT VFR BLD CALC: 34.3 %
HCT VFR BLD CALC: 36.1 %
HCT VFR BLD CALC: 36.3 %
HCT VFR BLD CALC: 37 %
HCT VFR BLD CALC: 37.1 %
HCT VFR BLD CALC: 37.5 %
HGB BLD-MCNC: 11.1 G/DL (ref 13.1–17.5)
HGB BLD-MCNC: 11.2 G/DL (ref 13.1–17.5)
HGB BLD-MCNC: 11.8 G/DL (ref 13.1–17.5)
HGB BLD-MCNC: 11.8 G/DL (ref 13.1–17.5)
HGB BLD-MCNC: 12.1 G/DL (ref 13.1–17.5)
HGB BLDA-MCNC: 11.2 G/DL (ref 13.5–17.5)
HGB BLDA-MCNC: 11.8 G/DL
HGB BLDA-MCNC: 11.8 G/DL (ref 13.5–17.5)
HGB BLDA-MCNC: 12.1 G/DL (ref 13.5–17.5)
HGB BLDA-MCNC: 12.1 G/DL (ref 13.5–17.5)
HGB BLDA-MCNC: 12.2 G/DL (ref 13.5–17.5)
HGB UR QL STRIP.AUTO: ABNORMAL
HOROWITZ INDEX BLD+IHG-RTO: 40 %
HOROWITZ INDEX BLD+IHG-RTO: 50 %
HYPOCHROMIA BLD QL: ABNORMAL
HYPOCHROMIA BLD QL: NORMAL
IMM GRANULOCYTES # BLD: 0.05 10*3/MM3 (ref 0–0.03)
IMM GRANULOCYTES # BLD: 0.15 10*3/MM3 (ref 0–0.03)
IMM GRANULOCYTES # BLD: 0.94 10*3/MM3 (ref 0–0.03)
IMM GRANULOCYTES NFR BLD: 0.9 % (ref 0–0.6)
IMM GRANULOCYTES NFR BLD: 1.2 % (ref 0–0.6)
IMM GRANULOCYTES NFR BLD: 6.7 % (ref 0–0.6)
INR PPP: 1.5
INR PPP: 1.69
INR PPP: 3.21
INR PPP: 3.6
INR PPP: 3.72
INR PPP: 4.31
INR PPP: 4.91
KETONES UR QL STRIP: NEGATIVE
LEUKOCYTE ESTERASE UR QL STRIP.AUTO: ABNORMAL
LYMPHOCYTES # BLD AUTO: 0.24 10*3/MM3 (ref 0.6–4.8)
LYMPHOCYTES # BLD AUTO: 0.32 10*3/MM3 (ref 0.6–4.8)
LYMPHOCYTES # BLD AUTO: 0.49 10*3/MM3 (ref 0.6–4.8)
LYMPHOCYTES # BLD MANUAL: 0.4 10*3/MM3 (ref 0.6–4.8)
LYMPHOCYTES NFR BLD AUTO: 2.5 % (ref 24–44)
LYMPHOCYTES NFR BLD AUTO: 3.5 % (ref 24–44)
LYMPHOCYTES NFR BLD AUTO: 4.4 % (ref 24–44)
LYMPHOCYTES NFR BLD MANUAL: 1 % (ref 0–12)
LYMPHOCYTES NFR BLD MANUAL: 2 % (ref 24–44)
MAGNESIUM SERPL-MCNC: 2.2 MG/DL (ref 1.3–2.7)
MCH RBC QN AUTO: 26.2 PG (ref 27–31)
MCH RBC QN AUTO: 26.2 PG (ref 27–31)
MCH RBC QN AUTO: 26.4 PG (ref 27–31)
MCH RBC QN AUTO: 26.6 PG (ref 27–31)
MCH RBC QN AUTO: 26.8 PG (ref 27–31)
MCHC RBC AUTO-ENTMCNC: 28.2 G/DL (ref 32–36)
MCHC RBC AUTO-ENTMCNC: 28.4 G/DL (ref 32–36)
MCHC RBC AUTO-ENTMCNC: 29.1 G/DL (ref 32–36)
MCHC RBC AUTO-ENTMCNC: 29.6 G/DL (ref 32–36)
MCHC RBC AUTO-ENTMCNC: 29.6 G/DL (ref 32–36)
MCV RBC AUTO: 90.1 FL (ref 80–99)
MCV RBC AUTO: 90.7 FL (ref 80–99)
MCV RBC AUTO: 90.7 FL (ref 80–99)
MCV RBC AUTO: 92.4 FL (ref 80–99)
MCV RBC AUTO: 93 FL (ref 80–99)
METHGB BLD QL: 0.2 % (ref 0–1.5)
METHGB BLD QL: 0.5 % (ref 0–1.5)
METHGB BLD QL: 0.6 % (ref 0–1.5)
METHGB BLD QL: 0.6 % (ref 0–1.5)
METHGB BLD QL: 0.8 % (ref 0–1.5)
METHGB BLD QL: 0.9 %
MODALITY: ABNORMAL
MONOCYTES # BLD AUTO: 0.2 10*3/MM3 (ref 0–1)
MONOCYTES # BLD AUTO: 0.49 10*3/MM3 (ref 0–1)
MONOCYTES # BLD AUTO: 0.84 10*3/MM3 (ref 0–1)
MONOCYTES # BLD AUTO: 0.86 10*3/MM3 (ref 0–1)
MONOCYTES NFR BLD AUTO: 6 % (ref 0–12)
MONOCYTES NFR BLD AUTO: 6.6 % (ref 0–12)
MONOCYTES NFR BLD AUTO: 9.1 % (ref 0–12)
NEUTROPHILS # BLD AUTO: 11.59 10*3/MM3 (ref 1.5–8.3)
NEUTROPHILS # BLD AUTO: 11.63 10*3/MM3 (ref 1.5–8.3)
NEUTROPHILS # BLD AUTO: 19.21 10*3/MM3 (ref 1.5–8.3)
NEUTROPHILS # BLD AUTO: 4.63 10*3/MM3 (ref 1.5–8.3)
NEUTROPHILS NFR BLD AUTO: 83 % (ref 41–71)
NEUTROPHILS NFR BLD AUTO: 85.6 % (ref 41–71)
NEUTROPHILS NFR BLD AUTO: 89.5 % (ref 41–71)
NEUTROPHILS NFR BLD MANUAL: 82 % (ref 41–71)
NEUTS BAND NFR BLD MANUAL: 14 % (ref 0–5)
NITRITE UR QL STRIP: NEGATIVE
NT-PROBNP SERPL-MCNC: ABNORMAL PG/ML (ref 0–486)
OXYHGB MFR BLDV: 91 % (ref 94–99)
OXYHGB MFR BLDV: 92.7 % (ref 94–99)
OXYHGB MFR BLDV: 92.8 % (ref 94–99)
OXYHGB MFR BLDV: 93.2 % (ref 94–99)
OXYHGB MFR BLDV: 94.3 % (ref 94–99)
OXYHGB MFR BLDV: 94.7 % (ref 94–99)
PCO2 BLDA: 51.5 MM HG (ref 35–48)
PCO2 BLDA: 51.8 MM HG (ref 35–48)
PCO2 BLDA: 56.3 MM HG
PCO2 BLDA: 63.6 MM HG (ref 35–48)
PCO2 BLDA: 66.4 MM HG (ref 35–48)
PCO2 BLDA: 72.2 MM HG (ref 35–48)
PH BLDA: 7.26 PH UNITS (ref 7.35–7.45)
PH BLDA: 7.28 PH UNITS (ref 7.35–7.45)
PH BLDA: 7.32 PH UNITS (ref 7.35–7.45)
PH BLDA: 7.33 PH UNITS
PH BLDA: 7.34 PH UNITS (ref 7.35–7.45)
PH BLDA: 7.38 PH UNITS (ref 7.35–7.45)
PH UR STRIP.AUTO: <=5 [PH] (ref 5–8)
PHOSPHATE SERPL-MCNC: 3 MG/DL (ref 2.4–5.1)
PLAT MORPH BLD: NORMAL
PLAT MORPH BLD: NORMAL
PLATELET # BLD AUTO: 109 10*3/MM3 (ref 150–450)
PLATELET # BLD AUTO: 125 10*3/MM3 (ref 150–450)
PLATELET # BLD AUTO: 132 10*3/MM3 (ref 150–450)
PLATELET # BLD AUTO: 133 10*3/MM3 (ref 150–450)
PLATELET # BLD AUTO: 66 10*3/MM3 (ref 150–450)
PMV BLD AUTO: 10.7 FL (ref 6–12)
PMV BLD AUTO: 10.8 FL (ref 6–12)
PMV BLD AUTO: 10.9 FL (ref 6–12)
PMV BLD AUTO: 11.2 FL (ref 6–12)
PMV BLD AUTO: 11.4 FL (ref 6–12)
PO2 BLDA: 71.9 MM HG (ref 83–108)
PO2 BLDA: 73.3 MM HG (ref 83–108)
PO2 BLDA: 76.5 MM HG
PO2 BLDA: 78 MM HG (ref 83–108)
PO2 BLDA: 87 MM HG (ref 83–108)
PO2 BLDA: 89.7 MM HG (ref 83–108)
POTASSIUM BLD-SCNC: 2.7 MMOL/L (ref 3.5–5.5)
POTASSIUM BLD-SCNC: 2.7 MMOL/L (ref 3.5–5.5)
POTASSIUM BLD-SCNC: 3 MMOL/L (ref 3.5–5.5)
POTASSIUM BLD-SCNC: 3.4 MMOL/L (ref 3.5–5.5)
POTASSIUM BLD-SCNC: 3.5 MMOL/L (ref 3.5–5.5)
POTASSIUM BLD-SCNC: 3.6 MMOL/L (ref 3.5–5.5)
POTASSIUM BLD-SCNC: 3.7 MMOL/L (ref 3.5–5.5)
POTASSIUM BLD-SCNC: 4 MMOL/L (ref 3.5–5.5)
PROCALCITONIN SERPL-MCNC: 0.46 NG/ML
PROCALCITONIN SERPL-MCNC: 0.53 NG/ML
PROT SERPL-MCNC: 5.8 G/DL (ref 5.7–8.2)
PROT UR QL STRIP: NEGATIVE
PROTHROMBIN TIME: 16.5 SECONDS (ref 9.6–11.5)
PROTHROMBIN TIME: 18.7 SECONDS (ref 9.6–11.5)
PROTHROMBIN TIME: 36.3 SECONDS (ref 9.6–11.5)
PROTHROMBIN TIME: 40.8 SECONDS (ref 9.6–11.5)
PROTHROMBIN TIME: 42.2 SECONDS (ref 9.6–11.5)
PROTHROMBIN TIME: 49.2 SECONDS (ref 9.6–11.5)
PROTHROMBIN TIME: 56.2 SECONDS (ref 9.6–11.5)
RBC # BLD AUTO: 4.2 10*6/MM3 (ref 4.2–5.76)
RBC # BLD AUTO: 4.27 10*6/MM3 (ref 4.2–5.76)
RBC # BLD AUTO: 4.4 10*6/MM3 (ref 4.2–5.76)
RBC # BLD AUTO: 4.43 10*6/MM3 (ref 4.2–5.76)
RBC # BLD AUTO: 4.61 10*6/MM3 (ref 4.2–5.76)
SAO2 % BLDCOA: 92.8 %
SAO2 % BLDCOA: 93.2 %
SODIUM BLD-SCNC: 139 MMOL/L (ref 132–146)
SODIUM BLD-SCNC: 142 MMOL/L (ref 132–146)
SODIUM BLD-SCNC: 145 MMOL/L (ref 132–146)
SODIUM BLD-SCNC: 146 MMOL/L (ref 132–146)
SODIUM BLD-SCNC: 149 MMOL/L (ref 132–146)
SODIUM BLD-SCNC: 151 MMOL/L (ref 132–146)
SODIUM BLD-SCNC: 160 MMOL/L (ref 132–146)
SP GR UR STRIP: 1.01 (ref 1–1.03)
STOMATOCYTES BLD QL SMEAR: ABNORMAL
STOMATOCYTES BLD QL SMEAR: NORMAL
UROBILINOGEN UR QL STRIP: ABNORMAL
VANCOMYCIN TROUGH SERPL-MCNC: 25.2 MCG/ML (ref 10–20)
VARIANT LYMPHS NFR BLD MANUAL: 1 % (ref 0–5)
WBC MORPH BLD: NORMAL
WBC MORPH BLD: NORMAL
WBC NRBC COR # BLD: 12.94 10*3/MM3 (ref 3.5–10.8)
WBC NRBC COR # BLD: 14.01 10*3/MM3 (ref 3.5–10.8)
WBC NRBC COR # BLD: 20.01 10*3/MM3 (ref 3.5–10.8)
WBC NRBC COR # BLD: 5.41 10*3/MM3 (ref 3.5–10.8)
WBC NRBC COR # BLD: 8.55 10*3/MM3 (ref 3.5–10.8)

## 2018-01-01 PROCEDURE — 85025 COMPLETE CBC W/AUTO DIFF WBC: CPT | Performed by: INTERNAL MEDICINE

## 2018-01-01 PROCEDURE — 94799 UNLISTED PULMONARY SVC/PX: CPT

## 2018-01-01 PROCEDURE — 25010000002 LORAZEPAM PER 2 MG: Performed by: INTERNAL MEDICINE

## 2018-01-01 PROCEDURE — 25010000002 MORPHINE SULFATE (PF) 2 MG/ML SOLUTION: Performed by: FAMILY MEDICINE

## 2018-01-01 PROCEDURE — 99232 SBSQ HOSP IP/OBS MODERATE 35: CPT | Performed by: INTERNAL MEDICINE

## 2018-01-01 PROCEDURE — 25010000002 PIPERACILLIN-TAZOBACTAM: Performed by: INTERNAL MEDICINE

## 2018-01-01 PROCEDURE — 71045 X-RAY EXAM CHEST 1 VIEW: CPT

## 2018-01-01 PROCEDURE — 99233 SBSQ HOSP IP/OBS HIGH 50: CPT | Performed by: INTERNAL MEDICINE

## 2018-01-01 PROCEDURE — 99291 CRITICAL CARE FIRST HOUR: CPT | Performed by: INTERNAL MEDICINE

## 2018-01-01 PROCEDURE — 85007 BL SMEAR W/DIFF WBC COUNT: CPT | Performed by: INTERNAL MEDICINE

## 2018-01-01 PROCEDURE — 82962 GLUCOSE BLOOD TEST: CPT

## 2018-01-01 PROCEDURE — 83605 ASSAY OF LACTIC ACID: CPT | Performed by: INTERNAL MEDICINE

## 2018-01-01 PROCEDURE — 80048 BASIC METABOLIC PNL TOTAL CA: CPT | Performed by: INTERNAL MEDICINE

## 2018-01-01 PROCEDURE — 25010000002 FUROSEMIDE PER 20 MG: Performed by: INTERNAL MEDICINE

## 2018-01-01 PROCEDURE — 87086 URINE CULTURE/COLONY COUNT: CPT | Performed by: INTERNAL MEDICINE

## 2018-01-01 PROCEDURE — 82805 BLOOD GASES W/O2 SATURATION: CPT | Performed by: INTERNAL MEDICINE

## 2018-01-01 PROCEDURE — 36600 WITHDRAWAL OF ARTERIAL BLOOD: CPT | Performed by: INTERNAL MEDICINE

## 2018-01-01 PROCEDURE — 25010000002 ACETAZOLAMIDE PER 500 MG: Performed by: INTERNAL MEDICINE

## 2018-01-01 PROCEDURE — 85610 PROTHROMBIN TIME: CPT | Performed by: NURSE PRACTITIONER

## 2018-01-01 PROCEDURE — 83880 ASSAY OF NATRIURETIC PEPTIDE: CPT | Performed by: INTERNAL MEDICINE

## 2018-01-01 PROCEDURE — 80048 BASIC METABOLIC PNL TOTAL CA: CPT | Performed by: HOSPITALIST

## 2018-01-01 PROCEDURE — 25010000002 HALOPERIDOL LACTATE PER 5 MG: Performed by: INTERNAL MEDICINE

## 2018-01-01 PROCEDURE — 84145 PROCALCITONIN (PCT): CPT | Performed by: INTERNAL MEDICINE

## 2018-01-01 PROCEDURE — 94760 N-INVAS EAR/PLS OXIMETRY 1: CPT

## 2018-01-01 PROCEDURE — 81003 URINALYSIS AUTO W/O SCOPE: CPT | Performed by: INTERNAL MEDICINE

## 2018-01-01 PROCEDURE — 92610 EVALUATE SWALLOWING FUNCTION: CPT

## 2018-01-01 PROCEDURE — G8996 SWALLOW CURRENT STATUS: HCPCS

## 2018-01-01 PROCEDURE — 99231 SBSQ HOSP IP/OBS SF/LOW 25: CPT | Performed by: INTERNAL MEDICINE

## 2018-01-01 PROCEDURE — 84132 ASSAY OF SERUM POTASSIUM: CPT | Performed by: INTERNAL MEDICINE

## 2018-01-01 PROCEDURE — 94660 CPAP INITIATION&MGMT: CPT

## 2018-01-01 PROCEDURE — 83735 ASSAY OF MAGNESIUM: CPT | Performed by: INTERNAL MEDICINE

## 2018-01-01 PROCEDURE — 25010000002 MORPHINE PER 10 MG: Performed by: INTERNAL MEDICINE

## 2018-01-01 PROCEDURE — 87633 RESP VIRUS 12-25 TARGETS: CPT | Performed by: INTERNAL MEDICINE

## 2018-01-01 PROCEDURE — 25010000002 VITAMIN K1 PER 1 MG: Performed by: INTERNAL MEDICINE

## 2018-01-01 PROCEDURE — G8997 SWALLOW GOAL STATUS: HCPCS

## 2018-01-01 PROCEDURE — 94640 AIRWAY INHALATION TREATMENT: CPT

## 2018-01-01 PROCEDURE — 87798 DETECT AGENT NOS DNA AMP: CPT | Performed by: INTERNAL MEDICINE

## 2018-01-01 PROCEDURE — 25010000002 VANCOMYCIN: Performed by: INTERNAL MEDICINE

## 2018-01-01 PROCEDURE — 87040 BLOOD CULTURE FOR BACTERIA: CPT | Performed by: INTERNAL MEDICINE

## 2018-01-01 PROCEDURE — 25010000003 POTASSIUM CHLORIDE 10 MEQ/100ML SOLUTION: Performed by: NURSE PRACTITIONER

## 2018-01-01 PROCEDURE — 84100 ASSAY OF PHOSPHORUS: CPT | Performed by: INTERNAL MEDICINE

## 2018-01-01 PROCEDURE — 87502 INFLUENZA DNA AMP PROBE: CPT | Performed by: INTERNAL MEDICINE

## 2018-01-01 PROCEDURE — 5A09557 ASSISTANCE WITH RESPIRATORY VENTILATION, GREATER THAN 96 CONSECUTIVE HOURS, CONTINUOUS POSITIVE AIRWAY PRESSURE: ICD-10-PCS | Performed by: INTERNAL MEDICINE

## 2018-01-01 PROCEDURE — 87486 CHLMYD PNEUM DNA AMP PROBE: CPT | Performed by: INTERNAL MEDICINE

## 2018-01-01 PROCEDURE — 85027 COMPLETE CBC AUTOMATED: CPT | Performed by: INTERNAL MEDICINE

## 2018-01-01 PROCEDURE — 25010000002 VANCOMYCIN 10 G RECONSTITUTED SOLUTION

## 2018-01-01 PROCEDURE — 86140 C-REACTIVE PROTEIN: CPT | Performed by: INTERNAL MEDICINE

## 2018-01-01 PROCEDURE — 80202 ASSAY OF VANCOMYCIN: CPT

## 2018-01-01 PROCEDURE — 80053 COMPREHEN METABOLIC PANEL: CPT | Performed by: INTERNAL MEDICINE

## 2018-01-01 PROCEDURE — 25010000003 POTASSIUM CHLORIDE 10 MEQ/100ML SOLUTION: Performed by: INTERNAL MEDICINE

## 2018-01-01 PROCEDURE — 87581 M.PNEUMON DNA AMP PROBE: CPT | Performed by: INTERNAL MEDICINE

## 2018-01-01 PROCEDURE — 25010000002 PIPERACILLIN SOD-TAZOBACTAM PER 1 G: Performed by: INTERNAL MEDICINE

## 2018-01-01 RX ORDER — HALOPERIDOL 5 MG/ML
0.5 INJECTION INTRAMUSCULAR EVERY 6 HOURS PRN
Status: DISCONTINUED | OUTPATIENT
Start: 2018-01-01 | End: 2018-01-01

## 2018-01-01 RX ORDER — MORPHINE SULFATE 1 MG/ML
INJECTION INTRAVENOUS CONTINUOUS
Status: DISCONTINUED | OUTPATIENT
Start: 2018-01-01 | End: 2018-01-01 | Stop reason: HOSPADM

## 2018-01-01 RX ORDER — IPRATROPIUM BROMIDE AND ALBUTEROL SULFATE 2.5; .5 MG/3ML; MG/3ML
3 SOLUTION RESPIRATORY (INHALATION)
Status: DISCONTINUED | OUTPATIENT
Start: 2018-01-01 | End: 2018-01-01

## 2018-01-01 RX ORDER — MORPHINE SULFATE 2 MG/ML
2 INJECTION, SOLUTION INTRAMUSCULAR; INTRAVENOUS
Status: DISCONTINUED | OUTPATIENT
Start: 2018-01-01 | End: 2018-01-01 | Stop reason: HOSPADM

## 2018-01-01 RX ORDER — HALOPERIDOL 5 MG/ML
5 INJECTION INTRAMUSCULAR EVERY 6 HOURS PRN
Status: DISCONTINUED | OUTPATIENT
Start: 2018-01-01 | End: 2018-01-01 | Stop reason: HOSPADM

## 2018-01-01 RX ORDER — LORAZEPAM 2 MG/ML
1 INJECTION INTRAMUSCULAR EVERY 6 HOURS PRN
Status: DISCONTINUED | OUTPATIENT
Start: 2018-01-01 | End: 2018-01-01 | Stop reason: HOSPADM

## 2018-01-01 RX ORDER — HALOPERIDOL 5 MG/ML
1 INJECTION INTRAMUSCULAR ONCE
Status: COMPLETED | OUTPATIENT
Start: 2018-01-01 | End: 2018-01-01

## 2018-01-01 RX ORDER — DEXMEDETOMIDINE HYDROCHLORIDE 4 UG/ML
INJECTION, SOLUTION INTRAVENOUS
Status: COMPLETED
Start: 2018-01-01 | End: 2018-01-01

## 2018-01-01 RX ORDER — POTASSIUM CHLORIDE 7.45 MG/ML
10 INJECTION INTRAVENOUS
Status: COMPLETED | OUTPATIENT
Start: 2018-01-01 | End: 2018-01-01

## 2018-01-01 RX ORDER — ACETAZOLAMIDE SODIUM 500 MG/5ML
500 INJECTION, POWDER, LYOPHILIZED, FOR SOLUTION INTRAVENOUS ONCE
Status: COMPLETED | OUTPATIENT
Start: 2018-01-01 | End: 2018-01-01

## 2018-01-01 RX ORDER — ACETYLCYSTEINE 200 MG/ML
4 SOLUTION ORAL; RESPIRATORY (INHALATION)
Status: DISCONTINUED | OUTPATIENT
Start: 2018-01-01 | End: 2018-01-01

## 2018-01-01 RX ORDER — GLYCOPYRROLATE 0.2 MG/ML
0.2 INJECTION INTRAMUSCULAR; INTRAVENOUS
Status: DISCONTINUED | OUTPATIENT
Start: 2018-01-01 | End: 2018-01-01 | Stop reason: HOSPADM

## 2018-01-01 RX ORDER — BUMETANIDE 0.25 MG/ML
1 INJECTION INTRAMUSCULAR; INTRAVENOUS DAILY
Status: DISCONTINUED | OUTPATIENT
Start: 2018-01-01 | End: 2018-01-01

## 2018-01-01 RX ORDER — FUROSEMIDE 10 MG/ML
40 INJECTION INTRAMUSCULAR; INTRAVENOUS DAILY
Status: DISCONTINUED | OUTPATIENT
Start: 2018-01-01 | End: 2018-01-01 | Stop reason: HOSPADM

## 2018-01-01 RX ORDER — FUROSEMIDE 10 MG/ML
40 INJECTION INTRAMUSCULAR; INTRAVENOUS ONCE
Status: DISCONTINUED | OUTPATIENT
Start: 2018-01-01 | End: 2018-01-01

## 2018-01-01 RX ORDER — FUROSEMIDE 10 MG/ML
80 INJECTION INTRAMUSCULAR; INTRAVENOUS ONCE
Status: COMPLETED | OUTPATIENT
Start: 2018-01-01 | End: 2018-01-01

## 2018-01-01 RX ORDER — MORPHINE SULFATE 2 MG/ML
4 INJECTION, SOLUTION INTRAMUSCULAR; INTRAVENOUS
Status: DISCONTINUED | OUTPATIENT
Start: 2018-01-01 | End: 2018-01-01 | Stop reason: HOSPADM

## 2018-01-01 RX ORDER — METOPROLOL TARTRATE 50 MG/1
50 TABLET, FILM COATED ORAL EVERY 12 HOURS SCHEDULED
Status: DISCONTINUED | OUTPATIENT
Start: 2018-01-01 | End: 2018-01-01

## 2018-01-01 RX ORDER — NALOXONE HCL 0.4 MG/ML
0.1 VIAL (ML) INJECTION
Status: DISCONTINUED | OUTPATIENT
Start: 2018-01-01 | End: 2018-01-01

## 2018-01-01 RX ORDER — POTASSIUM CHLORIDE 1.5 G/1.77G
40 POWDER, FOR SOLUTION ORAL ONCE
Status: COMPLETED | OUTPATIENT
Start: 2018-01-01 | End: 2018-01-01

## 2018-01-01 RX ORDER — PHYTONADIONE 10 MG/ML
2.5 INJECTION, EMULSION INTRAMUSCULAR; INTRAVENOUS; SUBCUTANEOUS ONCE
Status: COMPLETED | OUTPATIENT
Start: 2018-01-01 | End: 2018-01-01

## 2018-01-01 RX ORDER — IPRATROPIUM BROMIDE AND ALBUTEROL SULFATE 2.5; .5 MG/3ML; MG/3ML
3 SOLUTION RESPIRATORY (INHALATION) EVERY 4 HOURS PRN
Status: DISCONTINUED | OUTPATIENT
Start: 2018-01-01 | End: 2018-01-01 | Stop reason: HOSPADM

## 2018-01-01 RX ORDER — LORAZEPAM 2 MG/ML
0.5 INJECTION INTRAMUSCULAR EVERY 6 HOURS PRN
Status: DISCONTINUED | OUTPATIENT
Start: 2018-01-01 | End: 2018-01-01

## 2018-01-01 RX ORDER — WARFARIN SODIUM 2 MG/1
2 TABLET ORAL
Status: DISCONTINUED | OUTPATIENT
Start: 2018-01-01 | End: 2018-01-01

## 2018-01-01 RX ORDER — DEXMEDETOMIDINE HYDROCHLORIDE 4 UG/ML
.2-1.5 INJECTION, SOLUTION INTRAVENOUS
Status: DISCONTINUED | OUTPATIENT
Start: 2018-01-01 | End: 2018-01-01

## 2018-01-01 RX ORDER — HYDROCODONE BITARTRATE AND ACETAMINOPHEN 5; 325 MG/1; MG/1
1 TABLET ORAL ONCE
Status: COMPLETED | OUTPATIENT
Start: 2018-01-01 | End: 2018-01-01

## 2018-01-01 RX ORDER — FAMOTIDINE 10 MG/ML
20 INJECTION, SOLUTION INTRAVENOUS DAILY
Status: DISCONTINUED | OUTPATIENT
Start: 2018-01-01 | End: 2018-01-01 | Stop reason: HOSPADM

## 2018-01-01 RX ORDER — FUROSEMIDE 10 MG/ML
40 INJECTION INTRAMUSCULAR; INTRAVENOUS ONCE
Status: COMPLETED | OUTPATIENT
Start: 2018-01-01 | End: 2018-01-01

## 2018-01-01 RX ORDER — POTASSIUM CHLORIDE 7.45 MG/ML
10 INJECTION INTRAVENOUS
Status: DISCONTINUED | OUTPATIENT
Start: 2018-01-01 | End: 2018-01-01

## 2018-01-01 RX ORDER — METOLAZONE 5 MG/1
5 TABLET ORAL ONCE
Status: COMPLETED | OUTPATIENT
Start: 2018-01-01 | End: 2018-01-01

## 2018-01-01 RX ADMIN — MORPHINE SULFATE 4 MG: 25 INJECTION, SOLUTION, CONCENTRATE INTRAVENOUS at 20:25

## 2018-01-01 RX ADMIN — METOPROLOL TARTRATE 50 MG: 50 TABLET ORAL at 08:41

## 2018-01-01 RX ADMIN — LIDOCAINE 1 PATCH: 50 PATCH CUTANEOUS at 14:35

## 2018-01-01 RX ADMIN — LIDOCAINE 1 PATCH: 50 PATCH CUTANEOUS at 08:06

## 2018-01-01 RX ADMIN — LIDOCAINE 1 PATCH: 50 PATCH CUTANEOUS at 08:11

## 2018-01-01 RX ADMIN — POTASSIUM CHLORIDE 10 MEQ: 10 INJECTION, SOLUTION INTRAVENOUS at 20:35

## 2018-01-01 RX ADMIN — TAZOBACTAM SODIUM AND PIPERACILLIN SODIUM 4.5 G: .5; 4 INJECTION, POWDER, LYOPHILIZED, FOR SOLUTION INTRAVENOUS at 22:31

## 2018-01-01 RX ADMIN — NYSTATIN: 100000 POWDER TOPICAL at 22:10

## 2018-01-01 RX ADMIN — CASTOR OIL AND BALSAM, PERU 1 APPLICATION: 788; 87 OINTMENT TOPICAL at 20:55

## 2018-01-01 RX ADMIN — POTASSIUM CHLORIDE 10 MEQ: 10 INJECTION, SOLUTION INTRAVENOUS at 06:46

## 2018-01-01 RX ADMIN — CASTOR OIL AND BALSAM, PERU 1 APPLICATION: 788; 87 OINTMENT TOPICAL at 10:57

## 2018-01-01 RX ADMIN — NYSTATIN: 100000 POWDER TOPICAL at 09:43

## 2018-01-01 RX ADMIN — IPRATROPIUM BROMIDE AND ALBUTEROL SULFATE 3 ML: .5; 3 SOLUTION RESPIRATORY (INHALATION) at 02:30

## 2018-01-01 RX ADMIN — Medication 325 MG: at 08:42

## 2018-01-01 RX ADMIN — METOPROLOL TARTRATE 25 MG: 25 TABLET ORAL at 08:12

## 2018-01-01 RX ADMIN — LORAZEPAM 1 MG: 2 INJECTION INTRAMUSCULAR; INTRAVENOUS at 16:13

## 2018-01-01 RX ADMIN — FUROSEMIDE 40 MG: 10 INJECTION, SOLUTION INTRAMUSCULAR; INTRAVENOUS at 08:34

## 2018-01-01 RX ADMIN — Medication: at 14:23

## 2018-01-01 RX ADMIN — CASTOR OIL AND BALSAM, PERU 1 APPLICATION: 788; 87 OINTMENT TOPICAL at 08:06

## 2018-01-01 RX ADMIN — SERTRALINE HYDROCHLORIDE 100 MG: 100 TABLET ORAL at 08:41

## 2018-01-01 RX ADMIN — ACETAMINOPHEN AND CODEINE PHOSPHATE 1 TABLET: 30; 300 TABLET ORAL at 18:00

## 2018-01-01 RX ADMIN — LORAZEPAM 1 MG: 2 INJECTION INTRAMUSCULAR; INTRAVENOUS at 04:09

## 2018-01-01 RX ADMIN — IPRATROPIUM BROMIDE AND ALBUTEROL SULFATE 3 ML: .5; 3 SOLUTION RESPIRATORY (INHALATION) at 14:15

## 2018-01-01 RX ADMIN — TAZOBACTAM SODIUM AND PIPERACILLIN SODIUM 4.5 G: .5; 4 INJECTION, POWDER, LYOPHILIZED, FOR SOLUTION INTRAVENOUS at 21:45

## 2018-01-01 RX ADMIN — CASTOR OIL AND BALSAM, PERU 1 APPLICATION: 788; 87 OINTMENT TOPICAL at 20:44

## 2018-01-01 RX ADMIN — NYSTATIN: 100000 POWDER TOPICAL at 08:13

## 2018-01-01 RX ADMIN — FUROSEMIDE 40 MG: 10 INJECTION, SOLUTION INTRAMUSCULAR; INTRAVENOUS at 10:37

## 2018-01-01 RX ADMIN — ASPIRIN 81 MG: 81 TABLET, COATED ORAL at 08:41

## 2018-01-01 RX ADMIN — POTASSIUM CHLORIDE 10 MEQ: 10 INJECTION, SOLUTION INTRAVENOUS at 17:27

## 2018-01-01 RX ADMIN — IPRATROPIUM BROMIDE AND ALBUTEROL SULFATE 3 ML: .5; 3 SOLUTION RESPIRATORY (INHALATION) at 23:40

## 2018-01-01 RX ADMIN — IPRATROPIUM BROMIDE AND ALBUTEROL SULFATE 3 ML: .5; 3 SOLUTION RESPIRATORY (INHALATION) at 08:11

## 2018-01-01 RX ADMIN — MICONAZOLE NITRATE: 2 CREAM TOPICAL at 08:13

## 2018-01-01 RX ADMIN — IPRATROPIUM BROMIDE AND ALBUTEROL SULFATE 3 ML: .5; 3 SOLUTION RESPIRATORY (INHALATION) at 07:14

## 2018-01-01 RX ADMIN — LORAZEPAM 1 MG: 2 INJECTION INTRAMUSCULAR; INTRAVENOUS at 00:27

## 2018-01-01 RX ADMIN — IPRATROPIUM BROMIDE AND ALBUTEROL SULFATE 3 ML: .5; 3 SOLUTION RESPIRATORY (INHALATION) at 03:42

## 2018-01-01 RX ADMIN — POTASSIUM CHLORIDE 10 MEQ: 10 INJECTION, SOLUTION INTRAVENOUS at 21:45

## 2018-01-01 RX ADMIN — TAZOBACTAM SODIUM AND PIPERACILLIN SODIUM 4.5 G: .5; 4 INJECTION, POWDER, LYOPHILIZED, FOR SOLUTION INTRAVENOUS at 14:50

## 2018-01-01 RX ADMIN — TAZOBACTAM SODIUM AND PIPERACILLIN SODIUM 4.5 G: .5; 4 INJECTION, POWDER, LYOPHILIZED, FOR SOLUTION INTRAVENOUS at 06:02

## 2018-01-01 RX ADMIN — MORPHINE SULFATE 2 MG: 25 INJECTION, SOLUTION, CONCENTRATE INTRAVENOUS at 08:23

## 2018-01-01 RX ADMIN — IPRATROPIUM BROMIDE AND ALBUTEROL SULFATE 3 ML: .5; 3 SOLUTION RESPIRATORY (INHALATION) at 18:59

## 2018-01-01 RX ADMIN — MORPHINE SULFATE 4 MG: 25 INJECTION, SOLUTION, CONCENTRATE INTRAVENOUS at 20:59

## 2018-01-01 RX ADMIN — POTASSIUM CHLORIDE 10 MEQ: 10 INJECTION, SOLUTION INTRAVENOUS at 07:58

## 2018-01-01 RX ADMIN — Medication: at 08:04

## 2018-01-01 RX ADMIN — LORAZEPAM 1 MG: 2 INJECTION INTRAMUSCULAR; INTRAVENOUS at 21:08

## 2018-01-01 RX ADMIN — FAMOTIDINE 20 MG: 10 INJECTION INTRAVENOUS at 08:04

## 2018-01-01 RX ADMIN — POTASSIUM CHLORIDE 10 MEQ: 10 INJECTION, SOLUTION INTRAVENOUS at 11:49

## 2018-01-01 RX ADMIN — Medication 10 ML: at 08:22

## 2018-01-01 RX ADMIN — POTASSIUM CHLORIDE 40 MEQ: 1.5 POWDER, FOR SOLUTION ORAL at 19:58

## 2018-01-01 RX ADMIN — TAMSULOSIN HYDROCHLORIDE 0.4 MG: 0.4 CAPSULE ORAL at 08:12

## 2018-01-01 RX ADMIN — DEXMEDETOMIDINE HYDROCHLORIDE 0.2 MCG/KG/HR: 4 INJECTION, SOLUTION INTRAVENOUS at 23:24

## 2018-01-01 RX ADMIN — IPRATROPIUM BROMIDE AND ALBUTEROL SULFATE 3 ML: .5; 3 SOLUTION RESPIRATORY (INHALATION) at 15:56

## 2018-01-01 RX ADMIN — IPRATROPIUM BROMIDE AND ALBUTEROL SULFATE 3 ML: .5; 3 SOLUTION RESPIRATORY (INHALATION) at 23:23

## 2018-01-01 RX ADMIN — CASTOR OIL AND BALSAM, PERU 1 APPLICATION: 788; 87 OINTMENT TOPICAL at 20:38

## 2018-01-01 RX ADMIN — DEXMEDETOMIDINE HYDROCHLORIDE 0.8 MCG/KG/HR: 4 INJECTION, SOLUTION INTRAVENOUS at 03:44

## 2018-01-01 RX ADMIN — PHYTONADIONE 2.5 MG: 10 INJECTION, EMULSION INTRAMUSCULAR; INTRAVENOUS; SUBCUTANEOUS at 10:37

## 2018-01-01 RX ADMIN — LORAZEPAM 1 MG: 2 INJECTION INTRAMUSCULAR; INTRAVENOUS at 16:51

## 2018-01-01 RX ADMIN — MORPHINE SULFATE 2 MG: 25 INJECTION, SOLUTION, CONCENTRATE INTRAVENOUS at 15:00

## 2018-01-01 RX ADMIN — TAMSULOSIN HYDROCHLORIDE 0.4 MG: 0.4 CAPSULE ORAL at 08:42

## 2018-01-01 RX ADMIN — Medication: at 11:54

## 2018-01-01 RX ADMIN — IPRATROPIUM BROMIDE AND ALBUTEROL SULFATE 3 ML: .5; 3 SOLUTION RESPIRATORY (INHALATION) at 08:08

## 2018-01-01 RX ADMIN — OXYCODONE HYDROCHLORIDE AND ACETAMINOPHEN 500 MG: 500 TABLET ORAL at 08:12

## 2018-01-01 RX ADMIN — TAZOBACTAM SODIUM AND PIPERACILLIN SODIUM 4.5 G: .5; 4 INJECTION, POWDER, LYOPHILIZED, FOR SOLUTION INTRAVENOUS at 08:44

## 2018-01-01 RX ADMIN — HALOPERIDOL LACTATE 1 MG: 5 INJECTION, SOLUTION INTRAMUSCULAR at 14:48

## 2018-01-01 RX ADMIN — FAMOTIDINE 40 MG: 20 TABLET ORAL at 08:12

## 2018-01-01 RX ADMIN — NYSTATIN: 100000 POWDER TOPICAL at 20:35

## 2018-01-01 RX ADMIN — Medication: at 14:36

## 2018-01-01 RX ADMIN — MICONAZOLE NITRATE: 2 CREAM TOPICAL at 20:55

## 2018-01-01 RX ADMIN — MICONAZOLE NITRATE: 2 CREAM TOPICAL at 09:43

## 2018-01-01 RX ADMIN — OXYCODONE HYDROCHLORIDE AND ACETAMINOPHEN 250 MG: 500 TABLET ORAL at 20:44

## 2018-01-01 RX ADMIN — IPRATROPIUM BROMIDE AND ALBUTEROL SULFATE 3 ML: .5; 3 SOLUTION RESPIRATORY (INHALATION) at 10:48

## 2018-01-01 RX ADMIN — IPRATROPIUM BROMIDE AND ALBUTEROL SULFATE 3 ML: .5; 3 SOLUTION RESPIRATORY (INHALATION) at 19:54

## 2018-01-01 RX ADMIN — MORPHINE SULFATE 2 MG: 25 INJECTION, SOLUTION, CONCENTRATE INTRAVENOUS at 06:30

## 2018-01-01 RX ADMIN — IPRATROPIUM BROMIDE AND ALBUTEROL SULFATE 3 ML: .5; 3 SOLUTION RESPIRATORY (INHALATION) at 23:30

## 2018-01-01 RX ADMIN — OXYCODONE HYDROCHLORIDE AND ACETAMINOPHEN 250 MG: 500 TABLET ORAL at 08:42

## 2018-01-01 RX ADMIN — IPRATROPIUM BROMIDE AND ALBUTEROL SULFATE 3 ML: .5; 3 SOLUTION RESPIRATORY (INHALATION) at 08:50

## 2018-01-01 RX ADMIN — MORPHINE SULFATE 2 MG: 25 INJECTION, SOLUTION, CONCENTRATE INTRAVENOUS at 14:33

## 2018-01-01 RX ADMIN — IPRATROPIUM BROMIDE AND ALBUTEROL SULFATE 3 ML: .5; 3 SOLUTION RESPIRATORY (INHALATION) at 23:22

## 2018-01-01 RX ADMIN — IPRATROPIUM BROMIDE AND ALBUTEROL SULFATE 3 ML: .5; 3 SOLUTION RESPIRATORY (INHALATION) at 19:46

## 2018-01-01 RX ADMIN — ASPIRIN 81 MG: 81 TABLET, COATED ORAL at 08:12

## 2018-01-01 RX ADMIN — HALOPERIDOL LACTATE 0.5 MG: 5 INJECTION, SOLUTION INTRAMUSCULAR at 09:17

## 2018-01-01 RX ADMIN — Medication: at 22:09

## 2018-01-01 RX ADMIN — MICONAZOLE NITRATE: 2 CREAM TOPICAL at 08:45

## 2018-01-01 RX ADMIN — IPRATROPIUM BROMIDE AND ALBUTEROL SULFATE 3 ML: .5; 3 SOLUTION RESPIRATORY (INHALATION) at 10:40

## 2018-01-01 RX ADMIN — ACETAZOLAMIDE 500 MG: 500 INJECTION, POWDER, LYOPHILIZED, FOR SOLUTION INTRAVENOUS at 20:01

## 2018-01-01 RX ADMIN — POTASSIUM CHLORIDE 10 MEQ: 10 INJECTION, SOLUTION INTRAVENOUS at 09:10

## 2018-01-01 RX ADMIN — CASTOR OIL AND BALSAM, PERU 1 APPLICATION: 788; 87 OINTMENT TOPICAL at 15:07

## 2018-01-01 RX ADMIN — NYSTATIN: 100000 POWDER TOPICAL at 08:08

## 2018-01-01 RX ADMIN — HYDROCODONE BITARTRATE AND ACETAMINOPHEN 1 TABLET: 5; 325 TABLET ORAL at 03:10

## 2018-01-01 RX ADMIN — NYSTATIN: 100000 POWDER TOPICAL at 22:37

## 2018-01-01 RX ADMIN — NYSTATIN: 100000 POWDER TOPICAL at 20:55

## 2018-01-01 RX ADMIN — FUROSEMIDE 80 MG: 10 INJECTION, SOLUTION INTRAMUSCULAR; INTRAVENOUS at 19:58

## 2018-01-01 RX ADMIN — MICONAZOLE NITRATE: 2 CREAM TOPICAL at 22:36

## 2018-01-01 RX ADMIN — HALOPERIDOL LACTATE 1 MG: 5 INJECTION, SOLUTION INTRAMUSCULAR at 11:38

## 2018-01-01 RX ADMIN — FUROSEMIDE 40 MG: 10 INJECTION, SOLUTION INTRAMUSCULAR; INTRAVENOUS at 08:04

## 2018-01-01 RX ADMIN — METOPROLOL TARTRATE 5 MG: 5 INJECTION, SOLUTION INTRAVENOUS at 11:55

## 2018-01-01 RX ADMIN — Medication: at 11:52

## 2018-01-01 RX ADMIN — Medication: at 17:36

## 2018-01-01 RX ADMIN — POTASSIUM CHLORIDE 10 MEQ: 10 INJECTION, SOLUTION INTRAVENOUS at 16:17

## 2018-01-01 RX ADMIN — IPRATROPIUM BROMIDE AND ALBUTEROL SULFATE 3 ML: .5; 3 SOLUTION RESPIRATORY (INHALATION) at 03:46

## 2018-01-01 RX ADMIN — LIDOCAINE 1 PATCH: 50 PATCH CUTANEOUS at 08:21

## 2018-01-01 RX ADMIN — CASTOR OIL AND BALSAM, PERU 1 APPLICATION: 788; 87 OINTMENT TOPICAL at 22:09

## 2018-01-01 RX ADMIN — Medication 5 ML: at 07:46

## 2018-01-01 RX ADMIN — MORPHINE SULFATE 4 MG: 25 INJECTION, SOLUTION, CONCENTRATE INTRAVENOUS at 01:06

## 2018-01-01 RX ADMIN — MICONAZOLE NITRATE: 2 CREAM TOPICAL at 20:44

## 2018-01-01 RX ADMIN — Medication: at 14:41

## 2018-01-01 RX ADMIN — VANCOMYCIN HYDROCHLORIDE 1250 MG: 10 INJECTION, POWDER, LYOPHILIZED, FOR SOLUTION INTRAVENOUS at 10:06

## 2018-01-01 RX ADMIN — Medication: at 21:11

## 2018-01-01 RX ADMIN — MORPHINE SULFATE 4 MG: 25 INJECTION, SOLUTION, CONCENTRATE INTRAVENOUS at 06:20

## 2018-01-01 RX ADMIN — FUROSEMIDE 40 MG: 10 INJECTION, SOLUTION INTRAMUSCULAR; INTRAVENOUS at 10:20

## 2018-01-01 RX ADMIN — IPRATROPIUM BROMIDE AND ALBUTEROL SULFATE 3 ML: .5; 3 SOLUTION RESPIRATORY (INHALATION) at 23:51

## 2018-01-01 RX ADMIN — CASTOR OIL AND BALSAM, PERU 1 APPLICATION: 788; 87 OINTMENT TOPICAL at 08:04

## 2018-01-01 RX ADMIN — NYSTATIN: 100000 POWDER TOPICAL at 08:41

## 2018-01-01 RX ADMIN — POTASSIUM CHLORIDE 10 MEQ: 10 INJECTION, SOLUTION INTRAVENOUS at 10:26

## 2018-01-01 RX ADMIN — OXYCODONE HYDROCHLORIDE AND ACETAMINOPHEN 250 MG: 500 TABLET ORAL at 17:28

## 2018-01-01 RX ADMIN — Medication 325 MG: at 08:12

## 2018-01-01 RX ADMIN — MORPHINE SULFATE 4 MG: 25 INJECTION, SOLUTION, CONCENTRATE INTRAVENOUS at 11:10

## 2018-01-01 RX ADMIN — Medication 5 MG: at 00:22

## 2018-01-01 RX ADMIN — METOPROLOL TARTRATE 2.5 MG: 5 INJECTION, SOLUTION INTRAVENOUS at 01:31

## 2018-01-01 RX ADMIN — FUROSEMIDE 40 MG: 10 INJECTION, SOLUTION INTRAMUSCULAR; INTRAVENOUS at 09:17

## 2018-01-01 RX ADMIN — IPRATROPIUM BROMIDE AND ALBUTEROL SULFATE 3 ML: .5; 3 SOLUTION RESPIRATORY (INHALATION) at 17:04

## 2018-01-01 RX ADMIN — CASTOR OIL AND BALSAM, PERU 1 APPLICATION: 788; 87 OINTMENT TOPICAL at 08:13

## 2018-01-01 RX ADMIN — MORPHINE SULFATE 4 MG: 25 INJECTION, SOLUTION, CONCENTRATE INTRAVENOUS at 13:35

## 2018-01-01 RX ADMIN — Medication: at 20:26

## 2018-01-01 RX ADMIN — CASTOR OIL AND BALSAM, PERU 1 APPLICATION: 788; 87 OINTMENT TOPICAL at 08:44

## 2018-01-01 RX ADMIN — LORAZEPAM 1 MG: 2 INJECTION INTRAMUSCULAR; INTRAVENOUS at 22:15

## 2018-01-01 RX ADMIN — IPRATROPIUM BROMIDE AND ALBUTEROL SULFATE 3 ML: .5; 3 SOLUTION RESPIRATORY (INHALATION) at 23:14

## 2018-01-01 RX ADMIN — IPRATROPIUM BROMIDE AND ALBUTEROL SULFATE 3 ML: .5; 3 SOLUTION RESPIRATORY (INHALATION) at 19:03

## 2018-01-01 RX ADMIN — POTASSIUM CHLORIDE 10 MEQ: 10 INJECTION, SOLUTION INTRAVENOUS at 18:25

## 2018-01-01 RX ADMIN — POTASSIUM CHLORIDE 10 MEQ: 10 INJECTION, SOLUTION INTRAVENOUS at 06:53

## 2018-01-01 RX ADMIN — LORAZEPAM 1 MG: 2 INJECTION INTRAMUSCULAR; INTRAVENOUS at 08:52

## 2018-01-01 RX ADMIN — LORAZEPAM 1 MG: 2 INJECTION INTRAMUSCULAR; INTRAVENOUS at 05:59

## 2018-01-01 RX ADMIN — POTASSIUM CHLORIDE 10 MEQ: 10 INJECTION, SOLUTION INTRAVENOUS at 08:04

## 2018-01-01 RX ADMIN — VANCOMYCIN HYDROCHLORIDE 2000 MG: 10 INJECTION, POWDER, LYOPHILIZED, FOR SOLUTION INTRAVENOUS at 09:41

## 2018-01-01 RX ADMIN — TAZOBACTAM SODIUM AND PIPERACILLIN SODIUM 4.5 G: .5; 4 INJECTION, POWDER, LYOPHILIZED, FOR SOLUTION INTRAVENOUS at 05:52

## 2018-01-01 RX ADMIN — Medication: at 20:38

## 2018-01-01 RX ADMIN — TAZOBACTAM SODIUM AND PIPERACILLIN SODIUM 4.5 G: .5; 4 INJECTION, POWDER, LYOPHILIZED, FOR SOLUTION INTRAVENOUS at 05:59

## 2018-01-01 RX ADMIN — MORPHINE SULFATE 2 MG: 25 INJECTION, SOLUTION, CONCENTRATE INTRAVENOUS at 12:00

## 2018-01-01 RX ADMIN — FAMOTIDINE 40 MG: 20 TABLET ORAL at 08:42

## 2018-01-01 RX ADMIN — FUROSEMIDE 40 MG: 10 INJECTION, SOLUTION INTRAMUSCULAR; INTRAVENOUS at 08:13

## 2018-01-01 RX ADMIN — METOLAZONE 5 MG: 5 TABLET ORAL at 19:57

## 2018-01-01 RX ADMIN — GLYCOPYRROLATE 0.2 MG: 0.2 INJECTION, SOLUTION INTRAMUSCULAR; INTRAVENOUS at 16:49

## 2018-01-01 RX ADMIN — ACETAMINOPHEN AND CODEINE PHOSPHATE 1 TABLET: 30; 300 TABLET ORAL at 00:22

## 2018-01-01 RX ADMIN — CASTOR OIL AND BALSAM, PERU 1 APPLICATION: 788; 87 OINTMENT TOPICAL at 22:38

## 2018-01-01 RX ADMIN — TAZOBACTAM SODIUM AND PIPERACILLIN SODIUM 4.5 G: .5; 4 INJECTION, POWDER, LYOPHILIZED, FOR SOLUTION INTRAVENOUS at 14:11

## 2018-01-01 RX ADMIN — MORPHINE SULFATE 2 MG: 25 INJECTION, SOLUTION, CONCENTRATE INTRAVENOUS at 14:23

## 2018-01-01 RX ADMIN — IPRATROPIUM BROMIDE AND ALBUTEROL SULFATE 3 ML: .5; 3 SOLUTION RESPIRATORY (INHALATION) at 07:16

## 2018-01-01 RX ADMIN — FUROSEMIDE 40 MG: 10 INJECTION, SOLUTION INTRAMUSCULAR; INTRAVENOUS at 08:22

## 2018-01-01 RX ADMIN — TAZOBACTAM SODIUM AND PIPERACILLIN SODIUM 4.5 G: .5; 4 INJECTION, POWDER, LYOPHILIZED, FOR SOLUTION INTRAVENOUS at 22:04

## 2018-01-01 RX ADMIN — MORPHINE SULFATE 4 MG: 25 INJECTION, SOLUTION, CONCENTRATE INTRAVENOUS at 11:58

## 2018-01-01 RX ADMIN — ACETYLCYSTEINE 4 ML: 200 SOLUTION ORAL; RESPIRATORY (INHALATION) at 15:56

## 2018-01-01 RX ADMIN — MORPHINE SULFATE 2 MG: 25 INJECTION, SOLUTION, CONCENTRATE INTRAVENOUS at 08:02

## 2018-01-01 RX ADMIN — MORPHINE SULFATE 2 MG: 25 INJECTION, SOLUTION, CONCENTRATE INTRAVENOUS at 17:57

## 2018-01-01 RX ADMIN — NYSTATIN: 100000 POWDER TOPICAL at 08:04

## 2018-01-01 RX ADMIN — NYSTATIN: 100000 POWDER TOPICAL at 20:28

## 2018-01-01 RX ADMIN — IPRATROPIUM BROMIDE AND ALBUTEROL SULFATE 3 ML: .5; 3 SOLUTION RESPIRATORY (INHALATION) at 03:52

## 2018-01-01 RX ADMIN — TAZOBACTAM SODIUM AND PIPERACILLIN SODIUM 4.5 G: .5; 4 INJECTION, POWDER, LYOPHILIZED, FOR SOLUTION INTRAVENOUS at 22:26

## 2018-01-01 RX ADMIN — IPRATROPIUM BROMIDE AND ALBUTEROL SULFATE 3 ML: .5; 3 SOLUTION RESPIRATORY (INHALATION) at 03:29

## 2018-01-01 RX ADMIN — MORPHINE SULFATE 4 MG: 25 INJECTION, SOLUTION, CONCENTRATE INTRAVENOUS at 01:32

## 2018-01-01 RX ADMIN — IPRATROPIUM BROMIDE AND ALBUTEROL SULFATE 3 ML: .5; 3 SOLUTION RESPIRATORY (INHALATION) at 18:58

## 2018-01-01 RX ADMIN — SERTRALINE HYDROCHLORIDE 100 MG: 100 TABLET ORAL at 08:12

## 2018-01-01 RX ADMIN — LIDOCAINE 1 PATCH: 50 PATCH CUTANEOUS at 08:38

## 2018-01-01 RX ADMIN — TAZOBACTAM SODIUM AND PIPERACILLIN SODIUM 4.5 G: .5; 4 INJECTION, POWDER, LYOPHILIZED, FOR SOLUTION INTRAVENOUS at 06:34

## 2018-01-01 RX ADMIN — NYSTATIN: 100000 POWDER TOPICAL at 10:57

## 2018-01-01 RX ADMIN — IPRATROPIUM BROMIDE AND ALBUTEROL SULFATE 3 ML: .5; 3 SOLUTION RESPIRATORY (INHALATION) at 16:26

## 2018-01-01 RX ADMIN — METOPROLOL TARTRATE 50 MG: 50 TABLET ORAL at 20:44

## 2018-01-01 RX ADMIN — IPRATROPIUM BROMIDE AND ALBUTEROL SULFATE 3 ML: .5; 3 SOLUTION RESPIRATORY (INHALATION) at 12:33

## 2018-01-01 RX ADMIN — TAZOBACTAM SODIUM AND PIPERACILLIN SODIUM 4.5 G: .5; 4 INJECTION, POWDER, LYOPHILIZED, FOR SOLUTION INTRAVENOUS at 14:00

## 2018-01-01 RX ADMIN — NYSTATIN: 100000 POWDER TOPICAL at 20:44

## 2018-01-01 RX ADMIN — NYSTATIN: 100000 POWDER TOPICAL at 08:22

## 2018-01-01 RX ADMIN — FAMOTIDINE 20 MG: 10 INJECTION INTRAVENOUS at 17:46

## 2018-01-01 RX ADMIN — TAZOBACTAM SODIUM AND PIPERACILLIN SODIUM 2.25 G: 250; 2 INJECTION, SOLUTION INTRAVENOUS at 13:34

## 2018-01-01 RX ADMIN — TAZOBACTAM SODIUM AND PIPERACILLIN SODIUM 4.5 G: .5; 4 INJECTION, POWDER, LYOPHILIZED, FOR SOLUTION INTRAVENOUS at 06:21

## 2018-01-01 RX ADMIN — ATORVASTATIN CALCIUM 20 MG: 20 TABLET, FILM COATED ORAL at 20:44

## 2018-01-01 RX ADMIN — TAZOBACTAM SODIUM AND PIPERACILLIN SODIUM 4.5 G: .5; 4 INJECTION, POWDER, LYOPHILIZED, FOR SOLUTION INTRAVENOUS at 14:18

## 2018-01-01 RX ADMIN — TAZOBACTAM SODIUM AND PIPERACILLIN SODIUM 4.5 G: .5; 4 INJECTION, POWDER, LYOPHILIZED, FOR SOLUTION INTRAVENOUS at 22:09

## 2018-01-01 RX ADMIN — IPRATROPIUM BROMIDE AND ALBUTEROL SULFATE 3 ML: .5; 3 SOLUTION RESPIRATORY (INHALATION) at 16:00

## 2018-01-01 RX ADMIN — LORAZEPAM 0.5 MG: 2 INJECTION INTRAMUSCULAR; INTRAVENOUS at 10:04

## 2018-01-01 RX ADMIN — CASTOR OIL AND BALSAM, PERU 1 APPLICATION: 788; 87 OINTMENT TOPICAL at 22:04

## 2018-01-01 RX ADMIN — IPRATROPIUM BROMIDE AND ALBUTEROL SULFATE 3 ML: .5; 3 SOLUTION RESPIRATORY (INHALATION) at 16:45

## 2018-01-01 RX ADMIN — TAZOBACTAM SODIUM AND PIPERACILLIN SODIUM 4.5 G: .5; 4 INJECTION, POWDER, LYOPHILIZED, FOR SOLUTION INTRAVENOUS at 16:06

## 2018-01-01 RX ADMIN — POTASSIUM CHLORIDE 10 MEQ: 10 INJECTION, SOLUTION INTRAVENOUS at 19:31

## 2018-01-01 RX ADMIN — MORPHINE SULFATE 4 MG: 25 INJECTION, SOLUTION, CONCENTRATE INTRAVENOUS at 18:00

## 2018-01-01 RX ADMIN — FUROSEMIDE 40 MG: 10 INJECTION, SOLUTION INTRAMUSCULAR; INTRAVENOUS at 14:03

## 2018-01-01 RX ADMIN — MORPHINE SULFATE 2 MG: 25 INJECTION, SOLUTION, CONCENTRATE INTRAVENOUS at 02:06

## 2018-01-01 RX ADMIN — FAMOTIDINE 20 MG: 10 INJECTION INTRAVENOUS at 08:22

## 2018-01-01 RX ADMIN — IPRATROPIUM BROMIDE AND ALBUTEROL SULFATE 3 ML: .5; 3 SOLUTION RESPIRATORY (INHALATION) at 10:39

## 2018-01-01 NOTE — PLAN OF CARE
Problem: Skin Integrity Impairment, Risk/Actual (Adult)  Goal: Skin Integrity/Wound Healing  Outcome: Ongoing (interventions implemented as appropriate)      Problem: Fall Risk (Adult)  Goal: Absence of Falls  Outcome: Ongoing (interventions implemented as appropriate)      Problem: Fluid Volume Excess (Adult,Obstetrics,Pediatric)  Goal: Stable Weight  Outcome: Ongoing (interventions implemented as appropriate)    Goal: Balanced Intake/Output  Outcome: Ongoing (interventions implemented as appropriate)      Problem: Patient Care Overview (Adult)  Goal: Plan of Care Review  Outcome: Ongoing (interventions implemented as appropriate)    Goal: Adult Individualization and Mutuality  Outcome: Ongoing (interventions implemented as appropriate)    Goal: Discharge Needs Assessment  Outcome: Ongoing (interventions implemented as appropriate)      Problem: Respiratory Insufficiency (Adult)  Goal: Acid/Base Balance  Outcome: Ongoing (interventions implemented as appropriate)    Goal: Effective Ventilation  Outcome: Ongoing (interventions implemented as appropriate)   01/01/18 0316   Respiratory Insufficiency (Adult)   Effective Ventilation making progress toward outcome

## 2018-01-01 NOTE — PROGRESS NOTES
[facility] Medicine Services  PROGRESS NOTE    Patient Name: Bjorn Pollock  : 1934  MRN: [id number]    Date of Admission: 2017  Length of Stay: 1  Primary Care Physician: Vincent Mccullough MD    Subjective   Subjective     CC: SOA    HPI:No acute events overnight, patient is still soa on Bipap, restless    Review of Systems  UTO as patient is restless    Objective   Objective     Vital Signs:   Temp:  [97.1 °F (36.2 °C)-98.1 °F (36.7 °C)] 97.8 °F (36.6 °C)  Heart Rate:  [96-98] 97  Resp:  [20-24] 24  BP: (112-141)/(62-79) 112/66        Physical Exam:  Constitutional: Elderly male, restless, in respiratory distress, awake, alert  HENT: NCAT, mucous membranes moist  Respiratory: Bipap in place, poor air mvt, labored respirations   Cardiovascular: RRR, no murmurs, rubs, or gallops, palpable pedal pulses bilaterally  Gastrointestinal: Positive bowel sounds, soft, nontender, nondistended  Musculoskeletal: No bilateral ankle edema, chronic venous stasis changes,   Psychiatric: Appropriate affect, restless  Neurologic: not oriented, no focal deficits  Skin: No rashes    Results Reviewed:  I have personally reviewed current lab, radiology, and data and agree.      Results from last 7 days  Lab Units 18  0744 17  0545 17  1126 17  1133   WBC 10*3/mm3  --  8.42 14.02* 8.04   HEMOGLOBIN g/dL  --  11.8* 12.0* 12.8*   HEMATOCRIT %  --  39.5 40.6 42.5   PLATELETS 10*3/mm3  --  108* 131* 128*   INR  4.91 6.01 5.15 4.38       Results from last 7 days  Lab Units 18  0744 17  0545 17  1126 17  1133   SODIUM mmol/L 142 138 136 138   POTASSIUM mmol/L 4.0 3.8 4.2 3.9   CHLORIDE mmol/L 104 104 102 102   CO2 mmol/L 26.0 23.0 23.0 23.0   BUN mg/dL 41* 25* 19 22   CREATININE mg/dL 1.20 1.10 1.10 1.10   GLUCOSE mg/dL 125* 108* 101* 125*   CALCIUM mg/dL 9.1 9.5 9.2 9.3   ALT (SGPT) U/L  --   --   --  12   AST (SGOT) U/L  --   --   --  22     BNP    Date Value Ref Range Status   01/01/2018 791.0 (H) 0.0 - 100.0 pg/mL Final     pH, Arterial   Date Value Ref Range Status   12/29/2017 7.344 (L) 7.350 - 7.450 pH units Final       Microbiology Results Abnormal     Procedure Component Value - Date/Time    Influenza Antigen, Rapid - Swab, Nasopharynx [[id number]]  (Normal) Collected:  12/29/17 1124    Lab Status:  Final result Specimen:  Swab from Nasopharynx Updated:  12/29/17 1151     Influenza A Ag, EIA Negative     Influenza B Ag, EIA Negative          Imaging Results (last 24 hours)     Procedure Component Value Units Date/Time    XR Chest 1 View [[id number]] Collected:  01/01/18 0758     Updated:  01/01/18 0901    Narrative:       EXAMINATION: XR CHEST 1 VW-      INDICATION: Dyspnea; I50.33-Acute on chronic diastolic (congestive)  heart failure; R06.02-Shortness of breath; Z74.09-Other reduced  mobility.      COMPARISON: Chest x-ray 12/29/2017.     FINDINGS: Cardiac silhouette enlarged. Increased patchy ill-defined  pulmonary opacifications right perihilar and right basilar segments.  This is consistent with worsening airspace disease. No discrete  pneumothorax with trace bilateral pleural effusions.           Impression:       Increase in ill-defined right lower lobe pulmonary  opacifications consistent with worsening airspace disease.     D:  01/01/2018  E:  01/01/2018           Results for orders placed during the hospital encounter of 09/19/17   Adult Transthoracic Echo Complete W/ Cont if Necessary Per Protocol    Narrative · There is four chamber cardiac enlargement.  · Global and segmental LV wall motion abnormalities are seen with an   estimated EF=36%-40%.  · Left ventricular wall thickness is consistent with concentric   hypertrophy, mild.  · Mitral annular calcification with mild-moderate MR is appreciated.  · Aortic valve sclerosis with moderate aortic insufficiency is seen.  · The LV examination is suboptimal as the patient refused Lumason.           I have reviewed the medications.    Assessment/Plan   Assessment / Plan     Hospital Problem List     * (Principal)Acute exacerbation of CHF (congestive heart failure)    HTN (hypertension) (Chronic)    DM type 2 (diabetes mellitus, type 2) (Chronic)    COPD (chronic obstructive pulmonary disease) (Chronic)    NAYLA (obstructive sleep apnea) (Chronic)    Atrial fibrillation (Chronic)    Overview Addendum 9/19/2016 10:59 AM by Keyona adams. Chronic CHADS-VASc of 5.  b. Chronic anticoagulation.  Atrial fibrillation, rates well-controlled.          Supratherapeutic INR    CAD (coronary artery disease) s/p CABG history  (Chronic)    Overview Addendum 9/19/2016 10:59 AM by Keyona arango. CABG x3 in December 1989.  d. Normal left ventricular systolic function.  e. New York Heart Association class I heart failure symptoms.  f.  of collateralized RCA with patent LAD graft in August 2005.  g. MUGA, ejection fraction 63%, 05/09/2015.  h. Echocardiogram December 2014, showed normal left ventricular ejection function of 55% to 60%.   Coronary artery disease, stable.  Will not pursue any further ischemic studies at this time as the patient only had one episode of pain.          Chronic kidney disease (Chronic)    Overview Signed 12/29/2017  4:24 PM by MARLENE Hicks     Stage 2-3         Constipation    Urinary incontinence           Brief Hospital Course to date:  Bjorn Pollock is a 83 y.o. male with history of chronic SHF, CKD, CAD, DM2, chronic Afib, COPD, and NAYLA, who presented to the ED with c/o dyspnea, cough, fatigue, and worsening LE edema. Patient had been c/o frequent urination, so his PCP advised him to reduce Lasix from 40mg to 20mg daily.    Assessment & Plan:  - Acute on chronic respiratory failure, etiology likely multifactorial sec to decompensated HF , COPD exacerbation, continue nebs, O2, steroids and NIPPV  - Acute on chronic systolic HF likely sec to inadequate  diuresis, cardiology following, continue diureses, strict  I/o  - COPD with acute exacerbation- continue steroids, nebs and O2 as above  - Afib with RVR likely sec to acute resp distress, cards following  - Supra therapeutic INR, continues to trend up, pharmacy following  - Perineum candidiasis, continue nystatin topical  - Prolonged QTc- avoid prolonging rx, monitor.  - continue home rx for HTN/CAD/BPH  - PT/OT for generalized weakness and deconditioning  - Complex case. Prognosis guarded    DVT Prophylaxis: on coumdain    CODE STATUS: Full Code    Disposition: JUSTO Wilson MD  01/01/18  11:25 AM

## 2018-01-01 NOTE — PLAN OF CARE
Problem: Skin Integrity Impairment, Risk/Actual (Adult)  Goal: Skin Integrity/Wound Healing  Outcome: Ongoing (interventions implemented as appropriate)

## 2018-01-01 NOTE — PROGRESS NOTES
"Pharmacy Consult  -  Warfarin    Bjorn Pollock is an 83 y.o. male   Height - 182.9 cm (72\")  Weight - 111 kg (244 lb 1.6 oz)    Consulting Provider: - Hospitalist   Indication: - A-fib  Goal INR: - 2-3  Home Regimen:   - Warfarin 5mg daily except wednesdays (holds)   - 30mg weekly     Bridge Therapy: No    Drug-Drug Interactions with current regimen:   None     Warfarin Dosing During Admission:    Date  12/29 12/30 12/31 1/1        INR  4.38 5.15 6.01 4.91        Dose  HOLD HOLD HOLD HOLD           Education Provided: Will need to assess during admission    Labs:    Results from last 7 days   Lab Units 01/01/18  0744 12/31/17  0545 12/30/17  1126 12/29/17  1133   INR  4.91 6.01 5.15 4.38   HEMOGLOBIN g/dL --  11.8* 12.0* 12.8*   HEMATOCRIT % --  39.5 40.6 42.5   PLATELETS 10*3/mm3 --  108* 131* 128*     Results from last 7 days   Lab Units 01/01/18  0744 12/31/17  0545 12/30/17  1126 12/29/17  1133   SODIUM mmol/L 142 138 136 138   POTASSIUM mmol/L 4.0 3.8 4.2 3.9   CHLORIDE mmol/L 104 104 102 102   CO2 mmol/L 26.0 23.0 23.0 23.0   BUN mg/dL 41* 25* 19 22   CREATININE mg/dL 1.20 1.10 1.10 1.10   CALCIUM mg/dL 9.1 9.5 9.2 9.3   BILIRUBIN mg/dL --  --  --  1.6*   ALK PHOS U/L --  --  --  90   ALT (SGPT) U/L --  --  --  12   AST (SGOT) U/L --  --  --  22   GLUCOSE mg/dL 125* 108* 101* 125*     Current dietary intake: No PO intake documented 12/31    Assessment/Plan:     Patient's INR remains SUPRAtherapeutic at 4.91 today.  Continue to hold warfarin doses and check PT/INR daily for appropriateness of restart.  Monitor signs/symptoms of bleeding, dietary intake, and drug-drug interactions. Make dose adjustments as necessary.  Pharmacy will continue to follow.     Tari Harper, PharmD  1/1/2018  11:21 AM  "

## 2018-01-01 NOTE — PROGRESS NOTES
" [facility] at [facility] - Progress Note    Bjorn Pollock  1934  [id number]/1    01/01/18, 9:48 AM    Chief Complaint: Following for CHF, AFib    Subjective:   Anxious, dyspneic at rest, no chest pain    Review of Systems:  Pertinent positives are listed above and in physical exam.  All others have been reviewed and are negative.      aspirin 81 mg Oral Daily   atorvastatin 20 mg Oral Nightly   castor oil-balsam peru 1 application Topical Q12H   famotidine 40 mg Oral Daily   ferrous sulfate 325 mg Oral Daily With Breakfast   furosemide 40 mg Intravenous Daily   insulin lispro 0-7 Units Subcutaneous 4x Daily With Meals & Nightly   lidocaine 1 patch Transdermal Q24H   metoprolol tartrate 25 mg Oral Q12H   miconazole  Topical Q12H   nystatin  Topical Q12H   sertraline 100 mg Oral Daily   tamsulosin 0.4 mg Oral Daily   vitamin C 250 mg Oral TID   warfarin (COUMADIN) (dosing per levels)  Does not apply Daily       Objective:  Vitals:   height is 182.9 cm (72\") and weight is 111 kg (244 lb 1.6 oz). His axillary temperature is 97.8 °F (36.6 °C). His blood pressure is 112/66 and his pulse is 97. His respiration is 24 and oxygen saturation is 96%.     Intake/Output Summary (Last 24 hours) at 01/01/18 0948  Last data filed at 01/01/18 0516   Gross per 24 hour   Intake                0 ml   Output             1400 ml   Net            -1400 ml       Physical Exam:  CONSTITUTIONAL: Mild distress, anxious  RESPIRATORY: Rales bilaterally. Mildly labored  CARDIOVASCULAR: Irregularly irregular with normal S1 and S2. Without murmur, gallop or rub.  PERIPHERAL VASCULAR: Normal radial pulses bilaterally. There is no peripheral edema bilaterally.          Results from last 7 days  Lab Units 12/31/17  0545   WBC 10*3/mm3 8.42   HEMOGLOBIN g/dL 11.8*   HEMATOCRIT % 39.5   PLATELETS 10*3/mm3 108*       Results from last 7 days  Lab Units 01/01/18  0744  12/29/17  1133   SODIUM mmol/L 142  < > 138   POTASSIUM mmol/L 4.0  < > " 3.9   CHLORIDE mmol/L 104  < > 102   CO2 mmol/L 26.0  < > 23.0   BUN mg/dL 41*  < > 22   CREATININE mg/dL 1.20  < > 1.10   CALCIUM mg/dL 9.1  < > 9.3   BILIRUBIN mg/dL  --   --  1.6*   ALK PHOS U/L  --   --  90   ALT (SGPT) U/L  --   --  12   AST (SGOT) U/L  --   --  22   GLUCOSE mg/dL 125*  < > 125*   < > = values in this interval not displayed.    Results from last 7 days  Lab Units 01/01/18  0744 12/31/17  0545 12/30/17  1126   INR  4.91 6.01 5.15                   Tele:  Atrial Fibrillation    Assessment:  1.  Acute on Chronic Systolic CHF, CAD CABG 1989  -  .  Was 188 (baseline) on 11/21/17.  -  Strict I/Os, daily weights  -  2 doses IV Lasix given 12/31.  - BNP increased to 790 on 1/1     2.  Chronic Atrial Fibrillation  -  Monitor rates, if average stays above 110bpm, will increase metoprolol to 50mg BID  -  Pharmacy following INR, which was supra therapeutic at admission and continues to rise.  Pharmacy to dose     3.  Essential Hypertension  -  Controlled.  -  Monitor Cr, output, readings.     4.  Acute on Chronic Respiratory Failure  -  Multi factorial  -  Continue support. O2, nebs, steroids.     5.  Candidiasis  -  Perineum, scrotal areas  -  Nystatin has been ordered.  -  White Catheter anchored short term to assist with incontinence, allow skin to rest.     7.  COPD     8. CKD     9. Diabetes    10.  Patient is a FULL CODE/FULL SUPPORT status.    Plan:   -Continue diuresis; switched to Bumex IV daily; monitor BUN/Cr (Mild increase today)  -Increased metoprolol to 50mg BID for even better rate control    Anita Aguilar PA-C  01/01/18, 9:48 AM    Aly Soriano MD, MSc, [facility]  Interventional Cardiology  [facility]

## 2018-01-02 PROBLEM — K59.00 CONSTIPATION: Status: RESOLVED | Noted: 2017-01-01 | Resolved: 2018-01-01

## 2018-01-02 NOTE — PROGRESS NOTES
"Pharmacy Consult  -  Warfarin    Bjorn Pollock is an 83 y.o. male   Height - 182.9 cm (72\")  Weight - 110 kg (242 lb 8 oz)    Consulting Provider: - Hospitalist   Indication: - A-fib  Goal INR: - 2-3  Home Regimen:   - Warfarin 5mg daily except wednesdays (holds)   - 30mg weekly     Bridge Therapy: No    Drug-Drug Interactions with current regimen:   None     Warfarin Dosing During Admission:    Date  12/29 12/30 12/31 1/1 1/2        INR  4.38 5.15 6.01 4.91 3.6       Dose  HOLD HOLD HOLD HOLD 2 mg         Education Provided: Will need to assess during admission    Labs:    Results from last 7 days   Lab Units 01/02/18  0454 01/01/18  0744 12/31/17  0545 12/30/17  1126 12/29/17  1133   INR  3.60 4.91 6.01 5.15 4.38   HEMOGLOBIN g/dL --  --  11.8* 12.0* 12.8*   HEMATOCRIT % --  --  39.5 40.6 42.5   PLATELETS 10*3/mm3 --  --  108* 131* 128*     Results from last 7 days   Lab Units 01/02/18  0454 01/01/18  0744 12/31/17  0545  12/29/17  1133   SODIUM mmol/L 139 142 138 < > 138   POTASSIUM mmol/L 3.6 4.0 3.8 < > 3.9   CHLORIDE mmol/L 99 104 104 < > 102   CO2 mmol/L 27.0 26.0 23.0 < > 23.0   BUN mg/dL 50* 41* 25* < > 22   CREATININE mg/dL 1.30 1.20 1.10 < > 1.10   CALCIUM mg/dL 9.6 9.1 9.5 < > 9.3   BILIRUBIN mg/dL --  --  --  --  1.6*   ALK PHOS U/L --  --  --  --  90   ALT (SGPT) U/L --  --  --  --  12   AST (SGOT) U/L --  --  --  --  22   GLUCOSE mg/dL 137* 125* 108* < > 125*   < > = values in this interval not displayed.     Current dietary intake: Poor PO intake 1/1    Assessment/Plan:     1. INR today is SUPRAtherapeutic, however, has been steadily trending down for the last 4 days.  Expect INR to be <3 tomorrow, will give warfarin 2mg x 1 tonight in attempts to prevent INR from dropping below therapeutic range.  2. Continue to monitor INR daily, dietary intake, drug-drug interactions and for s/sx of bleeding and clotting.  3. Pharmacy will continue to follow.      Marlena Hopson, PharmD  1/2/2018  1:35 " PM

## 2018-01-02 NOTE — PLAN OF CARE
Problem: Patient Care Overview (Adult)  Goal: Plan of Care Review  Outcome: Ongoing (interventions implemented as appropriate)   01/02/18 0948   Coping/Psychosocial Response Interventions   Plan Of Care Reviewed With daughter   Outcome Evaluation   Outcome Summary/Follow up Plan Dysphagia Eval: Pt's dtr (RN at ) present. Reports mild general dysphagia at home with occassional coughing with liquids and difficulty with certain solids (e.g., tough meats). She has been managing with general precautions. Today - increased generalized weakness and decreased respiratory status. Increased coughing with liquids, improved with nectar-thick liquids. Increased RR with trials, benefitted from breaks between bites/sips. Risk for aspiration with all though highest with thin liquids. Discussed options with pt's dtr (pt fatigued and confused). Does not wish for FEES/MBS, prefers we manage at the bedside, balancing safety with quality. She understands risks involved. For now, will pursue safest PO diet option based on clinical assessment: Dysphagia Level 3 Puree with some soft items, nectar-thick liquids, meds in applesauce/pudding - crush prn. Strict aspiration precautions - upright with intake, small bites/sips, frequent breaks as needed. Oral care BID and PRN. SLP will follow and adjust diet/POC, considering a balance of safety and QOL. Thanks        Problem: Inpatient SLP  Goal: Dysphagia- Patient will safely consume diet as per recommendation with no signs/symptoms of aspiration  Outcome: Ongoing (interventions implemented as appropriate)   01/02/18 0948   Safely Consume Diet   Safely Consume Diet- SLP, Date Established 01/02/18   Safely Consume Diet- SLP, Time to Achieve by discharge   Safely Consume Diet- SLP, Outcome goal ongoing

## 2018-01-02 NOTE — SIGNIFICANT NOTE
Patient color which was ashen and gray as well as the continued agitation, restlessness, and combativeness from patient.   Patient punching staff and swinging at staff.  Respirations very shallow on Bipap with visual JVD.  Patient appearance has declined from the morning assessment and reported findings to Dr. Wilson, new orders received.  Dr. Matute arrived at bedside as requested by Dr. Wilson.  Informed Dr. Matute of patient decline in appearance but stable saturations and vital signs.  Informed Dr. Matute that ABG's had been completed and the results.  Family notified of patient condition as well and en route to hospital.

## 2018-01-02 NOTE — SIGNIFICANT NOTE
Called for patient agitation and continued need for BiPAP    Chart reviewed, patient here with A/C CHF exacerbation +/- pneumonia. IV diuresis limited by elevated BUN/Cr. Empiric antibiotics started this am. CXR shows patchy bibasilar infiltrates, R>L.     BUN 50   Cr 1.3   WBC 8.5  hgb 11.8  plt 133  INR 3.6  ABG 7.33/56/76    Limited Exam    Sedated but arousable briefly, on bipap  Tachycardic, regular  Clear at apices, scattered rhonchi, diminished at bases  Abd soft, non tender  No LE edema, venous stasis changes noted    A/P    Acute on Chronic respiratory failure  - secondary to CHF +/- Pneumonia  - Elevated BUN/Cr suggests possible cardiorenal syndrome, consider dobutamine with diuretics  - continue empiric antibiotics  - will check flu pcr, but no fever or leukocytosis  - check procalcitonin, CRP, repeat CBC am  - moderate hypercarbia, will repeat ABG in 3 hours and am  - CXR am  - gentle sedating medications for agitation to allow continued BiPAP (family agreeable)  - patient remains full code - family aware prognosis is guarded. Discussed with spouse and extended family at bedside.  - also discussed with Intensivist.    Addendum -- 1/2/17 7:19 PM    Patient seen by Pulmonary Dr RASHID Salazar who has made additional changes in therapy +/- ICU monitoring  - additional family has arrived and spouse now wishes Mr Pollock to be Do Not Intubate status, but otherwise would like full medical therapy.  - Will alert intensivist to change in status.

## 2018-01-02 NOTE — THERAPY EVALUATION
Acute Care - Speech Language Pathology   Swallow Initial Evaluation [facility]   Clinical Swallow Evaluation     Patient Name: Bjorn Pollock  : 1934  MRN: [id number]  Today's Date: 2018  Onset of Illness/Injury or Date of Surgery Date: 17            Admit Date: 2017    Visit Dx:     ICD-10-CM ICD-9-CM   1. Acute on chronic diastolic congestive heart failure I50.33 428.33     428.0   2. Shortness of breath R06.02 786.05   3. Impaired functional mobility, balance, gait, and endurance Z74.09 V49.89   4. Dysphagia, unspecified type R13.10 787.20     Patient Active Problem List   Diagnosis   • Intractable low back pain   • HTN (hypertension)   • DM type 2 (diabetes mellitus, type 2)   • Candidiasis   • COPD (chronic obstructive pulmonary disease)   • NAYLA (obstructive sleep apnea)   • Atrial fibrillation   • Supratherapeutic INR   • CKD (chronic kidney disease) stage 2, GFR 60-89 ml/min   • Diastolic heart failure secondary to hypertension   • Anemia   • DJD (degenerative joint disease)   • CAD (coronary artery disease) s/p CABG history    • Crawley's esophagus   • Chronic thrombocytopenic purpura   • Cerebrovascular accident   • NAYLA on CPAP   • Obesity (BMI 30-39.9)   • Dyslipidemia   • BPH (benign prostatic hyperplasia)   • Lower extremity edema   • Falls   • Sepsis due to urinary tract infection   • Reactive airway disease with wheezing   • Constipation   • Pneumonia due to infectious organism   • Urinary retention   • Olecranon bursitis of right elbow   • Acute exacerbation of CHF (congestive heart failure)   • Systolic and diastolic CHF, acute on chronic   • Acute on chronic respiratory failure with hypoxemia   • Chronic anticoagulation   • Hypervolemia   • Chronic kidney disease   • Constipation   • Urinary incontinence     Past Medical History:   Diagnosis Date   • Anemia    • Anxiety    • Anxiety    • Atrial fibrillation, chronic    • Crawley's esophagus    • BPH (benign prostatic  hyperplasia)    • CAD and hx of CABG    • Cardiomyopathy    • Cellulitis    • CHF (congestive heart failure)     diastolic heart failure    • Chronic kidney disease     Stage 2-3   • COPD (chronic obstructive pulmonary disease)    • DDD (degenerative disc disease), lumbar and cervical spine    • Depression    • Diabetes mellitus    • Diabetic retinopathy    • DJD (degenerative joint disease)    • GERD (gastroesophageal reflux disease)    • HLD (hyperlipidemia)    • Hypertension    • Lower extremity edema     Chronic lower extremity edema due to venous stasis.   • Necrotizing fasciitis     R arm; s/p skin graft   • Obesity    • NAYLA on CPAP    • Pneumothorax    • Stroke     x3   • Thrombocytopenia    • Thrombocytopenia    • Urinary incontinence    • Vitamin B12 deficiency      Past Surgical History:   Procedure Laterality Date   • ANKLE SURGERY     • BACK SURGERY     • CARDIAC CATHETERIZATION     • CATARACT EXTRACTION      cataracts   • CHOLECYSTECTOMY     • CORONARY ARTERY BYPASS GRAFT  12/1989    x3   • CORONARY ARTERY BYPASS GRAFT  08/2005     of collateralized RCA with patent LAD graft in August 2005.   • KNEE SURGERY     • TONSILLECTOMY     • TOTAL HIP ARTHROPLASTY Bilateral    • TURP / TRANSURETHRAL INCISION / DRAINAGE PROSTATE            SWALLOW EVALUATION (last 72 hours)      Swallow Evaluation       01/02/18 0850                Rehab Evaluation    Document Type evaluation  -SM        Subjective Information dyspnea  -SM        General Information    Patient Profile Review yes  -SM        Subjective Patient Observations Fatigue, confused, cooperative  -SM        Pertinent History Of Current Problem Consulted after coughing after drinking  -SM        Current Diet Limitations regular solid;thin liquids  -SM        Prior Level of Function- Swallowing other (comment)   mild difficulties, managed at home by dtr (RN)  -SM        Plans/Goals Discussed With family;agreed upon  -        Barriers to Rehab previous  functional deficit;medically complex  -        Clinical Impression    Patient's Goals For Discharge patient did not state  -        Family Goals For Discharge other (see comments)   balance safety with comfort  -        Rehab Potential/Prognosis, Swallowing good, to achieve stated therapy goals  -        Criteria for Skilled Therapeutic Interventions Met skilled criteria for dysphagia intervention met  -        Therapy Frequency PRN;5 times/wk  -        SLP Diet Recommendation III - pureed with some mashed;nectar/syrup-thick liquids  -        Recommended Feeding/Eating Techniques maintain upright posture during/after eating for 30 mins;small sips/bites;other (see comments)   breaks for breathing prn  -        SLP Rec. for Method of Medication Administration meds whole in pudding/applesauce;meds crushed in pudding/applesauce   crush prn  -        Pain Assessment    Pain Assessment Arrington-Moon FACES  -        Arrington-Baker FACES Pain Rating 4  -        Pain Intervention(s) Repositioned   RN present  -        Cognitive Assessment/Intervention    Current Cognitive/Communication Assessment impaired  -        Orientation Status oriented to;person  -        Follows Commands/Answers Questions 50% of the time;able to follow single-step instructions;needs cueing;needs increased time;needs repetition  -        Oral Motor Structure and Function    Oral Motor Assessment Comment Generalized weakness, did not formally assess 2' confusion/fatigue  -        Clinical Swallow Exam    Respiratory Concerns increased respiratory rate during eating;decreased control/incoordination of breathing with swallow  -        Oral Phase Results impaired oral phase, signs of dysfunction present  -        Pharyngeal Phase Results sign/symptoms of pharyngeal impairment;cough;susupected impaired pharyngeal phase of swallowing  -        Summary of Clinical Exam Dysphagia Eval: Pt's dtr (RN at ) present. Reports mild  general dysphagia at home with occassional coughing with liquids and difficulty with certain solids (e.g., tough meats). She has been managing with general precautions. Today - increased generalized weakness and decreased respiratory status. Increased coughing with liquids, improved with nectar-thick liquids. Increased RR with trials, benefitted from breaks between bites/sips. Risk for aspiration with all though highest with thin liquids. Discussed options with pt's dtr (pt fatigued and confused). Does not wish for FEES/MBS, prefers we manage at the bedside, balancing safety with quality. She understands risks involved. For now, will pursue safest PO diet option based on clinical assessment: Dysphagia Level 3 Puree with some soft items, nectar-thick liquids, meds in applesauce/pudding - crush prn. Strict aspiration precautions - upright with intake, small bites/sips, frequent breaks as needed. Oral care BID and PRN. SLP will follow and adjust diet/POC, considering a balance of safety and QOL. Thanks   -        Dysphagia Treatment Objectives and Progress    Dysphagia Treatment Objectives Other 1  -SM        Dysphagia Other 1    Dysphagia Other 1 Objective Tolerate diet without s/s aspiration with 100% accuracy and feeding/assistance for precautions  -        Status: Dysphagia Other 1 New  -SM        Dysphagia Other 1 Progress continue to address  -          User Key  (r) = Recorded By, (t) = Taken By, (c) = Cosigned By    Initials Name Effective Dates     Merry Adam, MS CCC-SLP 06/22/15 -         EDUCATION  The patient's dtr has been educated in the following areas:   Dysphagia (Swallowing Impairment) Oral Care/Hydration Modified Diet Instruction.    SLP Recommendation and Plan     SLP Diet Recommendation: III - pureed with some mashed, nectar/syrup-thick liquids  Recommended Feeding/Eating Techniques: maintain upright posture during/after eating for 30 mins, small sips/bites, other (see comments)  (breaks for breathing prn)  SLP Rec. for Method of Medication Administration: meds whole in pudding/applesauce, meds crushed in pudding/applesauce (crush prn)        Criteria for Skilled Therapeutic Interventions Met: skilled criteria for dysphagia intervention met     Rehab Potential/Prognosis, Swallowing: good, to achieve stated therapy goals  Therapy Frequency: PRN, 5 times/wk             Plan of Care Review  Plan Of Care Reviewed With: daughter  Outcome Summary/Follow up Plan: Dysphagia Eval: Pt's dtr (RN at ) present. Reports mild general dysphagia at home with occassional coughing with liquids and difficulty with certain solids (e.g., tough meats). She has been managing with general precautions. Today - increased generalized weakness and decreased respiratory status. Increased coughing with liquids, improved with nectar-thick liquids. Increased RR with trials, benefitted from breaks between bites/sips. Risk for aspiration with all though highest with thin liquids. Discussed options with pt's dtr (pt fatigued and confused). Does not wish for FEES/MBS, prefers we manage at the bedside, balancing safety with quality. She understands risks involved. For now, will pursue safest PO diet option based on clinical assessment: Dysphagia Level 3 Puree with some soft items, nectar-thick liquids, meds in applesauce/pudding - crush prn. Strict aspiration precautions - upright with intake, small bites/sips, frequent breaks as needed. Oral care BID and PRN. SLP will follow and adjust diet/POC, considering a balance of safety and QOL. Thanks           IP SLP Goals       01/02/18 0948          Safely Consume Diet    Safely Consume Diet- SLP, Date Established 01/02/18  -SM      Safely Consume Diet- SLP, Time to Achieve by discharge  -SM      Safely Consume Diet- SLP, Outcome goal ongoing  -SM        User Key  (r) = Recorded By, (t) = Taken By, (c) = Cosigned By    Initials Name Provider Type    JOSHUA Adam MS  CCC-SLP Speech and Language Pathologist             SLP Outcome Measures (last 72 hours)      SLP Outcome Measures       01/02/18 0900          SLP Outcome Measures    Outcome Measure Used? Adult NOMS  -      FCM Scores    FCM Chosen Swallowing  -      Swallowing FCM Score 3  -        User Key  (r) = Recorded By, (t) = Taken By, (c) = Cosigned By    Initials Name Effective Dates    JOSHUA Adam MS CCC-SLP 06/22/15 -            Time Calculation:         Time Calculation- SLP       01/02/18 0948          Time Calculation- SLP    SLP Start Time 0850  -      SLP Received On 01/02/18  -        User Key  (r) = Recorded By, (t) = Taken By, (c) = Cosigned By    Initials Name Provider Type    JOSHUA Adam MS CCC-SLP Speech and Language Pathologist          Therapy Charges for Today     Code Description Service Date Service Provider Modifiers Qty    52421865431 HC ST EVAL ORAL PHARYNG SWALLOW 4 1/2/2018 Merry Adam MS CCC-SLP GN 1    79730066683 HC ST SWALLOWING CURRENT STATUS 1/2/2018 Merry Adam MS CCC-SLP GN, CL 1    41881259952 HC ST SWALLOWING PROJECTED 1/2/2018 Merry Adam MS CCC-SLP GN, CJ 1          SLP G-Codes  SLP NOMS Used?: Yes  Functional Limitations: Swallowing  Swallow Current Status (): At least 60 percent but less than 80 percent impaired, limited or restricted  Swallow Goal Status (): At least 20 percent but less than 40 percent impaired, limited or restricted    Merry Adam MS CCC-SLP  1/2/2018

## 2018-01-02 NOTE — PROGRESS NOTES
"Pharmacy Consult-Vancomycin Dosing  Bjorn Pollock is a  83 y.o. male receiving vancomycin therapy for suspected PNA.    Indication: PNA  Consulting Provider: tSeve ARAYA Consult: no    Goal Trough: 15-20mg/L    Current Antimicrobial Therapy  Anti-Infectives       Ordered     Dose/Rate Route Frequency Start Stop      01/02/18 0721  piperacillin-tazobactam (ZOSYN) 4.5 g/100 mL 0.9% NS IVPB (mbp)     Ordering Provider:  Ulises Wilson MD    4.5 g  over 4 Hours Intravenous Every 8 Hours 01/02/18 1400 01/07/18 1359    01/02/18 0721  vancomycin 2000 mg/500 mL 0.9% NS IVPB ([facility])     Comments:  Max dose 2000 mg   Ordering Provider:  Ulises Wilson MD    2,000 mg Intravenous Once 01/02/18 0800 01/02/18 0941    01/02/18 0725  piperacillin-tazobactam (ZOSYN) 4.5 g/100 mL 0.9% NS IVPB ([facility]p)     Ordering Provider:  Ulises Wilson MD    4.5 g  over 30 Minutes Intravenous Once 01/02/18 0800 01/02/18 0914    01/02/18 0721  Pharmacy to dose vancomycin     Ordering Provider:  Ulises Wilson MD     Does not apply Continuous PRN 01/02/18 0719 01/07/18 0718            Allergies  Allergies as of 12/29/2017 - Will as Reviewed 12/29/2017   Allergen Reaction Noted   • Bactrim [sulfamethoxazole-trimethoprim] Other (See Comments) 11/22/2017   • Phenergan [promethazine hcl]  09/04/2016   • Promethazine  09/19/2016       Labs      Results from last 7 days     Lab Units 01/02/18  0454 01/01/18  0744 12/31/17  0545   BUN mg/dL 50* 41* 25*   CREATININE mg/dL 1.30 1.20 1.10         Results from last 7 days     Lab Units 12/31/17  0545 12/30/17  1126 12/29/17  1133   WBC 10*3/mm3 8.42 14.02* 8.04       Evaluation of Dosing     Ht - 182.9 cm (72\")  Wt - 110 kg (242 lb 8 oz)    Estimated Creatinine Clearance: 55.2 mL/min (by C-G formula based on Cr of 1.3).    Intake & Output (last 3 days)         12/30 0701 - 12/31 0700 12/31 0701 - 01/01 0700 01/01 0701 - 01/02 0700 01/02 0701 - 01/03 0700      P.O. 0  450 0    IV Piggyback    100    Total " Intake(mL/kg) 0 (0)  450 (4.1) 100 (0.9)    Urine (mL/kg/hr) 750 (0.3) 1400 (0.5) 1700 (0.6)     Stool  0 (0) 0 (0)     Total Output 750 1400 1700      Net -750 -1400 -1250 +100            Unmeasured Stool Occurrence  2 x 2 x             Microbiology and Radiology  Microbiology Results (last 10 days)       Procedure Component Value - Date/Time      Influenza Antigen, Rapid - Swab, Nasopharynx [[id number]]  (Normal) Collected:  12/29/17 1124    Lab Status:  Final result Specimen:  Swab from Nasopharynx Updated:  12/29/17 1151     Influenza A Ag, EIA Negative     Influenza B Ag, EIA Negative            Assessment/Plan:    1. Pharmacy consulted to dose vancomycin for pneumonia.  Pt loaded with vancomycin 2g IV x 1 @ 0950 on 1/2/17.  Will start vancomycin 1250mg IV Q24h and check a trough prior to the 3rd dose on 1/4/17.  2. Continue to monitor Scr/BUN at least 2x/week while on vancomycin as well as UOP.    3. Pharmacy will continue to follow.      Marlena Hopson, PharmD  1/2/2018  10:05 AM

## 2018-01-02 NOTE — NURSING NOTE
Dr. Wilson at bedside and wanted to see patient O2 sats without oxygen.  O2 sats dropped to 77% rapidly after removing Bipap.  Replaced Bipap and O2 sats recovered quickly to 92%.  Respiratory therapy called to place patient back on high flow at this time.
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
## 2018-01-02 NOTE — PROGRESS NOTES
"  [facility]  PROGRESS NOTE    Date of Admission: 12/29/2017  Length of Stay: 1  Primary Care Physician: Vincent Mccullough MD    Chief Complaint: f/u A/C [facility]   Problem List:   1. Coronary artery disease:  a. CABG x3 in December 1989.  b. Normal left ventricular systolic function.  c. New York Heart Association class I heart failure symptoms.  d.  of collateralized RCA with patent LAD graft in August 2005.  e. MUGA, ejection fraction 63%, 05/09/2015.  f. Echocardiogram December 2014, showed normal left ventricular ejection function of 55% to 60%.   g. EF 35-40% 9/2017  2. Chronic Atrial fibrillation:  a. CHADS-VASc of 5.  b. Chronic anticoagulation.  3. Cerebrovascular accident:  a. Remote CVA and TIA. Questionable relationship to atrial fibrillation.  4. Obstructive sleep apnea on CPAP.   5. Obesity.   6. Dyslipidemia.   7. Diabetes mellitus type 2.  8. Anemia.  9. Degenerative joint disease.  10. BPH.  11. Chronic lower extremity edema due to venous stasis  12.  Chronic Constipation  13. Urinary Incontinence     Subjective      Confused on BiPAP.  Telemetry reveals rate-controlled atrial fibrillation.      Objective   Vitals: /74 (BP Location: Right arm, Patient Position: Lying)  Pulse 89  Temp 98.1 °F (36.7 °C) (Oral)   Resp 18  Ht 182.9 cm (72\")  Wt 110 kg (242 lb 8 oz)  SpO2 94%  BMI 32.89 kg/m2    Physical Exam:  GENERAL: Lethargic with periods of agitation.   HEENT: Fundoscopic deferred, otherwise unremarkable.  NECK: No Jugular venous distention, adenopathy, or thyromegaly noted.   HEART: Irregularly irregular, normal S1-S2, no murmurs rubs or gallops  LUNGS: Scattered rhonchi, diffuse expiratory wheezing  ABDOMEN: Flat without evidence of organomegaly, masses, or tenderness.  NEUROLOGIC: Periods of agitation, no focal abnormalities involving strength or sensation are noted.   EXTREMITIES: No cyanosis, trace edema noted. Venous stasis discoloration " noted.     Results:    Results from last 7 days  Lab Units 12/31/17  0545 12/30/17  1126 12/29/17  1133   WBC 10*3/mm3 8.42 14.02* 8.04   HEMOGLOBIN g/dL 11.8* 12.0* 12.8*   HEMATOCRIT % 39.5 40.6 42.5   PLATELETS 10*3/mm3 108* 131* 128*       Results from last 7 days  Lab Units 01/02/18  0454 01/01/18  0744 12/31/17  0545   SODIUM mmol/L 139 142 138   POTASSIUM mmol/L 3.6 4.0 3.8   CHLORIDE mmol/L 99 104 104   CO2 mmol/L 27.0 26.0 23.0   BUN mg/dL 50* 41* 25*   CREATININE mg/dL 1.30 1.20 1.10   GLUCOSE mg/dL 137* 125* 108*      Lab Results   Component Value Date    CHOL 102 09/28/2017    TRIG 89 09/28/2017    HDL 30 (L) 09/28/2017    LDLDIRECT 58 09/28/2017    AST 22 12/29/2017    ALT 12 12/29/2017       Results from last 7 days  Lab Units 12/30/17  1126   HEMOGLOBIN A1C % 5.60           Results from last 7 days  Lab Units 01/02/18  0454 01/01/18  0744 12/29/17  1133   BNP pg/mL 1013.0* 791.0* 619.0*       Results from last 7 days  Lab Units 01/02/18  0454 01/01/18  0744 12/31/17  0545   PROTIME Seconds 40.8* 56.2* 69.2*   INR  3.60 4.91 6.01       Intake/Output Summary (Last 24 hours) at 01/02/18 1409  Last data filed at 01/02/18 0844   Gross per 24 hour   Intake              370 ml   Output              900 ml   Net             -530 ml     I personally reviewed the patient's EKG/Telemetry data    Radiology Data:   CXR 1/2/17:    FINDINGS: The heart is large. There are patchy perihilar and bibasilar  airspace changes, right greater than left. There has been no significant  change since the previous examination of the previous day.          IMPRESSION:  There has been no change since the previous examination.    Current Medications:    acetylcysteine 4 mL Nebulization BID - RT   aspirin 81 mg Oral Daily   atorvastatin 20 mg Oral Nightly   castor oil-balsam peru 1 application Topical Q12H   famotidine 40 mg Oral Daily   ferrous sulfate 325 mg Oral Daily With Breakfast   furosemide 40 mg Intravenous Daily   insulin  lispro 0-7 Units Subcutaneous 4x Daily With Meals & Nightly   ipratropium-albuterol 3 mL Nebulization Q4H - RT   lidocaine 1 patch Transdermal Q24H   metoprolol tartrate 50 mg Oral Q12H   miconazole  Topical Q12H   nystatin  Topical Q12H   pharmacy consult - MTM  Does not apply Daily   piperacillin-tazobactam 4.5 g Intravenous Q8H   sertraline 100 mg Oral Daily   tamsulosin 0.4 mg Oral Daily   [START ON 1/3/2018] vancomycin 1,250 mg Intravenous Q24H   vitamin C 250 mg Oral TID   warfarin (COUMADIN) (dosing per levels)  Does not apply Daily       [START ON 1/4/2018] Pharmacy Consult    Pharmacy to dose vancomycin    Pharmacy to dose warfarin        Assessment and Plan:     1.  Acute on Chronic Systolic CHF  -  .  Was 188 (baseline) on 11/21/17.  -  Strict I/Os, daily weights  -  BNP keeps rising, despite IV diuretics, Secondary to underlying pneumonia  -  currently on biPAP   -  Antral diuresis, trending renal function     2.  Chronic Atrial Fibrillation  -  Metoprolol increased to 50mg BID  - on Warfarin, currently held due to supratherapeutic INR on admission     4.  Acute on Chronic Respiratory Failure  -  Multi factorial  -  Continue support.       5.  Candidiasis  -  Perineum, scrotal areas  -  Nystatin has been ordered.  -  White Catheter anchored short term to assist with incontinence      7.  COPD      8. CKD  - continue to monitor Cr which has slowly trended upwards     9.  Patient is a FULL CODE/FULL SUPPORT status.      Damari Webb PA-C.  2:09 PM  01/02/18    I, Vincent Caba M.D.,  have reviewed the notes, assessments, and/or procedures performed by MARLENE/PA, I CONCUR with her documentation of Bjorn Pollock.

## 2018-01-02 NOTE — PROGRESS NOTES
Discharge Planning Assessment  [facility]     Patient Name: Bjorn Pollock  MRN: [id number]  Today's Date: 1/2/2018    Admit Date: 12/29/2017          Discharge Needs Assessment       01/02/18 4396    Living Environment    Lives With child(jonathan), adult;spouse    Living Arrangements house    Home Accessibility no concerns    Type of Financial/Environmental Concern none    Transportation Available ambulance;family or friend will provide    Living Environment    Provides Primary Care For no one, unable/limited ability to care for self    Quality Of Family Relationships supportive;helpful;involved    Discharge Needs Assessment    Concerns To Be Addressed discharge planning concerns    Readmission Within The Last 30 Days no previous admission in last 30 days    Outpatient/Agency/Support Group Needs --   Current for skilled nursing for dxs of DM2, Afib, CKD, COPD, CHF, wound care    Community Agency Name(S) [facility], ph. [phone], fax [phone]    Equipment Currently Used at Home bipap/ cpap;commode;walker, rolling;wheelchair;bath bench;power chair, (recliner lift)    Current Discharge Risk chronically ill    Discharge Disposition still a patient    Discharge Contact Information if Applicable Iesha Pollock, wife, [phone]; Ayah Godinez, daughter, [phone]            Discharge Plan       01/02/18 1504    Case Management/Social Work Plan    Plan TBD    Patient/Family In Agreement With Plan yes    Additional Comments Met with patient's family in the room to initiate discharge planning. Patient is sleeping and on BiPAP. Patient lives with his wife and daughter in a home in [facility]. He is independent with ADLs and uses a walker or wheelchair with mobility. He wears a CPAP at night. He is currently followed by [facility] for skilled nursing and wound care. Patient has had several hospitatlizations this past year and has gone to [facility] for rehab a couple times. Per MD notes,  patient's condition is guarded. CM will continue to follow for discharge planning.         Discharge Placement     No information found        Expected Discharge Date and Time     Expected Discharge Date Expected Discharge Time    Jan 5, 2018               Demographic Summary       01/02/18 1454    Referral Information    Admission Type inpatient    Referral Source admission list    Reason For Consult discharge planning    Record Reviewed clinical discipline documentation;history and physical;medical record    Contact Information    Permission Granted to Share Information With ;family/designee   wife, Iesha, or daughter, Ayah Pollock    Primary Care Physician Information    Name Vincent Mccullough            Functional Status       01/02/18 1454    Functional Status Prior    Ambulation 1-->assistive equipment    Transferring 1-->assistive equipment    Toileting 1-->assistive equipment    Bathing 2-->assistive person    Dressing 2-->assistive person    Eating 0-->independent    Communication 0-->understands/communicates without difficulty    Swallowing 0-->swallows foods/liquids without difficulty    Employment/Financial    Employment/Finance Comments Medical and rx coverage through [facility]            Psychosocial     None            Abuse/Neglect     None            Legal     None            Substance Abuse     None            Patient Forms     None          Maddison Blevins

## 2018-01-02 NOTE — PROGRESS NOTES
[facility] Medicine Services  PROGRESS NOTE    Patient Name: Bjorn Pollock  : 1934  MRN: [id number]    Date of Admission: 2017  Length of Stay: 1  Primary Care Physician: Vincent Mccullough MD    Subjective   Subjective     CC: SOA    HPI: Patient had a rough night, was agitated, tried pulling out his stewart, could not keep his bipap in place, was thus switched to HFNC, this morning he is more calm, not very interactive.    Review of Systems  UTO     Objective   Objective     Vital Signs:   Temp:  [96.2 °F (35.7 °C)-98.5 °F (36.9 °C)] 97.7 °F (36.5 °C)  Heart Rate:  [] 108  Resp:  [18-24] 18  BP: (104-136)/(62-77) 123/74      Physical Exam:  Constitutional: No acute distress, awake, agitated  HENT: NCAT, mucous membranes moist  Respiratory: labored respirations, coarse BS decreased air mvt on right, mild crackles   Cardiovascular: RRR, no murmurs, rubs, or gallops, palpable pedal pulses bilaterally  Gastrointestinal: Positive bowel sounds, soft, nontender, nondistended  : with stewart in place  Musculoskeletal: bilateral LE edema  Psychiatric: Appropriate affect, agitated  Neurologic: not oriented, no focal deficits  Skin: No rashes    Results Reviewed:  I have personally reviewed current lab, radiology, and data and agree.      Results from last 7 days  Lab Units 18  0454 18  0744 17  0545 17  1126 17  1133   WBC 10*3/mm3  --   --  8.42 14.02* 8.04   HEMOGLOBIN g/dL  --   --  11.8* 12.0* 12.8*   HEMATOCRIT %  --   --  39.5 40.6 42.5   PLATELETS 10*3/mm3  --   --  108* 131* 128*   INR  3.60 4.91 6.01 5.15 4.38       Results from last 7 days  Lab Units 18  0454 18  0744 17  0545  17  1133   SODIUM mmol/L 139 142 138  < > 138   POTASSIUM mmol/L 3.6 4.0 3.8  < > 3.9   CHLORIDE mmol/L 99 104 104  < > 102   CO2 mmol/L 27.0 26.0 23.0  < > 23.0   BUN mg/dL 50* 41* 25*  < > 22   CREATININE mg/dL 1.30 1.20 1.10  < > 1.10    GLUCOSE mg/dL 137* 125* 108*  < > 125*   CALCIUM mg/dL 9.6 9.1 9.5  < > 9.3   ALT (SGPT) U/L  --   --   --   --  12   AST (SGOT) U/L  --   --   --   --  22   < > = values in this interval not displayed.  BNP   Date Value Ref Range Status   01/02/2018 1013.0 (H) 0.0 - 100.0 pg/mL Final     pH, Arterial   Date Value Ref Range Status   01/01/2018 7.342 (L) 7.350 - 7.450 pH units Final       Microbiology Results Abnormal     Procedure Component Value - Date/Time    Influenza Antigen, Rapid - Swab, Nasopharynx [[id number]]  (Normal) Collected:  12/29/17 1124    Lab Status:  Final result Specimen:  Swab from Nasopharynx Updated:  12/29/17 1151     Influenza A Ag, EIA Negative     Influenza B Ag, EIA Negative          Imaging Results (last 24 hours)     Procedure Component Value Units Date/Time    XR Chest 1 View [[id number]] Collected:  01/01/18 0758     Updated:  01/01/18 1704    Narrative:       EXAMINATION: XR CHEST 1 VW-      INDICATION: Dyspnea; I50.33-Acute on chronic diastolic (congestive)  heart failure; R06.02-Shortness of breath; Z74.09-Other reduced  mobility.      COMPARISON: Chest x-ray 12/29/2017.     FINDINGS: Cardiac silhouette enlarged. Increased patchy ill-defined  pulmonary opacifications right perihilar and right basilar segments.  This is consistent with worsening airspace disease. No discrete  pneumothorax with trace bilateral pleural effusions.           Impression:       Increase in ill-defined right lower lobe pulmonary  opacifications consistent with worsening airspace disease.     D:  01/01/2018  E:  01/01/2018     This report was finalized on 1/1/2018 5:02 PM by Dr. Luis M Leija.       XR Chest 1 View [[id number]] Collected:  01/02/18 0935     Updated:  01/02/18 1151    Narrative:       EXAMINATION: XR CHEST 1 VW-01/02/2018:      INDICATION: SOA; I50.33-Acute on chronic diastolic (congestive) heart  failure; R06.02-Shortness of breath; Z74.09-Other reduced mobility.      COMPARISON:  01/01/2018.     FINDINGS: The heart is large. There are patchy perihilar and bibasilar  airspace changes, right greater than left. There has been no significant  change since the previous examination of the previous day.           Impression:       There has been no change since the previous examination.     D:  01/02/2018  E:  01/02/2018     This report was finalized on 1/2/2018 11:49 AM by Dr. Emerson Mackenzie MD.           Results for orders placed during the hospital encounter of 09/19/17   Adult Transthoracic Echo Complete W/ Cont if Necessary Per Protocol    Narrative · There is four chamber cardiac enlargement.  · Global and segmental LV wall motion abnormalities are seen with an   estimated EF=36%-40%.  · Left ventricular wall thickness is consistent with concentric   hypertrophy, mild.  · Mitral annular calcification with mild-moderate MR is appreciated.  · Aortic valve sclerosis with moderate aortic insufficiency is seen.  · The LV examination is suboptimal as the patient refused Lumason.          I have reviewed the medications.    Assessment/Plan   Assessment / Plan     Hospital Problem List     * (Principal)Acute exacerbation of CHF (congestive heart failure)    HTN (hypertension) (Chronic)    DM type 2 (diabetes mellitus, type 2) (Chronic)    COPD (chronic obstructive pulmonary disease) (Chronic)    NAYLA (obstructive sleep apnea) (Chronic)    Atrial fibrillation (Chronic)    Overview Addendum 9/19/2016 10:59 AM by Keyona Link     a. Chronic CHADS-VASc of 5.  b. Chronic anticoagulation.  Atrial fibrillation, rates well-controlled.          Supratherapeutic INR    CAD (coronary artery disease) s/p CABG history  (Chronic)    Overview Addendum 9/19/2016 10:59 AM by Keyona Link     c. CABG x3 in December 1989.  d. Normal left ventricular systolic function.  e. New York Heart Association class I heart failure symptoms.  f.  of collateralized RCA with patent LAD graft in August 2005.  lennie ANGEL  ejection fraction 63%, 05/09/2015.  h. Echocardiogram December 2014, showed normal left ventricular ejection function of 55% to 60%.   Coronary artery disease, stable.  Will not pursue any further ischemic studies at this time as the patient only had one episode of pain.          Chronic kidney disease (Chronic)    Overview Signed 12/29/2017  4:24 PM by MARLENE Hicks     Stage 2-3         Constipation    Urinary incontinence           Brief Hospital Course to date:  Bjorn Pollock is a 83 y.o. male with history of chronic SHF, CKD, CAD, DM2, chronic Afib, COPD, and NAYLA, who presented to the ED with c/o dyspnea, cough, fatigue, and worsening LE edema. Patient had been c/o frequent urination, so his PCP advised him to reduce Lasix from 40mg to 20mg daily.     Assessment & Plan:  - Acute on chronic respiratory failure, etiology likely multifactorial sec to decompensated HF, COPD exacerbation, continue nebs, will add mucomyst, O2, steroids and NIPPV, needs aggressive pulmonary toilet, now on HFNC as he cant keep the Bipap on  - Repeat CXR concerning for increased right sided opacification, will start abx for suspected PNA  - Acute on chronic systolic HF likely sec to inadequate diuresis, cardiology following, continue diureses, strict  I/o  - COPD with acute exacerbation- continue steroids, nebs and O2 as above  - Afib with RVR likely sec to acute resp distress, cards following  - Agitation with delirium  - Supra therapeutic INR, pharmacy following  - Perineum candidiasis, continue nystatin topical  - Prolonged QTc- avoid prolonging rx, monitor.  - continue home rx for HTN/CAD/BPH  - PT/OT for generalized weakness and deconditioning    Complex case. Prognosis guarded     DVT Prophylaxis: on coumdain     CODE STATUS: Full Code     Disposition: JUSTO Wilson MD  01/02/18  11:52 AM

## 2018-01-02 NOTE — PLAN OF CARE
Problem: Skin Integrity Impairment, Risk/Actual (Adult)  Goal: Skin Integrity/Wound Healing  Outcome: Ongoing (interventions implemented as appropriate)      Problem: Fall Risk (Adult)  Goal: Absence of Falls  Outcome: Ongoing (interventions implemented as appropriate)      Problem: Fluid Volume Excess (Adult,Obstetrics,Pediatric)  Goal: Stable Weight  Outcome: Ongoing (interventions implemented as appropriate)    Goal: Balanced Intake/Output  Outcome: Ongoing (interventions implemented as appropriate)      Problem: Patient Care Overview (Adult)  Goal: Adult Individualization and Mutuality  Outcome: Ongoing (interventions implemented as appropriate)    Goal: Discharge Needs Assessment  Outcome: Ongoing (interventions implemented as appropriate)      Problem: Respiratory Insufficiency (Adult)  Goal: Acid/Base Balance  Outcome: Ongoing (interventions implemented as appropriate)    Goal: Effective Ventilation  Outcome: Ongoing (interventions implemented as appropriate)

## 2018-01-02 NOTE — PROGRESS NOTES
"INTENSIVIST NOTE     Hospital:  LOS: 4 days   Mr. Bjorn Pollock, 83 y.o. male is followed for:   Principal Problem:    Acute exacerbation of CHF (congestive heart failure)  Active Problems:    Atrial fibrillation    CAD (coronary artery disease) s/p CABG history     Chronic kidney disease    Urinary incontinence    DM type 2 (diabetes mellitus, type 2)    COPD (chronic obstructive pulmonary disease)    NAYLA (obstructive sleep apnea)          SUBJECTIVE   Subjective    Mr. Pollock is an 82 y/o WM who was admitted to the Hospitalist service 12/29/17 for CHF exacerbation +/- pneumonia.  He is also being followed by Cardiology.  His respiratory status has continued to decline despite diuresis.  His Cr is stable.  He is bipap dependent and has become somnolent.  His last ABG on 50% bipap has pH 7.33, pCO2 56 and pO2 76.  He is being transferred to the ICU.    The patient's relevant past medical, surgical and social history were reviewed and updated in Epic as appropriate.        OBJECTIVE     Vital Sign Min/Max for last 24 hours  Temp  Min: 97.5 °F (36.4 °C)  Max: 98.5 °F (36.9 °C)   BP  Min: 104/62  Max: 136/75   Pulse  Min: 79  Max: 119   Resp  Min: 18  Max: 24   SpO2  Min: 90 %  Max: 100 %   Flow (L/min)  Min: 50  Max: 50           Intake/Output Summary (Last 24 hours) at 01/02/18 1851  Last data filed at 01/02/18 1620   Gross per 24 hour   Intake              350 ml   Output             1350 ml   Net            -1000 ml      Flowsheet Rows         First Filed Value    Admission Height  182.9 cm (72\") Documented at 12/29/2017 1106    Admission Weight  104 kg (230 lb) Documented at 12/29/2017 1106        Body mass index is 32.89 kg/(m^2).   Last 3 weights    12/31/17  1702 01/01/18  0516 01/02/18  0456   Weight: 112 kg (246 lb 8 oz) 111 kg (244 lb 1.6 oz) 110 kg (242 lb 8 oz)        Telemetry: A fib      Medications: (drips)    [START ON 1/4/2018] Pharmacy Consult    Pharmacy to dose vancomycin    Pharmacy to dose " warfarin      Medications:    acetaZOLAMIDE 500 mg Intravenous Once   acetylcysteine 4 mL Nebulization BID - RT   aspirin 81 mg Oral Daily   atorvastatin 20 mg Oral Nightly   castor oil-balsam peru 1 application Topical Q12H   famotidine 40 mg Oral Daily   ferrous sulfate 325 mg Oral Daily With Breakfast   furosemide 40 mg Intravenous Daily   furosemide 80 mg Intravenous Once   insulin lispro 0-7 Units Subcutaneous 4x Daily With Meals & Nightly   ipratropium-albuterol 3 mL Nebulization Q4H - RT   lidocaine 1 patch Transdermal Q24H   metOLazone 5 mg Oral Once   metoprolol tartrate 50 mg Oral Q12H   miconazole  Topical Q12H   nystatin  Topical Q12H   pharmacy consult - MTM  Does not apply Daily   piperacillin-tazobactam 4.5 g Intravenous Q8H   potassium chloride 40 mEq Oral Once   sertraline 100 mg Oral Daily   tamsulosin 0.4 mg Oral Daily   [START ON 1/3/2018] vancomycin 1,250 mg Intravenous Q24H   vitamin C 250 mg Oral TID   warfarin (COUMADIN) (dosing per levels)  Does not apply Daily   warfarin 2 mg Oral Once     Objective    Physical Exam:  Constitutional:  Appears well-developed and well-nourished. No distress.   HEENT:  Normocephalic and atraumatic. PERRL  Neck:  Neck supple. No JVD present.   CV: Normal rate, regular rhythm, intact distal pulses.  No gallop, murmur or rub.   Pulmonary/Chest: Effort normal and breath sounds normal. No respiratory distress. No wheezes, rhonchi or rales.   Abdominal: Soft. + BS.  No distension and no mass. There is no tenderness.   Musculoskeletal: Normal muscle tone and strength  Neurological:  No focal deficits  Skin: Skin is warm and dry. No rash noted.   Extremities:  No clubbing, edema or cyanosis  Psychiatric: lethargic      Results from last 7 days  Lab Units 01/02/18  1659 12/31/17  0545 12/30/17  1126   WBC 10*3/mm3 8.55 8.42 14.02*   HEMOGLOBIN g/dL 11.8* 11.8* 12.0*   PLATELETS 10*3/mm3 133* 108* 131*       Results from last 7 days  Lab Units 01/02/18  2939  01/02/18  0454 01/01/18  0744   SODIUM mmol/L 145 139 142   POTASSIUM mmol/L 3.7 3.6 4.0   CO2 mmol/L 28.0 27.0 26.0   CREATININE mg/dL 1.40* 1.30 1.20   GLUCOSE mg/dL 145* 137* 125*     Estimated Creatinine Clearance: 51.2 mL/min (by C-G formula based on Cr of 1.4).      Lab Results   Component Value Date    BNP 1013.0 (H) 01/02/2018       Results from last 7 days  Lab Units 12/30/17  1126   HEMOGLOBIN A1C % 5.60       Results from last 7 days  Lab Units 01/02/18  1652   PH, ARTERIAL pH units 7.330   PO2 ART mm Hg 76.5   PCO2, ARTERIAL mm Hg 56.3   HCO3 ART mmol/L 29.7     Lab Results   Component Value Date    TSH 2.147 11/21/2017     Lab Results   Component Value Date    LACTATE 1.9 12/29/2017     Lab Results   Component Value Date    PROCALCITO 0.08 11/21/2017      I reviewed the patient's results and images. Images: CXR bilateral pulmonary infiltrates     Assessment/Plan   ASSESSMENT / PLAN     83 y.o.male:    Hospital Problem List     * (Principal)Acute exacerbation of CHF (congestive heart failure)    Atrial fibrillation (Chronic)    Overview Addendum 9/19/2016 10:59 AM by Keyona adams. Chronic CHADS-VASc of 5.  b. Chronic anticoagulation.  Atrial fibrillation, rates well-controlled.          CAD (coronary artery disease) s/p CABG history  (Chronic)    Overview Addendum 9/19/2016 10:59 AM by Keyona arango. CABG x3 in December 1989.  d. Normal left ventricular systolic function.  e. New York Heart Association class I heart failure symptoms.  f.  of collateralized RCA with patent LAD graft in August 2005.  g. MUGA, ejection fraction 63%, 05/09/2015.  h. Echocardiogram December 2014, showed normal left ventricular ejection function of 55% to 60%.   Coronary artery disease, stable.  Will not pursue any further ischemic studies at this time as the patient only had one episode of pain.          Chronic kidney disease (Chronic)    Overview Signed 12/29/2017  4:24 PM by Katia Maurer  MARLENE Thomas     Stage 2-3         Urinary incontinence    DM type 2 (diabetes mellitus, type 2) (Chronic)    COPD (chronic obstructive pulmonary disease) (Chronic)    NAYLA (obstructive sleep apnea) (Chronic)    Overview Signed 1/2/2018  6:49 PM by MARLENE Fernandez     W/CPAP and nocturnal Home O2                Assessment & Plan   Monitor in ICU  Continue bipap  Family d/w Dr. Lam he is now a Do Not Intubate  Aggressive diuresis  Hold sedatives  Continue antibiotics  Repeat ABG        Critical Care Time:  30 minutes    MARLENE Santos, AGACNP-BC, FNP-BC  Pulmonary & Critical Care Medicine       I have seen and examined the patient, performing a face-to-face diagnostic evaluation with plan of care reviewed and developed with MARLENE and nursing staff. I have addended and modified the above history of present illness, physical examination, and assessment and plan to reflect my findings and impressions.    Rambo Salazar MD  Pulmonology and Critical Care Medicine  01/02/18 10:17 PM  Electronically Signed

## 2018-01-03 NOTE — NURSING NOTE
Family met with physician to discuss updating code status and transferring pt to ICU due to requiring a higher level of care. Bed was dirty, would call report when room is clean. Continue to monitor and support.

## 2018-01-03 NOTE — PROGRESS NOTES
Adult Nutrition  Assessment/PES    Patient Name:  Bjorn Pollock  YOB: 1934  MRN: [id number]  Admit Date:  12/29/2017    Assessment Date:  1/2/2018    Comments:            Reason for Assessment       01/02/18 2026    Reason for Assessment    Reason For Assessment/Visit follow up protocol    Time Spent (min) 20    Diagnosis --   per notes this adm and    Pulmonary/Critical Care Acute respiratory failure   on BiPAP                  Labs/Tests/Procedures/Meds       01/02/18 2027    Labs/Tests/Procedures/Meds    Labs/Tests Review Reviewed;BNP    Procedure Review SLP                Nutrition Prescription Ordered       01/02/18 2027    Nutrition Prescription PO    Current PO Diet Regular    Common Modifiers Cardiac;Consistent Carbohydrate    Fluid Restriction mL per Day 1000 mL            Evaluation of Received Nutrient/Fluid Intake       01/02/18 2027    PO Evaluation    Number of Meals 5    % PO Intake 0            Problem/Interventions:          Problem 2       01/02/18 2028    Nutrition Diagnoses Problem 2    Problem 2 Inadequate Intake/Infusion    Etiology (related to) --   clinical condition, unable to take food at this time.    Signs/Symptoms (evidenced by) PO Intake    Percent (%) intake recorded 0 %    Over number of meals 5                  Intervention Goal       01/02/18 2029    Intervention Goal    General Nutrition support treatment            Nutrition Intervention       01/02/18 2029    Nutrition Intervention    RD/Tech Action Follow Tx progress;Care plan reviewd            Nutrition Prescription       01/02/18 2029    Nutrition Prescription PO    PO Prescription Begin/change diet    Begin/Change Diet to Dysphagia   Level 3 per SLP rec    Fluid Consistency Nectar/syrup thick    Common Modifiers Cardiac;Consistent Carbohydrate    Fluid Restriction mL per Day 1000 mL    New PO Prescription Ordered? Yes   w note to call RN to determine if tray approp.            Education/Evaluation        01/02/18 2030    Monitor/Evaluation    Monitor Per protocol;Symptoms;PO intake;Pertinent labs        Electronically signed by:  Opal Moralez RD  01/02/18 8:31 PM

## 2018-01-03 NOTE — PROGRESS NOTES
"Pharmacy Consult  -  Warfarin    Bjorn Pollock is an 83 y.o. male   Height - 182.9 cm (72\")  Weight - 110 kg (242 lb 8 oz)    Consulting Provider: - Hospitalist   Indication: - A-fib  Goal INR: - 2-3  Home Regimen:   - Warfarin 5mg daily except wednesdays (holds)   - 30mg weekly     Bridge Therapy: No    Drug-Drug Interactions with current regimen:   None     Warfarin Dosing During Admission:    Date  12/29 12/30 12/31 1/1 1/2  1/3      INR  4.38 5.15 6.01 4.91 3.6 3.72      Dose  HOLD HOLD HOLD HOLD HOLD HOLD        Education Provided: Will need to assess during admission    Labs:    Results from last 7 days   Lab Units 01/03/18  0505 01/02/18 1659 01/02/18  0454 01/01/18  0744 12/31/17  0545 12/30/17  1126 12/29/17  1133   INR  3.72 --  3.60 4.91 6.01 5.15 4.38   HEMOGLOBIN g/dL 11.1* 11.8* --  --  11.8* 12.0* 12.8*   HEMATOCRIT % 38.1* 39.9 --  --  39.5 40.6 42.5   PLATELETS 10*3/mm3 109* 133* --  --  108* 131* 128*     Results from last 7 days   Lab Units 01/03/18  0505 01/02/18 1659 01/02/18  0454  12/29/17  1133   SODIUM mmol/L 146 145 139 < > 138   POTASSIUM mmol/L 3.5 3.7 3.6 < > 3.9   CHLORIDE mmol/L 104 105 99 < > 102   CO2 mmol/L 32.0* 28.0 27.0 < > 23.0   BUN mg/dL 56* 50* 50* < > 22   CREATININE mg/dL 1.60* 1.40* 1.30 < > 1.10   CALCIUM mg/dL 9.2 9.7 9.6 < > 9.3   BILIRUBIN mg/dL 1.2 --  --  --  1.6*   ALK PHOS U/L 74 --  --  --  90   ALT (SGPT) U/L 15 --  --  --  12   AST (SGOT) U/L 19 --  --  --  22   GLUCOSE mg/dL 145* 145* 137* < > 125*   < > = values in this interval not displayed.     Current dietary intake: Poor PO intake 1/1    Assessment/Plan:     1. INR today is SUPRAtherapeutic, Will continue to hold warfarin.  2. Continue to monitor INR daily, dietary intake, drug-drug interactions and for s/sx of bleeding and clotting.  3. Pharmacy will continue to follow.  Thanks,  Chanel Meier PharmD               "

## 2018-01-03 NOTE — PLAN OF CARE
Problem: Patient Care Overview (Adult)  Goal: Plan of Care Review  Outcome: Ongoing (interventions implemented as appropriate)   01/03/18 1530   Coping/Psychosocial Response Interventions   Plan Of Care Reviewed With spouse;daughter  (Ayah)   Patient Care Overview   Progress no change   Outcome Evaluation   Outcome Summary/Follow up Plan Observable increased work of breathing with agitation relieved with morphine administered by unit RN. Palliative following for symptom management and ongoing GOC discussion.

## 2018-01-03 NOTE — PROGRESS NOTES
"  [facility]  PROGRESS NOTE    Date of Admission: 12/29/2017  Length of Stay: 5  Primary Care Physician: Vincent Mccullough MD    Chief Complaint: f/u A/C SHF   Problem List:   1. Coronary artery disease:  a. CABG x3 in December 1989.  b. Normal left ventricular systolic function.  c. New York Heart Association class I heart failure symptoms.  d.  of collateralized RCA with patent LAD graft in August 2005.  e. MUGA, ejection fraction 63%, 05/09/2015.  f. Echocardiogram December 2014, showed normal left ventricular ejection function of 55% to 60%.   g. EF 36-40% 9/2017  h. Admission for acute on chronic SHF January 2017  2. Chronic Atrial fibrillation:  a. CHADS-VASc of 5.  b. Chronic anticoagulation with Warfarin   3. Cerebrovascular accident:  a. Remote CVA and TIA. Questionable relationship to atrial fibrillation.  4. Obstructive sleep apnea on CPAP.   5. Obesity.   6. Dyslipidemia.   7. Diabetes mellitus type 2.  8. Anemia.  9. Degenerative joint disease.  10. BPH.  11. Chronic lower extremity edema due to venous stasis  12.  Chronic Constipation  13. Urinary Incontinence    Subjective      Events noted, patient asleep after receiving Ativan for agitation and confusion.       Objective   Vitals: /55  Pulse 59  Temp 97.4 °F (36.3 °C) (Axillary)   Resp 22  Ht 182.9 cm (72\")  Wt 110 kg (242 lb 8 oz)  SpO2 97%  BMI 32.89 kg/m2    Physical Exam:  GENERAL: Asleep, in no acute distress.   HEENT: Fundoscopic deferred, otherwise unremarkable.  HEART: No discrete PMI is noted. Irregular rhythm, normal rate  LUNGS: Ronchi bilaterally, on BiPAP  ABDOMEN: Flat without evidence of organomegaly, masses, or tenderness.  NEUROLOGIC: per intensivist  EXTREMITIES: No clubbing, cyanosis, or edema noted.     Results:    Results from last 7 days  Lab Units 01/03/18  0505 01/02/18  1659 12/31/17  0545   WBC 10*3/mm3 5.41 8.55 8.42   HEMOGLOBIN g/dL 11.1* 11.8* 11.8*   HEMATOCRIT % " 38.1* 39.9 39.5   PLATELETS 10*3/mm3 109* 133* 108*       Results from last 7 days  Lab Units 01/03/18  0505 01/02/18  1659 01/02/18  0454   SODIUM mmol/L 146 145 139   POTASSIUM mmol/L 3.5 3.7 3.6   CHLORIDE mmol/L 104 105 99   CO2 mmol/L 32.0* 28.0 27.0   BUN mg/dL 56* 50* 50*   CREATININE mg/dL 1.60* 1.40* 1.30   GLUCOSE mg/dL 145* 145* 137*      Lab Results   Component Value Date    CHOL 102 09/28/2017    TRIG 89 09/28/2017    HDL 30 (L) 09/28/2017    LDLDIRECT 58 09/28/2017    AST 19 01/03/2018    ALT 15 01/03/2018       Results from last 7 days  Lab Units 12/30/17  1126   HEMOGLOBIN A1C % 5.60       Results from last 7 days  Lab Units 01/03/18  0505 01/02/18  1659 01/02/18  0454   BNP pg/mL 833.0* 1023.0* 1013.0*       Results from last 7 days  Lab Units 01/03/18  0505 01/02/18  0454 01/01/18  0744   PROTIME Seconds 42.2* 40.8* 56.2*   INR  3.72 3.60 4.91       Intake/Output Summary (Last 24 hours) at 01/03/18 1021  Last data filed at 01/03/18 0600   Gross per 24 hour   Intake            166.6 ml   Output             2350 ml   Net          -2183.4 ml     I personally reviewed the patient's EKG/Telemetry data    Radiology Data:   CXR 1/3/2018:  FINDINGS: Cardiac size enlarged with central pulmonary vascularity  increased, however, decreased in overall prominence from prior. Improved  aeration bibasilar segments with decreased opacifications from prior. No  new parenchymal disease. No pneumothorax with probable trace bilateral  pleural effusions decreased in prominence from prior.      IMPRESSION:  Decrease in overall pulmonary vascularity prominence from  prior as well as interval decrease in bibasilar opacities and effusions  from prior. No new parenchymal disease.    Current Medications:    acetylcysteine 4 mL Nebulization BID - RT   aspirin 81 mg Oral Daily   atorvastatin 20 mg Oral Nightly   castor oil-balsam peru 1 application Topical Q12H   famotidine 40 mg Oral Daily   ferrous sulfate 325 mg Oral Daily  With Breakfast   furosemide 40 mg Intravenous Daily   insulin lispro 0-7 Units Subcutaneous 4x Daily With Meals & Nightly   ipratropium-albuterol 3 mL Nebulization Q4H - RT   lidocaine 1 patch Transdermal Q24H   metoprolol tartrate 50 mg Oral Q12H   miconazole  Topical Q12H   nystatin  Topical Q12H   pharmacy consult - MTM  Does not apply Daily   piperacillin-tazobactam 4.5 g Intravenous Q8H   sertraline 100 mg Oral Daily   tamsulosin 0.4 mg Oral Daily   vancomycin 1,250 mg Intravenous Q24H   vitamin C 250 mg Oral TID   warfarin (COUMADIN) (dosing per levels)  Does not apply Daily   warfarin 2 mg Oral Once       [START ON 1/4/2018] Pharmacy Consult     dexmedetomidine 0.2-1.5 mcg/kg/hr Last Rate: Stopped (01/03/18 0738)   Pharmacy to dose vancomycin     Pharmacy to dose warfarin         Assessment and Plan:     1.  Acute on Chronic Systolic CHF  -  .  Was 188 (baseline) on 11/21/17.  -  Strict I/Os, daily weights  -  Diuresing with IV Lasix 40mg daily, Cr has bumped today from 1.3-->1.6 diagnostic of BONILLA        2.  Chronic Atrial Fibrillation  - Metoprolol increased to 50mg BID  - on Warfarin, currently held due to supratherapeutic INR on admission      4.  Acute on Chronic Respiratory Failure  -  Multi factorial  -  Continue support.       5. BONILLA on CKD  - closely monitor       6.  COPD    7. DNI otherwise full code.     The patient is well known to me. Cardiorenal issues are noted. Palliative care may be the best choice at this time. No family in room at this time.    I, Aly Vivas MD, personally performed the services described as documented by the above named individual. I have made any necessary edits and it is both accurate and complete 1/3/2018  7:20 PM    Scribed for Aly Vivas MD by Damari Webb PA-C.

## 2018-01-03 NOTE — SIGNIFICANT NOTE
Dr. Caraballo notified of patient severe agitation, c/o soa with Bipap off and extremely restless/agitated screaming for help with bipap on.  Updated Dr caraballo that patient is not tolerating precedex, hr dropping to 30's with any titration in gtt. Patient also confused.

## 2018-01-03 NOTE — PATIENT CARE CONFERENCE
ICU ROUNDS: Pt s/p transfer to ICU. Not medically appropriate for therapy participation today. Will HOLD PT/OT at this time. Family to discuss goals of care.

## 2018-01-03 NOTE — CONSULTS
Vincent Mccullough MD  Consulting physician: Martin    Chief Complaint   Patient presents with   • Shortness of Breath         HPI: Patient is currently on the BiPAP and is unresponsive.  History of present illness comes chart review as well as talking medical staff.  Patient has a long history of congestive heart failure with multiple hospitalizations, about 4-5 hospitalizations over the last 6 or so months.  Since coming hospital patient has a probably continue to decline getting to the point where he is now BiPAP dependent.  Nursing staff does report that when her to take the BiPAP off he gets extremely hypoxic with his SPO2 dropping into the 60s to 70s.  Nursing staff also reports patient has been having some apparent respiratory distress and dyspnea as well as some restlessness, however they feel that the Ativan has helped this dramatically.      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   • Necrotizing fasciitis     R arm; s/p skin graft   • Obesity    • NAYLA on CPAP    • Pneumothorax    • Stroke     x3   • Thrombocytopenia    • Thrombocytopenia    • Urinary incontinence    • Vitamin B12 deficiency      Past Surgical History:   Procedure Laterality Date   • ANKLE SURGERY     • BACK SURGERY     • CARDIAC CATHETERIZATION     • CATARACT  EXTRACTION      cataracts   • CHOLECYSTECTOMY     • CORONARY ARTERY BYPASS GRAFT  12/1989    x3   • CORONARY ARTERY BYPASS GRAFT  08/2005     of collateralized RCA with patent LAD graft in August 2005.   • KNEE SURGERY     • TONSILLECTOMY     • TOTAL HIP ARTHROPLASTY Bilateral    • TURP / TRANSURETHRAL INCISION / DRAINAGE PROSTATE       Current Facility-Administered Medications   Medication Dose Route Frequency Provider Last Rate Last Dose   • [START ON 1/4/2018] ! Vanc trough 1/4 @ 0830 - please hold dose for level > 20 !   Does not apply Continuous PRN [facility]       • acetaminophen (TYLENOL) tablet 650 mg  650 mg Oral Q4H PRN Katia Thomas, APRN       • aspirin EC tablet 81 mg  81 mg Oral Daily Katia Thomas APRN   81 mg at 01/02/18 0841   • atorvastatin (LIPITOR) tablet 20 mg  20 mg Oral Nightly Katia Thomas APRN   20 mg at 01/01/18 2044   • castor oil-balsam peru (VENELEX) ointment 1 application  1 application Topical Q12H Katia Thomas APRN   1 application at 01/03/18 1507   • cyclobenzaprine (FLEXERIL) tablet 5 mg  5 mg Oral TID PRN Katia Thomas APRN   5 mg at 12/30/17 0120   • dexmedetomidine (PRECEDEX) 400 mcg/100 mL (4 mcg/mL) infusion  0.2-1.5 mcg/kg/hr Intravenous Titrated Rambo Salazar MD   Stopped at 01/03/18 0738   • famotidine (PEPCID) tablet 40 mg  40 mg Oral Daily Katia Thomas APRN   40 mg at 01/02/18 0842   • ferrous sulfate tablet 325 mg  325 mg Oral Daily With Breakfast Katia Thomas APRN   325 mg at 01/02/18 0842   • furosemide (LASIX) injection 40 mg  40 mg Intravenous Daily Aly Soriano MD   40 mg at 01/03/18 0917   • haloperidol lactate (HALDOL) injection 5 mg  5 mg Intravenous Q6H PRN Juan Parra MD       • insulin lispro (humaLOG) injection 0-7 Units  0-7 Units Subcutaneous 4x Daily With Meals & Nightly Katia Thomas, APRN   2 Units at 12/31/17 8009   •  ipratropium-albuterol (DUO-NEB) nebulizer solution 3 mL  3 mL Nebulization Q4H - RT Ulises Wilson MD   3 mL at 01/03/18 1233   • lidocaine (LIDODERM) 5 % 1 patch  1 patch Transdermal Q24H MARLENE Hicks   Stopped at 01/03/18 0943   • LORazepam (ATIVAN) injection 1 mg  1 mg Intravenous Q6H PRN Juan Parra MD       • metoprolol tartrate (LOPRESSOR) tablet 50 mg  50 mg Oral Q12H Aly Soriano MD   50 mg at 01/02/18 0841   • miconazole (MICOTIN) 2 % cream   Topical Q12H Geeta Wilcox MD       • Morphine sulfate (PF) injection 2 mg  2 mg Intravenous Q1H PRN Justin Shin DO   2 mg at 01/03/18 1500   • Morphine sulfate (PF) injection 4 mg  4 mg Intravenous Q1H PRN Justin Shin DO       • nitroglycerin (NITROSTAT) SL tablet 0.4 mg  0.4 mg Sublingual Q5 Min PRN Aletha Laurent MD       • nystatin (MYCOSTATIN) powder   Topical Q12H MARLENE Hicks       • Pharmacy Consult - MTM   Does not apply Daily [facility]   Stopped at 01/03/18 0925   • piperacillin-tazobactam (ZOSYN) 4.5 g/100 mL 0.9% NS IVPB (mbp)  4.5 g Intravenous Q8H Ulises Wilson MD   4.5 g at 01/03/18 1418    And   • Pharmacy to dose vancomycin   Does not apply Continuous PRN Ulises Wilson MD       • Pharmacy to dose warfarin   Does not apply Continuous PRN MARLENE Hicks       • sertraline (ZOLOFT) tablet 100 mg  100 mg Oral Daily MARLENE Hicks   100 mg at 01/02/18 0841   • sodium chloride 0.9 % flush 1-10 mL  1-10 mL Intravenous PRN MARLENE Hicks       • sodium chloride 0.9 % flush 10 mL  10 mL Intravenous PRN Leonid Glynn MD       • tamsulosin (FLOMAX) 24 hr capsule 0.4 mg  0.4 mg Oral Daily Geeta Wilcox MD   0.4 mg at 01/02/18 0842   • vancomycin 1250 mg/250 mL 0.9% NS IVPB ([facility])  1,250 mg Intravenous Q24H Marlena Hopson RPH   1,250 mg at 01/03/18 1006   • vitamin C (ASCORBIC ACID) tablet 250 mg  250 mg Oral TID Katia Maurer  "MARLENE Thomas   250 mg at 01/02/18 0842   • warfarin (COUMADIN) (dosing per levels)   Does not apply Daily Katia Maurer MARLENE Thomas   Stopped at 01/01/18 1805       [START ON 1/4/2018] Pharmacy Consult     dexmedetomidine 0.2-1.5 mcg/kg/hr Last Rate: Stopped (01/03/18 0738)   Pharmacy to dose vancomycin     Pharmacy to dose warfarin       •  [START ON 1/4/2018] Pharmacy Consult  •  acetaminophen  •  cyclobenzaprine  •  haloperidol lactate  •  LORazepam  •  Morphine  •  Morphine  •  nitroglycerin  •  piperacillin-tazobactam **AND** [COMPLETED] vancomycin **AND** Pharmacy to dose vancomycin  •  Pharmacy to dose warfarin  •  sodium chloride  •  sodium chloride  Allergies   Allergen Reactions   • Bactrim [Sulfamethoxazole-Trimethoprim] Other (See Comments)     Extreme weakness    • Phenergan [Promethazine Hcl]    • Promethazine      Family History   Problem Relation Age of Onset   • Obesity Daughter    • Breast cancer Sister    • Meniere's disease Son    • Obesity Daughter      Social History     Social History   • Marital status:      Spouse name: N/A   • Number of children: N/A   • Years of education: N/A     Occupational History   • Not on file.     Social History Main Topics   • Smoking status: Former Smoker     Packs/day: 1.00     Years: 11.00     Types: Cigarettes     Start date: 1975     Quit date: 1986   • Smokeless tobacco: Never Used   • Alcohol use No   • Drug use: No   • Sexual activity: Defer     Other Topics Concern   • Not on file     Social History Narrative    Lives at home with his wife and adult daughter.  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  Pt has HH for wound care/sk nsg.       Review of Systems - Unable to obtain secondary to patient been unresponsive      /74 (BP Location: Left arm, Patient Position: Lying)  Pulse 85  Temp 98 °F (36.7 °C) (Axillary)   Resp 27  Ht 182.9 cm (72\")  Wt 110 kg (242 lb 8 oz)  SpO2 90%  BMI 32.89 kg/m2    Intake/Output Summary (Last 24 hours) at 01/03/18 " 1521  Last data filed at 01/03/18 1418   Gross per 24 hour   Intake            532.6 ml   Output             3825 ml   Net          -3292.4 ml     Physical Exam:      General Appearance:    Unresponsive, BiPAP in place    Head:    Normocephalic, without obvious abnormality, atraumatic   Eyes:            Lids and lashes normal, conjunctivae and sclerae normal, no   icterus, no pallor, corneas clear, PERRLA   Ears:    Ears appear intact with no abnormalities noted   Throat:   No oral lesions, no thrush, oral mucosa moist   Neck:   No adenopathy, supple, trachea midline, no thyromegaly, no   carotid bruit, no JVD   Back:     No kyphosis present, no scoliosis present, no skin lesions,      erythema or scars, no tenderness to percussion or                   palpation,   range of motion normal   Lungs:     Coarse crackles noted throughout     Heart:    Regular rhythm and normal rate, normal S1 and S2, no            murmur, no gallop, no rub, no click   Chest Wall:    No abnormalities observed   Abdomen:     Normal bowel sounds, no masses, no organomegaly, soft        non-tender, non-distended, no guarding, no rebound                tenderness   Rectal:     Deferred   Extremities:   Edema noted both lower extremities    Pulses:   Pulses palpable and equal bilaterally   Skin:   No bleeding, bruising or rash   Lymph nodes:   No palpable adenopathy             Results from last 7 days  Lab Units 01/03/18  0505   WBC 10*3/mm3 5.41   HEMOGLOBIN g/dL 11.1*   HEMATOCRIT % 38.1*   PLATELETS 10*3/mm3 109*       Results from last 7 days  Lab Units 01/03/18  0505   SODIUM mmol/L 146   POTASSIUM mmol/L 3.5   CHLORIDE mmol/L 104   CO2 mmol/L 32.0*   BUN mg/dL 56*   CREATININE mg/dL 1.60*   CALCIUM mg/dL 9.2   BILIRUBIN mg/dL 1.2   ALK PHOS U/L 74   ALT (SGPT) U/L 15   AST (SGOT) U/L 19   GLUCOSE mg/dL 145*       Results from last 7 days  Lab Units 01/03/18  0505   SODIUM mmol/L 146   POTASSIUM mmol/L 3.5   CHLORIDE mmol/L 104   CO2  mmol/L 32.0*   BUN mg/dL 56*   CREATININE mg/dL 1.60*   GLUCOSE mg/dL 145*   CALCIUM mg/dL 9.2     Imaging Results (last 72 hours)     Procedure Component Value Units Date/Time    XR Chest 1 View [[id number]] Collected:  01/01/18 0758     Updated:  01/01/18 1704    Narrative:       EXAMINATION: XR CHEST 1 VW-      INDICATION: Dyspnea; I50.33-Acute on chronic diastolic (congestive)  heart failure; R06.02-Shortness of breath; Z74.09-Other reduced  mobility.      COMPARISON: Chest x-ray 12/29/2017.     FINDINGS: Cardiac silhouette enlarged. Increased patchy ill-defined  pulmonary opacifications right perihilar and right basilar segments.  This is consistent with worsening airspace disease. No discrete  pneumothorax with trace bilateral pleural effusions.           Impression:       Increase in ill-defined right lower lobe pulmonary  opacifications consistent with worsening airspace disease.     D:  01/01/2018  E:  01/01/2018     This report was finalized on 1/1/2018 5:02 PM by Dr. Luis M Leija.       XR Chest 1 View [[id number]] Collected:  01/02/18 0935     Updated:  01/02/18 1151    Narrative:       EXAMINATION: XR CHEST 1 VW-01/02/2018:      INDICATION: SOA; I50.33-Acute on chronic diastolic (congestive) heart  failure; R06.02-Shortness of breath; Z74.09-Other reduced mobility.      COMPARISON: 01/01/2018.     FINDINGS: The heart is large. There are patchy perihilar and bibasilar  airspace changes, right greater than left. There has been no significant  change since the previous examination of the previous day.           Impression:       There has been no change since the previous examination.     D:  01/02/2018  E:  01/02/2018     This report was finalized on 1/2/2018 11:49 AM by Dr. Emerson Mackenzie MD.       XR Chest 1 View [[id number]] Collected:  01/03/18 0810     Updated:  01/03/18 1030    Narrative:       EXAMINATION: XR CHEST 1 VW-01/03/2018:      INDICATION: F/U CHF/pneumonia; I50.33-Acute on chronic  diastolic  (congestive) heart failure; R06.02-Shortness of breath; Z74.09-Other  reduced mobility; R13.10-Dysphagia, unspecified.      COMPARISON: Chest x-ray 01/02/2018.     FINDINGS: Cardiac size enlarged with central pulmonary vascularity  increased, however, decreased in overall prominence from prior. Improved  aeration bibasilar segments with decreased opacifications from prior. No  new parenchymal disease. No pneumothorax with probable trace bilateral  pleural effusions decreased in prominence from prior.       Impression:       Decrease in overall pulmonary vascularity prominence from  prior as well as interval decrease in bibasilar opacities and effusions  from prior. No new parenchymal disease.     D:  01/03/2018  E:  01/03/2018     This report was finalized on 1/3/2018 10:28 AM by Dr. Luis M Leija.           Impression: Congestive heart failure  Hypoxic respiratory failure  Dyspnea  Restlessness  Goals of care  Plan: I did have a conversation with the patient's daughter over the phone.  Overall the patient is a DNI but the family would want to pursue the patient been resuscitated and are interested in CPR.  Since this is very been discussed and the daughter is adamant that the do not want to continue to have this discussion for present time we will worked primarily in supporting the family.  I did talk to the daughter about symptom management as she is okay with the patient having more pain stating that morphine has helped him before in the past.        Justin Shin,   01/03/18  3:21 PM

## 2018-01-03 NOTE — PLAN OF CARE
Problem: Respiratory Insufficiency (Adult)  Intervention: Provide Oxygenation/Ventilation/Perfusion Support   01/03/18 1244   Safety Interventions   Medication Review/Management medications reviewed   Activity   Activity Type activity minimized   Positioning   Head Of Bed (HOB) Position HOB at 30-45 degrees   Respiratory Interventions   Airway/Ventilation Management airway patency maintained;calming measures promoted;humidification applied;pulmonary hygiene promoted

## 2018-01-03 NOTE — PROGRESS NOTES
INTENSIVIST   PROGRESS NOTE     Hospital:  LOS: 5 days   Subjective   Mr. Bjorn Pollock, 83 y.o. male is followed for:    Respiratory failure  Heart failure  Delirium    As an Intensivist, we provide an integrated approach to the ICU patient and family, medical management of comorbid conditions, lead interdisciplinary rounds and coordinate the care with all other services, including those from other specialists.     S     Interval History:  Restless. Screaming from time to time.  Unable to carry a conversation.     The patient's relevant past medical, surgical and social history were reviewed and updated in Epic as appropriate.      ROS:   ROS cannot be reliably obtained from the patient due to his Altered Mental Status.    Objective   O     Vitals:  Temp: 98 °F (36.7 °C) Temp  Min: 97.4 °F (36.3 °C)  Max: 98.1 °F (36.7 °C)   BP: 122/62 BP  Min: 82/48  Max: 138/83   Pulse: 88 Pulse  Min: 55  Max: 118   Resp: 28 Resp  Min: 18  Max: 48   SpO2: 95 % SpO2  Min: 90 %  Max: 100 %   Device: BiPAP    Flow Rate: 40 Flow (L/min)  Min: 15  Max: 50     Intake/Ouptut 24 hrs (7:00AM - 6:59 AM)  Intake & Output (last 3 days)       12/31 0701 - 01/01 0700 01/01 0701 - 01/02 0700 01/02 0701 - 01/03 0700 01/03 0701 - 01/04 0700    P.O.  450 0     I.V. (mL/kg)   66.6 (0.6) 16 (0.1)    IV Piggyback   200 350    Total Intake(mL/kg)  450 (4.1) 266.6 (2.4) 366 (3.3)    Urine (mL/kg/hr) 1400 (0.5) 1700 (0.6) 2350 (0.9) 550 (0.9)    Stool 0 (0) 0 (0) 0 (0)     Total Output 1400 1700 2350 550    Net -1400 -1250 -2083.4 -184            Unmeasured Stool Occurrence 2 x 2 x 1 x           Medications (drips):    [START ON 1/4/2018] Pharmacy Consult    dexmedetomidine Last Rate: Stopped (01/03/18 0738)   Pharmacy to dose vancomycin    Pharmacy to dose warfarin        Physical Examination    Telemetry:     Atrial Rhythm: atrial fibrillation      Cardiovascular: IRR  No murmurs, gallop or rub.   Respiratory: No respiratory distress. Normal  respiratory effort.  Decreased BS.  Rhonchi.   Abdominal:  Soft. No masses. Non-tender. No distension. No HSM.   Extremities: No digital cyanosis. No clubbing.  No peripheral edema.   Neurological:   Alert and Oriented to person, place, and time.  Best Eye Response: 4-->(E4) spontaneous  Best Motor Response: 6-->(M6) obeys commands  Best Verbal Response: 3-->(V3) inappropriate words  [place] Coma Scale Score: 13   Lines/Drains/Airways: White     Hematology:    Results from last 7 days  Lab Units 01/03/18  0505 01/02/18  1659 12/31/17  0545   WBC 10*3/mm3 5.41 8.55 8.42   HEMOGLOBIN g/dL 11.1* 11.8* 11.8*   MCV fL 90.7 90.7 90.2   PLATELETS 10*3/mm3 109* 133* 108*     Electrolytes, Magnesium and Phosphorus:    Results from last 7 days  Lab Units 01/03/18  0505 01/02/18 1659 01/02/18  0454   SODIUM mmol/L 146 145 139   POTASSIUM mmol/L 3.5 3.7 3.6   CO2 mmol/L 32.0* 28.0 27.0   MAGNESIUM mg/dL 2.2  --   --    PHOSPHORUS mg/dL 3.0  --   --      Renal:    Results from last 7 days  Lab Units 01/03/18  0505 01/02/18  1659 01/02/18  0454 01/01/18  0744 12/31/17  0545 12/30/17  1126   CREATININE mg/dL 1.60* 1.40* 1.30 1.20 1.10 1.10   BUN mg/dL 56* 50* 50* 41* 25* 19     Estimated Creatinine Clearance: 44.8 mL/min (by C-G formula based on Cr of 1.6).    Hepatic:    Results from last 7 days  Lab Units 01/03/18  0505 12/29/17  1133   ALK PHOS U/L 74 90   BILIRUBIN mg/dL 1.2 1.6*   ALT (SGPT) U/L 15 12   AST (SGOT) U/L 19 22       Results from last 7 days  Lab Units 01/03/18  0505   PLATELETS 10*3/mm3 109*   PROTIME Seconds 42.2*   INR  3.72       Cardiac:      Lab Results   Component Value Date    .0 (H) 01/03/2018    BNP 1023.0 (H) 01/02/2018    BNP 1013.0 (H) 01/02/2018       Arterial Blood Gases:    Results from last 7 days  Lab Units 01/03/18  0459 01/02/18  2103 01/02/18  1652 01/01/18  1344 12/29/17  1210   PH, ARTERIAL pH units 7.379 7.261* 7.330 7.342* 7.344*   PCO2, ARTERIAL mm Hg 51.8* 63.6* 56.3 51.5* 43.9    PO2 ART mm Hg 73.3* 89.7 76.5 87.0 102.0   FIO2 % 40 50  --  40 28       Images:  CXR 1/3/2018 Decrease in overall pulmonary vascularity prominence from prior as well as interval decrease in bibasilar opacities and effusions from prior. No new parenchymal disease     Echo:  Results for orders placed during the hospital encounter of 09/19/17   Adult Transthoracic Echo Complete W/ Cont if Necessary Per Protocol    Narrative · There is four chamber cardiac enlargement.  · Global and segmental LV wall motion abnormalities are seen with an   estimated EF=36%-40%.  · Left ventricular wall thickness is consistent with concentric   hypertrophy, mild.  · Mitral annular calcification with mild-moderate MR is appreciated.  · Aortic valve sclerosis with moderate aortic insufficiency is seen.  · The LV examination is suboptimal as the patient refused Lumason.          Results: Reviewed.  I reviewed the patient's new laboratory and imaging results.  I independently reviewed the patient's new images.    Medications: Reviewed.    Assessment/Plan   A / P     83 y.o.male, admitted on 12/29/2017 with Acute on chronic diastolic congestive heart failure [I50.33]:     1. Encephalopathy/Delirium  2. Respiratory failure  1. ABG: Compensated respiratory acidosis  2. BiPAP  3. COPD  4. NAYLA - CPAP at home  5. HFrEF with exacerbation  3. A Fibrillation, Chronic  1. Anticoagualion  4. BONILLA/CKD  5. TCY  6. ABS: PIP-Tazo + Vanco IV  7. T2DM    Results from last 7 days  Lab Units 01/03/18  1204 01/03/18  0739 01/02/18  2128 01/02/18  1705 01/02/18  1114 01/02/18  0723   GLUCOSE mg/dL 132* 141* 165* 138* 140* 132*       Lab Results  Lab Value Date/Time   HGBA1C 5.60 12/30/2017 1126   HGBA1C 5.60 11/22/2017 0453        Nutrition:  Diet Dysphagia; III - Pureed With Some Mashed; Nectar / Syrup Thick; Cardiac, Consistent Carbohydrate, Daily Fluid Restriction; 1000 mL Fluid   Advance Directives: Conditional Code       Assessment /  "Plan:    1. Palliative consult and re-assess goals of care.  2. Haldol  3. Benzodiazepines  4. Dexmedetomidine   5. Prognosis seems poor.  6. \"CODE without intubation\". DNI.    Plan of care and goals reviewed during interdisciplinary rounds.  I discussed the patient's findings and my recommendations with nursing staff    Time: was greater than 35 minutes.    (This excludes time spent performing separately reportable procedures and services).  Patient was at high risk of imminent or life-threatening deterioration in his condition due to CNS dysfunction.     Juan Parra MD, FACP, FCCP, CNSC  Intensive Care Medicine, Nutrition Support and Pulmonary Medicine       "

## 2018-01-03 NOTE — PROGRESS NOTES
Adult Nutrition  Assessment/PES    Patient Name:  Bjorn Pollock  YOB: 1934  MRN: [id number]  Admit Date:  12/29/2017    Assessment Date:  1/3/2018            Reason for Assessment       01/03/18 1218    Reason for Assessment    Reason For Assessment/Visit multidisciplinary rounds;follow up protocol    Time Spent (min) 30    Diagnosis --   per notes this adm   Principal Problem:    Acute exacerbation CHF  Active Problems:    DM type 2 (diabetes mellitus, type 2)    COPD (chronic obstructive pulmonary disease)    NAYLA (obstructive sleep apnea)    Atrial fibrillation    CAD (coronary artery disease) s/p CABG history     Chronic kidney disease    Urinary incontinence             Nutrition/Diet History       01/03/18 1219    Nutrition/Diet History    Reported/Observed By RN;MD    Other per MD- pt not appropriate to start nutrition support as plan for palliative consult for GOC              Labs/Tests/Procedures/Meds       01/03/18 1221    Labs/Tests/Procedures/Meds    Labs/Tests Review Reviewed;BUN;BNP    Medication Review Reviewed, pertinent   precedex     Results from last 7 days  Lab Units 01/03/18  0505   SODIUM mmol/L 146   POTASSIUM mmol/L 3.5   CHLORIDE mmol/L 104   CO2 mmol/L 32.0*   BUN mg/dL 56*   CREATININE mg/dL 1.60*   CALCIUM mg/dL 9.2   BILIRUBIN mg/dL 1.2   ALK PHOS U/L 74   ALT (SGPT) U/L 15   AST (SGOT) U/L 19   GLUCOSE mg/dL 145*                Nutrition Prescription Ordered       01/03/18 1221    Nutrition Prescription PO    Current PO Diet Dysphagia    Dysphagia Level 3  Pureed with some mashed    Fluid Consistency Nectar/syrup thick    Supplement --   Premier Protein (family bringing from home)    Supplement Frequency 3 times a day    Common Modifiers Cardiac;Consistent Carbohydrate            Evaluation of Received Nutrient/Fluid Intake       01/03/18 1222    PO Evaluation    Number of Meals 6    % PO Intake 0            Problem/Interventions:        Problem 1       01/03/18 1222     Nutrition Diagnoses Problem 1    Problem 1 No Nutrition Diagnosis at this Time   palliative consult pending, GOC pending                    Intervention Goal       01/03/18 1223    Intervention Goal    General --   palliative consult pending            Nutrition Intervention       01/03/18 1225    Nutrition Intervention    RD/Tech Action Care plan reviewd;Follow Tx progress            Nutrition Prescription       01/03/18 1225    Other Orders    Other will continue to follow and provide nutrition support recs if medically appropriate            Education/Evaluation       01/03/18 1225    Monitor/Evaluation    Monitor Per protocol   POC/GOC        Electronically signed by:  Fátima Crisostomo MS RD/LD CNSC  01/03/18 12:26 PM

## 2018-01-03 NOTE — PLAN OF CARE
Problem: Skin Integrity Impairment, Risk/Actual (Adult)  Goal: Skin Integrity/Wound Healing  Outcome: Ongoing (interventions implemented as appropriate)      Problem: Fall Risk (Adult)  Goal: Absence of Falls  Outcome: Ongoing (interventions implemented as appropriate)      Problem: Fluid Volume Excess (Adult,Obstetrics,Pediatric)  Goal: Stable Weight  Outcome: Ongoing (interventions implemented as appropriate)    Goal: Balanced Intake/Output  Outcome: Ongoing (interventions implemented as appropriate)      Problem: Patient Care Overview (Adult)  Goal: Plan of Care Review  Outcome: Ongoing (interventions implemented as appropriate)   01/03/18 4192   Coping/Psychosocial Response Interventions   Plan Of Care Reviewed With patient;spouse;daughter;family   Patient Care Overview   Progress declining   Outcome Evaluation   Outcome Summary/Follow up Plan Patient progressively declining all day. Precedex d/c'd due to low hr. Paitnet extremely agitated and restless rr 41, ativan /morphine staerted for respiratory distress and pain control. pallative consulted for end of life care and goal of care. Patient wiife doesnot want to make patinetAND confort measures, however wants to keep patinet comfortable.        Problem: Respiratory Insufficiency (Adult)  Goal: Acid/Base Balance  Outcome: Ongoing (interventions implemented as appropriate)    Goal: Effective Ventilation  Outcome: Ongoing (interventions implemented as appropriate)      Problem: Dying Patient, Actively (Adult)  Goal: Identify Related Risk Factors and Signs and Symptoms  Outcome: Ongoing (interventions implemented as appropriate)    Goal: Comfort/Pain Control  Outcome: Ongoing (interventions implemented as appropriate)    Goal: Dying Process, Peace and Dignity  Outcome: Ongoing (interventions implemented as appropriate)

## 2018-01-03 NOTE — PLAN OF CARE
Problem: Skin Integrity Impairment, Risk/Actual (Adult)  Goal: Skin Integrity/Wound Healing  Outcome: Ongoing (interventions implemented as appropriate)      Problem: Fall Risk (Adult)  Goal: Absence of Falls  Outcome: Ongoing (interventions implemented as appropriate)      Problem: Fluid Volume Excess (Adult,Obstetrics,Pediatric)  Goal: Stable Weight  Outcome: Ongoing (interventions implemented as appropriate)    Goal: Balanced Intake/Output  Outcome: Ongoing (interventions implemented as appropriate)      Problem: Patient Care Overview (Adult)  Goal: Plan of Care Review  Outcome: Ongoing (interventions implemented as appropriate)   01/03/18 8393   Coping/Psychosocial Response Interventions   Plan Of Care Reviewed With patient   Patient Care Overview   Progress no change   Outcome Evaluation   Outcome Summary/Follow up Plan Transfer from  for monitoring on bipap. Started precedex for agitation and had to turn off due to HR dropping to 40s. Patient very labile and will be very sedated and then super restless and try to pull at things. Alert to self, very hard of hearing. Code status changed multiple times due to family wishes, currently DNI only.        Problem: Respiratory Insufficiency (Adult)  Goal: Acid/Base Balance  Outcome: Ongoing (interventions implemented as appropriate)    Goal: Effective Ventilation  Outcome: Ongoing (interventions implemented as appropriate)

## 2018-01-04 NOTE — PLAN OF CARE
Problem: Patient Care Overview (Adult)  Goal: Plan of Care Review  Outcome: Ongoing (interventions implemented as appropriate)   01/04/18 0829   Coping/Psychosocial Response Interventions   Plan Of Care Reviewed With patient   Patient Care Overview   Progress no change   Outcome Evaluation   Outcome Summary/Follow up Plan WOC follow-up for yeast in groin/perineum area and coccyx PI. Yeast has resolved at this time. Will discontinue MICOTIN cream. Coccyx wound shows slight wound contraction but no-granulation. Continue with Venelex ointment at this time. On LOKESH mattress. Skin and pressure interventions in place per RN. WOC will continue to follow. Please contact WOC nurse if further needs arise. Thanks

## 2018-01-04 NOTE — PLAN OF CARE
Problem: Skin Integrity Impairment, Risk/Actual (Adult)  Goal: Skin Integrity/Wound Healing  Outcome: Ongoing (interventions implemented as appropriate)      Problem: Fall Risk (Adult)  Goal: Absence of Falls  Outcome: Ongoing (interventions implemented as appropriate)      Problem: Fluid Volume Excess (Adult,Obstetrics,Pediatric)  Goal: Stable Weight  Outcome: Ongoing (interventions implemented as appropriate)    Goal: Balanced Intake/Output  Outcome: Ongoing (interventions implemented as appropriate)      Problem: Patient Care Overview (Adult)  Goal: Plan of Care Review  Outcome: Ongoing (interventions implemented as appropriate)   01/04/18 0510   Coping/Psychosocial Response Interventions   Plan Of Care Reviewed With patient   Patient Care Overview   Progress declining   Outcome Evaluation   Outcome Summary/Follow up Plan Ativan and Morphine given for anxiety. Remains on bipap, mostly unresponsive, but has followed commands at times.        Problem: Respiratory Insufficiency (Adult)  Goal: Acid/Base Balance  Outcome: Ongoing (interventions implemented as appropriate)    Goal: Effective Ventilation  Outcome: Ongoing (interventions implemented as appropriate)      Problem: Dying Patient, Actively (Adult)  Goal: Comfort/Pain Control  Outcome: Ongoing (interventions implemented as appropriate)    Goal: Dying Process, Peace and Dignity  Outcome: Ongoing (interventions implemented as appropriate)

## 2018-01-04 NOTE — PLAN OF CARE
Problem: Skin Integrity Impairment, Risk/Actual (Adult)  Goal: Skin Integrity/Wound Healing  Outcome: Ongoing (interventions implemented as appropriate)      Problem: Fall Risk (Adult)  Goal: Absence of Falls  Outcome: Ongoing (interventions implemented as appropriate)      Problem: Fluid Volume Excess (Adult,Obstetrics,Pediatric)  Goal: Stable Weight  Outcome: Ongoing (interventions implemented as appropriate)    Goal: Balanced Intake/Output  Outcome: Ongoing (interventions implemented as appropriate)      Problem: Patient Care Overview (Adult)  Goal: Plan of Care Review  Outcome: Ongoing (interventions implemented as appropriate)   01/04/18 1150 01/04/18 2369   Coping/Psychosocial Response Interventions   Plan Of Care Reviewed With daughter;son --    Patient Care Overview   Progress declining --    Outcome Evaluation   Outcome Summary/Follow up Plan --  Patient continuing to decline with respirations becoming more shallow and agonal at times. ABG worsening. Extensive discussions with family who still remain firm in their decision to code patient when heart stops but not to intubate. They are aware he is actively dying. Morphine given x3 for dyspnea and restlessness. K 2.7 this am, replaced and up to 3, replacing again per peripheral IV protocol.      Goal: Adult Individualization and Mutuality  Outcome: Ongoing (interventions implemented as appropriate)    Goal: Discharge Needs Assessment  Outcome: Ongoing (interventions implemented as appropriate)      Problem: Respiratory Insufficiency (Adult)  Goal: Acid/Base Balance  Outcome: Ongoing (interventions implemented as appropriate)    Goal: Effective Ventilation  Outcome: Ongoing (interventions implemented as appropriate)      Problem: Dying Patient, Actively (Adult)  Goal: Identify Related Risk Factors and Signs and Symptoms  Outcome: Outcome(s) achieved Date Met: 01/04/18    Goal: Comfort/Pain Control  Outcome: Ongoing (interventions implemented as  appropriate)    Goal: Dying Process, Peace and Dignity  Outcome: Ongoing (interventions implemented as appropriate)

## 2018-01-04 NOTE — PROGRESS NOTES
Multidisciplinary Rounds    Time: 20min  Patient Name: Bjorn Pollock  Date of Encounter: 01/04/18 11:44 AM  MRN: [id number]  Admission date: 12/29/2017      Reason for visit: MDR. RD to continue to follow per protocol.     Additional information obtained during MDR: Will d/c diet order in Epic and make npo.     Current diet: NPO Diet      Intervention:  Follow treatment plan  Care plan reviewed    Follow up:   Per protocol      Fátima Crisostomo MS RD/LD CNSC  11:44 AM

## 2018-01-04 NOTE — PROGRESS NOTES
"INTENSIVIST   PROGRESS NOTE     Hospital:  LOS: 6 days   Subjective   Mr. Bjorn Pollock, 83 y.o. male is followed for:    Respiratory failure  Heart failure  Delirium    As an Intensivist, we provide an integrated approach to the ICU patient and family, medical management of comorbid conditions, lead interdisciplinary rounds and coordinate the care with all other services, including those from other specialists.     S     Interval History:  Lethargic.  Occ restless.  Not able to say a word.     The patient's relevant past medical, surgical and social history were reviewed and updated in Epic as appropriate.      ROS:   ROS cannot be reliably obtained from the patient due to his Altered Mental Status.    Objective   O     Vitals:  Temp: 97.6 °F (36.4 °C) Temp  Min: 97.6 °F (36.4 °C)  Max: 98.1 °F (36.7 °C)   BP: 116/65 BP  Min: 107/55  Max: 142/76   Pulse: 117 Pulse  Min: 77  Max: 158   Resp: 22 Resp  Min: 18  Max: 27   SpO2: 93 % SpO2  Min: 88 %  Max: 100 %   Device: BiPAP    Flow Rate: 40 No Data Recorded     Intake/Ouptut 24 hrs (7:00AM - 6:59 AM)  Intake & Output (last 3 days)       01/01 0701 - 01/02 0700 01/02 0701 - 01/03 0700 01/03 0701 - 01/04 0700 01/04 0701 - 01/05 0700    P.O. 450 0      I.V. (mL/kg)  66.6 (0.6) 30.7 (0.3)     IV Piggyback  200 550 200    Total Intake(mL/kg) 450 (4.1) 266.6 (2.4) 580.7 (5.4) 200 (1.9)    Urine (mL/kg/hr) 1700 (0.6) 2350 (0.9) 3100 (1.2)     Stool 0 (0) 0 (0) 0 (0)     Total Output 1700 2350 3100      Net -1250 -2083.4 -2519.3 +200            Unmeasured Stool Occurrence 2 x 1 x 2 x           Medications (drips):    dexmedetomidine Last Rate: Stopped (01/03/18 0738)       Physical Examination    Telemetry:     Atrial Rhythm: atrial fibrillation      Cardiovascular: IRR  No murmurs, gallop or rub.   Respiratory: No respiratory distress.   Breathing sometimes looks \"agonal\"  Decreased BS.  Rhonchi.   Abdominal:  Soft. No masses. Non-tender. No distension. No HSM. "   Extremities: No digital cyanosis. No clubbing.  No peripheral edema.   Neurological:   Lethargic, restless.  Best Eye Response: 3-->(E3) to speech  Best Motor Response: 5-->(M5) localizes pain  Best Verbal Response: 4-->(V4) confused  [place] Coma Scale Score: 12   Lines/Drains/Airways: White     Hematology:    Results from last 7 days  Lab Units 01/04/18  0401 01/03/18  0505 01/02/18  1659   WBC 10*3/mm3 12.94* 5.41 8.55   HEMOGLOBIN g/dL 11.8* 11.1* 11.8*   MCV fL 90.1 90.7 90.7   PLATELETS 10*3/mm3 125* 109* 133*     Electrolytes, Magnesium and Phosphorus:    Results from last 7 days  Lab Units 01/04/18  0401 01/03/18  0505 01/02/18  1659   SODIUM mmol/L 149* 146 145   POTASSIUM mmol/L 2.7* 3.5 3.7   CO2 mmol/L 35.0* 32.0* 28.0   MAGNESIUM mg/dL  --  2.2  --    PHOSPHORUS mg/dL  --  3.0  --      Renal:    Results from last 7 days  Lab Units 01/04/18  0401 01/03/18  0505 01/02/18  1659 01/02/18  0454 01/01/18  0744 12/31/17  0545   CREATININE mg/dL 1.60* 1.60* 1.40* 1.30 1.20 1.10   BUN mg/dL 56* 56* 50* 50* 41* 25*     Estimated Creatinine Clearance: 44.2 mL/min (by C-G formula based on Cr of 1.6).    Hepatic:    Results from last 7 days  Lab Units 01/03/18  0505 12/29/17  1133   ALK PHOS U/L 74 90   BILIRUBIN mg/dL 1.2 1.6*   ALT (SGPT) U/L 15 12   AST (SGOT) U/L 19 22       Results from last 7 days  Lab Units 01/04/18  0401   PLATELETS 10*3/mm3 125*   PROTIME Seconds 49.2*   INR  4.31       Cardiac:      Lab Results   Component Value Date    .0 (H) 01/04/2018    .0 (H) 01/03/2018    BNP 1023.0 (H) 01/02/2018       Arterial Blood Gases:    Results from last 7 days  Lab Units 01/04/18  1106 01/03/18  0459 01/02/18  2103 01/02/18  1652 01/01/18  1344 12/29/17  1210   PH, ARTERIAL pH units 7.319* 7.379 7.261* 7.330 7.342* 7.344*   PCO2, ARTERIAL mm Hg 66.4* 51.8* 63.6* 56.3 51.5* 43.9   PO2 ART mm Hg 78.0* 73.3* 89.7 76.5 87.0 102.0   FIO2 % 40 40 50  --  40 28       Images:  CXR 1/3/2018 Decrease  in overall pulmonary vascularity prominence from prior as well as interval decrease in bibasilar opacities and effusions from prior. No new parenchymal disease     Echo:  Results for orders placed during the hospital encounter of 09/19/17   Adult Transthoracic Echo Complete W/ Cont if Necessary Per Protocol    Narrative · There is four chamber cardiac enlargement.  · Global and segmental LV wall motion abnormalities are seen with an   estimated EF=36%-40%.  · Left ventricular wall thickness is consistent with concentric   hypertrophy, mild.  · Mitral annular calcification with mild-moderate MR is appreciated.  · Aortic valve sclerosis with moderate aortic insufficiency is seen.  · The LV examination is suboptimal as the patient refused Lumason.          Results: Reviewed.  I reviewed the patient's new laboratory and imaging results.  I independently reviewed the patient's new images.    Medications: Reviewed.    Assessment/Plan   A / P     83 y.o.male, admitted on 12/29/2017 with Acute on chronic diastolic congestive heart failure [I50.33]:     1. Encephalopathy/Delirium  2. Respiratory failure  1. ABG: Respiratory acidosis, pH is worse (1/4/2018) despite being on BIPAP  2. COPD  3. NAYLA - CPAP at home  4. HFrEF with exacerbation  3. A Fibrillation, Chronic  1. Anticoagulation  4. BONILLA/CKD  5. TCY  6. ABS: PIP-Tazo + Vanco IV  7. T2DM    Results from last 7 days  Lab Units 01/04/18  0511 01/04/18  0255 01/03/18  2348 01/03/18  2046 01/03/18  1636 01/03/18  1204   GLUCOSE mg/dL 119 112 92 105 113 132*       Lab Results  Lab Value Date/Time   HGBA1C 5.60 12/30/2017 1126   HGBA1C 5.60 11/22/2017 0453        Nutrition:  NPO Diet   Advance Directives: Conditional Code       Assessment / Plan:    1. He seems to be actively dying.  2. Discussed with daughter who is a former ICU ( 3ICU) and now works at [employer], who is expressing patient's wife statemetn to do CPR in case of an arrest but no intubation. She understands  "that is futile, and that the patient's condition is deteriorating.   1. Patient's wife, is also a former nurse, and is following, patient's wishes of having CPR when he dies.  3. Re-assess ABG in AM.  4. Palliative team following.  5. Vit K  6. Prognosis seems poor.  7. \"CODE without intubation\". DNI.    Plan of care and goals reviewed during interdisciplinary rounds.  I discussed the patient's findings and my recommendations with nursing staff    Time: was greater than 40 minutes.    (This excludes time spent performing separately reportable procedures and services).  Patient was at high risk of imminent or life-threatening deterioration in his condition due to CNS dysfunction.     Juan Parra MD, FACP, FCCP, CNSC  Intensive Care Medicine, Nutrition Support and Pulmonary Medicine       "

## 2018-01-04 NOTE — PLAN OF CARE
Problem: Respiratory Insufficiency (Adult)  Intervention: Provide Oxygenation/Ventilation/Perfusion Support   01/04/18 0720   Safety Interventions   Medication Review/Management medications reviewed   Positioning   Head Of Bed (HOB) Position HOB at 30-45 degrees   Respiratory Interventions   Airway/Ventilation Management calming measures promoted;humidification applied;airway patency maintained

## 2018-01-04 NOTE — PROGRESS NOTES
"Palliative Care Progress Note    Date of Admission: 12/29/2017    Subjective:  Reamins on BiPAP and unresponsive.  Family feels he has been comforable.  No current facility-administered medications on file prior to encounter.      Current Outpatient Prescriptions on File Prior to Encounter   Medication Sig Dispense Refill   • acetaminophen (TYLENOL) 325 MG tablet Take 2 tablets by mouth Every 4 (Four) Hours As Needed for Mild Pain .     • aspirin 81 MG EC tablet Take 1 tablet by mouth Daily. 30 tablet 11   • atorvastatin (LIPITOR) 20 MG tablet Take 20 mg by mouth Every Night.     • bisacodyl (DULCOLAX) 5 MG EC tablet Take 1 tablet by mouth Daily As Needed for Constipation.     • budesonide (PULMICORT) 0.5 MG/2ML nebulizer solution Take 0.5 mg by nebulization 2 (Two) Times a Day. Pt appears to use PRN     • calcium carbonate (TUMS) 500 MG chewable tablet Chew 1,000 mg 2 (Two) Times a Day As Needed for Heartburn.     • castor oil-balsam peru (VENELEX) ointment Apply 1 application topically Every 12 (Twelve) Hours.     • cyclobenzaprine (FLEXERIL) 10 MG tablet Take 5 mg by mouth 3 (Three) Times a Day As Needed for Muscle Spasms. 30 day supply filled 11/15/17 at [facility]     • lidocaine (LIDODERM) 5 % Place 1 patch on the skin Daily. Remove & Discard patch within 12 hours or as directed by MD 15 patch 0   • melatonin 5 MG sublingual tablet sublingual tablet Place 1 tablet under the tongue At Night As Needed (SLEEP).     • nystatin (MYCOSTATIN) 972653 UNIT/GM powder Apply  topically Every 12 (Twelve) Hours.     • sertraline (ZOLOFT) 100 MG tablet Take 100 mg by mouth Daily.         dexmedetomidine 0.2-1.5 mcg/kg/hr Last Rate: Stopped (01/03/18 0738)     •  acetaminophen  •  haloperidol lactate  •  LORazepam  •  Morphine  •  Morphine  •  sodium chloride  •  sodium chloride    Objective: /74  Pulse (!) 124  Temp 97.6 °F (36.4 °C) (Axillary)   Resp 22  Ht 182.9 cm (72\")  Wt 107 kg (236 lb 3.2 oz)  SpO2 95%  BMI " 32.03 kg/m2     Intake/Output Summary (Last 24 hours) at 01/04/18 1405  Last data filed at 01/04/18 1200   Gross per 24 hour   Intake            427.7 ml   Output             2675 ml   Net          -2247.3 ml     Physical Exam:      General Appearance:    Unresponsive, bipap   Head:    Normocephalic, without obvious abnormality, atraumatic   Eyes:            Lids and lashes normal, conjunctivae and sclerae normal, no   icterus, no pallor, corneas clear, PERRLA   Ears:    Ears appear intact with no abnormalities noted   Throat:   No oral lesions, no thrush, oral mucosa moist   Neck:   No adenopathy, supple, trachea midline, no thyromegaly, no   carotid bruit, no JVD   Back:     No kyphosis present, no scoliosis present, no skin lesions,      erythema or scars, no tenderness to percussion or                   palpation,   range of motion normal   Lungs:     Diminished breathe sounds    Heart:    Regular rhythm and normal rate, normal S1 and S2, no            murmur, no gallop, no rub, no click   Chest Wall:    No abnormalities observed   Abdomen:     Normal bowel sounds, no masses, no organomegaly, soft        non-tender, non-distended, no guarding, no rebound                tenderness   Rectal:     Deferred   Extremities:   Moves all extremities well, no edema, no cyanosis, no             redness   Pulses:   Pulses palpable and equal bilaterally   Skin:   No bleeding, bruising or rash   Lymph nodes:   No palpable adenopathy           Results from last 7 days  Lab Units 01/04/18  0401   WBC 10*3/mm3 12.94*   HEMOGLOBIN g/dL 11.8*   HEMATOCRIT % 39.9   PLATELETS 10*3/mm3 125*       Results from last 7 days  Lab Units 01/04/18  0401 01/03/18  0505   SODIUM mmol/L 149* 146   POTASSIUM mmol/L 2.7* 3.5   CHLORIDE mmol/L 101 104   CO2 mmol/L 35.0* 32.0*   BUN mg/dL 56* 56*   CREATININE mg/dL 1.60* 1.60*   CALCIUM mg/dL 9.5 9.2   BILIRUBIN mg/dL  --  1.2   ALK PHOS U/L  --  74   ALT (SGPT) U/L  --  15   AST (SGOT) U/L  --   19   GLUCOSE mg/dL 120* 145*       Impression: Congestive heart failure  Hypoxic respiratory failure  Dyspnea  Restlessness  Goals of care  Plan: Continue to current plan of care.  Patient will continue being a DNR but would want to be coded.  As CODE STATUS has been addressed multiple times and the patient's wife is very adamant about not changing the current CODE STATUS I would highly recommend not readdressing this unless the family brings it up first.      Justin Shin,   01/04/18  2:05 PM

## 2018-01-04 NOTE — PROGRESS NOTES
"  [facility] at [facility]  PROGRESS NOTE    Date of Admission: 12/29/2017  Length of Stay: 6  Primary Care Physician: Vincent Mccullough MD    Chief Complaint: f/u A/C SHF   Problem List:   1. Coronary artery disease:  a. CABG x3 in December 1989.  b. Normal left ventricular systolic function.  c. New York Heart Association class I heart failure symptoms.  d.  of collateralized RCA with patent LAD graft in August 2005.  e. MUGA, ejection fraction 63%, 05/09/2015.  f. Echocardiogram December 2014, showed normal left ventricular ejection function of 55% to 60%.   g. EF 36-40% 9/2017  h. Admission for acute on chronic SHF January 2017  2. Chronic Atrial fibrillation:  a. CHADS-VASc of 5.  b. Chronic anticoagulation with Warfarin   3. Cerebrovascular accident:  a. Remote CVA and TIA. Questionable relationship to atrial fibrillation.  4. Obstructive sleep apnea on CPAP.   5. Obesity.   6. Dyslipidemia.   7. Diabetes mellitus type 2.  8. Anemia.  9. Degenerative joint disease.  10. BPH.  11. Chronic lower extremity edema due to venous stasis  12.  Chronic Constipation  13. Urinary Incontinence    Subjective      Patient remains lethargic on BiPAP, receiving Ativan for periods of agitation. Afib with RVR this morning, as he has not been able to take his oral medicines      Objective   Vitals: /75 (BP Location: Left arm, Patient Position: Lying)  Pulse 112  Temp 97.6 °F (36.4 °C) (Axillary)   Resp 23  Ht 182.9 cm (72\")  Wt 107 kg (236 lb 3.2 oz)  SpO2 95%  BMI 32.03 kg/m2    Physical Exam:  GENERAL: Lethargic, on BiPAP  HEENT: Fundoscopic deferred, otherwise unremarkable.  HEART: No discrete PMI is noted. Irregular rhythm, fast rate, and no murmur  LUNGS: Ronchi bilaterally, on BiPAP; difficult exam  ABDOMEN: Flat without evidence of organomegaly, masses, or tenderness.  NEUROLOGIC: per intensivist  EXTREMITIES: No clubbing, cyanosis, or edema noted.    Results:    Results from " last 7 days  Lab Units 01/04/18  0401 01/03/18  0505 01/02/18  1659   WBC 10*3/mm3 12.94* 5.41 8.55   HEMOGLOBIN g/dL 11.8* 11.1* 11.8*   HEMATOCRIT % 39.9 38.1* 39.9   PLATELETS 10*3/mm3 125* 109* 133*       Results from last 7 days  Lab Units 01/04/18  0401 01/03/18  0505 01/02/18  1659   SODIUM mmol/L 149* 146 145   POTASSIUM mmol/L 2.7* 3.5 3.7   CHLORIDE mmol/L 101 104 105   CO2 mmol/L 35.0* 32.0* 28.0   BUN mg/dL 56* 56* 50*   CREATININE mg/dL 1.60* 1.60* 1.40*   GLUCOSE mg/dL 120* 145* 145*      Lab Results   Component Value Date    CHOL 102 09/28/2017    TRIG 89 09/28/2017    HDL 30 (L) 09/28/2017    LDLDIRECT 58 09/28/2017    AST 19 01/03/2018    ALT 15 01/03/2018       Results from last 7 days  Lab Units 12/30/17  1126   HEMOGLOBIN A1C % 5.60         Results from last 7 days  Lab Units 01/04/18  0401 01/03/18  0505 01/02/18  1659   BNP pg/mL 554.0* 833.0* 1023.0*       Results from last 7 days  Lab Units 01/04/18  0401 01/03/18  0505 01/02/18  0454   PROTIME Seconds 49.2* 42.2* 40.8*   INR  4.31 3.72 3.60           Intake/Output Summary (Last 24 hours) at 01/04/18 0915  Last data filed at 01/04/18 0910   Gross per 24 hour   Intake            764.7 ml   Output             2550 ml   Net          -1785.3 ml     I personally reviewed the patient's EKG/Telemetry data    Current Medications:    aspirin 81 mg Oral Daily   atorvastatin 20 mg Oral Nightly   castor oil-balsam peru 1 application Topical Q12H   famotidine 40 mg Oral Daily   ferrous sulfate 325 mg Oral Daily With Breakfast   furosemide 40 mg Intravenous Daily   insulin lispro 0-7 Units Subcutaneous 4x Daily With Meals & Nightly   ipratropium-albuterol 3 mL Nebulization Q4H - RT   lidocaine 1 patch Transdermal Q24H   metoprolol tartrate 50 mg Oral Q12H   nystatin  Topical Q12H   palliative care oral rinse  Mouth/Throat 4x Daily   pharmacy consult - MTM  Does not apply Daily   phytonadione 2.5 mg Subcutaneous Once   piperacillin-tazobactam 4.5 g  Intravenous Q8H   potassium chloride 10 mEq Intravenous Q1H   sertraline 100 mg Oral Daily   tamsulosin 0.4 mg Oral Daily   vancomycin 1,250 mg Intravenous Q24H       Pharmacy Consult     dexmedetomidine 0.2-1.5 mcg/kg/hr Last Rate: Stopped (01/03/18 0738)   Pharmacy to dose vancomycin     Pharmacy to dose warfarin         Assessment and Plan:     1.  Acute on Chronic Systolic CHF  -  BNP improved to 554 today. Baseline is about 188 on 11/21/17.  -  Strict I/Os, daily weights  -  Diuresing with IV Lasix 40mg daily, Cr has bumped however is stable today      2.  Chronic Atrial Fibrillation  - Metoprolol increased to 50mg BID - has not been able to take PO medicines for the past day- now in Afib with RVR  - on Warfarin, currently held due to supratherapeutic INR, pharmacy following      4.  Acute on Chronic Respiratory Failure  -  Multi factorial  -  Continue support.       5. BONILLA on CKD  - closely monitor , Cr stable at 1.6 today       6.  COPD     7. DNI otherwise full code.     8. Hypokalemia  - replaced per protocol     I, Aly Vivas MD, personally performed the services described as documented by the above named individual. I have made any necessary edits and it is both accurate and complete 1/4/2018  5:33 PM        Scribed for Aly Vivas MD by Damari Webb PA-C.

## 2018-01-04 NOTE — PLAN OF CARE
Problem: Patient Care Overview (Adult)  Goal: Plan of Care Review  Outcome: Ongoing (interventions implemented as appropriate)   01/04/18 1150   Coping/Psychosocial Response Interventions   Plan Of Care Reviewed With daughter;son   Patient Care Overview   Progress declining   Outcome Evaluation   Outcome Summary/Follow up Plan Continues to decline. Pt's wife has stated that current code status (DNI, otherwise full support including resuscitative efforts in the event of cardiac arrest) is consistent with pt's previously stated wishes; adult children verbalize intention to honor wife's decision to maintain that status. Wife and children aware of pt continued decline, aware that resuscitation highly unlikely to be successful, aware that pt appears to be actively dying. Palliative following for continued family support.       Problem: Dying Patient, Actively (Adult)  Goal: Identify Related Risk Factors and Signs and Symptoms  Outcome: Ongoing (interventions implemented as appropriate)   01/04/18 1150   Dying Patient, Actively   Actively Dying Patient: Related Risk Factors anticipatory loss;disease progression;dyspnea;existential beliefs;family dynamics;level of consciousness;ventilator dependency   Signs and Symptoms (Actively Dying Patient) agitation/restlessness;total dependency;dyspnea;profound weakness     Goal: Comfort/Pain Control  Outcome: Ongoing (interventions implemented as appropriate)   01/04/18 1150   Dying Patient, Actively (Adult)   Comfort/Pain Control making progress toward outcome     Goal: Dying Process, Peace and Dignity  Outcome: Ongoing (interventions implemented as appropriate)   01/04/18 1150   Dying Patient, Actively (Adult)   Dying Process, Peace and Dignity making progress toward outcome

## 2018-01-04 NOTE — PROGRESS NOTES
Continued Stay Note  [facility]     Patient Name: Bjorn Pollock  MRN: [id number]  Today's Date: 1/4/2018    Admit Date: 12/29/2017          Discharge Plan       01/04/18 1537    Case Management/Social Work Plan    Plan TBD    Additional Comments Patient declining.  CM will continue to follow.              Discharge Codes     None        Expected Discharge Date and Time     Expected Discharge Date Expected Discharge Time    Jan 5, 2018             Chel Dorman RN

## 2018-01-05 NOTE — PROGRESS NOTES
"  [facility] at [facility]  PROGRESS NOTE    Date of Admission: 12/29/2017  Length of Stay: 7  Primary Care Physician: Vincent Mccullough MD    Chief Complaint: f/u A/C SHF  Problem List:   1. Coronary artery disease:  a. CABG x3 in December 1989.  b. Normal left ventricular systolic function.  c. New York Heart Association class I heart failure symptoms.  d.  of collateralized RCA with patent LAD graft in August 2005.  e. MUGA, ejection fraction 63%, 05/09/2015.  f. Echocardiogram December 2014, showed normal left ventricular ejection function of 55% to 60%.   g. EF 36-40% 9/2017  h. Admission for acute on chronic SHF January 2017  2. Chronic Atrial fibrillation:  a. CHADS-VASc of 5.  b. Chronic anticoagulation with Warfarin   3. Cerebrovascular accident:  a. Remote CVA and TIA. Questionable relationship to atrial fibrillation.  4. Obstructive sleep apnea on CPAP.   5. Obesity.   6. Dyslipidemia.   7. Diabetes mellitus type 2.  8. Anemia.  9. Degenerative joint disease.  10. BPH.  11. Chronic lower extremity edema due to venous stasis  12.  Chronic Constipation  13. Urinary Incontinence    Subjective      Unarousable, on BiPAP. Family not present at this time (1730H).      Objective   Vitals: /74  Pulse 109  Temp 97.2 °F (36.2 °C) (Axillary)   Resp 16  Ht 182.9 cm (72\")  Wt 107 kg (236 lb 3.2 oz)  SpO2 96%  BMI 32.03 kg/m2    Physical Exam:  GENERAL: lethargic, on BiPAP, appears ill, in no acute distress   HEENT: Fundoscopic deferred, otherwise unremarkable.  HEART: No discrete PMI is noted. Irregular rhythm, fast rate  LUNGS: Ronchi and wheezing. On BiPAP  ABDOMEN: Flat without evidence of organomegaly, masses, or tenderness.  NEUROLOGIC: per intensivist   EXTREMITIES: No clubbing, cyanosis, or edema noted.     Results:    Results from last 7 days  Lab Units 01/05/18  0503 01/04/18  0401 01/03/18  0505   WBC 10*3/mm3 20.01* 12.94* 5.41   HEMOGLOBIN g/dL 12.1* 11.8* 11.1* "   HEMATOCRIT % 42.6 39.9 38.1*   PLATELETS 10*3/mm3 132* 125* 109*       Results from last 7 days  Lab Units 01/05/18  0503 01/04/18  1500 01/04/18  0401 01/03/18  0505   SODIUM mmol/L 151*  --  149* 146   POTASSIUM mmol/L 3.4* 3.0* 2.7* 3.5   CHLORIDE mmol/L 104  --  101 104   CO2 mmol/L 33.0*  --  35.0* 32.0*   BUN mg/dL 65*  --  56* 56*   CREATININE mg/dL 1.90*  --  1.60* 1.60*   GLUCOSE mg/dL 147*  --  120* 145*      Lab Results   Component Value Date    CHOL 102 09/28/2017    TRIG 89 09/28/2017    HDL 30 (L) 09/28/2017    LDLDIRECT 58 09/28/2017    AST 19 01/03/2018    ALT 15 01/03/2018       Results from last 7 days  Lab Units 12/30/17  1126   HEMOGLOBIN A1C % 5.60       Results from last 7 days  Lab Units 01/04/18  0401 01/03/18  0505 01/02/18  1659   BNP pg/mL 554.0* 833.0* 1023.0*       Results from last 7 days  Lab Units 01/05/18  0503 01/04/18  0401 01/03/18  0505   PROTIME Seconds 36.3* 49.2* 42.2*   INR  3.21 4.31 3.72           Intake/Output Summary (Last 24 hours) at 01/05/18 1552  Last data filed at 01/05/18 1400   Gross per 24 hour   Intake           1047.7 ml   Output              735 ml   Net            312.7 ml     I personally reviewed the patient's EKG/Telemetry data    Current Medications:    aspirin 81 mg Oral Daily   atorvastatin 20 mg Oral Nightly   castor oil-balsam peru 1 application Topical Q12H   famotidine 20 mg Intravenous Daily   furosemide 40 mg Intravenous Daily   insulin regular 0-7 Units Subcutaneous Q6H   ipratropium-albuterol 3 mL Nebulization Q4H - RT   lidocaine 1 patch Transdermal Q24H   metoprolol tartrate 50 mg Oral Q12H   nystatin  Topical Q12H   palliative care oral rinse  Mouth/Throat 4x Daily   piperacillin-tazobactam 4.5 g Intravenous Q8H   sertraline 100 mg Oral Daily          Assessment and Plan:     I see little hope of a meaningful recovery. I have no further recommendations at this time.    I, Aly Vivas MD, personally performed the services described as  documented by the above named individual. I have made any necessary edits and it is both accurate and complete 1/5/2018  5:30 PM        Scribed for Aly Vivas MD by Damari Webb PA-C.

## 2018-01-05 NOTE — PLAN OF CARE
Problem: Patient Care Overview (Adult)  Goal: Plan of Care Review  Outcome: Ongoing (interventions implemented as appropriate)   01/05/18 1700   Coping/Psychosocial Response Interventions   Plan Of Care Reviewed With spouse;daughter   Patient Care Overview   Progress declining   Outcome Evaluation   Outcome Summary/Follow up Plan pt is actively dying and family verbalizes that pt is dying but want cpr still and epi x1 round. continue bipap. prn ativan and morphine for comfort       Problem: Dying Patient, Actively (Adult)  Goal: Comfort/Pain Control  Outcome: Ongoing (interventions implemented as appropriate)    Goal: Dying Process, Peace and Dignity  Outcome: Ongoing (interventions implemented as appropriate)

## 2018-01-05 NOTE — PROGRESS NOTES
Multidisciplinary Rounds    Time: 20min  Patient Name: Bjorn Pollock  Date of Encounter: 01/05/18 9:34 AM  MRN: [id number]  Admission date: 12/29/2017      Reason for visit: MDR. RD to continue to follow per protocol.     Additional information obtained during MDR: Pt actively dying    Current diet: NPO Diet      Intervention:  Follow treatment plan  Care plan reviewed    Follow up:   Per protocol      Fátima Crisostomo MS RD/CLAUDIA CNSC  9:34 AM

## 2018-01-05 NOTE — PLAN OF CARE
Problem: Skin Integrity Impairment, Risk/Actual (Adult)  Goal: Skin Integrity/Wound Healing  Outcome: Ongoing (interventions implemented as appropriate)      Problem: Fall Risk (Adult)  Goal: Absence of Falls  Outcome: Ongoing (interventions implemented as appropriate)      Problem: Fluid Volume Excess (Adult,Obstetrics,Pediatric)  Goal: Stable Weight  Outcome: Ongoing (interventions implemented as appropriate)    Goal: Balanced Intake/Output  Outcome: Ongoing (interventions implemented as appropriate)      Problem: Patient Care Overview (Adult)  Goal: Plan of Care Review  Outcome: Ongoing (interventions implemented as appropriate)   01/05/18 1654   Coping/Psychosocial Response Interventions   Plan Of Care Reviewed With patient;spouse;daughter;family   Patient Care Overview   Progress no change   Outcome Evaluation   Outcome Summary/Follow up Plan Patient still declining, actively dying. Patient family wished for one round of CPR and epi when time comes then stop code and allow to die naturally. Paitnet having ectopy and run VT this evening        Problem: Respiratory Insufficiency (Adult)  Goal: Acid/Base Balance  Outcome: Ongoing (interventions implemented as appropriate)      Problem: Dying Patient, Actively (Adult)  Goal: Comfort/Pain Control  Outcome: Ongoing (interventions implemented as appropriate)    Goal: Dying Process, Peace and Dignity  Outcome: Ongoing (interventions implemented as appropriate)

## 2018-01-05 NOTE — PROGRESS NOTES
INTENSIVIST   PROGRESS NOTE     Hospital:  LOS: 7 days   Subjective   Mr. Bjorn Pollock, 83 y.o. male is followed for:    Respiratory failure  Heart failure  Delirium    As an Intensivist, we provide an integrated approach to the ICU patient and family, medical management of comorbid conditions, lead interdisciplinary rounds and coordinate the care with all other services, including those from other specialists.     S     Interval History:  Non responsive.  More lethargic.  Worse.     The patient's relevant past medical, surgical and social history were reviewed and updated in Epic as appropriate.      ROS:   ROS cannot be reliably obtained from the patient due to his Altered Mental Status.    Objective   O     Vitals:  Temp: 97.6 °F (36.4 °C) Temp  Min: 97.6 °F (36.4 °C)  Max: 97.9 °F (36.6 °C)   BP: 130/82 BP  Min: 111/69  Max: 146/79   Pulse: 109 Pulse  Min: 99  Max: 134   Resp: 16 Resp  Min: 16  Max: 40   SpO2: 98 % SpO2  Min: 87 %  Max: 100 %   Device: BiPAP    Flow Rate: 50 Flow (L/min)  Min: 50  Max: 50     Physical Examination    Telemetry:     Atrial Rhythm: atrial fibrillation      Cardiovascular: IRR  No murmurs, gallop or rub.   Respiratory: Agonal breathing.  Decreased BS.  Rhonchi.   Abdominal:  Soft. No masses. Non-tender. No distension. No HSM.   Extremities: No digital cyanosis. No clubbing.  No peripheral edema.   Neurological:   Lethargic  Best Eye Response: 2-->(E2) to pain  Best Motor Response: 5-->(M5) localizes pain  Best Verbal Response: 1-->(V1) none  Nina Coma Scale Score: 8   Lines/Drains/Airways: Christopher     Hematology:    Results from last 7 days  Lab Units 01/05/18  0503 01/04/18  0401 01/03/18  0505   WBC 10*3/mm3 20.01* 12.94* 5.41   HEMOGLOBIN g/dL 12.1* 11.8* 11.1*   MCV fL 92.4 90.1 90.7   PLATELETS 10*3/mm3 132* 125* 109*     Electrolytes, Magnesium and Phosphorus:    Results from last 7 days  Lab Units 01/05/18  0503 01/04/18  1500 01/04/18  0401 01/03/18  0505   SODIUM  mmol/L 151*  --  149* 146   POTASSIUM mmol/L 3.4* 3.0* 2.7* 3.5   CO2 mmol/L 33.0*  --  35.0* 32.0*   MAGNESIUM mg/dL  --   --   --  2.2   PHOSPHORUS mg/dL  --   --   --  3.0     Renal:    Results from last 7 days  Lab Units 01/05/18  0503 01/04/18  0401 01/03/18  0505 01/02/18  1659 01/02/18  0454 01/01/18  0744   CREATININE mg/dL 1.90* 1.60* 1.60* 1.40* 1.30 1.20   BUN mg/dL 65* 56* 56* 50* 50* 41*     Estimated Creatinine Clearance: 37.3 mL/min (by C-G formula based on Cr of 1.9).      Lab Results   Component Value Date    INR 3.21 01/05/2018    INR 4.31 01/04/2018    INR 3.72 01/03/2018    INR 3.60 01/02/2018    INR 4.91 01/01/2018    INR 6.01 12/31/2017     Arterial Blood Gases:    Results from last 7 days  Lab Units 01/05/18  0352 01/04/18  1106 01/03/18  0459 01/02/18  2103 01/02/18  1652 01/01/18  1344   PH, ARTERIAL pH units 7.281* 7.319* 7.379 7.261* 7.330 7.342*   PCO2, ARTERIAL mm Hg 72.2* 66.4* 51.8* 63.6* 56.3 51.5*   PO2 ART mm Hg 71.9* 78.0* 73.3* 89.7 76.5 87.0   FIO2 % 40 40 40 50  --  40       Images:  CXR 1/5/2018 Increased markings again seen diffusely throughout the lung  fields bilaterally. Findings suggesting superimposed edema with possible perihilar infiltrate. Small bilateral pleural effusions are identified bilaterally.     Echo:  Results for orders placed during the hospital encounter of 09/19/17   Adult Transthoracic Echo Complete W/ Cont if Necessary Per Protocol    Narrative · There is four chamber cardiac enlargement.  · Global and segmental LV wall motion abnormalities are seen with an   estimated EF=36%-40%.  · Left ventricular wall thickness is consistent with concentric   hypertrophy, mild.  · Mitral annular calcification with mild-moderate MR is appreciated.  · Aortic valve sclerosis with moderate aortic insufficiency is seen.  · The LV examination is suboptimal as the patient refused Lumason.          Results: Reviewed.  I reviewed the patient's new laboratory and imaging  "results.  I independently reviewed the patient's new images.    Medications: Reviewed.    Assessment/Plan   A / P     83 y.o.male, admitted on 12/29/2017 with Acute on chronic diastolic congestive heart failure [I50.33]:     1. Respiratory failure - worse despite Bipap  1. ABG: Respiratory acidosis  2. COPD  3. NAYLA - CPAP at home  4. HFrEF with exacerbation  2. A Fibrillation, Chronic  1. Anticoagulation  3. Encephalopathy/Delirium  4. BONILLA/CKD  5. TCY  6. ABS: PIP-Tazo  7. T2DM    Results from last 7 days  Lab Units 01/05/18  0505 01/04/18  2343 01/04/18  1721 01/04/18  1130 01/04/18  0511 01/04/18  0255   GLUCOSE mg/dL 139* 121 126 132* 119 112       Lab Results  Lab Value Date/Time   HGBA1C 5.60 12/30/2017 1126   HGBA1C 5.60 11/22/2017 0453        Nutrition:  NPO Diet   Advance Directives: Conditional Code       Assessment / Plan:    1. Worse than yesterday, he is actively dying.  2. Palliative team following.  3. \"CODE without intubation\". DNI.  4. Wife is supposed to come today. We will be available for her questions or requests.    Plan of care and goals reviewed during interdisciplinary rounds.  I discussed the patient's findings and my recommendations with nursing staff    Time: was greater than 33 minutes.    (This excludes time spent performing separately reportable procedures and services).  Patient was at high risk of imminent or life-threatening deterioration in his condition due to CNS dysfunction.     Juan Parra MD, FACP, FCCP, CNSC  Intensive Care Medicine, Nutrition Support and Pulmonary Medicine       "

## 2018-01-05 NOTE — PATIENT CARE CONFERENCE
ICU ROUNDS: HOLD PT/OT; likely discharge orders soon as pt with continued decline in medical status.

## 2018-01-05 NOTE — SIGNIFICANT NOTE
Palliative Team Meeting Attendance  13:00             DO ANTHONY Rushing, RN, CHPN  OSCAR Prado MDiv, RN, CHPN     Nohemy Perrin RN CHPN

## 2018-01-05 NOTE — PLAN OF CARE
Problem: Skin Integrity Impairment, Risk/Actual (Adult)  Goal: Skin Integrity/Wound Healing  Outcome: Ongoing (interventions implemented as appropriate)      Problem: Fall Risk (Adult)  Goal: Absence of Falls  Outcome: Ongoing (interventions implemented as appropriate)      Problem: Fluid Volume Excess (Adult,Obstetrics,Pediatric)  Goal: Stable Weight  Outcome: Ongoing (interventions implemented as appropriate)    Goal: Balanced Intake/Output  Outcome: Ongoing (interventions implemented as appropriate)      Problem: Patient Care Overview (Adult)  Goal: Plan of Care Review  Outcome: Ongoing (interventions implemented as appropriate)   01/05/18 0541   Coping/Psychosocial Response Interventions   Plan Of Care Reviewed With patient   Patient Care Overview   Progress no change   Outcome Evaluation   Outcome Summary/Follow up Plan Pt continues on bipap. ativan x1 for restlessness.        Problem: Respiratory Insufficiency (Adult)  Goal: Acid/Base Balance  Outcome: Ongoing (interventions implemented as appropriate)    Goal: Effective Ventilation  Outcome: Ongoing (interventions implemented as appropriate)      Problem: Dying Patient, Actively (Adult)  Goal: Comfort/Pain Control  Outcome: Ongoing (interventions implemented as appropriate)    Goal: Dying Process, Peace and Dignity  Outcome: Ongoing (interventions implemented as appropriate)

## 2018-01-05 NOTE — SIGNIFICANT NOTE
01/05/18 0900   SLP Deferred Reason   SLP Deferred Reason (Chart reviewed since SLP seen on 1/2. No further SLP needs, signing off. Thanks)

## 2018-01-05 NOTE — PROGRESS NOTES
Continued Stay Note  [facility]     Patient Name: Bjorn Pollock  MRN: [id number]  Today's Date: 1/5/2018    Admit Date: 12/29/2017          Discharge Plan       01/05/18 1511    Case Management/Social Work Plan    Plan TBD    Additional Comments Per MD note, patient is actively dying.  Palliative following.  CM will continue to follow              Discharge Codes     None        Expected Discharge Date and Time     Expected Discharge Date Expected Discharge Time    Jan 5, 2018             Chel Dorman RN

## 2018-01-06 PROBLEM — Z86.73 HISTORY OF CVA (CEREBROVASCULAR ACCIDENT): Status: ACTIVE | Noted: 2018-01-01

## 2018-01-06 PROBLEM — N17.9 ACUTE RENAL FAILURE SUPERIMPOSED ON CHRONIC KIDNEY DISEASE (HCC): Status: ACTIVE | Noted: 2017-01-01

## 2018-01-06 PROBLEM — J96.01 ACUTE RESPIRATORY FAILURE WITH HYPOXIA AND HYPERCARBIA (HCC): Status: ACTIVE | Noted: 2018-01-01

## 2018-01-06 PROBLEM — I38 VALVULAR HEART DISEASE: Status: ACTIVE | Noted: 2018-01-01

## 2018-01-06 PROBLEM — N18.9 ACUTE RENAL FAILURE SUPERIMPOSED ON CHRONIC KIDNEY DISEASE (HCC): Status: ACTIVE | Noted: 2017-01-01

## 2018-01-06 PROBLEM — J96.02 ACUTE RESPIRATORY FAILURE WITH HYPOXIA AND HYPERCARBIA (HCC): Status: ACTIVE | Noted: 2018-01-01

## 2018-01-06 PROBLEM — G93.40 ENCEPHALOPATHY ACUTE: Status: ACTIVE | Noted: 2018-01-01

## 2018-01-06 PROBLEM — E66.9 OBESITY: Status: ACTIVE | Noted: 2018-01-01

## 2018-01-06 NOTE — PLAN OF CARE
Problem: Respiratory Insufficiency (Adult)  Goal: Acid/Base Balance  Outcome: Ongoing (interventions implemented as appropriate)    Goal: Effective Ventilation  Outcome: Ongoing (interventions implemented as appropriate)      Problem: Dying Patient, Actively (Adult)  Goal: Comfort/Pain Control  Outcome: Ongoing (interventions implemented as appropriate)    Goal: Dying Process, Peace and Dignity  Outcome: Ongoing (interventions implemented as appropriate)

## 2018-01-06 NOTE — PROGRESS NOTES
Patient is on end-of-life care being managed by palliative care medicine.  Nothing more to add from cardiology standpoint per Dr. Vivas's note of January 05, 2018.  Continue comfort care, we will sign off and will be available if there are any questions or concerns please do not hesitate to contact me.

## 2018-01-06 NOTE — PLAN OF CARE
Problem: Patient Care Overview (Adult)  Goal: Plan of Care Review  Outcome: Ongoing (interventions implemented as appropriate)   01/06/18 0542   Coping/Psychosocial Response Interventions   Plan Of Care Reviewed With patient   Patient Care Overview   Progress declining   Outcome Evaluation   Outcome Summary/Follow up Plan Pt actively dying. bipap continues.        Problem: Respiratory Insufficiency (Adult)  Goal: Acid/Base Balance  Outcome: Ongoing (interventions implemented as appropriate)    Goal: Effective Ventilation  Outcome: Ongoing (interventions implemented as appropriate)      Problem: Dying Patient, Actively (Adult)  Goal: Comfort/Pain Control  Outcome: Ongoing (interventions implemented as appropriate)    Goal: Dying Process, Peace and Dignity  Outcome: Ongoing (interventions implemented as appropriate)

## 2018-01-07 NOTE — PROGRESS NOTES
"Intensivist Note     1/7/2018  Hospital Day: 9      Mr. Bjorn Pollock, 83 y.o. male is followed for:  Principal Problem:    Ischemic cardiomyopathy with acute exacerbation CHF this admission. EF 36 to 40% September 2017   Active Problems:    DM type 2 (diabetes mellitus, type 2)    COPD    NAYLA on home CPAP and oxygen    Atrial fibrillation on home Coumadin    CAD s/p CABG 1989    BPH (benign prostatic hyperplasia)    Chronic Lower extremity edema/venous stasis    Acute on chronic kidney disease    Urinary incontinence    History of CVA possibly related to atrial fibrillation    Valvular heart disease. Moderate MR and moderate AI    Obesity    Acute mixed hypoxemic/hypercarbic respiratory failure BiPAP dependent. Multifactorial     Encephalopathy acute. Multifactorial       SUBJECTIVE     Subjective    83-year-old  white male admitted 12/29/17 for exacerbation of congestive heart failure and possible pneumonia. Despite diuresis, empiric antimicrobial coverage, and maximal support he has become increasingly somnolent and hypercarbic and is basically BiPAP dependent. He made it clear to his family he did not wish to be on ventilatory support but he wished medications, CPR, and cardioversion if necessary to sustain life. His daughter is an ICU nurse and realizes this is an appropriate level of care at this point, but her mother apparently promised her  that he would get a trial of support should he arrest (although they do not want intubation or ventilatory support).    He remains tachypneic on BiPAP. Now renal function worsened with diuresis. He is afebrile. He is unable to eat.     The patient's relevant past medical, surgical and social history were reviewed and updated in Epic as appropriate.    OBJECTIVE     /69  Pulse 94  Temp 98.3 °F (36.8 °C) (Axillary)   Resp (!) 30  Ht 182.9 cm (72\")  Wt 107 kg (236 lb 3.2 oz)  SpO2 93%  BMI 32.03 kg/m2  Oxygen Concentration (%): 50      Flowsheet " "Rows         First Filed Value    Admission Height  182.9 cm (72\") Documented at 12/29/2017 1106    Admission Weight  104 kg (230 lb) Documented at 12/29/2017 1106        Intake & Output (last day)       01/06 0701 - 01/07 0700 01/07 0701 - 01/08 0700    P.O. 0     I.V. (mL/kg) 142 (1.3)     IV Piggyback 220     Total Intake(mL/kg) 362 (3.4)     Urine (mL/kg/hr) 1825 (0.7)     Stool      Total Output 1825      Net -1463                  Objective    Exam:  General Exam:  Elderly, obese stuporous white male   HEENT: Pupils equal and reactive BiPAP mask in place  Neck:                          Supple, no JVD, thyromegaly, or adenopathy. Short and fat making evaluation difficult  Lungs: Diminished breath sounds. Coarse rhonchi bilaterally  Cardiovascular: Regular rate and rhythm, decreased S1 and S2 with systolic murmer  Abdomen: Soft nontender without organomegaly or masses. Morbidly obese   and rectal: White catheter in place  Extremities: No cyanosis or clubbing but 2+ lower extremity edema and evidence of venous stasis  Neurologic:                 Somnolent and poorly responsive except to discomfort. No focal deficits noted. Spontaneous movement of extremities    Chest X-Ray 1/7/18: Cardiac silhouette enlarged. Sternotomy wires noted. Dense calcification of the aortic knob. Bilateral edematous changes right greater than left, no change compared to January 5    INFUSIONS         Results from last 7 days  Lab Units 01/07/18  0404 01/05/18  0503 01/04/18  0401   WBC 10*3/mm3 14.01* 20.01* 12.94*   HEMOGLOBIN g/dL 11.2* 12.1* 11.8*   HEMATOCRIT % 39.7 42.6 39.9   PLATELETS 10*3/mm3 66* 132* 125*       Results from last 7 days  Lab Units 01/07/18  0404 01/05/18  0503   SODIUM mmol/L 160* 151*   POTASSIUM mmol/L 2.7* 3.4*   CHLORIDE mmol/L 111* 104   CO2 mmol/L 39.0* 33.0*   BUN mg/dL 88* 65*   CREATININE mg/dL 2.30* 1.90*   GLUCOSE mg/dL 139* 147*   CALCIUM mg/dL 8.8 9.6       Results from last 7 days  Lab Units " 01/03/18  0505   MAGNESIUM mg/dL 2.2   PHOSPHORUS mg/dL 3.0       Lab Results   Component Value Date    SEDRATE 33 (H) 11/21/2017     Lab Results   Component Value Date    .0 (H) 01/04/2018     Lab Results   Component Value Date    CKTOTAL 59 12/17/2014    CKMB 0.9 12/17/2014    CKMBINDEX CKMB Index not calculated when CK Total <80 12/17/2014    TROPONINI 0.036 02/27/2017     Lab Results   Component Value Date    TSH 2.147 11/21/2017     Lab Results   Component Value Date    LACTATE 1.4 01/03/2018     No results found for: CORTISOL      Results from last 7 days  Lab Units 01/05/18  0352 01/04/18  1106 01/03/18  0459 01/02/18  2103 01/02/18  1652 01/01/18  1344   PH, ARTERIAL pH units 7.281* 7.319* 7.379 7.261* 7.330 7.342*   PCO2, ARTERIAL mm Hg 72.2* 66.4* 51.8* 63.6* 56.3 51.5*   PO2 ART mm Hg 71.9* 78.0* 73.3* 89.7 76.5 87.0   HCO3 ART mmol/L 34.0* 34.1* 30.5* 28.6* 29.7 27.9*   FIO2 % 40 40 40 50  --  40         I reviewed the patient's results, images and medication.    Assessment/Plan   ASSESSMENT      Principal Problem:    Ischemic cardiomyopathy with acute exacerbation CHF this admission. EF 36 to 40% September 2017   Active Problems:    DM type 2 (diabetes mellitus, type 2)    COPD    NAYLA on home CPAP and oxygen    Atrial fibrillation on home Coumadin    CAD s/p CABG 1989    BPH (benign prostatic hyperplasia)    Chronic Lower extremity edema/venous stasis    Acute on chronic kidney disease    Urinary incontinence    History of CVA possibly related to atrial fibrillation    Valvular heart disease. Moderate MR and moderate AI    Obesity    Acute mixed hypoxemic/hypercarbic respiratory failure BiPAP dependent. Multifactorial     Encephalopathy acute. Multifactorial      DISCUSSION: Progressive decline. Cardiology tells me they have nothing to offer at this point. Main focus is comfort and yet the wife wishes CPR and possible cardioversion but no intubation. In a patient with acute on chronic respiratory  failure and high PCO2's this is futile. Today he is holding his saturations at 94% but respiratory rate can get into the 50s. Chest x-ray is not improving despite diuresis and now serum creatinine is up to 2.3 with aggressive diuresis. WBC decreased from 20,000-14,000. He remains on Zosyn.     PLAN   1. Continue present therapy  2. Comfort measures  3. Continue diuretics  4. Change all meds possible to IV  5.  Adjust Zosyn for declining renal function          I discussed the patient's findings and my recommendations with patient, family and nursing staff    Time spent 30min (It does not include procedure time).    Rowan Arredondo MD  Intensive Care Medicine  01/07/18 9:49 AM

## 2018-01-07 NOTE — SIGNIFICANT NOTE
Exam confirms with auscultation zero audible heart tones and zero audible respirations. Mr.Donald ZULEIKA Pollock was pronounced dead at 1830. Family at bedside.  MD notified by Patient's RN.    Sondra Ulloa RN  Clinical House Supervisor  1/7/2018 6:34 PM

## 2018-01-07 NOTE — SIGNIFICANT NOTE
Patient rr elevated/irregular to 66 per min, oxygen saturation 80-90% and both alarming constantly.  Patient family experience anxiety re: alarms, and requested alarms be turned down.  I explained to family that patient was actively deteriorating and that there was nothing that will fix the alarms, being that they are relate to his declining respiratory status.  Family states that they know he is dying and are ok with turing pulse of alarm off and leaving bipap on for now.  Palliative care team called and notified for advice, and notified of declining status, states will be here at 1400 to speak with family.

## 2018-01-07 NOTE — SIGNIFICANT NOTE
Dr Rodriguez notified of patient increased work of breathing and increased needs for morphine.  Patient wife at bedside and wishes for him to be comfortable and feels that is is working very had to breathe.  Orders received for morphine gtt, for dyspnea and comfort. Patient daughter Clarke called for update and notified of new orders, and clarke agreed with orders.

## 2018-01-07 NOTE — PLAN OF CARE
Problem: Fall Risk (Adult)  Goal: Absence of Falls  Outcome: Outcome(s) achieved Date Met: 01/07/18 01/07/18 0725   Fall Risk (Adult)   Absence of Falls achieves outcome       Problem: Respiratory Insufficiency (Adult)  Goal: Effective Ventilation  Outcome: Ongoing (interventions implemented as appropriate)   01/07/18 0725   Respiratory Insufficiency (Adult)   Effective Ventilation making progress toward outcome       Problem: Dying Patient, Actively (Adult)  Goal: Comfort/Pain Control  Outcome: Ongoing (interventions implemented as appropriate)   01/07/18 0725   Dying Patient, Actively (Adult)   Comfort/Pain Control making progress toward outcome

## 2018-01-07 NOTE — PROGRESS NOTES
Intensivist Note     1/6/2018  Hospital Day: 8      Mr. Bjorn Pollock, 83 y.o. male is followed for:  Principal Problem:    Ischemic cardiomyopathy with acute exacerbation CHF this admission. EF 36 to 40% September 2017   Active Problems:    CAD s/p CABG 1989    Valvular heart disease. Moderate MR and moderate AI    Acute mixed hypoxemic/hypercarbic respiratory failure BiPAP dependent. Multifactorial     Encephalopathy acute. Multifactorial    COPD    NAYLA on home CPAP and oxygen    Atrial fibrillation on home Coumadin    Acute on chronic kidney disease    History of CVA possibly related to atrial fibrillation    DM type 2 (diabetes mellitus, type 2)    BPH (benign prostatic hyperplasia)    Chronic Lower extremity edema/venous stasis    Urinary incontinence    Obesity       SUBJECTIVE     Subjective    83-year-old  white male admitted 12/29/17 for exacerbation of congestive heart failure and possible pneumonia. Despite diuresis, empiric antimicrobial coverage, and maximal support he has become increasingly somnolent and hypercarbic and is basically BiPAP dependent. He made it clear to his family he did not wish to be on ventilatory support but he wished medications, CPR, and cardioversion if necessary to sustain life. His daughter is an ICU nurse and realizes this is an appropriate level of care at this point, but her mother apparently promised her  that he would get a trial of support should he arrest (although they do not want intubation or ventilatory support).    He remains stuporous on BiPAP and is requiring FiO2's of approximately 50%.. He will arouse to discomfort but otherwise is somnolent. Remains on BiPAP medially desaturate if comes off. Is not eating, or drinking and is developing progressive respiratory insufficiency.     The patient's relevant past medical, surgical and social history were reviewed and updated in Epic as appropriate.    OBJECTIVE     /75  Pulse 115  Temp 98.1 °F  "(36.7 °C) (Axillary)   Resp (!) 44  Ht 182.9 cm (72\")  Wt 107 kg (236 lb 3.2 oz)  SpO2 93%  BMI 32.03 kg/m2  Oxygen Concentration (%): 50      Flowsheet Rows         First Filed Value    Admission Height  182.9 cm (72\") Documented at 12/29/2017 1106    Admission Weight  104 kg (230 lb) Documented at 12/29/2017 1106        Intake & Output (last day)       01/06 0701 - 01/07 0700    P.O. 0    I.V. (mL/kg) 122 (1.1)    IV Piggyback 110    Total Intake(mL/kg) 232 (2.2)    Urine (mL/kg/hr) 1375 (0.8)    Stool     Total Output 1375    Net -1143               Objective    Exam:  General Exam:  Morbidly obese stuporous white male in NAD  HEENT: Pupils equal and reactive BiPAP mask in place  Neck:                          Supple, no JVD, thyromegaly, or adenopathy. Short and fat making evaluation difficult  Lungs: Diminished breath sounds. Coarse rhonchi. Moves anywhere from 350, to 700cc tidal volume on BiPAP  Cardiovascular: Regular rate and rhythm without murmurs or gallops.  Abdomen: Soft nontender without organomegaly or masses. Morbidly obese   and rectal: White catheter in place  Extremities: No cyanosis or clubbing but 2+ lower extremity edema and evidence of venous stasis  Neurologic:                 Somnolent and poorly responsive except to discomfort. No focal deficits noted. Spontaneous movement of extremities    Chest X-Ray 1/5/18 : Cardiomegaly with bilateral congestive heart failure. Possible small effusions    INFUSIONS         Results from last 7 days  Lab Units 01/05/18  0503 01/04/18  0401 01/03/18  0505   WBC 10*3/mm3 20.01* 12.94* 5.41   HEMOGLOBIN g/dL 12.1* 11.8* 11.1*   HEMATOCRIT % 42.6 39.9 38.1*   PLATELETS 10*3/mm3 132* 125* 109*       Results from last 7 days  Lab Units 01/05/18  0503 01/04/18  1500 01/04/18  0401   SODIUM mmol/L 151*  --  149*   POTASSIUM mmol/L 3.4* 3.0* 2.7*   CHLORIDE mmol/L 104  --  101   CO2 mmol/L 33.0*  --  35.0*   BUN mg/dL 65*  --  56*   CREATININE mg/dL 1.90*  " --  1.60*   GLUCOSE mg/dL 147*  --  120*   CALCIUM mg/dL 9.6  --  9.5       Results from last 7 days  Lab Units 01/03/18  0505   MAGNESIUM mg/dL 2.2   PHOSPHORUS mg/dL 3.0       Lab Results   Component Value Date    SEDRATE 33 (H) 11/21/2017     Lab Results   Component Value Date    .0 (H) 01/04/2018     Lab Results   Component Value Date    CKTOTAL 59 12/17/2014    CKMB 0.9 12/17/2014    CKMBINDEX CKMB Index not calculated when CK Total <80 12/17/2014    TROPONINI 0.036 02/27/2017     Lab Results   Component Value Date    TSH 2.147 11/21/2017     Lab Results   Component Value Date    LACTATE 1.4 01/03/2018     No results found for: CORTISOL      Results from last 7 days  Lab Units 01/05/18  0352 01/04/18  1106 01/03/18  0459 01/02/18  2103 01/02/18  1652 01/01/18  1344   PH, ARTERIAL pH units 7.281* 7.319* 7.379 7.261* 7.330 7.342*   PCO2, ARTERIAL mm Hg 72.2* 66.4* 51.8* 63.6* 56.3 51.5*   PO2 ART mm Hg 71.9* 78.0* 73.3* 89.7 76.5 87.0   HCO3 ART mmol/L 34.0* 34.1* 30.5* 28.6* 29.7 27.9*   FIO2 % 40 40 40 50  --  40         I reviewed the patient's results, images and medication.    Assessment/Plan   ASSESSMENT      Principal Problem:    Ischemic cardiomyopathy with acute exacerbation CHF this admission. EF 36 to 40% September 2017   Active Problems:    CAD s/p CABG 1989    Valvular heart disease. Moderate MR and moderate AI    Acute mixed hypoxemic/hypercarbic respiratory failure BiPAP dependent. Multifactorial     Encephalopathy acute. Multifactorial    COPD    NAYLA on home CPAP and oxygen    Atrial fibrillation on home Coumadin    Acute on chronic kidney disease    History of CVA possibly related to atrial fibrillation    DM type 2 (diabetes mellitus, type 2)    BPH (benign prostatic hyperplasia)    Chronic Lower extremity edema/venous stasis    Urinary incontinence    Obesity      DISCUSSION: Progressive decline. Cardiology tells me they have nothing to offer at this point. Main focus is comfort and yet  the wife wishes CPR and possible cardioversion but no intubation. In a patient with acute on chronic respiratory failure and high PCO2's this is futile. Long discussion with the daughter whom I know well and who is an excellent ICU nurse. It is the wish of her mother and her father that he receive this attempt at the time of arrest and we will honor their wishes. Difficult to give some of his medications as cannot place NG tube without causing discomfort and heaviness BiPAP mask off. We will change what we can    PLAN   1. Continue present therapy  2. Comfort measures  3. Continue diuretics  4. Change all meds possible to IV      Plan of care and goals reviewed with mulitdisciplinary team at daily rounds.    I discussed the patient's findings and my recommendations with patient, family and nursing staff    Time spent Critical care 35 min (It does not include procedure time).    Vincent Rodriguez MD  Intensive Care Medicine  01/06/18 11:54 PM

## 2018-01-07 NOTE — SIGNIFICANT NOTE
Dr archer notified that family requests to have bipap removed for comfort once all family arrives.  Dr archer agrees with this decision.

## 2018-01-07 NOTE — PLAN OF CARE
Problem: Skin Integrity Impairment, Risk/Actual (Adult)  Goal: Skin Integrity/Wound Healing  Outcome: Ongoing (interventions implemented as appropriate)      Problem: Fluid Volume Excess (Adult,Obstetrics,Pediatric)  Goal: Stable Weight  Outcome: Unable to achieve outcome(s) by discharge Date Met: 01/07/18    Goal: Balanced Intake/Output  Outcome: Unable to achieve outcome(s) by discharge Date Met: 01/07/18      Problem: Patient Care Overview (Adult)  Goal: Plan of Care Review  Outcome: Ongoing (interventions implemented as appropriate)   01/07/18 8427   Coping/Psychosocial Response Interventions   Plan Of Care Reviewed With patient;spouse;daughter;family;son   Patient Care Overview   Progress declining   Outcome Evaluation   Outcome Summary/Follow up Plan patient actively dying.       Problem: Respiratory Insufficiency (Adult)  Goal: Acid/Base Balance  Outcome: Unable to achieve outcome(s) by discharge Date Met: 01/07/18    Goal: Effective Ventilation  Outcome: Unable to achieve outcome(s) by discharge Date Met: 01/07/18      Problem: Dying Patient, Actively (Adult)  Goal: Comfort/Pain Control  Outcome: Ongoing (interventions implemented as appropriate)    Goal: Dying Process, Peace and Dignity  Outcome: Ongoing (interventions implemented as appropriate)

## 2018-01-07 NOTE — SIGNIFICANT NOTE
Nursing discuss the situation in detail with the family and myself. Family wished additional comfort and he was subsequently placed on a morphine drip. He is stuporous and extremely tachypneic and uncomfortable and daughter now wishes to take him off his BiPAP. All understand he will deteriorate and pass away. They still would like to give him 1 dose of epinephrine when this occurs. They do not want compressions, cardioversion, intubation or ventilatory support. We will honor their wishes    Vincent Rodriguez MD  Pulmonary and Critical Care Medicine  01/07/18 4:33 PM

## 2022-02-25 NOTE — NURSING NOTE
Pt found pulling on stewart. Urine bright red, but lightening. Notified APRN. Ordered a sitter, have family help sit to assist with pt. Family may need to re-reconsider pharmacological meds or restraints.   Routing refill request to provider for review/approval because:  Labs not current:  Creatinine, potassium  Patient needs to be seen because it has been more than 1 year since last office visit.  Elevated BP on file.     Vane Patel Rn

## 2022-12-01 NOTE — PROGRESS NOTES
Continued Stay Note  [facility]     Patient Name: Bjorn Pollock  MRN: [id number]  Today's Date: 10/2/2017    Admit Date: 9/19/2017          Discharge Plan       10/02/17 1009    Case Management/Social Work Plan    Plan [facility]    Patient/Family In Agreement With Plan yes    Additional Comments Per [facility], patient is medically ready for transfer. Notified Marie with [facility], and she will submit clinical updates to patient's insurance. Plan is for patient to transfer to [facility], PENDING insurance approval and bed availability. CM will continue to follow.               Discharge Codes     None        Expected Discharge Date and Time     Expected Discharge Date Expected Discharge Time    Oct 4, 2017             Maddison Blevins     hair removal not indicated